# Patient Record
Sex: FEMALE | Race: WHITE | Employment: FULL TIME | URBAN - METROPOLITAN AREA
[De-identification: names, ages, dates, MRNs, and addresses within clinical notes are randomized per-mention and may not be internally consistent; named-entity substitution may affect disease eponyms.]

---

## 2017-01-27 DIAGNOSIS — K21.00 GASTROESOPHAGEAL REFLUX DISEASE WITH ESOPHAGITIS: ICD-10-CM

## 2017-01-27 RX ORDER — OMEPRAZOLE 40 MG/1
CAPSULE, DELAYED RELEASE ORAL
Qty: 30 CAPSULE | Refills: 5 | Status: SHIPPED | OUTPATIENT
Start: 2017-01-27 | End: 2020-03-09 | Stop reason: ALTCHOICE

## 2017-10-10 ENCOUNTER — TELEPHONE (OUTPATIENT)
Dept: INTERNAL MEDICINE CLINIC | Age: 52
End: 2017-10-10

## 2017-10-10 DIAGNOSIS — Z12.39 BREAST CANCER SCREENING: Primary | ICD-10-CM

## 2017-10-10 DIAGNOSIS — Z12.11 COLON CANCER SCREENING: ICD-10-CM

## 2017-10-10 NOTE — TELEPHONE ENCOUNTER
Patient received a letter that she needs to set up a mammogram and a colonoscopy. Please place order for mammogram for [facility] and refer her for her colonoscopy.

## 2017-11-13 ENCOUNTER — OFFICE VISIT (OUTPATIENT)
Dept: GASTROENTEROLOGY | Age: 52
End: 2017-11-13
Payer: COMMERCIAL

## 2017-11-13 VITALS
DIASTOLIC BLOOD PRESSURE: 84 MMHG | SYSTOLIC BLOOD PRESSURE: 167 MMHG | HEART RATE: 68 BPM | TEMPERATURE: 97.3 F | BODY MASS INDEX: 38.46 KG/M2 | RESPIRATION RATE: 14 BRPM | OXYGEN SATURATION: 99 % | HEIGHT: 62 IN | WEIGHT: 209 LBS

## 2017-11-13 DIAGNOSIS — R19.7 DIARRHEA, UNSPECIFIED TYPE: ICD-10-CM

## 2017-11-13 DIAGNOSIS — K21.9 GASTROESOPHAGEAL REFLUX DISEASE WITHOUT ESOPHAGITIS: Primary | ICD-10-CM

## 2017-11-13 PROCEDURE — 99244 OFF/OP CNSLTJ NEW/EST MOD 40: CPT | Performed by: INTERNAL MEDICINE

## 2017-11-13 RX ORDER — SUCRALFATE 1 G/1
1 TABLET ORAL 4 TIMES DAILY
Qty: 120 TABLET | Refills: 3 | Status: SHIPPED | OUTPATIENT
Start: 2017-11-13 | End: 2020-03-09

## 2017-11-13 RX ORDER — OMEPRAZOLE 20 MG/1
20 TABLET, DELAYED RELEASE ORAL DAILY
COMMUNITY
End: 2020-03-09 | Stop reason: ALTCHOICE

## 2017-11-13 RX ORDER — POLYETHYLENE GLYCOL 3350 17 G/17G
POWDER, FOR SOLUTION ORAL
Qty: 255 G | Refills: 0 | Status: SHIPPED | OUTPATIENT
Start: 2017-11-13 | End: 2017-12-13

## 2017-11-13 ASSESSMENT — ENCOUNTER SYMPTOMS
ABDOMINAL DISTENTION: 1
ABDOMINAL PAIN: 1
COUGH: 0
SHORTNESS OF BREATH: 0
DIARRHEA: 1
BLOOD IN STOOL: 0
WHEEZING: 0
ANAL BLEEDING: 0
NAUSEA: 0
VOMITING: 1
BACK PAIN: 1
CONSTIPATION: 0
RECTAL PAIN: 0
ALLERGIC/IMMUNOLOGIC NEGATIVE: 1
RESPIRATORY NEGATIVE: 1

## 2017-11-13 NOTE — PROGRESS NOTES
Hematological: Bruises/bleeds easily. Psychiatric/Behavioral: Positive for sleep disturbance. The patient is nervous/anxious. Objective:   Physical Exam   Constitutional: She is oriented to person, place, and time. She appears well-developed and well-nourished. No distress. HENT:   Head: Normocephalic. Mouth/Throat: No oropharyngeal exudate. Eyes: Pupils are equal, round, and reactive to light. No scleral icterus. Neck: Normal range of motion. Neck supple. No JVD present. No tracheal deviation present. No thyromegaly present. Cardiovascular: Normal rate, regular rhythm, normal heart sounds and intact distal pulses. No murmur heard. Pulmonary/Chest: Effort normal and breath sounds normal. No respiratory distress. She has no wheezes. Abdominal: Soft. Bowel sounds are normal. She exhibits no distension. There is no tenderness. There is no rebound and no guarding. No ascites   Musculoskeletal: Normal range of motion. She exhibits no edema. Neurological: She is alert and oriented to person, place, and time. She has normal reflexes. Skin: Skin is warm. No rash noted. She is not diaphoretic. No erythema. No pallor. She is not diaphoretic   Psychiatric: She has a normal mood and affect. Her behavior is normal. Judgment and thought content normal.   Nursing note and vitals reviewed. Assessment:      1. Gastroesophageal reflux disease without esophagitis  EGD   2.  Diarrhea, unspecified type  COLONOSCOPY W/ OR W/O BIOPSY           Plan:      Carafate tabsHopefully will be covered with her insurance,   continue Prilosec, and proceed with the EGD and colonoscopy

## 2017-11-16 ENCOUNTER — HOSPITAL ENCOUNTER (OUTPATIENT)
Age: 52
Setting detail: OUTPATIENT SURGERY
Discharge: HOME | End: 2017-11-16
Attending: INTERNAL MEDICINE | Admitting: INTERNAL MEDICINE
Payer: COMMERCIAL

## 2017-11-16 VITALS
SYSTOLIC BLOOD PRESSURE: 120 MMHG | HEART RATE: 70 BPM | WEIGHT: 202 LBS | BODY MASS INDEX: 37.17 KG/M2 | DIASTOLIC BLOOD PRESSURE: 72 MMHG | OXYGEN SATURATION: 97 % | HEIGHT: 62 IN | TEMPERATURE: 97.7 F | RESPIRATION RATE: 16 BRPM

## 2017-11-16 PROCEDURE — 7100000010 HC PHASE II RECOVERY - FIRST 15 MIN: Performed by: INTERNAL MEDICINE

## 2017-11-16 PROCEDURE — 3609010300 HC COLONOSCOPY W/BIOPSY SINGLE/MULTIPLE: Performed by: INTERNAL MEDICINE

## 2017-11-16 PROCEDURE — 2580000003 HC RX 258: Performed by: INTERNAL MEDICINE

## 2017-11-16 PROCEDURE — 43239 EGD BIOPSY SINGLE/MULTIPLE: CPT | Performed by: INTERNAL MEDICINE

## 2017-11-16 PROCEDURE — 88305 TISSUE EXAM BY PATHOLOGIST: CPT

## 2017-11-16 PROCEDURE — 6360000002 HC RX W HCPCS: Performed by: INTERNAL MEDICINE

## 2017-11-16 PROCEDURE — 3609012400 HC EGD TRANSORAL BIOPSY SINGLE/MULTIPLE: Performed by: INTERNAL MEDICINE

## 2017-11-16 PROCEDURE — 45380 COLONOSCOPY AND BIOPSY: CPT | Performed by: INTERNAL MEDICINE

## 2017-11-16 PROCEDURE — 7100000011 HC PHASE II RECOVERY - ADDTL 15 MIN: Performed by: INTERNAL MEDICINE

## 2017-11-16 PROCEDURE — 6370000000 HC RX 637 (ALT 250 FOR IP): Performed by: INTERNAL MEDICINE

## 2017-11-16 PROCEDURE — 99152 MOD SED SAME PHYS/QHP 5/>YRS: CPT | Performed by: INTERNAL MEDICINE

## 2017-11-16 PROCEDURE — 99153 MOD SED SAME PHYS/QHP EA: CPT | Performed by: INTERNAL MEDICINE

## 2017-11-16 RX ORDER — MIDAZOLAM HYDROCHLORIDE 1 MG/ML
INJECTION INTRAMUSCULAR; INTRAVENOUS PRN
Status: DISCONTINUED | OUTPATIENT
Start: 2017-11-16 | End: 2017-11-16 | Stop reason: HOSPADM

## 2017-11-16 RX ORDER — SODIUM CHLORIDE, SODIUM LACTATE, POTASSIUM CHLORIDE, CALCIUM CHLORIDE 600; 310; 30; 20 MG/100ML; MG/100ML; MG/100ML; MG/100ML
INJECTION, SOLUTION INTRAVENOUS CONTINUOUS
Status: DISCONTINUED | OUTPATIENT
Start: 2017-11-16 | End: 2017-11-16 | Stop reason: HOSPADM

## 2017-11-16 RX ORDER — MEPERIDINE HYDROCHLORIDE 50 MG/ML
INJECTION INTRAMUSCULAR; INTRAVENOUS; SUBCUTANEOUS PRN
Status: DISCONTINUED | OUTPATIENT
Start: 2017-11-16 | End: 2017-11-16 | Stop reason: HOSPADM

## 2017-11-16 RX ADMIN — SODIUM CHLORIDE, POTASSIUM CHLORIDE, SODIUM LACTATE AND CALCIUM CHLORIDE: 600; 310; 30; 20 INJECTION, SOLUTION INTRAVENOUS at 10:19

## 2017-11-16 ASSESSMENT — PAIN - FUNCTIONAL ASSESSMENT: PAIN_FUNCTIONAL_ASSESSMENT: 0-10

## 2017-11-16 ASSESSMENT — PAIN SCALES - GENERAL: PAINLEVEL_OUTOF10: 0

## 2017-11-16 NOTE — H&P
Neuropsychiatric:  No referable complaints. GENERAL PHYSICAL EXAM:     Vitals: BP (!) 139/91   Pulse 73   Temp 97.5 °F (36.4 °C) (Oral)   Resp 16   Ht 5' 2\" (1.575 m)   Wt 202 lb (91.6 kg)   SpO2 98%   BMI 36.95 kg/m²  Body mass index is 36.95 kg/m². GENERAL APPEARANCE:   Thien Sims is 47 y.o.,  female, moderately obese, nourished, conscious, alert. Does not appear to be distress or pain at this time. SKIN:  Warm, dry, no cyanosis or jaundice. HEAD:  Normocephalic, atraumatic, no swelling or tenderness. EYES:  Pupils equal, reactive to light. EARS:  No discharge, no marked hearing loss. NOSE:  No rhinorrhea, epistaxis or septal deformity. THROAT:  Not congested. No ulceration bleeding or discharge. NECK:  No stiffness, trachea central.  No palpable masses or L.N.                 CHEST:  Symmetrical and equal on expansion. HEART:  RRR S1 > S2. No audible murmurs or gallops. LUNGS:  Equal on expansion, normal breath sounds. No adventitious sounds. ABDOMEN:  Bowel sounds are absent. Obese. Soft on palpation. No localized tenderness. No guarding or rigidity. No palpable organomegaly. LYMPHATICS:  No palpable cervical lymphadenopathy. LOCOMOTOR, BACK AND SPINE:  No tenderness or deformities. EXTREMITIES:  Symmetrical, no pedal edema. Trents sign negative. No discoloration or ulcerations. NEUROLOGIC:  The patient is conscious, alert, oriented, No apparent focal sensory or motor deficits. PROVISIONAL DIAGNOSES / SURGERY:      Diarrhea. EGD, Colonoscopy.     Patient Active Problem List    Diagnosis Date Noted    Diarrhea 11/13/2017    Bulge of lumbar disc without myelopathy 10/16/2014    Drug-seeking behavior 10/16/2014    Opioid dependence ([facility] [address] 10/16/2014    Chronic back pain greater than 3 months duration 05/27/2014    Gastroesophageal reflux disease without esophagitis 05/27/2014    Suprapubic tenderness 05/27/2014    Urinary frequency 05/27/2014    FANTA (generalized anxiety disorder) 05/27/2014           Alisa Hooper PA-C on 11/16/2017 at 10:24 AM

## 2017-11-16 NOTE — OP NOTE
PROCEDURE NOTE    DATE OF PROCEDURE: 11/16/2017    SURGEON: Ciara Valentine MD  Facility : [address] [facility]  ASSISTANT: None  Anesthesia: Demerol 125 mg IV, Versed 5 mg IV  PREOPERATIVE DIAGNOSIS:   Diarrhea     POSTOPERATIVE DIAGNOSIS: as described below    OPERATION: Total colonoscopy     ANESTHESIA: Moderate Sedation    ESTIMATED BLOOD LOSS: less than 50     COMPLICATIONS: None. SPECIMENS:  Was Obtained:   Tiny cecal polyp , 5 mm , cold bx forceps     HISTORY: The patient is a 47y.o. year old female with history of above preop diagnosis. I recommended colonoscopy with possible biopsy or polypectomy and I explained the risk, benefits, expected outcome, and alternatives to the procedure. Risks included but are not limited to bleeding, infection, respiratory distress, hypotension, and perforation of the colon and possibility of missing a lesion. The patient understands and is in agreement. PROCEDURE: The patient was given IV conscious sedation. The patient's SPO2 remained above 90% throughout the procedure. The colonoscope was inserted per rectum and advanced under direct vision to the cecum without difficulty. The prep was good. Findings:  Terminal ileum: normal    Cecum/Ascending colon: abnormal: Tiny cecal polyp , 5 mm , cold bx forceps     Transverse colon: normal    Descending/Sigmoid colon: abnormal: diverticulosis     Rectum/Anus: examined in normal and retroflexed positions and was normal    Withdrawal Time was (minutes): 10    The colon was decompressed and the scope was removed. The patient tolerated the procedure well. Recommendations/Plan:   1. Lifestyle and dietary modifications as discussed  2. F/U Biopsies  3. F/U In OfficeYes  4. Discussed with the family  5.  Repeat colonoscopy [id number]    Electronically signed by Ciara Valentine MD  on 11/16/2017 at 11:39 AM

## 2017-11-17 LAB — SURGICAL PATHOLOGY REPORT: NORMAL

## 2017-12-05 DIAGNOSIS — R19.7 DIARRHEA, UNSPECIFIED TYPE: ICD-10-CM

## 2017-12-28 ENCOUNTER — TELEPHONE (OUTPATIENT)
Dept: INTERNAL MEDICINE CLINIC | Age: 52
End: 2017-12-28

## 2018-03-27 ENCOUNTER — OFFICE VISIT (OUTPATIENT)
Dept: FAMILY MEDICINE CLINIC | Age: 53
End: 2018-03-27

## 2018-03-27 VITALS
TEMPERATURE: 98.6 F | RESPIRATION RATE: 16 BRPM | HEIGHT: 62 IN | SYSTOLIC BLOOD PRESSURE: 144 MMHG | HEART RATE: 82 BPM | WEIGHT: 215 LBS | DIASTOLIC BLOOD PRESSURE: 88 MMHG | OXYGEN SATURATION: 98 % | BODY MASS INDEX: 39.56 KG/M2

## 2018-03-27 DIAGNOSIS — J20.9 ACUTE BRONCHITIS, UNSPECIFIED ORGANISM: ICD-10-CM

## 2018-03-27 DIAGNOSIS — S00.82XA: Primary | ICD-10-CM

## 2018-03-27 DIAGNOSIS — L08.9: Primary | ICD-10-CM

## 2018-03-27 DIAGNOSIS — J06.9 UPPER RESPIRATORY TRACT INFECTION, UNSPECIFIED TYPE: ICD-10-CM

## 2018-03-27 PROCEDURE — 99212 OFFICE O/P EST SF 10 MIN: CPT | Performed by: FAMILY MEDICINE

## 2018-03-27 RX ORDER — FLUCONAZOLE 150 MG/1
150 TABLET ORAL ONCE
Qty: 1 TABLET | Refills: 0 | Status: SHIPPED | OUTPATIENT
Start: 2018-03-27 | End: 2018-03-27

## 2018-03-27 RX ORDER — SULFAMETHOXAZOLE AND TRIMETHOPRIM 800; 160 MG/1; MG/1
1 TABLET ORAL 2 TIMES DAILY
Qty: 20 TABLET | Refills: 0 | Status: SHIPPED | OUTPATIENT
Start: 2018-03-27 | End: 2018-04-06

## 2018-03-27 RX ORDER — ACYCLOVIR 400 MG/1
400 TABLET ORAL 2 TIMES DAILY
Qty: 10 TABLET | Refills: 1 | Status: SHIPPED | OUTPATIENT
Start: 2018-03-27 | End: 2018-04-01

## 2018-03-27 RX ORDER — CEPHALEXIN 500 MG/1
500 CAPSULE ORAL 3 TIMES DAILY
Qty: 30 CAPSULE | Refills: 0 | Status: SHIPPED | OUTPATIENT
Start: 2018-03-27 | End: 2018-04-06

## 2018-03-27 ASSESSMENT — ENCOUNTER SYMPTOMS
SORE THROAT: 0
SHORTNESS OF BREATH: 0
COUGH: 1
RHINORRHEA: 1
SINUS PAIN: 0
TROUBLE SWALLOWING: 0
CHEST TIGHTNESS: 0
SINUS PRESSURE: 0
VOMITING: 0
EYE PAIN: 0
ABDOMINAL PAIN: 0
EYE REDNESS: 0
NAUSEA: 0
WHEEZING: 0
EYE DISCHARGE: 1
DIARRHEA: 1
BACK PAIN: 1
VOICE CHANGE: 1
EYE ITCHING: 0

## 2018-03-27 NOTE — PATIENT INSTRUCTIONS
later. If you notice any problems or new symptoms, get medical treatment right away. Follow-up care is a key part of your treatment and safety. Be sure to make and go to all appointments, and call your doctor if you are having problems. It's also a good idea to know your test results and keep a list of the medicines you take. How can you care for yourself at home? · To prevent dehydration, drink plenty of fluids, enough so that your urine is light yellow or clear like water. Choose water and other caffeine-free clear liquids until you feel better. If you have kidney, heart, or liver disease and have to limit fluids, talk with your doctor before you increase the amount of fluids you drink. · Take an over-the-counter pain medicine, such as acetaminophen (Tylenol), ibuprofen (Advil, Motrin), or naproxen (Aleve). Read and follow all instructions on the label. · Before you use cough and cold medicines, check the label. These medicines may not be safe for young children or for people with certain health problems. · Be careful when taking over-the-counter cold or flu medicines and Tylenol at the same time. Many of these medicines have acetaminophen, which is Tylenol. Read the labels to make sure that you are not taking more than the recommended dose. Too much acetaminophen (Tylenol) can be harmful. · Get plenty of rest.  · Do not smoke or allow others to smoke around you. If you need help quitting, talk to your doctor about stop-smoking programs and medicines. These can increase your chances of quitting for good. When should you call for help? Call 911 anytime you think you may need emergency care. For example, call if:  ? · You have severe trouble breathing. ?Call your doctor now or seek immediate medical care if:  ? · You seem to be getting much sicker. ? · You have new or worse trouble breathing. ? · You have a new or higher fever. ? · You have a new rash. ? Watch closely for changes in your health, yourself at home? · Take your antibiotics as directed. Do not stop taking them just because you feel better. You need to take the full course of antibiotics. · Prop up the infected area on pillows to reduce pain and swelling. Try to keep the area above the level of your heart as often as you can. · If your doctor told you how to care for your wound, follow your doctor's instructions. If you did not get instructions, follow this general advice:  ¨ Wash the wound with clean water 2 times a day. Don't use hydrogen peroxide or alcohol, which can slow healing. ¨ You may cover the wound with a thin layer of petroleum jelly, such as Vaseline, and a nonstick bandage. ¨ Apply more petroleum jelly and replace the bandage as needed. · Be safe with medicines. Take pain medicines exactly as directed. ¨ If the doctor gave you a prescription medicine for pain, take it as prescribed. ¨ If you are not taking a prescription pain medicine, ask your doctor if you can take an over-the-counter medicine. To prevent cellulitis in the future  · Try to prevent cuts, scrapes, or other injuries to your skin. Cellulitis most often occurs where there is a break in the skin. · If you get a scrape, cut, mild burn, or bite, wash the wound with clean water as soon as you can to help avoid infection. Don't use hydrogen peroxide or alcohol, which can slow healing. · If you have swelling in your legs (edema), support stockings and good skin care may help prevent leg sores and cellulitis. · Take care of your feet, especially if you have diabetes or other conditions that increase the risk of infection. Wear shoes and socks. Do not go barefoot. If you have athlete's foot or other skin problems on your feet, talk to your doctor about how to treat them. When should you call for help?   Call your doctor now or seek immediate medical care if:  ? · You have signs that your infection is getting worse, such as:  ¨ Increased pain, swelling, warmth, or redness. ¨ Red streaks leading from the area. ¨ Pus draining from the area. ¨ A fever. ? · You get a rash. ? Watch closely for changes in your health, and be sure to contact your doctor if:  ? · You are not getting better after 1 day (24 hours). ? · You do not get better as expected. Where can you learn more? Go to [email or web address].[email or web address]. org and sign in to your [employer] account. Enter A132 in the StarGreetz box to learn more about \"Cellulitis: Care Instructions. \"     If you do not have an account, please click on the \"Sign Up Now\" link. Current as of: October 13, 2016  Content Version: 11.5  © 0099-9123 [employer], [employer]orporated. Care instructions adapted under license by [facility] ([facility]). If you have questions about a medical condition or this instruction, always ask your healthcare professional. [address] any warranty or liability for your use of this information. Please go to the Emergency dept if you get worse.

## 2018-03-27 NOTE — PROGRESS NOTES
Breast Cancer Paternal Aunt     Cancer Maternal Cousin        Social History   Substance Use Topics    Smoking status: Former Smoker     Packs/day: 0.50     Years: 3.00     Types: Cigarettes     Quit date: 1/1/1988    Smokeless tobacco: Never Used    Alcohol use Yes      Comment: occasionally      Current Outpatient Prescriptions   Medication Sig Dispense Refill    mupirocin (BACTROBAN) 2 % ointment Apply topically 3 times daily. 1 Tube 0    cephALEXin (KEFLEX) 500 MG capsule Take 1 capsule by mouth 3 times daily for 10 days 30 capsule 0    sulfamethoxazole-trimethoprim (BACTRIM DS) 800-160 MG per tablet Take 1 tablet by mouth 2 times daily for 10 days 20 tablet 0    fluconazole (DIFLUCAN) 150 MG tablet Take 1 tablet by mouth once for 1 dose 1 tablet 0    acyclovir (ZOVIRAX) 400 MG tablet Take 1 tablet by mouth 2 times daily for 5 days 10 tablet 1    tiZANidine (ZANAFLEX) 2 MG tablet Take 2 mg by mouth 2 times daily.  HYDROcodone-acetaminophen (NORCO) 5-325 MG per tablet Take 1 tablet by mouth every 6 hours as needed for Pain.  omeprazole (PRILOSEC OTC) 20 MG tablet Take 20 mg by mouth daily      sucralfate (CARAFATE) 1 GM tablet Take 1 tablet by mouth 4 times daily 120 tablet 3    omeprazole (PRILOSEC) 40 MG delayed release capsule TAKE 1 CAPSULE BY MOUTH ONCE A DAY 30 capsule 5    vitamin D (ERGOCALCIFEROL) 79045 UNITS CAPS capsule Take 1 capsule by mouth once a week For 12 weeks 12 capsule 0    metoclopramide (REGLAN) 10 MG tablet Take 1 tablet by mouth 3 times daily (with meals) 120 tablet 3     No current facility-administered medications for this visit.       No Known Allergies    Health Maintenance   Topic Date Due    HIV screen  08/21/1981    DTaP/Tdap/Td vaccine (1 - Tdap) 08/21/1985    Shingles Vaccine (1 of 2 - 2 Dose Series) 08/21/2016    Cervical cancer screen  01/20/2017    Breast cancer screen  02/14/2017    Flu vaccine (1) 09/01/2017    Lipid screen  03/23/2021    Colon

## 2018-08-15 ENCOUNTER — OFFICE VISIT (OUTPATIENT)
Dept: FAMILY MEDICINE CLINIC | Age: 53
End: 2018-08-15

## 2018-08-15 VITALS
OXYGEN SATURATION: 98 % | HEART RATE: 80 BPM | WEIGHT: 216 LBS | DIASTOLIC BLOOD PRESSURE: 83 MMHG | BODY MASS INDEX: 42.41 KG/M2 | TEMPERATURE: 98 F | SYSTOLIC BLOOD PRESSURE: 136 MMHG | RESPIRATION RATE: 16 BRPM | HEIGHT: 60 IN

## 2018-08-15 DIAGNOSIS — J06.0 SORE THROAT AND LARYNGITIS: Primary | ICD-10-CM

## 2018-08-15 LAB — S PYO AG THROAT QL: NORMAL

## 2018-08-15 PROCEDURE — 99212 OFFICE O/P EST SF 10 MIN: CPT | Performed by: FAMILY MEDICINE

## 2018-08-15 PROCEDURE — 87880 STREP A ASSAY W/OPTIC: CPT | Performed by: FAMILY MEDICINE

## 2018-08-15 RX ORDER — FLUTICASONE PROPIONATE 50 MCG
1 SPRAY, SUSPENSION (ML) NASAL 2 TIMES DAILY
Qty: 1 BOTTLE | Refills: 2 | Status: SHIPPED | OUTPATIENT
Start: 2018-08-15 | End: 2020-03-09 | Stop reason: ALTCHOICE

## 2018-08-15 RX ORDER — AMOXICILLIN 875 MG/1
875 TABLET, COATED ORAL 2 TIMES DAILY
Qty: 20 TABLET | Refills: 0 | Status: SHIPPED | OUTPATIENT
Start: 2018-08-15 | End: 2018-08-25

## 2018-08-15 RX ORDER — GUAIFENESIN 600 MG/1
600 TABLET, EXTENDED RELEASE ORAL 2 TIMES DAILY
Qty: 20 TABLET | Refills: 0 | Status: SHIPPED | OUTPATIENT
Start: 2018-08-15 | End: 2018-08-25

## 2018-08-15 ASSESSMENT — PATIENT HEALTH QUESTIONNAIRE - PHQ9
SUM OF ALL RESPONSES TO PHQ9 QUESTIONS 1 & 2: 0
SUM OF ALL RESPONSES TO PHQ QUESTIONS 1-9: 0
SUM OF ALL RESPONSES TO PHQ QUESTIONS 1-9: 0
2. FEELING DOWN, DEPRESSED OR HOPELESS: 0
1. LITTLE INTEREST OR PLEASURE IN DOING THINGS: 0

## 2018-08-15 ASSESSMENT — ENCOUNTER SYMPTOMS
RESPIRATORY NEGATIVE: 1
SWOLLEN GLANDS: 1
ALLERGIC/IMMUNOLOGIC NEGATIVE: 1
SINUS PAIN: 1
SINUS PRESSURE: 1
GASTROINTESTINAL NEGATIVE: 1
EYES NEGATIVE: 1

## 2018-08-15 NOTE — PROGRESS NOTES
[address] [facility]   [address] [facility]  [address] [facility] [address]  Dept: [phone]  Dept Fax: [phone]    Dolores Casarez is a 47 y.o. female who presents today for her medical conditions/complaints as noted below. Dolores Casarez is c/o of   Chief Complaint   Patient presents with    Pharyngitis    Otalgia         HPI:     This patient is a 64-year-old white female presents today with a sore throat. She has a past medical history significant for chronic pain and gastroesophageal reflux disease. Patient states her throat has been sore for the past couple days and there has been ill contacts. There is postnasal drip and drainage along with cough. We will evaluate and treat. Pharyngitis   This is a new problem. The current episode started in the past 7 days. Associated symptoms include congestion and swollen glands. Pertinent negatives include no fatigue. The symptoms are aggravated by drinking and eating.        Hemoglobin A1C (%)   Date Value   03/23/2016 5.5   05/03/2013 5.4             ( goal A1C is < 7)   No results found for: LABMICR  LDL Cholesterol (mg/dL)   Date Value   03/23/2016 122   04/12/2014 134 (H)   05/03/2013 119 (H)       (goal LDL is <100)   AST (U/L)   Date Value   03/23/2016 11     ALT (U/L)   Date Value   03/23/2016 14     BUN (mg/dL)   Date Value   03/23/2016 12     BP Readings from Last 3 Encounters:   08/15/18 136/83   03/27/18 (!) 144/88   11/16/17 120/72          (goal 120/80)    Past Medical History:   Diagnosis Date    Chronic back pain     Diarrhea     Distal radius fracture 1/28/13    GERD (gastroesophageal reflux disease)     History of depression       Past Surgical History:   Procedure Laterality Date    BLADDER SUSPENSION      FRACTURE SURGERY  2/7/2013    right distial radius orif    HYSTERECTOMY      has ovaries    KNEE ARTHROSCOPY      right    HI COLONOSCOPY W/BIOPSY SINGLE/MULTIPLE N/A 11/16/2017 COLONOSCOPY WITH BIOPSY OF POLYP performed by Jose Medley MD at [address] [place] [address] EGD TRANSORAL BIOPSY SINGLE/MULTIPLE N/A 11/16/2017    EGD BIOPSY performed by Jose Medley MD at [place] [address]         Family History   Problem Relation Age of Onset    High Blood Pressure Mother     Depression Mother     Heart Surgery Father     Other Brother         scoliosis    Colon Cancer Maternal Grandfather     COPD Maternal Grandfather     Cancer Maternal Grandfather         lung    Breast Cancer Paternal Aunt     Cancer Maternal Cousin        Social History   Substance Use Topics    Smoking status: Former Smoker     Packs/day: 0.50     Years: 3.00     Types: Cigarettes     Quit date: 1/1/1988    Smokeless tobacco: Never Used    Alcohol use Yes      Comment: occasionally      Current Outpatient Prescriptions   Medication Sig Dispense Refill    amoxicillin (AMOXIL) 875 MG tablet Take 1 tablet by mouth 2 times daily for 10 days 20 tablet 0    fluticasone (FLONASE) 50 MCG/ACT nasal spray 1 spray by Nasal route 2 times daily for 14 days 1 Bottle 2    guaiFENesin (MUCINEX) 600 MG extended release tablet Take 1 tablet by mouth 2 times daily for 10 days 20 tablet 0    mupirocin (BACTROBAN) 2 % ointment Apply topically 3 times daily. 1 Tube 0    omeprazole (PRILOSEC OTC) 20 MG tablet Take 20 mg by mouth daily      sucralfate (CARAFATE) 1 GM tablet Take 1 tablet by mouth 4 times daily 120 tablet 3    omeprazole (PRILOSEC) 40 MG delayed release capsule TAKE 1 CAPSULE BY MOUTH ONCE A DAY 30 capsule 5    vitamin D (ERGOCALCIFEROL) 75293 UNITS CAPS capsule Take 1 capsule by mouth once a week For 12 weeks 12 capsule 0    metoclopramide (REGLAN) 10 MG tablet Take 1 tablet by mouth 3 times daily (with meals) 120 tablet 3    tiZANidine (ZANAFLEX) 2 MG tablet Take 2 mg by mouth 2 times daily.  HYDROcodone-acetaminophen (NORCO) 5-325 MG per tablet Take 1 tablet by mouth every 6 hours as needed for Pain.

## 2020-02-20 ENCOUNTER — OFFICE VISIT (OUTPATIENT)
Dept: FAMILY MEDICINE CLINIC | Age: 55
End: 2020-02-20
Payer: COMMERCIAL

## 2020-02-20 VITALS
BODY MASS INDEX: 37.74 KG/M2 | TEMPERATURE: 99.3 F | OXYGEN SATURATION: 98 % | DIASTOLIC BLOOD PRESSURE: 85 MMHG | SYSTOLIC BLOOD PRESSURE: 130 MMHG | HEART RATE: 73 BPM | HEIGHT: 63 IN | WEIGHT: 213 LBS

## 2020-02-20 PROCEDURE — 99212 OFFICE O/P EST SF 10 MIN: CPT | Performed by: FAMILY MEDICINE

## 2020-02-20 RX ORDER — FLUCONAZOLE 150 MG/1
150 TABLET ORAL ONCE
Qty: 1 TABLET | Refills: 0 | Status: SHIPPED | OUTPATIENT
Start: 2020-02-20 | End: 2020-02-20

## 2020-02-20 RX ORDER — NAPROXEN 500 MG/1
500 TABLET ORAL 2 TIMES DAILY WITH MEALS
COMMUNITY
End: 2020-07-16 | Stop reason: ALTCHOICE

## 2020-02-20 RX ORDER — AMOXICILLIN 500 MG/1
500 TABLET, FILM COATED ORAL 3 TIMES DAILY
Qty: 30 TABLET | Refills: 0 | Status: SHIPPED | OUTPATIENT
Start: 2020-02-20 | End: 2020-03-01

## 2020-02-20 ASSESSMENT — ENCOUNTER SYMPTOMS
DIARRHEA: 0
EYE REDNESS: 0
RHINORRHEA: 0
SINUS PRESSURE: 1
SINUS PAIN: 1
EYE PAIN: 0
SINUS COMPLAINT: 1
WHEEZING: 0
VOMITING: 0
NAUSEA: 0
HOARSE VOICE: 1
CHEST TIGHTNESS: 0
BACK PAIN: 1
EYE ITCHING: 0
COUGH: 1
ABDOMINAL PAIN: 0
SHORTNESS OF BREATH: 0
SORE THROAT: 1
SWOLLEN GLANDS: 0

## 2020-02-20 ASSESSMENT — PATIENT HEALTH QUESTIONNAIRE - PHQ9: DEPRESSION UNABLE TO ASSESS: PT REFUSES

## 2020-02-20 NOTE — PROGRESS NOTES
[address] [facility]   [facility] Sal[facility]  Dept: [phone]  Dept Fax: [phone]    Roger Harrison is a 49 y.o. female who presents today for her medicalconditions/complaints as noted below. 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 Sinus Problem   This is a new problem. The current episode started in the past 7 days (2 days). The problem has been gradually worsening since onset. There has been no fever. Her pain is at a severity of 6/10. The pain is moderate. Associated symptoms include chills, congestion, coughing, ear pain, headaches, a hoarse voice, sinus pressure, sneezing and a sore throat. Pertinent negatives include no diaphoresis, neck pain, shortness of breath or swollen glands. (Occasional cough, mild sore throat.) Past treatments include nothing.        Past Medical History:   Diagnosis Date    Chronic back pain     Diarrhea     Distal radius fracture 1/28/13    GERD (gastroesophageal reflux disease)     History of depression       Past Surgical History:   Procedure Laterality Date    BLADDER SUSPENSION      FRACTURE SURGERY  2/7/2013    right distial radius orif    HYSTERECTOMY      has ovaries    KNEE ARTHROSCOPY      right    [place] COLONOSCOPY W/BIOPSY SINGLE/MULTIPLE N/A 11/16/2017    COLONOSCOPY WITH BIOPSY OF POLYP performed by Robert Carias MD at [address] [place] [address] EGD TRANSORAL BIOPSY SINGLE/MULTIPLE N/A 11/16/2017    EGD BIOPSY performed by Robert Carias MD at [facility]         Family History   Problem Relation Age of Onset    High Blood Pressure Mother     Depression Mother     Heart Surgery Father     Other Brother         scoliosis    Colon Cancer Maternal Grandfather     COPD Maternal Grandfather     Cancer Maternal Grandfather         lung    Breast Cancer Paternal Aunt     Cancer Maternal Cousin        Social History     Tobacco Use    Smoking status: Former Smoker     Packs/day: 0.50     Years: 3.00     Pack years: 1.50     Types: Cigarettes     Last attempt to quit: 1988     Years since quittin.1    Smokeless tobacco: Never Used   Substance Use Topics    Alcohol use: Yes     Comment: occasionally      Current Outpatient Medications   Medication Sig Dispense Refill    naproxen (NAPROSYN) 500 MG tablet Take 500 mg by mouth 2 times daily (with meals)      fluconazole (DIFLUCAN) 150 MG tablet Take 1 tablet by mouth once for 1 dose 1 tablet 0    Amoxicillin 500 MG TABS Take 500 mg by mouth 3 times daily for 10 days 30 tablet 0    tiZANidine (ZANAFLEX) 2 MG tablet Take 2 mg by mouth 2 times daily.  HYDROcodone-acetaminophen (NORCO) 5-325 MG per tablet Take 1 tablet by mouth every 6 hours as needed for Pain.  fluticasone (FLONASE) 50 MCG/ACT nasal spray 1 spray by Nasal route 2 times daily for 14 days 1 Bottle 2    mupirocin (BACTROBAN) 2 % ointment Apply topically 3 times daily. 1 Tube 0    omeprazole (PRILOSEC OTC) 20 MG tablet Take 20 mg by mouth daily      sucralfate (CARAFATE) 1 GM tablet Take 1 tablet by mouth 4 times daily (Patient not taking: Reported on 2020) 120 tablet 3    omeprazole (PRILOSEC) 40 MG delayed release capsule TAKE 1 CAPSULE BY MOUTH ONCE A DAY (Patient not taking: Reported on 2020) 30 capsule 5    vitamin D (ERGOCALCIFEROL) 02526 UNITS CAPS capsule Take 1 capsule by mouth once a week For 12 weeks (Patient not taking: Reported on 2020) 12 capsule 0    metoclopramide (REGLAN) 10 MG tablet Take 1 tablet by mouth 3 times daily (with meals) 120 tablet 3     No current facility-administered medications for this visit.       No Known Allergies       Health Maintenance   Topic Date Due    DTaP/Tdap/Td vaccine (1 - Tdap) 1977    HIV screen  1981    Shingles Vaccine (1 of 2) 2016    Cervical cancer screen 01/20/2017    Breast cancer screen  02/14/2017    Diabetes screen  03/23/2019    Flu vaccine (1) 09/01/2019    Lipid screen  03/23/2021    Colon cancer screen colonoscopy  11/16/2022    Hepatitis A vaccine  Aged Out    Hepatitis B vaccine  Aged Out    Hib vaccine  Aged Out    Meningococcal (ACWY) vaccine  Aged Out    Pneumococcal 0-64 years Vaccine  Aged Out       Subjective:      Review of Systems   Constitutional: Positive for chills. Negative for appetite change, diaphoresis and fever. HENT: Positive for congestion, ear pain, hoarse voice, sinus pressure, sinus pain, sneezing and sore throat. Negative for postnasal drip and rhinorrhea. Eyes: Negative for pain, redness, itching and visual disturbance. Eyes water. Respiratory: Positive for cough. Negative for chest tightness, shortness of breath and wheezing. Cardiovascular: Negative for chest pain. Gastrointestinal: Negative for abdominal pain, diarrhea, nausea and vomiting. Genitourinary: Positive for frequency. Negative for dysuria and hematuria. Nocturnal frequency refuses tests. Musculoskeletal: Positive for back pain and myalgias. Negative for neck pain. Chronic back pain. Skin: Negative for rash. Neurological: Positive for headaches. Negative for dizziness and light-headedness. Objective:      Physical Exam  Vitals signs reviewed. Constitutional:       General: She is not in acute distress. Appearance: She is well-developed. She is not diaphoretic. HENT:      Head: Normocephalic. Right Ear: External ear normal. A middle ear effusion is present. Tympanic membrane is erythematous. Left Ear: External ear normal. Tenderness present. No drainage. A middle ear effusion is present. Tympanic membrane is erythematous. Nose: Mucosal edema, congestion and rhinorrhea present. Right Sinus: Maxillary sinus tenderness and frontal sinus tenderness present.       Left Sinus: Maxillary sinus tenderness and frontal sinus tenderness present. Mouth/Throat:      Pharynx: No oropharyngeal exudate. Eyes:      General:         Right eye: No discharge. Left eye: No discharge. Conjunctiva/sclera: Conjunctivae normal.      Pupils: Pupils are equal, round, and reactive to light. Neck:      Musculoskeletal: Normal range of motion and neck supple. Cardiovascular:      Rate and Rhythm: Normal rate and regular rhythm. Heart sounds: Normal heart sounds. No murmur. Pulmonary:      Effort: Pulmonary effort is normal. No respiratory distress. Breath sounds: Normal breath sounds. No wheezing or rales. Abdominal:      General: Bowel sounds are normal. There is no distension. Palpations: Abdomen is soft. There is no mass. Tenderness: There is no abdominal tenderness. Musculoskeletal: Normal range of motion. General: No tenderness. Lymphadenopathy:      Cervical: Cervical adenopathy present. Skin:     General: Skin is warm. Findings: No rash. Neurological:      Mental Status: She is alert and oriented to person, place, and time. Cranial Nerves: No cranial nerve deficit. Coordination: Coordination normal.   Psychiatric:         Behavior: Behavior normal.         Thought Content: Thought content normal.         Judgment: Judgment normal.       /85 (Site: Left Upper Arm, Position: Sitting, Cuff Size: Large Adult)   Pulse 73   Temp 99.3 °F (37.4 °C)   Ht 5' 3\" (1.6 m)   Wt 213 lb (96.6 kg)   SpO2 98%   BMI 37.73 kg/m²     Assessment:       Diagnosis Orders   1. Acute sinusitis, recurrence not specified, unspecified location  fluconazole (DIFLUCAN) 150 MG tablet    Amoxicillin 500 MG TABS   2. Acute otitis media, unspecified otitis media type  fluconazole (DIFLUCAN) 150 MG tablet    Amoxicillin 500 MG TABS   3.  Acute otitis externa of left ear, unspecified type  fluconazole (DIFLUCAN) 150 MG tablet    Amoxicillin 500 MG TABS       Plan: Refuses sretp/flu screens. No orders of the defined types were placed in this encounter. Orders Placed This Encounter   Medications    fluconazole (DIFLUCAN) 150 MG tablet     Sig: Take 1 tablet by mouth once for 1 dose     Dispense:  1 tablet     Refill:  0    Amoxicillin 500 MG TABS     Sig: Take 500 mg by mouth 3 times daily for 10 days     Dispense:  30 tablet     Refill:  0      Patient given educational materials - see patient instructions. Discusseduse, benefit, and side effects of prescribed medications. All patient questionsanswered. Pt voiced understanding. Reviewed health maintenance. Instructed tocontinue current medications, diet and exercise. Patient agreed with treatmentplan. Follow up as directed.      Electronically signed by Keo Kemp MD on 2/20/2020 at 8:56 AM

## 2020-02-20 NOTE — PATIENT INSTRUCTIONS
Patient Education        Swimmer's Ear: Care Instructions  Your Care Instructions    Swimmer's ear (otitis externa) is inflammation or infection of the ear canal. This is the passage that leads from the outer ear to the eardrum. Any water, sand, or other debris that gets into the ear canal and stays there can cause swimmer's ear. Putting cotton swabs or other items in the ear to clean it can also cause this problem. Swimmer's ear can be very painful. But you can treat the pain and infection with medicines. You should feel better in a few days. Follow-up care is a key part of your treatment and safety. Be sure to make and go to all appointments, and call your doctor if you are having problems. It's also a good idea to know your test results and keep a list of the medicines you take. How can you care for yourself at home? Cleaning and care  · Use antibiotic drops as your doctor directs. · Do not insert ear drops (other than the antibiotic ear drops) or anything else into the ear unless your doctor has told you to. · Avoid getting water in the ear until the problem clears up. Use cotton lightly coated with petroleum jelly as an earplug. Do not use plastic earplugs. · Use a hair dryer set on low to carefully dry the ear after you shower. · To ease ear pain, hold a warm washcloth against your ear. · Take pain medicines exactly as directed. ? If the doctor gave you a prescription medicine for pain, take it as prescribed. ? If you are not taking a prescription pain medicine, ask your doctor if you can take an over-the-counter medicine. Inserting ear drops  · Warm the drops to body temperature by rolling the container in your hands. Or you can place it in a cup of warm water for a few minutes. · Lie down, with your ear facing up. · Place drops inside the ear. Follow your doctor's instructions (or the directions on the label) for how many drops to use.  Gently wiggle the outer ear or pull the ear up and back to help the drops get into the ear. · It's important to keep the liquid in the ear canal for 3 to 5 minutes. When should you call for help? Call your doctor now or seek immediate medical care if:    · You have a new or higher fever.     · You have new or worse pain, swelling, warmth, or redness around or behind your ear.     · You have new or increasing pus or blood draining from your ear.    Watch closely for changes in your health, and be sure to contact your doctor if:    · You are not getting better after 2 days (48 hours). Where can you learn more? Go to https://chpepiceweb.Fliplingo. org and sign in to your [employer] account. Enter C706 in the [employer] box to learn more about \"Swimmer's Ear: Care Instructions. \"     If you do not have an account, please click on the \"Sign Up Now\" link. Current as of: July 28, 2019  Content Version: 12.3  © 2891-5677 [employer]. Care instructions adapted under license by [facility] ([facility]). If you have questions about a medical condition or this instruction, always ask your healthcare professional. [place] [address] any warranty or liability for your use of this information. Patient Education        Ear Infection (Otitis Media): Care Instructions  Your Care Instructions    An ear infection may start with a cold and affect the middle ear (otitis media). It can hurt a lot. Most ear infections clear up on their own in a couple of days. Most often you will not need antibiotics. This is because many ear infections are caused by a virus. Antibiotics don't work against a virus. Regular doses of pain medicines are the best way to reduce your fever and help you feel better. Follow-up care is a key part of your treatment and safety. Be sure to make and go to all appointments, and call your doctor if you are having problems. It's also a good idea to know your test results and keep a list of the medicines you take.   How can you care for yourself at home? · Take pain medicines exactly as directed. ? If the doctor gave you a prescription medicine for pain, take it as prescribed. ? If you are not taking a prescription pain medicine, take an over-the-counter medicine, such as acetaminophen (Tylenol), ibuprofen (Advil, Motrin), or naproxen (Aleve). Read and follow all instructions on the label. ? Do not take two or more pain medicines at the same time unless the doctor told you to. Many pain medicines have acetaminophen, which is Tylenol. Too much acetaminophen (Tylenol) can be harmful. · Plan to take a full dose of pain reliever before bedtime. Getting enough sleep will help you get better. · Try a warm, moist washcloth on the ear. It may help relieve pain. · If your doctor prescribed antibiotics, take them as directed. Do not stop taking them just because you feel better. You need to take the full course of antibiotics. When should you call for help? Call your doctor now or seek immediate medical care if:    · You have new or increasing ear pain.     · You have new or increasing pus or blood draining from your ear.     · You have a fever with a stiff neck or a severe headache.    Watch closely for changes in your health, and be sure to contact your doctor if:    · You have new or worse symptoms.     · You are not getting better after taking an antibiotic for 2 days. Where can you learn more? Go to https://Hitsbookpemarueweb.Dynamo Micropower. org and sign in to your [employer] account. Enter T446 in the [facility] box to learn more about \"Ear Infection (Otitis Media): Care Instructions. \"     If you do not have an account, please click on the \"Sign Up Now\" link. Current as of: July 28, 2019  Content Version: 12.3  © 3914-3871 [employer]. Care instructions adapted under license by [address]  If you have questions about a medical condition or this instruction, always ask your healthcare professional. [employer] disclaims any warranty or liability for your use of this information. Patient Education        Sinusitis: Care Instructions  Your Care Instructions    Sinusitis is an infection of the lining of the sinus cavities in your head. Sinusitis often follows a cold. It causes pain and pressure in your head and face. In most cases, sinusitis gets better on its own in 1 to 2 weeks. But some mild symptoms may last for several weeks. Sometimes antibiotics are needed. Follow-up care is a key part of your treatment and safety. Be sure to make and go to all appointments, and call your doctor if you are having problems. It's also a good idea to know your test results and keep a list of the medicines you take. How can you care for yourself at home? · Take an over-the-counter pain medicine, such as acetaminophen (Tylenol), ibuprofen (Advil, Motrin), or naproxen (Aleve). Read and follow all instructions on the label. · If the doctor prescribed antibiotics, take them as directed. Do not stop taking them just because you feel better. You need to take the full course of antibiotics. · Be careful when taking over-the-counter cold or flu medicines and Tylenol at the same time. Many of these medicines have acetaminophen, which is Tylenol. Read the labels to make sure that you are not taking more than the recommended dose. Too much acetaminophen (Tylenol) can be harmful. · Breathe warm, moist air from a steamy shower, a hot bath, or a sink filled with hot water. Avoid cold, dry air. Using a humidifier in your home may help. Follow the directions for cleaning the machine. · Use saline (saltwater) nasal washes to help keep your nasal passages open and wash out mucus and bacteria. You can buy saline nose drops at a grocery store or drugstore. Or you can make your own at home by adding 1 teaspoon of salt and 1 teaspoon of baking soda to 2 cups of distilled water.  If you make your own, fill a bulb syringe with the medical condition or this instruction, always ask your healthcare professional. [place] [address] any warranty or liability for your use of this information.

## 2020-03-09 ENCOUNTER — OFFICE VISIT (OUTPATIENT)
Dept: FAMILY MEDICINE CLINIC | Age: 55
End: 2020-03-09
Payer: COMMERCIAL

## 2020-03-09 ENCOUNTER — HOSPITAL ENCOUNTER (OUTPATIENT)
Age: 55
Setting detail: SPECIMEN
Discharge: HOME | End: 2020-03-09
Payer: COMMERCIAL

## 2020-03-09 VITALS
WEIGHT: 213 LBS | BODY MASS INDEX: 37.74 KG/M2 | DIASTOLIC BLOOD PRESSURE: 80 MMHG | OXYGEN SATURATION: 96 % | HEART RATE: 94 BPM | SYSTOLIC BLOOD PRESSURE: 130 MMHG | HEIGHT: 63 IN

## 2020-03-09 PROBLEM — N39.41 URGE INCONTINENCE: Status: ACTIVE | Noted: 2020-03-09

## 2020-03-09 PROBLEM — R19.7 DIARRHEA: Status: RESOLVED | Noted: 2017-11-13 | Resolved: 2020-03-09

## 2020-03-09 PROBLEM — E55.9 VITAMIN D DEFICIENCY: Status: ACTIVE | Noted: 2020-03-09

## 2020-03-09 LAB
BILIRUBIN, POC: NEGATIVE
BLOOD URINE, POC: NEGATIVE
CLARITY, POC: NORMAL
COLOR, POC: YELLOW
GLUCOSE URINE, POC: NEGATIVE
KETONES, POC: NEGATIVE
LEUKOCYTE EST, POC: NEGATIVE
NITRITE, POC: NEGATIVE
PH, POC: 5.5
PROTEIN, POC: NEGATIVE
SPECIFIC GRAVITY, POC: 1.03
UROBILINOGEN, POC: 0.2

## 2020-03-09 PROCEDURE — 99203 OFFICE O/P NEW LOW 30 MIN: CPT | Performed by: FAMILY MEDICINE

## 2020-03-09 PROCEDURE — 81002 URINALYSIS NONAUTO W/O SCOPE: CPT | Performed by: FAMILY MEDICINE

## 2020-03-09 PROCEDURE — 3017F COLORECTAL CA SCREEN DOC REV: CPT | Performed by: FAMILY MEDICINE

## 2020-03-09 PROCEDURE — 82306 VITAMIN D 25 HYDROXY: CPT

## 2020-03-09 PROCEDURE — G8484 FLU IMMUNIZE NO ADMIN: HCPCS | Performed by: FAMILY MEDICINE

## 2020-03-09 PROCEDURE — G8427 DOCREV CUR MEDS BY ELIG CLIN: HCPCS | Performed by: FAMILY MEDICINE

## 2020-03-09 PROCEDURE — 1036F TOBACCO NON-USER: CPT | Performed by: FAMILY MEDICINE

## 2020-03-09 PROCEDURE — G8417 CALC BMI ABV UP PARAM F/U: HCPCS | Performed by: FAMILY MEDICINE

## 2020-03-09 PROCEDURE — 36415 COLL VENOUS BLD VENIPUNCTURE: CPT

## 2020-03-09 RX ORDER — OXYBUTYNIN CHLORIDE 5 MG/1
5 TABLET, EXTENDED RELEASE ORAL DAILY
Qty: 30 TABLET | Refills: 1 | Status: SHIPPED | OUTPATIENT
Start: 2020-03-09 | End: 2020-05-21 | Stop reason: SDUPTHER

## 2020-03-09 ASSESSMENT — ENCOUNTER SYMPTOMS
BACK PAIN: 1
SHORTNESS OF BREATH: 0
ABDOMINAL PAIN: 0
NAUSEA: 0
SORE THROAT: 0

## 2020-03-09 ASSESSMENT — PATIENT HEALTH QUESTIONNAIRE - PHQ9
SUM OF ALL RESPONSES TO PHQ QUESTIONS 1-9: 0
SUM OF ALL RESPONSES TO PHQ9 QUESTIONS 1 & 2: 0
2. FEELING DOWN, DEPRESSED OR HOPELESS: 0
SUM OF ALL RESPONSES TO PHQ QUESTIONS 1-9: 0
1. LITTLE INTEREST OR PLEASURE IN DOING THINGS: 0

## 2020-03-09 NOTE — PROGRESS NOTES
Subjective:      Patient ID: Hossein Rivers is a 49 y.o. female. Visit Information    Have you changed or started any medications since your last visit including any over-the-counter medicines, vitamins, or herbal medicines? no   Are you having any side effects from any of your medications? -  no  Have you stopped taking any of your medications? Is so, why? -  no    Have you seen any other physician or provider since your last visit? No  Have you had any other diagnostic tests since your last visit? No  Have you been seen in the emergency room and/or had an admission to a hospital since we last saw you? No  Have you had your routine dental cleaning in the past 6 months? no    Have you activated your Rounds account? If not, what are your barriers? No     Patient Care Team:  Yaneth Celis MD as PCP - General (Family Medicine)    Medical History Review  Past Medical, Family, and Social History reviewed and does contribute to the patient presenting condition    Health Maintenance   Topic Date Due    HIV screen  08/21/1981    DTaP/Tdap/Td vaccine (1 - Tdap) 08/21/1985    Shingles Vaccine (1 of 2) 08/21/2016    Cervical cancer screen  01/20/2017    Breast cancer screen  02/14/2017    Diabetes screen  03/23/2019    Flu vaccine (1) 06/30/2020 (Originally 9/1/2019)    Lipid screen  03/23/2021    Colon cancer screen colonoscopy  11/16/2022    Hepatitis A vaccine  Aged Out    Hepatitis B vaccine  Aged Out    Hib vaccine  Aged Out    Meningococcal (ACWY) vaccine  Aged Out    Pneumococcal 0-64 years Vaccine  Aged Out     HPI  18-year-old female is seen first time by me in the office the patient has got increased frequency of urination and she states  she cannot hold it denies of dribbling and she has been getting up in the night  5 times.   She has got chronic back pain and goes to the pain clinic and is on medication from them, she also had vitamin D deficiency is not on vitamin D will check her vitamin D level also due for her mammogram  Review of Systems   Constitutional: Negative for appetite change. HENT: Negative for ear pain, sneezing and sore throat. Eyes: Negative for visual disturbance. Respiratory: Negative for shortness of breath. Cardiovascular: Negative for chest pain. Gastrointestinal: Negative for abdominal pain and nausea. Genitourinary: Positive for frequency. Negative for pelvic pain and vaginal discharge. Musculoskeletal: Positive for back pain. Negative for arthralgias. Neurological: Negative for dizziness and headaches. Objective:   Physical Exam  Vitals signs and nursing note reviewed. Constitutional:       Appearance: Normal appearance. She is well-developed. Comments: /80   Pulse 94   Ht 5' 3\" (1.6 m)   Wt 213 lb (96.6 kg)   SpO2 96%   BMI 37.73 kg/m²    HENT:      Head: Normocephalic. Right Ear: Tympanic membrane normal.      Left Ear: Tympanic membrane normal.      Nose: Nose normal.      Mouth/Throat:      Mouth: Mucous membranes are moist.   Eyes:      Conjunctiva/sclera: Conjunctivae normal.   Neck:      Thyroid: No thyromegaly. Cardiovascular:      Rate and Rhythm: Normal rate and regular rhythm. Pulmonary:      Breath sounds: Normal breath sounds. No rales. Abdominal:      General: Bowel sounds are normal.      Palpations: Abdomen is soft. Tenderness: There is no abdominal tenderness. Musculoskeletal:         General: Tenderness present. Comments: Tenderness in the lumbar region present   Skin:     Findings: No rash. Neurological:      Mental Status: She is alert and oriented to person, place, and time. Urine dipstick was negative    Assessment:        Diagnosis Orders   1. Urge incontinence   start Ditropan   2. Frequency of urination  POCT Urinalysis no Micro   3.  Screening mammogram, encounter for  JUSTIN DIGITAL SCREEN W CAD BILATERAL   4. Vitamin D deficiency  Vitamin D 25 Hydroxy             Plan:         Orders Placed This Encounter   Procedures    JUSTIN DIGITAL SCREEN W CAD BILATERAL     Standing Status:   Future     Standing Expiration Date:   5/9/2021    Vitamin D 25 Hydroxy     Standing Status:   Future     Standing Expiration Date:   3/9/2021    POCT Urinalysis no Micro     Orders Placed This Encounter   Medications    oxybutynin (DITROPAN-XL) 5 MG extended release tablet     Sig: Take 1 tablet by mouth daily     Dispense:  30 tablet     Refill:  1     Return in about 2 weeks (around 3/23/2020) for  urge incontinence.     Continue current medications reviewed from the chart            Yaneth Celis MD

## 2020-03-10 LAB — VITAMIN D 25-HYDROXY: 13.4 NG/ML (ref 30–100)

## 2020-03-10 RX ORDER — ERGOCALCIFEROL 1.25 MG/1
50000 CAPSULE ORAL WEEKLY
Qty: 16 CAPSULE | Refills: 0 | Status: SHIPPED | OUTPATIENT
Start: 2020-03-10 | End: 2020-07-16 | Stop reason: ALTCHOICE

## 2020-03-20 ENCOUNTER — TELEPHONE (OUTPATIENT)
Dept: FAMILY MEDICINE CLINIC | Age: 55
End: 2020-03-20

## 2020-05-21 RX ORDER — OXYBUTYNIN CHLORIDE 5 MG/1
5 TABLET, EXTENDED RELEASE ORAL DAILY
Qty: 30 TABLET | Refills: 2 | Status: SHIPPED | OUTPATIENT
Start: 2020-05-21 | End: 2020-07-16 | Stop reason: ALTCHOICE

## 2020-07-16 ENCOUNTER — TELEMEDICINE (OUTPATIENT)
Dept: FAMILY MEDICINE CLINIC | Age: 55
End: 2020-07-16
Payer: COMMERCIAL

## 2020-07-16 PROCEDURE — G8427 DOCREV CUR MEDS BY ELIG CLIN: HCPCS | Performed by: FAMILY MEDICINE

## 2020-07-16 PROCEDURE — 99214 OFFICE O/P EST MOD 30 MIN: CPT | Performed by: FAMILY MEDICINE

## 2020-07-16 PROCEDURE — 3017F COLORECTAL CA SCREEN DOC REV: CPT | Performed by: FAMILY MEDICINE

## 2020-07-16 RX ORDER — PHENTERMINE HYDROCHLORIDE 37.5 MG/1
37.5 TABLET ORAL
Qty: 30 TABLET | Refills: 0 | Status: SHIPPED | OUTPATIENT
Start: 2020-07-16 | End: 2020-08-19 | Stop reason: SDUPTHER

## 2020-07-16 RX ORDER — OXYBUTYNIN CHLORIDE 5 MG/1
5 TABLET, EXTENDED RELEASE ORAL DAILY
Qty: 30 TABLET | Refills: 3 | Status: SHIPPED | OUTPATIENT
Start: 2020-07-16 | End: 2020-09-16 | Stop reason: SDUPTHER

## 2020-07-16 RX ORDER — TRAZODONE HYDROCHLORIDE 50 MG/1
50 TABLET ORAL NIGHTLY PRN
Qty: 90 TABLET | Refills: 1 | Status: SHIPPED | OUTPATIENT
Start: 2020-07-16 | End: 2021-02-16 | Stop reason: SDUPTHER

## 2020-07-16 ASSESSMENT — ENCOUNTER SYMPTOMS
DIARRHEA: 0
VOMITING: 0
ABDOMINAL DISTENTION: 0
CONSTIPATION: 0
NAUSEA: 0
SHORTNESS OF BREATH: 0
ABDOMINAL PAIN: 0
WHEEZING: 0
CHEST TIGHTNESS: 0
COUGH: 0

## 2020-07-16 ASSESSMENT — ANXIETY QUESTIONNAIRES
2. NOT BEING ABLE TO STOP OR CONTROL WORRYING: 0-NOT AT ALL
GAD7 TOTAL SCORE: 8
3. WORRYING TOO MUCH ABOUT DIFFERENT THINGS: 2-OVER HALF THE DAYS
1. FEELING NERVOUS, ANXIOUS, OR ON EDGE: 2-OVER HALF THE DAYS
4. TROUBLE RELAXING: 0-NOT AT ALL
6. BECOMING EASILY ANNOYED OR IRRITABLE: 2-OVER HALF THE DAYS
5. BEING SO RESTLESS THAT IT IS HARD TO SIT STILL: 2-OVER HALF THE DAYS
7. FEELING AFRAID AS IF SOMETHING AWFUL MIGHT HAPPEN: 0-NOT AT ALL

## 2020-07-16 ASSESSMENT — PATIENT HEALTH QUESTIONNAIRE - PHQ9
1. LITTLE INTEREST OR PLEASURE IN DOING THINGS: 1
SUM OF ALL RESPONSES TO PHQ QUESTIONS 1-9: 2
SUM OF ALL RESPONSES TO PHQ QUESTIONS 1-9: 2
SUM OF ALL RESPONSES TO PHQ9 QUESTIONS 1 & 2: 2
2. FEELING DOWN, DEPRESSED OR HOPELESS: 1

## 2020-07-16 NOTE — PROGRESS NOTES
2020    TELEHEALTH EVALUATION -- Audio/Visual (During ZHVIF-08 public health emergency)    HPI:    Saranya Tidwell (:  1965) has requested an audio/video evaluation for the following concern(s):Weight Management (discuss weight management) and Anxiety      Fatigue: Patient complains of fatigue. Symptoms began several years ago. [place] symptom the patient feels fatigue began with: Patient reports chronic insomnia for many years, chronic back pain but she stopped going to pain management, increased thirst and urination,    Not sleeping well  Going to the bathroom at nighttime twice/night  Works 3rd shift  Drinking a lot of water,but even when she stopped drinking at 6 pm, still wakes up at 4 am and wakes up twice a night. She also feels hungry all the time. Patient describes the following psychologic symptoms: Anxiety. Patient denies significant change in weight and symptoms of arthritis. Symptoms have progressed to a point and plateaued. Severity has been moderate. Previous visits for this problem: none. [place] complains of unintentional weight gain and she is still gaining weight  Wants to lose weight. Saranya Tidwell has made a substantial good jackson effort to lose weight in a regimen for weight reduction based on caloric restriction, nutritional changes, and exercise  Saranya Tidwell reports he did stop drinking pop  Walking a lot , riding the bike, walking around, and working in childcare, has an active job, but unable to lose any weight,  Contraindications to controlled substance anorexiant: NONE     (Saranya Tidwell reports no : use of illegal drug substances  Saranya Tidwell reports alcohol use of NONE    OARRS done today and okay  Plan: diet, exercise and start Adipex      Patient informed that when prescribed a controlled substance for weight loss, the provider is required by law to see the patient for an appointment every thirty days.  This is neccessary to record the weight and blood pressure and to assess patient's efforts to lose weight, and to ensure there are no contraindications or adverse effects. blood pressure is Normal per prior measurement. BP Readings from Last 3 Encounters:   03/09/20 130/80   02/20/20 130/85   08/15/18 136/83        Pulse is Normal.  Pulse Readings from Last 3 Encounters:   03/09/20 94   02/20/20 73   08/15/18 80     Patient reports her weight now is 219 lbs, there is unintentional weight gain of 6 pounds in 4 months with lifestyle changes      Wt Readings from Last 3 Encounters:   03/09/20 213 lb (96.6 kg)   02/20/20 213 lb (96.6 kg)   08/15/18 216 lb (98 kg)     BMI 38.8 KG/M2 per BMI calculator  Severe obesity per BMI    She is requesting to start Adipex as some colleagues at work have been able to lose weight with it. Patient says she did come off of the opiates and she would like to try it . Patient reports besides insomnia she also has mild depression and anxiety    Denies suicidal ideation, plan or intent. She is not taking any medications and she does not want any, but would like something to help with her insomnia. PHQ-2 Over the past 2 weeks, how often have you been bothered by any of the following problems? Little interest or pleasure in doing things: Several days  Feeling down, depressed, or hopeless: Several days  PHQ-2 Score: 2  PHQ-9 Over the past 2 weeks, how often have you been bothered by any of the following problems? PHQ-9 Total Score: 2        PHQ Scores 7/16/2020 3/9/2020 8/15/2018   PHQ2 Score 2 0 0   PHQ9 Score 2 0 0       Overactive bladder. She was on oxybutynin 5 mg before did not feel it is helping much. She has mostly nocturia  And frequency of urination,, Which is worse at nighttime but not during the daytime    Jenna Yusuf has Vitamin D deficiency. Jenna Yusuf  is not taking Vitamin D supplementation   she feels tired.     Lab Results   Component Value Date    VITD25 13.4 (L) 03/09/2020         Hyperlipidemia:  Patient is not following a low fat, low cholesterol diet. She is  exercising regularly. OTC Supplements: None    LDL is high, high triglycerides  Lab Results   Component Value Date    LDLCHOLESTEROL 122 03/23/2016     Lab Results   Component Value Date    TRIG 171 (H) 03/23/2016    TRIG 82 04/12/2014    TRIG 124 05/03/2013       Patient has a prior diagnosis of overactive bladder and she was on oxybutynin before which did help a little she has frequency and urgency of urination. She would like to restart oxybutynin. She denies any flank pain or gross hematuria. Shannan Bobo is due for HIV screening due to CDC recommendation to be screened. Shannan Bobo denies high risk behavior. Review of Systems   Constitutional: Positive for fatigue and unexpected weight change. Negative for activity change, appetite change, chills, diaphoresis and fever. Respiratory: Negative for cough, chest tightness, shortness of breath and wheezing. Cardiovascular: Negative for chest pain, palpitations and leg swelling. Gastrointestinal: Negative for abdominal distention, abdominal pain, constipation, diarrhea, nausea and vomiting. Endocrine: Positive for polydipsia and polyphagia. Negative for cold intolerance, heat intolerance and polyuria. Genitourinary: Positive for frequency and urgency. Negative for flank pain and hematuria. Psychiatric/Behavioral: Positive for dysphoric mood and sleep disturbance. Negative for self-injury and suicidal ideas. The patient is nervous/anxious. Not taking any medications currently      Prior to Visit Medications    Medication Sig Taking?  Authorizing Provider   oxybutynin (DITROPAN-XL) 5 MG extended release tablet Take 1 tablet by mouth daily  Patient not taking: Reported on 7/16/2020  Alexander Jesus MD   vitamin D (ERGOCALCIFEROL) 1.25 MG (79916 UT) CAPS capsule Take 1 capsule by mouth once a week  Patient not taking: Reported on 7/16/2020  Vik Vaca MD   naproxen (NAPROSYN) 500 MG tablet Take 500 mg by mouth 2 times daily (with meals)  Historical Provider, MD   mupirocin (BACTROBAN) 2 % ointment Apply topically 3 times daily. Patient not taking: Reported on 3/9/2020  Sharon Reynoso MD   tiZANidine (ZANAFLEX) 2 MG tablet Take 2 mg by mouth 2 times daily. Historical Provider, MD   HYDROcodone-acetaminophen (NORCO) 5-325 MG per tablet Take 1 tablet by mouth every 6 hours as needed for Pain. Historical Provider, MD       Social History     Tobacco Use    Smoking status: Former Smoker     Packs/day: 0.50     Years: 3.00     Pack years: 1.50     Types: Cigarettes     Last attempt to quit: 1988     Years since quittin.5    Smokeless tobacco: Never Used   Substance Use Topics    Alcohol use: Yes     Comment: occasionally    Drug use: No          PHYSICAL EXAMINATION:    Vital Signs: (As obtained by patient/caregiver or practitioner observation)  -vital signs stable and within normal limits except severe obesity per BMI, BMI 38.8 kg/M2  Patient-Reported Vitals 2020   Patient-Reported Weight 219   Patient-Reported Height 5'3 in   Patient-Reported Temperature 97.7 F           Constitutional: [x] Appears well-developed and well-nourished [x] No apparent distress      [x] Abnormal-obese      Mental status  [x] Alert and awake  [x] Oriented to person/place/time [x]Able to follow commands      Eyes:  EOM    [x]  Normal  [] Abnormal-  Sclera  [x]  Normal  [] Abnormal -         Discharge [x]  None visible  [] Abnormal -    HENT:   [x] Normocephalic, atraumatic.   [] Abnormal   [x] Mouth/Throat: Mucous membranes are moist.     External Ears [x] Normal  [] Abnormal-     Neck: [x] No visualized mass     Pulmonary/Chest: [x] Respiratory effort normal.  [x] No visualized signs of difficulty breathing or respiratory distress        [] Abnormal        Musculoskeletal:   [x] Normal gait with no signs of ataxia         [x] Normal range of motion of neck        [] Abnormal-       Neurological: [x] No Facial Asymmetry (Cranial nerve 7 motor function) (limited exam to video visit)          [x] No gaze palsy        [] Abnormal-            Skin:        [x] No significant exanthematous lesions or discoloration noted on facial skin         [] Abnormal-            Psychiatric:      [x] No Hallucinations       []Mood is normal      [x]Behavior is normal      [x]Judgment is normal      [x]Thought content is normal       [x] Abnormal-anxious    Other pertinent observable physical exam findings-none    Due to this being a TeleHealth encounter, evaluation of the following organ systems is limited: Vitals/Constitutional/EENT/Resp/CV/GI//MS/Neuro/Skin/Heme-Lymph-Imm. ASSESSMENT/PLAN:    1. Chronic fatigue  Failing to change as expected. Will do basic labs to rule out certain common medical conditions: hematologic, renal, hepatic, electrolyte imbalances, thyroid disorders. Will improve her sleep  - CBC; Future  - Comprehensive Metabolic Panel; Future  - TSH without Reflex; Future    2. Severe obesity (BMI 35.0-39. 9) with comorbidity ([address]  Worsening  We will do blood work  - Comprehensive Metabolic Panel; Future  - Lipid Panel; Future  - TSH without Reflex; Future  - start phentermine (ADIPEX-P) 37.5 MG tablet; Take 1 tablet by mouth every morning (before breakfast) for 30 days. Dispense: 30 tablet; Refill: 0  - Hemoglobin A1C; Future    Patient was asked about her current diet and exercise habits, and personalized advice was provided regarding recommended lifestyle changes. Patient's comorbid health conditions associated with elevated BMI were discussed, including dyslipidemia, GERD, mood disorder and stress urinary incontinence, as well as the likely benefits of weight loss.   Based upon patient's motivation to change her behavior, the following plan was agreed upon to work toward a weight loss goal of 1-2 pounds/week: low carbohydrate diet, wear a pedometer and get at least 10,000 steps per day, exercise for at least 30 minutes 4-5 days per week and medication prescribed: Adipex. Educational materials for  weight loss were provided. Patient will follow-up in 1 month(s) with PCP. Provider spent  10 minutes counseling patient. 3. Insomnia due to medical condition  Failing to change as expected. -start traZODone (DESYREL) 50 MG tablet; Take 1 tablet by mouth nightly as needed for Sleep  Dispense: 90 tablet; Refill: 1    4. Mild episode of recurrent major depressive disorder ([facility] [address]  worsening  Start Trazodone for insomnia  Attempt to lose weight       5. Vitamin D deficiency  Likely worsening  We will recheck level  - Vitamin D 25 Hydroxy; Future    6. Hyperlipidemia with target LDL less than 100  Likely worsening  Recheck levels, lipid profile    7. OAB (overactive bladder)  worsening    - Urinalysis Reflex to Culture; Future-to rule out UTI  -restart oxybutynin (DITROPAN XL) 5 MG extended release tablet; Take 1 tablet by mouth daily  Dispense: 30 tablet; Refill: 3    8. Encounter for screening for HIV    - HIV Screen; Future    9. Polydipsia  Will need to rule out diabetes  - Hemoglobin A1C; Future      Petra received counseling on the following healthy behaviors: nutrition, exercise, medication adherence and weight loss  Reviewed prior labs and health maintenance. Continue current medications, diet and exercise. Discussed use, benefit, and side effects of prescribed medications. Barriers to medication compliance addressed. Patient given educational materials - see patient instructions. All patient questions answered. Patient voiced understanding.      Return in about 4 weeks (around 8/13/2020) for ADIPEX#, **Do EXERCISE FLOWSHEET in [facility].  2 more for Adipex      Future Appointments   Date Time Provider Salvador Cheng   8/19/2020  1:45 PM Jacinda Jo MD fp [facility]   9/16/2020  4:15 PM [facility]        Total time spent during this visit 25 minutes including

## 2020-07-16 NOTE — PATIENT INSTRUCTIONS
Patient Education        Learning About Low-Carbohydrate Diets  What is a low-carbohydrate diet? A low-carbohydrate (or \"low-carb\") diet limits foods and drinks that have carbohydrates. This includes grains, fruits, milk and yogurt, and starchy vegetables like potatoes, beans, and corn. It also avoids foods and drinks that have added sugar. Instead, low-carb diets include foods that are high in protein and fat. Why might you follow a low-carb diet? Low-carb diets may be used for a variety of reasons, such as for weight loss. People who have diabetes may use a low-carb diet to help manage their blood sugar levels. What should you do before you start the diet? Talk to your doctor before you try any diet. This is even more important if you have health problems like kidney disease, heart disease, or diabetes. Your doctor may suggest that you meet with a registered dietitian. A dietitian can help you make an eating plan that works for you. What foods do you eat on a low-carb diet? On a low-carb diet, you choose foods that are high in protein and fat. Examples of these are:  · Meat, poultry, and fish. · Eggs. · Nuts, such as walnuts, pecans, almonds, and peanuts. · Peanut butter and other nut butters. · Tofu. · Avocado. · Verito Clarke. · Non-starchy vegetables like broccoli, cauliflower, green beans, mushrooms, peppers, lettuce, and spinach. · Unsweetened non-dairy milks like almond milk and coconut milk. · Cheese, cottage cheese, and cream cheese. Current as of: August 22, 2019               Content Version: 12.5  © 9074-9798 [employer]. Care instructions adapted under license by [facility] ([facility]). If you have questions about a medical condition or this instruction, always ask your healthcare professional. [address] any warranty or liability for your use of this information.

## 2020-07-19 PROBLEM — N32.81 OAB (OVERACTIVE BLADDER): Status: ACTIVE | Noted: 2020-07-19

## 2020-07-19 PROBLEM — G47.01 INSOMNIA DUE TO MEDICAL CONDITION: Status: ACTIVE | Noted: 2020-07-19

## 2020-07-19 PROBLEM — R53.82 CHRONIC FATIGUE: Status: ACTIVE | Noted: 2020-07-19

## 2020-07-19 PROBLEM — R63.1 POLYDIPSIA: Status: ACTIVE | Noted: 2020-07-19

## 2020-07-19 PROBLEM — F33.0 MILD EPISODE OF RECURRENT MAJOR DEPRESSIVE DISORDER (HCC): Status: ACTIVE | Noted: 2020-07-19

## 2020-07-19 PROBLEM — E78.5 HYPERLIPIDEMIA WITH TARGET LDL LESS THAN 100: Status: ACTIVE | Noted: 2020-07-19

## 2020-07-19 PROBLEM — E66.01 SEVERE OBESITY (BMI 35.0-39.9) WITH COMORBIDITY (HCC): Status: ACTIVE | Noted: 2020-07-19

## 2020-07-24 ENCOUNTER — HOSPITAL ENCOUNTER (OUTPATIENT)
Age: 55
Setting detail: SPECIMEN
Discharge: HOME | End: 2020-07-24
Payer: COMMERCIAL

## 2020-07-24 LAB
-: ABNORMAL
ALBUMIN SERPL-MCNC: 4.5 G/DL (ref 3.5–5.2)
ALBUMIN/GLOBULIN RATIO: ABNORMAL (ref 1–2.5)
ALP BLD-CCNC: 83 U/L (ref 35–104)
ALT SERPL-CCNC: 13 U/L (ref 5–33)
AMORPHOUS: ABNORMAL
ANION GAP SERPL CALCULATED.3IONS-SCNC: 9 MMOL/L (ref 9–17)
AST SERPL-CCNC: 12 U/L
BACTERIA: ABNORMAL
BILIRUB SERPL-MCNC: 0.42 MG/DL (ref 0.3–1.2)
BILIRUBIN URINE: NEGATIVE
BUN BLDV-MCNC: 12 MG/DL (ref 6–20)
BUN/CREAT BLD: ABNORMAL (ref 9–20)
CALCIUM SERPL-MCNC: 9.5 MG/DL (ref 8.6–10.4)
CASTS UA: ABNORMAL /LPF
CHLORIDE BLD-SCNC: 106 MMOL/L (ref 98–107)
CHOLESTEROL/HDL RATIO: 4.2
CHOLESTEROL: 204 MG/DL
CO2: 26 MMOL/L (ref 20–31)
COLOR: YELLOW
COMMENT UA: ABNORMAL
CREAT SERPL-MCNC: 0.74 MG/DL (ref 0.5–0.9)
CRYSTALS, UA: ABNORMAL /HPF
EPITHELIAL CELLS UA: ABNORMAL /HPF
ESTIMATED AVERAGE GLUCOSE: 114 MG/DL
GFR AFRICAN AMERICAN: >60 ML/MIN
GFR NON-AFRICAN AMERICAN: >60 ML/MIN
GFR SERPL CREATININE-BSD FRML MDRD: ABNORMAL ML/MIN/{1.73_M2}
GFR SERPL CREATININE-BSD FRML MDRD: ABNORMAL ML/MIN/{1.73_M2}
GLUCOSE BLD-MCNC: 101 MG/DL (ref 70–99)
GLUCOSE URINE: NEGATIVE
HBA1C MFR BLD: 5.6 % (ref 4–6)
HCT VFR BLD CALC: 42.7 % (ref 36–46)
HDLC SERPL-MCNC: 49 MG/DL
HEMOGLOBIN: 14 G/DL (ref 12–16)
HIV AG/AB: NONREACTIVE
KETONES, URINE: NEGATIVE
LDL CHOLESTEROL: 138 MG/DL (ref 0–130)
LEUKOCYTE ESTERASE, URINE: NEGATIVE
MCH RBC QN AUTO: 28.7 PG (ref 26–34)
MCHC RBC AUTO-ENTMCNC: 32.7 G/DL (ref 31–37)
MCV RBC AUTO: 87.7 FL (ref 80–100)
MUCUS: ABNORMAL
NITRITE, URINE: NEGATIVE
NRBC AUTOMATED: NORMAL PER 100 WBC
OTHER OBSERVATIONS UA: ABNORMAL
PDW BLD-RTO: 14 % (ref 11.5–14.9)
PH UA: 6.5 (ref 5–8)
PLATELET # BLD: 200 K/UL (ref 150–450)
PMV BLD AUTO: 10.5 FL (ref 6–12)
POTASSIUM SERPL-SCNC: 4.3 MMOL/L (ref 3.7–5.3)
PROTEIN UA: NEGATIVE
RBC # BLD: 4.87 M/UL (ref 4–5.2)
RBC UA: ABNORMAL /HPF
RENAL EPITHELIAL, UA: ABNORMAL /HPF
SODIUM BLD-SCNC: 141 MMOL/L (ref 135–144)
SPECIFIC GRAVITY UA: 1.01 (ref 1–1.03)
TOTAL PROTEIN: 7.1 G/DL (ref 6.4–8.3)
TRICHOMONAS: ABNORMAL
TRIGL SERPL-MCNC: 84 MG/DL
TSH SERPL DL<=0.05 MIU/L-ACNC: 18.65 MIU/L (ref 0.3–5)
TURBIDITY: ABNORMAL
URINE HGB: NEGATIVE
UROBILINOGEN, URINE: NORMAL
VITAMIN D 25-HYDROXY: 18.6 NG/ML (ref 30–100)
VLDLC SERPL CALC-MCNC: ABNORMAL MG/DL (ref 1–30)
WBC # BLD: 4.2 K/UL (ref 3.5–11)
WBC UA: ABNORMAL /HPF
YEAST: ABNORMAL

## 2020-07-24 PROCEDURE — 85027 COMPLETE CBC AUTOMATED: CPT

## 2020-07-24 PROCEDURE — 87389 HIV-1 AG W/HIV-1&-2 AB AG IA: CPT

## 2020-07-24 PROCEDURE — 83036 HEMOGLOBIN GLYCOSYLATED A1C: CPT

## 2020-07-24 PROCEDURE — 80053 COMPREHEN METABOLIC PANEL: CPT

## 2020-07-24 PROCEDURE — 84443 ASSAY THYROID STIM HORMONE: CPT

## 2020-07-24 PROCEDURE — 80061 LIPID PANEL: CPT

## 2020-07-24 PROCEDURE — 82306 VITAMIN D 25 HYDROXY: CPT

## 2020-07-24 PROCEDURE — 81001 URINALYSIS AUTO W/SCOPE: CPT

## 2020-07-24 PROCEDURE — 36415 COLL VENOUS BLD VENIPUNCTURE: CPT

## 2020-07-27 PROBLEM — E03.9 ACQUIRED HYPOTHYROIDISM: Status: ACTIVE | Noted: 2020-07-27

## 2020-07-27 RX ORDER — ATORVASTATIN CALCIUM 10 MG/1
10 TABLET, FILM COATED ORAL EVERY EVENING
Qty: 90 TABLET | Refills: 1 | Status: SHIPPED | OUTPATIENT
Start: 2020-07-27 | End: 2021-01-15

## 2020-07-27 RX ORDER — LEVOTHYROXINE SODIUM 0.03 MG/1
25 TABLET ORAL
Qty: 30 TABLET | Refills: 0 | Status: SHIPPED | OUTPATIENT
Start: 2020-07-27 | End: 2020-08-19 | Stop reason: SDUPTHER

## 2020-07-27 RX ORDER — ERGOCALCIFEROL 1.25 MG/1
50000 CAPSULE ORAL WEEKLY
Qty: 12 CAPSULE | Refills: 0 | Status: SHIPPED
Start: 2020-07-27 | End: 2021-01-19 | Stop reason: DRUGHIGH

## 2020-07-27 NOTE — RESULT ENCOUNTER NOTE
.  Prescription for Lipitor sent to the pharmacy  Future Appointments  8/19/2020  1:45 PM    Albertina Weber MD     fp [facility]          [facility]  9/16/2020  4:15 PM    Albertina Weber MD     fp [facility]

## 2020-08-19 ENCOUNTER — TELEMEDICINE (OUTPATIENT)
Dept: FAMILY MEDICINE CLINIC | Age: 55
End: 2020-08-19
Payer: COMMERCIAL

## 2020-08-19 PROBLEM — Z90.710 H/O: HYSTERECTOMY: Status: ACTIVE | Noted: 2020-08-19

## 2020-08-19 PROBLEM — N39.41 URGE INCONTINENCE: Status: RESOLVED | Noted: 2020-03-09 | Resolved: 2020-08-19

## 2020-08-19 PROCEDURE — 3017F COLORECTAL CA SCREEN DOC REV: CPT | Performed by: FAMILY MEDICINE

## 2020-08-19 PROCEDURE — 99214 OFFICE O/P EST MOD 30 MIN: CPT | Performed by: FAMILY MEDICINE

## 2020-08-19 PROCEDURE — G8427 DOCREV CUR MEDS BY ELIG CLIN: HCPCS | Performed by: FAMILY MEDICINE

## 2020-08-19 RX ORDER — LEVOTHYROXINE SODIUM 0.03 MG/1
25 TABLET ORAL
Qty: 90 TABLET | Refills: 3 | Status: SHIPPED | OUTPATIENT
Start: 2020-08-19 | End: 2021-02-21 | Stop reason: SDUPTHER

## 2020-08-19 RX ORDER — PHENTERMINE HYDROCHLORIDE 37.5 MG/1
37.5 TABLET ORAL
Qty: 30 TABLET | Refills: 0 | Status: SHIPPED | OUTPATIENT
Start: 2020-08-19 | End: 2020-09-16 | Stop reason: SDUPTHER

## 2020-08-19 ASSESSMENT — ENCOUNTER SYMPTOMS
ABDOMINAL PAIN: 0
TROUBLE SWALLOWING: 0
SHORTNESS OF BREATH: 0

## 2020-08-19 NOTE — PROGRESS NOTES
2020    TELEHEALTH EVALUATION -- Audio/Visual (During HDJCN-54 public health emergency)    HPI:    Emerita Aguilar (:  1965) has requested an audio/video evaluation for the following concern(s):Weight Management (adipex ) and Knee Pain (right)        Wants to lose weight. Emerita Aguilar was started on Adipex. Patient is here for refill of Adipex #2    In addition to the medication, patient also using diet and exercise as follows:  -diet: Low-carb diet low-fat diet  -exercise: Has been more active until she felt 3 days ago    Medication side effects: None. Patient denies anorexia, nausea, vomiting, abdominal pain, involuntary weight loss, palpitations, insomnia, irritability, anxiety, headache and tremor. Patient informed that when prescribed a controlled substance for weight loss, the provider is required by law to see the patient for an appointment every thirty days. This is neccessary to record the weight and blood pressure and to assess patient's efforts to lose weight, and to ensure there are no contraindications or adverse effects. blood pressure is Normal.  BP Readings from Last 3 Encounters:   20 130/80   20 130/85   08/15/18 136/83        Pulse is Normal.     Pulse Readings from Last 3 Encounters:   20 94   20 73   08/15/18 80       Weight has been 203 lbs. Patient intentionally lost 10 in 1 month  Wt Readings from Last 3 Encounters:   20 213 lb (96.6 kg)   20 213 lb (96.6 kg)   08/15/18 216 lb (98 kg)                   Fell down 3 steps on Monday morning, on 2020, onto the right knee  Patient says \"I cannot walk for too long\". She reports she has some swelling over the right knee, but it is going down  Patient says the knee pops a lot, but does not give out. Has history of meniscus injury and she says she knows she does not have meniscus rupture at this time .   Told she will need knee replacement in the future due to arthritis worsening  Intensity of pain is 8/10  Has been taking ibuprofen ice, brace and have been helping. Patient reports she does not have insurance at this time and she does not want to see an orthopedics at this time. She does not want x-ray either. Hypothyroidism: Recent symptoms: fatigue has been improving. She denies cold intolerance, heat intolerance, hair loss, dry skin, constipation, diarrhea, edema, anxiety, tremor, palpitations and dysphagia. Patient is  taking her medication consistently on an empty stomach. Patient was started on Synthroid a month ago, tolerated well she is due for recheck  TSH is Elevated. No results found for: [facility]  Lab Results   Component Value Date    TSH 18.65 (H) 07/24/2020    TSH 3.83 03/23/2016    TSH 5.39 12/21/2013       Hyperlipidemia:  No new myalgias or GI upset on atorvastatin (Lipitor) which was started recently on 7/27/2020. Medication compliance: compliant all of the time. Patient is  following a low fat, low cholesterol diet. LDL is high  Lab Results   Component Value Date    LDLCHOLESTEROL 138 (H) 07/24/2020     Lab Results   Component Value Date    TRIG 84 07/24/2020    TRIG 171 (H) 03/23/2016    TRIG 82 04/12/2014         Review of Systems   Constitutional: Negative for activity change, appetite change, chills, diaphoresis, fatigue, fever and unexpected weight change. HENT: Negative for trouble swallowing. Respiratory: Negative for shortness of breath. Cardiovascular: Negative for chest pain, palpitations and leg swelling. Gastrointestinal: Negative for abdominal pain. Endocrine: Negative for cold intolerance, heat intolerance, polydipsia, polyphagia and polyuria. Musculoskeletal: Positive for arthralgias (RIGHT KNEE), gait problem and joint swelling (RIGHT KNEE). Neurological: Negative for tremors and headaches. Psychiatric/Behavioral: Negative for sleep disturbance. The patient is not nervous/anxious.           Prior to Visit Medications    Medication Sig Taking? Authorizing Provider   vitamin D (ERGOCALCIFEROL) 1.25 MG (79500 UT) CAPS capsule Take 1 capsule by mouth once a week Yes Jacinda Jo MD   levothyroxine (SYNTHROID) 25 MCG tablet Take 1 tablet by mouth every morning (before breakfast) Call for refill Yes Jacinda Jo MD   atorvastatin (LIPITOR) 10 MG tablet Take 1 tablet by mouth every evening Yes Jacinda Jo MD   oxybutynin (DITROPAN XL) 5 MG extended release tablet Take 1 tablet by mouth daily Yes Jacinda oJ MD   traZODone (DESYREL) 50 MG tablet Take 1 tablet by mouth nightly as needed for Sleep Yes Jacinda Jo MD       Social History     Tobacco Use    Smoking status: Former Smoker     Packs/day: 0.50     Years: 3.00     Pack years: 1.50     Types: Cigarettes     Last attempt to quit: 1988     Years since quittin.6    Smokeless tobacco: Never Used   Substance Use Topics    Alcohol use: Yes     Comment: occasionally    Drug use: No          PHYSICAL EXAMINATION:    Vital Signs: (As obtained by patient/caregiver or practitioner observation)  -vital signs stable and within normal limits except severe obesity per BMI, last BMI was 37.73 kg/M2  Patient-Reported Vitals 2020   Patient-Reported Weight 203 lb   Patient-Reported Height 5'2   Patient-Reported Temperature 97.7           Intensity of pain is 8 out of 10       Constitutional: [x] Appears well-developed and well-nourished [x] No apparent distress      [x] Abnormal-obese      Mental status  [x] Alert and awake  [x] Oriented to person/place/time [x]Able to follow commands      Eyes:  EOM    [x]  Normal  [] Abnormal-  Sclera  [x]  Normal  [] Abnormal -         Discharge [x]  None visible  [] Abnormal -    HENT:   [x] Normocephalic, atraumatic.   [] Abnormal   [x] Mouth/Throat: Mucous membranes are moist.     External Ears [x] Normal  [] Abnormal-     Neck: [x] No visualized mass     Pulmonary/Chest: [x] Respiratory CHOL 206 (H) 04/12/2014     Lab Results   Component Value Date    TRIG 84 07/24/2020    TRIG 171 (H) 03/23/2016    TRIG 82 04/12/2014     Lab Results   Component Value Date    HDL 49 07/24/2020    HDL 45 03/23/2016    HDL 56 04/12/2014     Lab Results   Component Value Date    LDLCHOLESTEROL 138 (H) 07/24/2020    LDLCHOLESTEROL 122 03/23/2016    LDLCHOLESTEROL 134 (H) 04/12/2014       Lab Results   Component Value Date    CHOLHDLRATIO 4.2 07/24/2020    CHOLHDLRATIO 4.5 03/23/2016    CHOLHDLRATIO 3.7 04/12/2014       Lab Results   Component Value Date    LABA1C 5.6 07/24/2020    LABA1C 5.5 03/23/2016    LABA1C 5.4 05/03/2013         Lab Results   Component Value Date    VITD25 18.6 (L) 07/24/2020       ASSESSMENT/PLAN:    1. Severe obesity (BMI 35.0-39. 9) with comorbidity (HCC)  Improved  - phentermine (ADIPEX-P) 37.5 MG tablet; Take 1 tablet by mouth every morning (before breakfast) for 30 days. Dispense: 30 tablet; Refill: 0  Patient was asked about her current diet and exercise habits, and personalized advice was provided regarding recommended lifestyle changes. Patient's comorbid health conditions associated with elevated BMI were discussed, including degenerative joint disease, dyslipidemia, GERD, hyperglycemia and mood disorder, as well as the likely benefits of weight loss. Based upon patient's motivation to change her behavior, the following plan was agreed upon to work toward a weight loss goal of 1-2 pounds per week: low carbohydrate diet, increase physical activity as follows: As tolerated in the view of recent injury of the right knee and medication prescribed: Adipex and also Synthroid. Educational materials for  weight loss were provided. Patient will follow-up in 1 month(s) with PCP. Provider spent  10 minutes counseling patient.       2. Acute pain of right knee  Improving  Continue ice, advised knee brace from over-the-counter, advised her knee care gel, Voltaren gel, lidocaine cream or from over-the-counter  Patient was advised if the pain does not improve she needs to see orthopedics, to rule out any meniscal or ligament injury    3. Acquired hypothyroidism  Likely improving  Continue current treatment and recheck TSH  - levothyroxine (SYNTHROID) 25 MCG tablet; Take 1 tablet by mouth every morning (before breakfast) Call for refill  Dispense: 90 tablet; Refill: 3      4. Hyperlipidemia with target LDL less than 100  Likely improving slowly  Continue Lipitor which was recently started, tolerated well  We will recheck lipid profile in about 6 months from prior  Continue to attempt to lose weight    Controlled Substance Monitoring:    Acute and Chronic Pain Monitoring:   RX Monitoring 8/19/2020   Periodic Controlled Substance Monitoring Possible medication side effects, risk of tolerance/dependence & alternative treatments discussed. ;No signs of potential drug abuse or diversion identified. ;Assessed functional status. [facility] received counseling on the following healthy behaviors: nutrition, exercise, medication adherence and weight loss. Reviewed prior labs and health maintenance. Continue current medications, diet and exercise. Discussed use, benefit, and side effects of prescribed medications. Barriers to medication compliance addressed. Patient given educational materials - see patient instructions. All patient questions answered. Patient voiced understanding. Return for KEEP APPT. Data Unavailable      Future Appointments   Date Time Provider Salvador Skylar   9/16/2020  4:15 PM Albertina Weber MD [facility]        Total time spent during this visit 25 minutes including face-to-face, counseling, charting review, and non-face-to-face time. iTka Guzman is a 49 y.o. female being evaluated by a Virtual Visit (video visit) encounter to address concerns as mentioned above.     Due to this being a TeleHealth encounter (During [facility]- public health emergency), evaluation of the following organ systems was limited: Vitals/Constitutional/EENT/Resp/CV/GI//MS/Neuro/Skin/Heme-Lymph-Imm. Pursuant to the emergency declaration under the [address] [place] [address], [address] [place] [address] and the [employer] and [employer] Act, this Virtual Visit was conducted with patient's (and/or legal guardian's) consent, to reduce the patient's risk of exposure to COVID-19 and provide necessary medical care. The patient (and/or legal guardian) has also been advised to contact this office for worsening conditions or problems, and seek emergency medical treatment and/or call 911 if deemed necessary. Services were provided through a video synchronous discussion virtually to substitute for in-person clinic visit. Patient was located at his home, provider was located in the office, at the primary practice location. Patient identification was verified at the start of the visit: Yes    Total time spent for this encounter: 25 minutes    --Elizabeth Duff MD on 8/19/2020 at 7:01 PM    An electronic signature was used to authenticate this note.

## 2020-09-16 ENCOUNTER — TELEMEDICINE (OUTPATIENT)
Dept: FAMILY MEDICINE CLINIC | Age: 55
End: 2020-09-16

## 2020-09-16 PROBLEM — R63.1 POLYDIPSIA: Status: RESOLVED | Noted: 2020-07-19 | Resolved: 2020-09-16

## 2020-09-16 PROCEDURE — 99213 OFFICE O/P EST LOW 20 MIN: CPT | Performed by: FAMILY MEDICINE

## 2020-09-16 RX ORDER — PHENTERMINE HYDROCHLORIDE 37.5 MG/1
37.5 TABLET ORAL
Qty: 30 TABLET | Refills: 0 | Status: SHIPPED | OUTPATIENT
Start: 2020-09-16 | End: 2021-04-21 | Stop reason: SDUPTHER

## 2020-09-16 RX ORDER — OXYBUTYNIN CHLORIDE 10 MG/1
10 TABLET, EXTENDED RELEASE ORAL DAILY
Qty: 90 TABLET | Refills: 3 | Status: SHIPPED | OUTPATIENT
Start: 2020-09-16 | End: 2021-11-18 | Stop reason: SDUPTHER

## 2020-09-16 ASSESSMENT — PATIENT HEALTH QUESTIONNAIRE - PHQ9
SUM OF ALL RESPONSES TO PHQ QUESTIONS 1-9: 0
SUM OF ALL RESPONSES TO PHQ QUESTIONS 1-9: 0
SUM OF ALL RESPONSES TO PHQ9 QUESTIONS 1 & 2: 0
1. LITTLE INTEREST OR PLEASURE IN DOING THINGS: 0
2. FEELING DOWN, DEPRESSED OR HOPELESS: 0

## 2020-09-16 ASSESSMENT — ENCOUNTER SYMPTOMS
DIARRHEA: 0
CONSTIPATION: 0
COUGH: 0
NAUSEA: 0
CHEST TIGHTNESS: 0
VOMITING: 0
ABDOMINAL DISTENTION: 0
SHORTNESS OF BREATH: 0
WHEEZING: 0
ABDOMINAL PAIN: 0

## 2020-09-16 NOTE — PROGRESS NOTES
2020 2020 3/9/2020 8/15/2018   PHQ2 Score 0 2 0 0   PHQ9 Score 0 2 0 0         Review of Systems   Constitutional: Negative for activity change, appetite change, chills, diaphoresis, fatigue, fever and unexpected weight change. Respiratory: Negative for cough, chest tightness, shortness of breath and wheezing. Cardiovascular: Negative for chest pain, palpitations and leg swelling. Gastrointestinal: Negative for abdominal distention, abdominal pain, constipation, diarrhea, nausea and vomiting. Endocrine: Negative for cold intolerance, heat intolerance, polydipsia, polyphagia and polyuria. Genitourinary: Positive for frequency and urgency. Negative for dysuria and flank pain. Psychiatric/Behavioral: Positive for sleep disturbance. Prior to Visit Medications    Medication Sig Taking? Authorizing Provider   phentermine (ADIPEX-P) 37.5 MG tablet Take 1 tablet by mouth every morning (before breakfast) for 30 days.  Yes Ever MD Saqib   levothyroxine (SYNTHROID) 25 MCG tablet Take 1 tablet by mouth every morning (before breakfast) Call for refill Yes Ever MD Saqib   vitamin D (ERGOCALCIFEROL) 1.25 MG (71838 UT) CAPS capsule Take 1 capsule by mouth once a week Yes Ever MD Saqib   atorvastatin (LIPITOR) 10 MG tablet Take 1 tablet by mouth every evening Yes Ever MD Saqib   oxybutynin (DITROPAN XL) 5 MG extended release tablet Take 1 tablet by mouth daily Yes Ever MD Saqib   traZODone (DESYREL) 50 MG tablet Take 1 tablet by mouth nightly as needed for Sleep Yes Ever MD Saqib       Social History     Tobacco Use    Smoking status: Former Smoker     Packs/day: 0.50     Years: 3.00     Pack years: 1.50     Types: Cigarettes     Last attempt to quit: 1988     Years since quittin.7    Smokeless tobacco: Never Used   Substance Use Topics    Alcohol use: Yes     Comment: occasionally    Drug use: No          PHYSICAL EXAMINATION:    Vital Signs: (As obtained by patient/caregiver or practitioner observation)  -vital signs stable and within normal limits except severe obesity per BMI, last BMI 37.73 kg/M2  Patient-Reported Vitals 9/16/2020   Patient-Reported Weight 198   Patient-Reported Height 5'2   Patient-Reported Temperature 97.5           Intensity of pain is 0/10       Constitutional: [x] Appears well-developed and well-nourished [x] No apparent distress      [x] Abnormal-obese    Mental status  [x] Alert and awake  [x] Oriented to person/place/time [x]Able to follow commands      Eyes:  EOM    [x]  Normal  [] Abnormal-  Sclera  [x]  Normal  [] Abnormal -         Discharge [x]  None visible  [] Abnormal -    HENT:   [x] Normocephalic, atraumatic. [] Abnormal   [x] Mouth/Throat: Mucous membranes are moist.     External Ears [x] Normal  [] Abnormal-     Neck: [x] No visualized mass     Pulmonary/Chest: [x] Respiratory effort normal.  [x] No visualized signs of difficulty breathing or respiratory distress        [] Abnormal        Musculoskeletal:   [x] Normal gait with no signs of ataxia         [x] Normal range of motion of neck        [] Abnormal-       Neurological:        [x] No Facial Asymmetry (Cranial nerve 7 motor function) (limited exam to video visit)          [x] No gaze palsy        [] Abnormal-            Skin:        [x] No significant exanthematous lesions or discoloration noted on facial skin         [] Abnormal-            Psychiatric:      [x] No Hallucinations       [x]Mood is normal      [x]Behavior is normal      [x]Judgment is normal      [x]Thought content is normal       [] Abnormal-     Other pertinent observable physical exam findings- none      Due to this being a TeleHealth encounter, evaluation of the following organ systems is limited: Vitals/Constitutional/EENT/Resp/CV/GI//MS/Neuro/Skin/Heme-Lymph-Imm. Most recent labs reviewed with the patient and all questions fully answered.    hyperlipidemia  Mild Hyperglycemia, A1c within normal limits   TSH increased , was stared on Synthroid        Otherwise labs within normal limits        Lab Results   Component Value Date    WBC 4.2 07/24/2020    HGB 14.0 07/24/2020    HCT 42.7 07/24/2020    MCV 87.7 07/24/2020     07/24/2020       Lab Results   Component Value Date     07/24/2020    K 4.3 07/24/2020     07/24/2020    CO2 26 07/24/2020    BUN 12 07/24/2020    CREATININE 0.74 07/24/2020    GLUCOSE 101 07/24/2020    CALCIUM 9.5 07/24/2020          Lab Results   Component Value Date    LABA1C 5.6 07/24/2020    LABA1C 5.5 03/23/2016    LABA1C 5.4 05/03/2013       Lab Results   Component Value Date    ALT 13 07/24/2020    AST 12 07/24/2020    ALKPHOS 83 07/24/2020    BILITOT 0.42 07/24/2020       Lab Results   Component Value Date    TSH 18.65 (H) 07/24/2020       Lab Results   Component Value Date    CHOL 204 (H) 07/24/2020    CHOL 201 (H) 03/23/2016    CHOL 206 (H) 04/12/2014     Lab Results   Component Value Date    TRIG 84 07/24/2020    TRIG 171 (H) 03/23/2016    TRIG 82 04/12/2014     Lab Results   Component Value Date    HDL 49 07/24/2020    HDL 45 03/23/2016    HDL 56 04/12/2014     Lab Results   Component Value Date    LDLCHOLESTEROL 138 (H) 07/24/2020    LDLCHOLESTEROL 122 03/23/2016    LDLCHOLESTEROL 134 (H) 04/12/2014       Lab Results   Component Value Date    CHOLHDLRATIO 4.2 07/24/2020    CHOLHDLRATIO 4.5 03/23/2016    CHOLHDLRATIO 3.7 04/12/2014       ASSESSMENT/PLAN:    1. Severe obesity (BMI 35.0-39. 9) with comorbidity (HCC)  Improving  - phentermine (ADIPEX-P) 37.5 MG tablet; Take 1 tablet by mouth every morning (before breakfast) for 30 days. Dispense: 30 tablet; Refill: 0  Patient was asked about her current diet and exercise habits, and personalized advice was provided regarding recommended lifestyle changes.   Patient's comorbid health conditions associated with elevated BMI were discussed, including degenerative joint disease, dyslipidemia, hyperglycemia and mood disorder, as well as the likely benefits of weight loss. Based upon patient's motivation to change her behavior, the following plan was agreed upon to work toward a weight loss goal of  1-2 pounds/months: low carbohydrate diet, wear a pedometer and get at least 10,000 steps per day and medication prescribed: Adipex. Educational materials for  weight loss were provided. Patient will follow-up in 4 month(s) with PCP. Provider spent  10 minutes counseling patient. 2. OAB (overactive bladder)  Failing to change as expected. No recent UTI  -Dose increased 9/16/2020  oxybutynin (DITROPAN-XL) 10 MG extended release tablet; Take 1 tablet by mouth daily  Dispense: 90 tablet; Refill: 3    Controlled Substance Monitoring:    Acute and Chronic Pain Monitoring:   RX Monitoring 9/16/2020   Periodic Controlled Substance Monitoring Possible medication side effects, risk of tolerance/dependence & alternative treatments discussed. ;No signs of potential drug abuse or diversion identified. ;Assessed functional status. Shannan Bobo received counseling on the following healthy behaviors: nutrition, exercise, medication adherence and weight loss     Reviewed prior labs and health maintenance. Continue current medications, diet and exercise. Discussed use, benefit, and side effects of prescribed medications. Barriers to medication compliance addressed. Patient given educational materials - see patient instructions. All patient questions answered. Patient voiced understanding. Return in about 4 months (around 1/16/2021) for Face-2F-30mins PHYSICAL, VISION screen, PHQ9. .  Data Unavailable      Future Appointments   Date Time Provider Salvador Cheng   1/19/2021  3:30 PM Alexander Jesus MD [facility]        Total time spent during this visit 15 minutes including face-to-face, counseling, charting review, and non-face-to-face time.       Hiral Hsieh is a 48 y.o. female being evaluated by a Virtual Visit (video visit) encounter to address concerns as mentioned above. Due to this being a TeleHealth encounter ([facility]), evaluation of the following organ systems was limited: Vitals/Constitutional/EENT/Resp/CV/GI//MS/Neuro/Skin/Heme-Lymph-Imm. Pursuant to the emergency declaration under the [address] [place] [address] and the [employer] and [employer] Act, this Virtual Visit was conducted with patient's (and/or legal guardian's) consent, to reduce the patient's risk of exposure to COVID-19 and provide necessary medical care. The patient (and/or legal guardian) has also been advised to contact this office for worsening conditions or problems, and seek emergency medical treatment and/or call 911 if deemed necessary. Services were provided through a video synchronous discussion virtually to substitute for in-person clinic visit. Patient was located at his home, provider was located in the office, at the primary practice location. Patient identification was verified at the start of the visit: Yes    Total time spent for this encounter: 15 minutes    --Stefani Gonsalves MD on 9/16/2020 at 4:55 PM    An electronic signature was used to authenticate this note.

## 2020-09-16 NOTE — PATIENT INSTRUCTIONS
Patient Education        Learning About Low-Carbohydrate Diets  What is a low-carbohydrate diet? A low-carbohydrate (or \"low-carb\") diet limits foods and drinks that have carbohydrates. This includes grains, fruits, milk and yogurt, and starchy vegetables like potatoes, beans, and corn. It also avoids foods and drinks that have added sugar. Instead, low-carb diets include foods that are high in protein and fat. Why might you follow a low-carb diet? Low-carb diets may be used for a variety of reasons, such as for weight loss. People who have diabetes may use a low-carb diet to help manage their blood sugar levels. What should you do before you start the diet? Talk to your doctor before you try any diet. This is even more important if you have health problems like kidney disease, heart disease, or diabetes. Your doctor may suggest that you meet with a registered dietitian. A dietitian can help you make an eating plan that works for you. What foods do you eat on a low-carb diet? On a low-carb diet, you choose foods that are high in protein and fat. Examples of these are:  · Meat, poultry, and fish. · Eggs. · Nuts, such as walnuts, pecans, almonds, and peanuts. · Peanut butter and other nut butters. · Tofu. · Avocado. · Denny Ruffin. · Non-starchy vegetables like broccoli, cauliflower, green beans, mushrooms, peppers, lettuce, and spinach. · Unsweetened non-dairy milks like almond milk and coconut milk. · Cheese, cottage cheese, and cream cheese. Current as of: August 22, 2019               Content Version: 12.5  © 0786-6275 [employer]. Care instructions adapted under license by [facility] [place] ([facility]). If you have questions about a medical condition or this instruction, always ask your healthcare professional. [address] any warranty or liability for your use of this information.
none

## 2021-01-15 DIAGNOSIS — E78.5 HYPERLIPIDEMIA WITH TARGET LDL LESS THAN 100: ICD-10-CM

## 2021-01-15 RX ORDER — ATORVASTATIN CALCIUM 10 MG/1
TABLET, FILM COATED ORAL
Qty: 90 TABLET | Refills: 0 | Status: SHIPPED | OUTPATIENT
Start: 2021-01-15 | End: 2021-04-26 | Stop reason: SDUPTHER

## 2021-01-15 NOTE — TELEPHONE ENCOUNTER
Please Approve or Refuse.   Send to Pharmacy per Pt's Request:      Next Visit Date:  1/19/2021   Last Visit Date: 9/16/2020    Hemoglobin A1C (%)   Date Value   07/24/2020 5.6   03/23/2016 5.5   05/03/2013 5.4             ( goal A1C is < 7)   BP Readings from Last 3 Encounters:   03/09/20 130/80   02/20/20 130/85   08/15/18 136/83          (goal 120/80)  BUN   Date Value Ref Range Status   07/24/2020 12 6 - 20 mg/dL Final     CREATININE   Date Value Ref Range Status   07/24/2020 0.74 0.50 - 0.90 mg/dL Final     Potassium   Date Value Ref Range Status   07/24/2020 4.3 3.7 - 5.3 mmol/L Final

## 2021-01-19 ENCOUNTER — OFFICE VISIT (OUTPATIENT)
Dept: FAMILY MEDICINE CLINIC | Age: 56
End: 2021-01-19
Payer: COMMERCIAL

## 2021-01-19 VITALS
OXYGEN SATURATION: 98 % | DIASTOLIC BLOOD PRESSURE: 86 MMHG | BODY MASS INDEX: 35.08 KG/M2 | HEIGHT: 63 IN | SYSTOLIC BLOOD PRESSURE: 138 MMHG | WEIGHT: 198 LBS | TEMPERATURE: 97.2 F | HEART RATE: 66 BPM

## 2021-01-19 DIAGNOSIS — E55.9 VITAMIN D DEFICIENCY: ICD-10-CM

## 2021-01-19 DIAGNOSIS — Z00.00 ANNUAL PHYSICAL EXAM: Primary | ICD-10-CM

## 2021-01-19 DIAGNOSIS — E78.5 HYPERLIPIDEMIA WITH TARGET LDL LESS THAN 100: ICD-10-CM

## 2021-01-19 DIAGNOSIS — Z12.31 ENCOUNTER FOR SCREENING MAMMOGRAM FOR BREAST CANCER: ICD-10-CM

## 2021-01-19 DIAGNOSIS — Z11.59 ENCOUNTER FOR SCREENING FOR OTHER VIRAL DISEASES: ICD-10-CM

## 2021-01-19 DIAGNOSIS — G89.29 CHRONIC RIGHT-SIDED LOW BACK PAIN WITHOUT SCIATICA: ICD-10-CM

## 2021-01-19 DIAGNOSIS — M54.50 CHRONIC RIGHT-SIDED LOW BACK PAIN WITHOUT SCIATICA: ICD-10-CM

## 2021-01-19 DIAGNOSIS — Z23 ENCOUNTER FOR IMMUNIZATION: ICD-10-CM

## 2021-01-19 DIAGNOSIS — F41.9 ANXIETY: ICD-10-CM

## 2021-01-19 DIAGNOSIS — E03.9 ACQUIRED HYPOTHYROIDISM: ICD-10-CM

## 2021-01-19 DIAGNOSIS — H61.23 BILATERAL IMPACTED CERUMEN: ICD-10-CM

## 2021-01-19 PROCEDURE — 90715 TDAP VACCINE 7 YRS/> IM: CPT | Performed by: FAMILY MEDICINE

## 2021-01-19 PROCEDURE — 99396 PREV VISIT EST AGE 40-64: CPT | Performed by: FAMILY MEDICINE

## 2021-01-19 PROCEDURE — 90471 IMMUNIZATION ADMIN: CPT | Performed by: FAMILY MEDICINE

## 2021-01-19 RX ORDER — CHOLECALCIFEROL (VITAMIN D3) 50 MCG
2000 TABLET ORAL DAILY
Qty: 30 TABLET | Refills: 3
Start: 2021-01-19 | End: 2021-12-23

## 2021-01-19 RX ORDER — BUSPIRONE HYDROCHLORIDE 10 MG/1
10 TABLET ORAL 3 TIMES DAILY PRN
Qty: 90 TABLET | Refills: 0 | Status: SHIPPED | OUTPATIENT
Start: 2021-01-19 | End: 2021-02-18

## 2021-01-19 RX ORDER — HYDROCODONE BITARTRATE AND ACETAMINOPHEN 5; 325 MG/1; MG/1
1 TABLET ORAL EVERY 6 HOURS PRN
COMMUNITY
End: 2021-05-14

## 2021-01-19 RX ORDER — TIZANIDINE 4 MG/1
4 TABLET ORAL EVERY 8 HOURS PRN
Qty: 90 TABLET | Refills: 0 | Status: SHIPPED | OUTPATIENT
Start: 2021-01-19 | End: 2021-02-17

## 2021-01-19 ASSESSMENT — PATIENT HEALTH QUESTIONNAIRE - PHQ9
2. FEELING DOWN, DEPRESSED OR HOPELESS: 0
SUM OF ALL RESPONSES TO PHQ QUESTIONS 1-9: 0
SUM OF ALL RESPONSES TO PHQ QUESTIONS 1-9: 0
1. LITTLE INTEREST OR PLEASURE IN DOING THINGS: 0
SUM OF ALL RESPONSES TO PHQ QUESTIONS 1-9: 0

## 2021-01-19 ASSESSMENT — VISUAL ACUITY
OD_CC: 20/25
OS_CC: 20/20

## 2021-01-19 ASSESSMENT — ENCOUNTER SYMPTOMS
VOMITING: 0
WHEEZING: 0
NAUSEA: 0
BLOOD IN STOOL: 0
ABDOMINAL PAIN: 0
TROUBLE SWALLOWING: 0
SHORTNESS OF BREATH: 0
CHEST TIGHTNESS: 0
BACK PAIN: 1
CONSTIPATION: 0
DIARRHEA: 0
COUGH: 0
ABDOMINAL DISTENTION: 0

## 2021-01-19 NOTE — PATIENT INSTRUCTIONS
Patient Education        Learning About Low-Carbohydrate Diets  What is a low-carbohydrate diet? A low-carbohydrate (or \"low-carb\") diet limits foods and drinks that have carbohydrates. This includes grains, fruits, milk and yogurt, and starchy vegetables like potatoes, beans, and corn. It also avoids foods and drinks that have added sugar. Instead, low-carb diets include foods that are high in protein and fat. Why might you follow a low-carb diet? Low-carb diets may be used for a variety of reasons, such as for weight loss. People who have diabetes may use a low-carb diet to help manage their blood sugar levels. What should you do before you start the diet? Talk to your doctor before you try any diet. This is even more important if you have health problems like kidney disease, heart disease, or diabetes. Your doctor may suggest that you meet with a registered dietitian. A dietitian can help you make an eating plan that works for you. What foods do you eat on a low-carb diet? On a low-carb diet, you choose foods that are high in protein and fat. Examples of these are:  · Meat, poultry, and fish. · Eggs. · Nuts, such as walnuts, pecans, almonds, and peanuts. · Peanut butter and other nut butters. · Tofu. · Avocado. · Mila Drones. · Non-starchy vegetables like broccoli, cauliflower, green beans, mushrooms, peppers, lettuce, and spinach. · Unsweetened non-dairy milks like almond milk and coconut milk. · Cheese, cottage cheese, and cream cheese. Current as of: August 22, 2019               Content Version: 12.6  © 1026-5630 [employer]. Care instructions adapted under license by [facility] ([facility]). If you have questions about a medical condition or this instruction, always ask your healthcare professional. Nestor Fontanez any warranty or liability for your use of this information.          Patient Education        Well Visit, Women 48 to 72: Care Instructions  Your Care Instructions     Physical exams can help you stay healthy. Your doctor has checked your overall health and may have suggested ways to take good care of yourself. He or she also may have recommended tests. At home, you can help prevent illness with healthy eating, regular exercise, and other steps. Follow-up care is a key part of your treatment and safety. Be sure to make and go to all appointments, and call your doctor if you are having problems. It's also a good idea to know your test results and keep a list of the medicines you take. How can you care for yourself at home? · Reach and stay at a healthy weight. This will lower your risk for many problems, such as obesity, diabetes, heart disease, and high blood pressure. · Get at least 30 minutes of exercise on most days of the week. Walking is a good choice. You also may want to do other activities, such as running, swimming, cycling, or playing tennis or team sports. · Do not smoke. Smoking can make health problems worse. If you need help quitting, talk to your doctor about stop-smoking programs and medicines. These can increase your chances of quitting for good. · Protect your skin from too much sun. When you're outdoors from 10 a.m. to 4 p.m., stay in the shade or cover up with clothing and a hat with a wide brim. Wear sunglasses that block UV rays. Even when it's cloudy, put broad-spectrum sunscreen (SPF 30 or higher) on any exposed skin. · See a dentist one or two times a year for checkups and to have your teeth cleaned. · Wear a seat belt in the car. Follow your doctor's advice about when to have certain tests. These tests can spot problems early. · Cholesterol. Your doctor will tell you how often to have this done based on your age, family history, or other things that can increase your risk for heart attack and stroke. · Blood pressure. Have your blood pressure checked during a routine doctor visit.  Your doctor will tell you how often to check your blood pressure based on your age, your blood pressure results, and other factors. · Mammogram. Ask your doctor how often you should have a mammogram, which is an X-ray of your breasts. A mammogram can spot breast cancer before it can be felt and when it is easiest to treat. · Pap test and pelvic exam. Ask your doctor how often you should have a Pap test. You may not need to have a Pap test as often as you used to. · Vision. Have your eyes checked every year or two or as often as your doctor suggests. Some experts recommend that you have yearly exams for glaucoma and other age-related eye problems starting at age 48. · Hearing. Tell your doctor if you notice any change in your hearing. You can have tests to find out how well you hear. · Diabetes. Ask your doctor whether you should have tests for diabetes. · Colorectal cancer. Your risk for colorectal cancer gets higher as you get older. Some experts say that adults should start regular screening at age 48 and stop at age 76. Others say to start before age 48 or continue after age 76. Talk with your doctor about your risk and when to start and stop screening. · Thyroid disease. Talk to your doctor about whether to have your thyroid checked as part of a regular physical exam. Women have an increased chance of a thyroid problem. · Osteoporosis. You should begin tests for bone density at age 72. If you are younger than 72, ask your doctor whether you have factors that may increase your risk for this disease. You may want to have this test before age 72. · Heart attack and stroke risk. At least every 4 to 6 years, you should have your risk for heart attack and stroke assessed. Your doctor uses factors such as your age, blood pressure, cholesterol, and whether you smoke or have diabetes to show what your risk for a heart attack or stroke is over the next 10 years. When should you call for help?   Watch closely for changes in your health, and be sure to contact your doctor if you have any problems or symptoms that concern you. Where can you learn more? Go to [email or web address] and sign in to your [employer] account. Enter A515 in the [facility] box to learn more about \"Well Visit, Women 50 to 72: Care Instructions. \"     If you do not have an account, please click on the \"Sign Up Now\" link. Current as of: May 27, 2020               Content Version: 12.6  © 2006-2020 [employer]. Care instructions adapted under license by [facility] ([facility]). If you have questions about a medical condition or this instruction, always ask your healthcare professional. [address] any warranty or liability for your use of this information.

## 2021-01-19 NOTE — PROGRESS NOTES
Visit Information    Have you changed or started any medications since your last visit including any over-the-counter medicines, vitamins, or herbal medicines? no   Are you having any side effects from any of your medications? -  no  Have you stopped taking any of your medications? Is so, why? -  no    Have you seen any other physician or provider since your last visit? No  Have you had any other diagnostic tests since your last visit? No  Have you been seen in the emergency room and/or had an admission to a hospital since we last saw you? NO  Have you had your routine dental cleaning in the past 6 months? no    Have you activated your [employer] account? If not, what are your barriers?  Yes     Patient Care Team:  Rosa Bolaños MD as PCP - General (Family Medicine)  Rosa Bolaños MD as PCP - [facility]    Medical History Review  Past Medical, Family, and Social History reviewed and does contribute to the patient presenting condition    Health Maintenance   Topic Date Due    Hepatitis C screen  1965    DTaP/Tdap/Td vaccine (1 - Tdap) 08/21/1985    Shingles Vaccine (1 of 2) 08/21/2016    Breast cancer screen  02/14/2017    Flu vaccine (1) 09/01/2020    Lipid screen  07/24/2021    TSH testing  07/24/2021    Colon cancer screen colonoscopy  11/16/2022    Diabetes screen  07/24/2023    HIV screen  Completed    Hepatitis A vaccine  Aged Out    Hepatitis B vaccine  Aged Out    Hib vaccine  Aged Out    Meningococcal (ACWY) vaccine  Aged Out    Pneumococcal 0-64 years Vaccine  Aged Out

## 2021-01-19 NOTE — PROGRESS NOTES
2021      Raimundo Arenas (:  1965) is a 48 y.o. female, here for a preventive medicine evaluation, Annual Exam, Anxiety, and Back Pain   . ASSESSMENT/PLAN:    1. Annual physical exam    Low carb, low fat diet, increase fruits and vegetables, and exercise 4-5 times a week 30-40 minutes a day, or walk 1-2 hours per day, or wear a pedometer and get at least 10,000 steps per day. Dental exam 2-3 times /year advised. Immunizations reviewed. Health Maintenance reviewed   Smoking cessation counseling given not to ever restart smoking  Low-salt diet advised    Annual eye exam advised. 2. Anxiety  worsening  Situational  -Start    busPIRone (BUSPAR) 10 MG tablet; Take 1 tablet by mouth 3 times daily as needed (anxiety), Disp-90 tablet, R-0Normal  3. Chronic right-sided low back pain without sciatica  Failing to change as expected. Recently has steroid shot given by pain management and Norco  -start     tiZANidine (ZANAFLEX) 4 MG tablet; Take 1 tablet by mouth every 8 hours as needed (back pain) Causes sedation, do not drive while taking this medication, Disp-90 tablet, R-0Normal  -    Start  Glucosamine-Chondroitin-MSM (TRIPLE FLEX) 750-400-375 MG TABS; Take 2 tablets by mouth daily With food, Disp-180 tablet, R-3Normal  Heating pad, stretching advised  4. Vitamin D deficiency  Taking vitamin D 2000 units from over-the-counter, continue the same  5. Encounter for screening mammogram for breast cancer  -     JUSTIN DIGITAL SCREEN W OR WO CAD BILATERAL; Future  6. Encounter for screening for other viral diseases  -     Hepatitis C Antibody; Future  7. Encounter for immunization  -     Tdap (age 6y and older) IM ([facility])  8. Acquired hypothyroidism  Stable  Continue Synthroid 25 MCG daily  Advised to take Synthroid in the morning, on empty stomach, without any other meds and with a full glass of water.    -     TSH without Reflex; Future  -     T4, Free; Future  9.  Hyperlipidemia with target LDL less than 100  Likely improving  Continue Lipitor 10 mg daily  -     Lipid Panel; Future  -     AST; Future  -     ALT; Future  10. Bilateral impacted cerumen  -     carbamide peroxide (DEBROX) 6.5 % otic solution; Place 5 drops into the right ear 2 times daily for 5 days One ear at a time, then flush it, Disp-1 Bottle, R-0Normal        Petra received counseling on the following healthy behaviors: nutrition, exercise, medication adherence and weight loss    Reviewed prior labs and health maintenance  Discussed use, benefit, and side effects of prescribed medications. Barriers to medication compliance addressed. Patient given educational materials - see patient instructions  All patient questions answered. Patient voiced understanding. The patient's past medical, surgical, social, and family history as well as her  current medications and allergies were reviewed as documented in today's encounter. Medications, labs, diagnostic studies, consultations and follow-up as documented in thisencounter. 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 Put her on the list for COVID 19. 2 more appointments adipex 1 mo apart        Patient Active Problem List   Diagnosis    Chronic back pain greater than 3 months duration    Gastroesophageal reflux disease without esophagitis    Bulge of lumbar disc without myelopathy    Vitamin D deficiency    Severe obesity (BMI 35.0-39. 9) with comorbidity ([address]    Hyperlipidemia with target LDL less than 100    Chronic fatigue    OAB (overactive bladder)    Insomnia due to medical condition    Mild episode of recurrent major depressive disorder ([address]    Acquired hypothyroidism    H/O: hysterectomy    Anxiety    Chronic right-sided low back pain without sciatica    Bilateral impacted cerumen       Prior to Visit Medications    Medication Sig Taking?  Authorizing Provider   ZINC PO Take by mouth Yes Historical Provider, MD   HYDROcodone-acetaminophen (NORCO) 5-325 MG per tablet Take 1 tablet by mouth every 6 hours as needed for Pain. Yes Historical Provider, MD   atorvastatin (LIPITOR) 10 MG tablet TAKE ONE TABLET BY MOUTH EVERY EVENING Yes Bernie Fritz MD   oxybutynin (DITROPAN-XL) 10 MG extended release tablet Take 1 tablet by mouth daily Yes Bernie Fritz MD   levothyroxine (SYNTHROID) 25 MCG tablet Take 1 tablet by mouth every morning (before breakfast) Call for refill Yes Bernie Fritz MD   vitamin D (ERGOCALCIFEROL) 1.25 MG (47161 UT) CAPS capsule Take 1 capsule by mouth once a week Yes Bernie Fritz MD   traZODone (DESYREL) 50 MG tablet Take 1 tablet by mouth nightly as needed for Sleep Yes Bernie Fritz MD        No Known Allergies    Past Medical History:   Diagnosis Date    Acquired hypothyroidism 7/27/2020    Chronic back pain     Diarrhea     Distal radius fracture 1/28/13    Drug-seeking behavior 10/16/2014    FANTA (generalized anxiety disorder)     GERD (gastroesophageal reflux disease)     H/O: hysterectomy 8/19/2020    History of depression     Hyperlipidemia with target LDL less than 100 7/19/2020    Mild episode of recurrent major depressive disorder ([facility] 7/19/2020    Opioid dependence ([facility] 10/16/2014    Severe obesity (BMI 35.0-39. 9) with comorbidity ([facility] 7/19/2020    Urge incontinence 3/9/2020       Past Surgical History:   Procedure Laterality Date    BLADDER SUSPENSION      FRACTURE SURGERY  2/7/2013    right distial radius orif    HYSTERECTOMY      has ovaries    KNEE ARTHROSCOPY      right    NE COLONOSCOPY W/BIOPSY SINGLE/MULTIPLE N/A 11/16/2017    COLONOSCOPY WITH BIOPSY OF POLYP performed by Lucio Ratliff MD at [address] EGD TRANSORAL BIOPSY SINGLE/MULTIPLE N/A 11/16/2017    EGD BIOPSY performed by Lucio Ratliff MD at [facility]         .   Social History     Tobacco Use    Smoking status: Former Smoker     Packs/day: 0.50 Years: 3.00     Pack years: 1.50     Types: Cigarettes     Quit date: 1988     Years since quittin.0    Smokeless tobacco: Never Used   Substance Use Topics    Alcohol use: Yes     Comment: occasionally    Drug use: No       Family History   Problem Relation Age of Onset    High Blood Pressure Mother     Depression Mother     Heart Surgery Father     Other Brother         scoliosis    Colon Cancer Maternal Grandfather     COPD Maternal Grandfather     Lung Cancer Maternal Grandfather         lung    Breast Cancer Paternal Aunt     Cancer Maternal Cousin         brain       ADVANCE DIRECTIVE: N, <no information>    CHONG / Brenden Sandoval is here for Annual Exam, Anxiety, and Back Pain     Patient reports anxiety due to her mom living with her and her   Patient says she has lost her father 2019, and now her mom is getting depressed, does not want to do anything  Her sister will take her mom to [place]. She would like something as needed for anxiety    Patient also reports flareup of her low back pain, worse on the right side, cannot sit for too long, she just received a steroid injection per pain management and Norco.   Has been using heating pad. The pain feels like muscle spasms, intensity of pain 7 out of 10. Urinary symptoms: no    Sexually active: Yes     last pap: was normal  Had hysterectomy , had heavy periods and endometriosis      Last mammogram:N/A  The patient has paternal aunt family history of breast cancer    Wears seatbelts: yes     Regular exercise: yes  Ever been transfused or tattooed?: yes  The patient reports that domestic violence in her life is absent. Last eye exam: up to date: No    Abnormal visual screening. I advised Erik Lund to make appointment with ophthalmologist. The patient verbalizes understanding and agrees with the plan.            Hearing Screening    125Hz 250Hz 500Hz 1000Hz 2000Hz 3000Hz 4000Hz 6000Hz 8000Hz   Right ear: Left ear:               Visual Acuity Screening    Right eye Left eye Both eyes   Without correction:      With correction: 20/25 20/20 20/20       Hearing:normal :Yes    Last dental exam and preventative dental cleaning: up to date : NO  I advised Lorena Montgomery to make appointment with dentist. The patient verbalizes understanding and agrees with the plan. Nutritional assessment: Body mass index is 35.07 kg/m². Elevated, obesity per BMI    Weight is decreasing intentionally lost 15 pounds in 10 months and she would like to lose more weight to help with her back pain. She was previously on Adipex and she would like to restart that  Wt Readings from Last 3 Encounters:   01/19/21 198 lb (89.8 kg)   03/09/20 213 lb (96.6 kg)   02/20/20 213 lb (96.6 kg)       Healthful diet and Physical activity counseling to prevent CVD- low carb, low fat diet, increase fruits and vegetables, and exercise 4-5 times a day 30-40minutes a day discussed    Nicotine dependence. no  Smoker, counseling given to quit smoking. Counseling given: Yes      Alcohol use: yes - occasionally     Immunization History   Administered Date(s) Administered    Tdap (Boostrix, Adacel) 01/19/2021       Tdap one time, then Td every 10 years-up to date:  NO, she is agreeable to get it done today, the last one was about 14 years ago and she works in     Influenza annually-up to date:  No    PPSV 21 in all adults 19-63 yo with chronic conditions,smokers, alcoholism,  nursing home residents; then PCV 13 at 74 yo-up to date:  NOT indicated    Menopause: Yes   No LMP recorded. Patient has had a hysterectomy.      Colon cancer screening recommended at 48 yo-up to date,11/16/2017, the next one is due in 5 years from prior due to polyp which was benign  The patient has  family history of colon cancer      Blood pressure is borderline high, she says she is very anxious today  Advised low-salt diet    BP Readings from Last 3 Encounters:   01/19/21 138/86   03/09/20 130/80   02/20/20 130/85       Pulse is normal.  Pulse Readings from Last 3 Encounters:   01/19/21 66   03/09/20 94   02/20/20 73       A1c is normal  Lab Results   Component Value Date    LABA1C 5.6 07/24/2020    LABA1C 5.5 03/23/2016    LABA1C 5.4 05/03/2013     Hyperlipidemia   On Lipitor, tolerates it well, denies side effects      Lab Results   Component Value Date    CHOL 204 (H) 07/24/2020    CHOL 201 (H) 03/23/2016    CHOL 206 (H) 04/12/2014     Lab Results   Component Value Date    TRIG 84 07/24/2020    TRIG 171 (H) 03/23/2016    TRIG 82 04/12/2014     Lab Results   Component Value Date    HDL 49 07/24/2020    HDL 45 03/23/2016    HDL 56 04/12/2014     Lab Results   Component Value Date    LDLCHOLESTEROL 138 (H) 07/24/2020    LDLCHOLESTEROL 122 03/23/2016    LDLCHOLESTEROL 134 (H) 04/12/2014     Lab Results   Component Value Date    CHOLHDLRATIO 4.2 07/24/2020    CHOLHDLRATIO 4.5 03/23/2016    CHOLHDLRATIO 3.7 04/12/2014       Cardiovascular risk is: Low  The 10-year ASCVD risk score (Asya Apodaca, et al., 2013) is: 2.4%    Values used to calculate the score:      Age: 48 years      Sex: Female      Is Non- : No      Diabetic: No      Tobacco smoker: No      Systolic Blood Pressure: 764 mmHg      Is BP treated: No      HDL Cholesterol: 49 mg/dL      Total Cholesterol: 204 mg/dL    Hepatitis C screening-No results found for: HAV, HEPAIGM, HEPBIGM, HEPBCAB, HBEAG, HEPCAB        Hypothyroidism: Recent symptoms: fatigue and anxiety. She denies fatigue, weight gain, cold intolerance, heat intolerance, hair loss, dry skin, constipation, diarrhea, edema, tremor, palpitations and dysphagia. Patient is  taking her medication consistently on an empty stomach. TSH is Elevated. No results found for: [facility]  Lab Results   Component Value Date    TSH 18.65 (H) 07/24/2020    TSH 3.83 03/23/2016    TSH 5.39 12/21/2013     Janeth Oakes has Vitamin D deficiency.   Janeth Oakes  is  taking Vitamin D supplementation   she feels tired. Lab Results   Component Value Date    VITD25 18.6 (L) 07/24/2020          [x]Negative depression screening. PHQ Scores 1/19/2021 9/16/2020 7/16/2020 3/9/2020 8/15/2018   PHQ2 Score 0 0 2 0 0   PHQ9 Score 0 0 2 0 0       Health Maintenance   Topic Date Due    Hepatitis C screen  1965    Shingles Vaccine (1 of 2) 08/21/2016    Breast cancer screen  02/14/2017    Flu vaccine (1) 06/30/2021 (Originally 9/1/2020)    Lipid screen  07/24/2021    TSH testing  07/24/2021    Colon cancer screen colonoscopy  11/16/2022    Diabetes screen  07/24/2023    DTaP/Tdap/Td vaccine (2 - Td) 01/19/2031    HIV screen  Completed    Hepatitis A vaccine  Aged Out    Hepatitis B vaccine  Aged Out    Hib vaccine  Aged Out    Meningococcal (ACWY) vaccine  Aged Out    Pneumococcal 0-64 years Vaccine  Aged Out       -rest of complaints with corresponding details per ROS    Review of Systems   Constitutional: Positive for fatigue. Negative for activity change, appetite change, chills, diaphoresis, fever and unexpected weight change. HENT: Negative for congestion, dental problem, hearing loss and trouble swallowing. Eyes: Positive for visual disturbance. Respiratory: Negative for cough, chest tightness, shortness of breath and wheezing. Cardiovascular: Negative for chest pain, palpitations and leg swelling. Gastrointestinal: Negative for abdominal distention, abdominal pain, blood in stool, constipation, diarrhea, nausea and vomiting. Endocrine: Negative for cold intolerance, heat intolerance, polydipsia, polyphagia and polyuria. Genitourinary: Negative for difficulty urinating, dysuria, frequency, urgency and vaginal pain. Musculoskeletal: Positive for back pain. Negative for arthralgias and myalgias. Skin: Negative for rash. Allergic/Immunologic: Negative for environmental allergies and immunocompromised state.    Neurological: Negative for dizziness, weakness and numbness. Hematological: Does not bruise/bleed easily. Psychiatric/Behavioral: Negative for dysphoric mood and sleep disturbance. The patient is nervous/anxious.          -vital signs stable and within normal limits except obesity per BMI and borderline high blood pressure  /86   Pulse 66   Temp 97.2 °F (36.2 °C)   Ht 5' 3\" (1.6 m)   Wt 198 lb (89.8 kg)   SpO2 98%   BMI 35.07 kg/m²   Vitals:    01/19/21 1531   BP: 138/86   Pulse: 66   Temp: 97.2 °F (36.2 °C)   SpO2: 98%   Weight: 198 lb (89.8 kg)   Height: 5' 3\" (1.6 m)     Estimated body mass index is 35.07 kg/m² as calculated from the following:    Height as of this encounter: 5' 3\" (1.6 m). Weight as of this encounter: 198 lb (89.8 kg). Physical Exam  Vitals signs and nursing note reviewed. Constitutional:       General: She is not in acute distress. Appearance: Normal appearance. She is well-developed. She is obese. She is not diaphoretic. HENT:      Head: Normocephalic and atraumatic. Right Ear: Hearing, tympanic membrane, ear canal and external ear normal. There is impacted cerumen. Left Ear: Hearing, tympanic membrane, ear canal and external ear normal. There is impacted cerumen. Nose: No mucosal edema. Mouth/Throat:      Comments: I did not examine the mouth due to coronavirus pandemic and wearing masks. Eyes:      General: Lids are normal. No scleral icterus. Right eye: No discharge. Left eye: No discharge. Extraocular Movements: Extraocular movements intact. Conjunctiva/sclera: Conjunctivae normal.   Neck:      Musculoskeletal: Normal range of motion and neck supple. Thyroid: No thyromegaly. Cardiovascular:      Rate and Rhythm: Normal rate and regular rhythm. Heart sounds: Normal heart sounds. No murmur. Pulmonary:      Effort: Pulmonary effort is normal. No respiratory distress. Breath sounds: Normal breath sounds. No wheezing or rales.    Chest: Chest wall: No tenderness. Abdominal:      General: Bowel sounds are normal. There is no distension. Palpations: Abdomen is soft. There is no hepatomegaly or splenomegaly. Tenderness: There is no abdominal tenderness. Comments: Obese abdomen   Musculoskeletal:      Lumbar back: She exhibits decreased range of motion, tenderness, bony tenderness, pain and spasm. Right lower leg: No edema. Left lower leg: No edema. Comments: antalgic gait and changing position   Skin:     General: Skin is warm and dry. Capillary Refill: Capillary refill takes less than 2 seconds. Findings: No rash. Neurological:      Mental Status: She is alert and oriented to person, place, and time. Cranial Nerves: No cranial nerve deficit. Motor: No abnormal muscle tone. Gait: Gait normal.      Deep Tendon Reflexes: Reflexes normal.      Reflex Scores:       Patellar reflexes are 2+ on the right side and 2+ on the left side. Psychiatric:         Mood and Affect: Mood is anxious. Behavior: Behavior normal.         Thought Content: Thought content normal.         Judgment: Judgment normal.         No flowsheet data found.         Orders Placed This Encounter   Medications    busPIRone (BUSPAR) 10 MG tablet     Sig: Take 1 tablet by mouth 3 times daily as needed (anxiety)     Dispense:  90 tablet     Refill:  0    tiZANidine (ZANAFLEX) 4 MG tablet     Sig: Take 1 tablet by mouth every 8 hours as needed (back pain) Causes sedation, do not drive while taking this medication     Dispense:  90 tablet     Refill:  0    Glucosamine-Chondroitin-MSM (TRIPLE FLEX) 750-400-375 MG TABS     Sig: Take 2 tablets by mouth daily With food     Dispense:  180 tablet     Refill:  3    carbamide peroxide (DEBROX) 6.5 % otic solution     Sig: Place 5 drops into the right ear 2 times daily for 5 days One ear at a time, then flush it     Dispense:  1 Bottle     Refill:  0    vitamin D (CHOLECALCIFEROL) 50 MCG (2000 UT) TABS tablet     Sig: Take 1 tablet by mouth daily     Dispense:  30 tablet     Refill:  3         Medications Discontinued During This Encounter   Medication Reason    vitamin D (ERGOCALCIFEROL) 1.25 MG (37037 UT) CAPS capsule DOSE ADJUSTMENT         Orders Placed This Encounter   Procedures    JUSTIN DIGITAL SCREEN W OR WO CAD BILATERAL     Standing Status:   Future     Standing Expiration Date:   12/19/2021     Order Specific Question:   Reason for exam:     Answer:   screening mammogram    Tdap (age 6y and older) IM ([facility])    Hepatitis C Antibody     Standing Status:   Future     Standing Expiration Date:   12/19/2021    TSH without Reflex     Standing Status:   Future     Standing Expiration Date:   12/19/2021    T4, Free     Standing Status:   Future     Standing Expiration Date:   12/19/2021    Lipid Panel     Standing Status:   Future     Standing Expiration Date:   12/19/2021     Order Specific Question:   Is Patient Fasting?/# of Hours     Answer:   8-10 Hours, water ok to drink    AST     Standing Status:   Future     Standing Expiration Date:   12/19/2021    ALT     Standing Status:   Future     Standing Expiration Date:   12/19/2021         Future Appointments   Date Time Provider Salvador Cheng   4/21/2021  2:30 PM Nathan Littlejohn MD [address]   5/14/2021  2:30 PM [facility]   6/11/2021  3:00 PM Nathan Littlejohn MD [address]       This note was completed by using the assistance of a speech-recognition program. However, inadvertent computerized transcription errors may be present. Although every effort was made to ensure accuracy, no guarantees can be provided that every mistake has been identified and corrected by editing. An electronic signature was used to authenticate this note.     Electronically signed by Nathan Littlejohn MD on 1/19/2021 at 6:42 PM

## 2021-02-16 DIAGNOSIS — G47.01 INSOMNIA DUE TO MEDICAL CONDITION: ICD-10-CM

## 2021-02-17 DIAGNOSIS — M54.50 CHRONIC RIGHT-SIDED LOW BACK PAIN WITHOUT SCIATICA: ICD-10-CM

## 2021-02-17 DIAGNOSIS — G89.29 CHRONIC RIGHT-SIDED LOW BACK PAIN WITHOUT SCIATICA: ICD-10-CM

## 2021-02-17 RX ORDER — TRAZODONE HYDROCHLORIDE 50 MG/1
50 TABLET ORAL NIGHTLY PRN
Qty: 90 TABLET | Refills: 1 | Status: SHIPPED | OUTPATIENT
Start: 2021-02-17 | End: 2021-11-22

## 2021-02-17 RX ORDER — TIZANIDINE 4 MG/1
TABLET ORAL
Qty: 90 TABLET | Refills: 0 | Status: SHIPPED | OUTPATIENT
Start: 2021-02-17 | End: 2021-03-18

## 2021-02-17 NOTE — TELEPHONE ENCOUNTER
Please Approve or Refuse.   Send to Pharmacy per Pt's Request:      Next Visit Date:  4/21/2021   Last Visit Date: 1/19/2021    Hemoglobin A1C (%)   Date Value   07/24/2020 5.6   03/23/2016 5.5   05/03/2013 5.4             ( goal A1C is < 7)   BP Readings from Last 3 Encounters:   01/19/21 138/86   03/09/20 130/80   02/20/20 130/85          (goal 120/80)  BUN   Date Value Ref Range Status   07/24/2020 12 6 - 20 mg/dL Final     CREATININE   Date Value Ref Range Status   07/24/2020 0.74 0.50 - 0.90 mg/dL Final     Potassium   Date Value Ref Range Status   07/24/2020 4.3 3.7 - 5.3 mmol/L Final

## 2021-02-17 NOTE — TELEPHONE ENCOUNTER
Please Approve or Refuse.   Send to Pharmacy per Pt's Request:      Next Visit Date:  2/17/2021   Last Visit Date: 1/19/2021    Hemoglobin A1C (%)   Date Value   07/24/2020 5.6   03/23/2016 5.5   05/03/2013 5.4             ( goal A1C is < 7)   BP Readings from Last 3 Encounters:   01/19/21 138/86   03/09/20 130/80   02/20/20 130/85          (goal 120/80)  BUN   Date Value Ref Range Status   07/24/2020 12 6 - 20 mg/dL Final     CREATININE   Date Value Ref Range Status   07/24/2020 0.74 0.50 - 0.90 mg/dL Final     Potassium   Date Value Ref Range Status   07/24/2020 4.3 3.7 - 5.3 mmol/L Final

## 2021-02-19 ENCOUNTER — HOSPITAL ENCOUNTER (OUTPATIENT)
Age: 56
Setting detail: SPECIMEN
Discharge: HOME | End: 2021-02-19
Payer: COMMERCIAL

## 2021-02-19 DIAGNOSIS — E03.9 ACQUIRED HYPOTHYROIDISM: ICD-10-CM

## 2021-02-19 DIAGNOSIS — Z11.59 ENCOUNTER FOR SCREENING FOR OTHER VIRAL DISEASES: ICD-10-CM

## 2021-02-19 DIAGNOSIS — E78.5 HYPERLIPIDEMIA WITH TARGET LDL LESS THAN 100: ICD-10-CM

## 2021-02-19 LAB
ALT SERPL-CCNC: 20 U/L (ref 5–33)
AST SERPL-CCNC: 14 U/L
CHOLESTEROL/HDL RATIO: 2.3
CHOLESTEROL: 126 MG/DL
HDLC SERPL-MCNC: 54 MG/DL
HEPATITIS C ANTIBODY: NONREACTIVE
LDL CHOLESTEROL: 59 MG/DL (ref 0–130)
THYROXINE, FREE: 0.86 NG/DL (ref 0.93–1.7)
TRIGL SERPL-MCNC: 64 MG/DL
TSH SERPL DL<=0.05 MIU/L-ACNC: 14.11 MIU/L (ref 0.3–5)
VLDLC SERPL CALC-MCNC: NORMAL MG/DL (ref 1–30)

## 2021-02-19 PROCEDURE — 86803 HEPATITIS C AB TEST: CPT

## 2021-02-19 PROCEDURE — 80061 LIPID PANEL: CPT

## 2021-02-19 PROCEDURE — 84460 ALANINE AMINO (ALT) (SGPT): CPT

## 2021-02-19 PROCEDURE — 84450 TRANSFERASE (AST) (SGOT): CPT

## 2021-02-19 PROCEDURE — 84439 ASSAY OF FREE THYROXINE: CPT

## 2021-02-19 PROCEDURE — 84443 ASSAY THYROID STIM HORMONE: CPT

## 2021-02-19 PROCEDURE — 36415 COLL VENOUS BLD VENIPUNCTURE: CPT

## 2021-02-21 DIAGNOSIS — E03.9 ACQUIRED HYPOTHYROIDISM: ICD-10-CM

## 2021-02-21 RX ORDER — LEVOTHYROXINE SODIUM 0.05 MG/1
50 TABLET ORAL
Qty: 90 TABLET | Refills: 3 | Status: SHIPPED | OUTPATIENT
Start: 2021-02-21 | End: 2022-06-16 | Stop reason: SDUPTHER

## 2021-02-22 NOTE — RESULT ENCOUNTER NOTE
Please notify patient. Thyroid function is still abnormal but improving. Increase Synthroid from 25 MCG to 50 MCG daily, new prescription sent to the pharmacy  Advised to take Synthroid in the morning, on empty stomach, without any other meds and with a full glass of water.       Greatly improved lipids  Otherwise labs within normal limits    Future Appointments  4/21/2021  2:30 PM    Harjit Barnd MD     [facility]  5/14/2021  2:30 PM    Harjit Brand MD     [facility]  6/11/2021  3:00 PM    Harjit Brand MD     [facility]

## 2021-03-03 ENCOUNTER — TELEPHONE (OUTPATIENT)
Dept: FAMILY MEDICINE CLINIC | Age: 56
End: 2021-03-03

## 2021-03-03 DIAGNOSIS — Z20.822 SUSPECTED COVID-19 VIRUS INFECTION: ICD-10-CM

## 2021-03-03 DIAGNOSIS — B34.9 ACUTE VIRAL SYNDROME: Primary | ICD-10-CM

## 2021-03-03 RX ORDER — ALBUTEROL SULFATE 90 UG/1
2 AEROSOL, METERED RESPIRATORY (INHALATION) EVERY 6 HOURS PRN
Qty: 1 INHALER | Refills: 1 | Status: SHIPPED | OUTPATIENT
Start: 2021-03-03 | End: 2022-03-16 | Stop reason: ALTCHOICE

## 2021-03-03 RX ORDER — BUTALBITAL, ACETAMINOPHEN AND CAFFEINE 50; 325; 40 MG/1; MG/1; MG/1
1 TABLET ORAL EVERY 6 HOURS PRN
Qty: 30 TABLET | Refills: 0 | Status: SHIPPED | OUTPATIENT
Start: 2021-03-03 | End: 2021-05-14

## 2021-03-03 RX ORDER — DEXAMETHASONE 1 MG
1 TABLET ORAL EVERY 6 HOURS
Qty: 40 TABLET | Refills: 0 | Status: SHIPPED | OUTPATIENT
Start: 2021-03-03 | End: 2021-03-13

## 2021-03-03 RX ORDER — BUTALBITAL, ACETAMINOPHEN AND CAFFEINE 50; 325; 40 MG/1; MG/1; MG/1
1 TABLET ORAL EVERY 6 HOURS PRN
Qty: 30 TABLET | Refills: 0 | Status: SHIPPED | OUTPATIENT
Start: 2021-03-03 | End: 2021-03-03 | Stop reason: SDUPTHER

## 2021-03-03 NOTE — TELEPHONE ENCOUNTER
Patient took Covid test 03/02/2021 [facility]) waiting for results, looking to see if Dr. Jarod Croft would prescribe something for sore throat, chest montserrat. and headache.

## 2021-03-03 NOTE — TELEPHONE ENCOUNTER
Please let the patient know to  prescription from the pharmacy. If she needs letter for work, she will need an appointment    If worsening symptoms in few days or not getting better as expected needs to let us know and make appointment. Orders Placed This Encounter   Medications    dexamethasone (DECADRON) 1 MG tablet     Sig: Take 1 tablet by mouth every 6 hours for 10 days With low carb low salt diet     Dispense:  40 tablet     Refill:  0    albuterol sulfate HFA (PROVENTIL HFA) 108 (90 Base) MCG/ACT inhaler     Sig: Inhale 2 puffs into the lungs every 6 hours as needed for Wheezing or Shortness of Breath     Dispense:  1 Inhaler     Refill:  1    butalbital-acetaminophen-caffeine (FIORICET, ESGIC) -40 MG per tablet     Sig: Take 1 tablet by mouth every 6 hours as needed for Headaches or Migraine MAX 3 DAYS/WEEK     Dispense:  30 tablet     Refill:  0         [facility] [address] [place], [place] [id number] [place] [id number]  Phone: [phone] Fax: [phone]      No orders of the defined types were placed in this encounter. Future Appointments   Date Time Provider Salvador Cheng   4/21/2021  2:30 PM [facility]   5/14/2021  2:30 PM [facility]   6/11/2021  3:00 PM [facility]       Thank you!

## 2021-03-04 ENCOUNTER — TELEPHONE (OUTPATIENT)
Dept: FAMILY MEDICINE CLINIC | Age: 56
End: 2021-03-04

## 2021-03-04 ENCOUNTER — TELEMEDICINE (OUTPATIENT)
Dept: FAMILY MEDICINE CLINIC | Age: 56
End: 2021-03-04
Payer: COMMERCIAL

## 2021-03-04 DIAGNOSIS — J01.90 ACUTE BACTERIAL SINUSITIS: Primary | ICD-10-CM

## 2021-03-04 DIAGNOSIS — J20.9 ACUTE BRONCHITIS DUE TO INFECTION: ICD-10-CM

## 2021-03-04 DIAGNOSIS — B96.89 ACUTE BACTERIAL SINUSITIS: Primary | ICD-10-CM

## 2021-03-04 PROCEDURE — 99213 OFFICE O/P EST LOW 20 MIN: CPT | Performed by: FAMILY MEDICINE

## 2021-03-04 RX ORDER — BENZONATATE 100 MG/1
100 CAPSULE ORAL 3 TIMES DAILY PRN
Qty: 21 CAPSULE | Refills: 0 | Status: SHIPPED | OUTPATIENT
Start: 2021-03-04 | End: 2021-03-11

## 2021-03-04 RX ORDER — AZITHROMYCIN 250 MG/1
TABLET, FILM COATED ORAL
Qty: 6 TABLET | Refills: 0 | Status: SHIPPED | OUTPATIENT
Start: 2021-03-04 | End: 2021-03-09

## 2021-03-04 ASSESSMENT — ENCOUNTER SYMPTOMS
RHINORRHEA: 1
SINUS PRESSURE: 1
SHORTNESS OF BREATH: 0
TROUBLE SWALLOWING: 1
SINUS PAIN: 1
SORE THROAT: 1
CHEST TIGHTNESS: 1
COUGH: 1

## 2021-03-04 NOTE — PROGRESS NOTES
3/4/2021    TELEHEALTH EVALUATION -- Audio/Visual (During LYQSB-92 public health emergency)      Enoch Kuo (:  1965) is a 48 y.o. female,Established patient, here for evaluation of the following chief complaint(s): Congestion, Pharyngitis, and Cough        ASSESSMENT/PLAN:    1. Acute bacterial sinusitis  Worsening  -     azithromycin (ZITHROMAX) 250 MG tablet; 500 mg orally on day one followed by 250 mg daily on days two through five, Disp-6 tablet, R-0Normal  -     benzonatate (TESSALON PERLES) 100 MG capsule; Take 1 capsule by mouth 3 times daily as needed for Cough, Disp-21 capsule, R-0Normal  Increase fluids, rest, heat over the sinuses, decongestant of her choice from over-the-counter, [facility]  2. Acute bronchitis due to infection  Worsening  -     azithromycin (ZITHROMAX) 250 MG tablet; 500 mg orally on day one followed by 250 mg daily on days two through five, Disp-6 tablet, R-0Normal  -     benzonatate (TESSALON PERLES) 100 MG capsule; Take 1 capsule by mouth 3 times daily as needed for Cough, Disp-21 capsule, R-0Normal  Letter for work given and will fax it  \"It is my medical opinion that Myles Berrios should remain out of work until 3/9/2021  DUE TO SINUS INFECTION AND BRONCHITIS. COVID 19 NEGATIVE. \"      Isha All received counseling on the following healthy behaviors: nutrition, exercise and medication adherence  Reviewed prior labs and health maintenance  Discussed use, benefit, and side effects of prescribed medications. Barriers to medication compliance addressed. Patient given educational materials - see patient instructions  All patient questions answered. Patient voiced understanding. The patient's past medical,surgical, social, and family history as well as her current medications and allergies were reviewed as documented in today's encounter. Medications, labs, diagnostic studies, consultations and follow-up as documented in this encounter. Return for KEEP APPT.     To fax letter Fax: [phone]   Attention: Marcie Chow    Future Appointments   Date Time Provider Salvadro Cheng   2021  2:30 PM Silvia Saavedra MD [facility]   2021  2:30 PM Silvia Saavedra MD [facility]   2021  3:00 PM Silvia Saavedra MD [facility]         SUBJECTIVE/OBJECTIVE:  Allen Mann (:  1965) has requested an audio/video evaluation for the following concern(s):Congestion, Pharyngitis, and Cough    Patient reports onset of respiratory symptoms since Monday night, 3/1/21, suddenly, progressively getting worse. Patient complains of chills, night sweats, decreased appetite, sore throat, losing her voice, has no energy, cough, chest tightness. Patient says her chest feels a bit better with inhaler which was prescribed yesterday  Patient reports headache, quality is pressure all over, and it hurts under the eyes and feels like sinus pressure. Denies white spots in the throat. She reports postnasal drip, congestion, runny nose, sinus pain and sinus pressure, and in the morning throwing up yellow mucus. COVID 19 test negative on 3/2/21, done at [facility], she will send us the report    Works at  and she needs letter for work. Denies fever  Lost appetite but she says she did not lose smell and taste  Drinking a lot of fluids. Has been resting. She did contact our office yesterday and she was started on decadron and Albuterol which have been helping with the cough. She denies chest pain, leg swelling, abdominal pain, diarrhea. She does have some muscle aches but not much. Review of Systems   Constitutional: Positive for activity change, appetite change, chills, diaphoresis and fatigue. Negative for fever. HENT: Positive for congestion, postnasal drip, rhinorrhea, sinus pressure, sinus pain, sore throat and trouble swallowing. Negative for ear discharge and ear pain. Respiratory: Positive for cough and chest tightness.  Negative for shortness of breath. Gastrointestinal: Positive for nausea and vomiting. Negative for abdominal pain, constipation and diarrhea. Endocrine: Positive for cold intolerance. Musculoskeletal: Positive for myalgias. Neurological: Positive for headaches. Psychiatric/Behavioral: Positive for sleep disturbance (sleeping a lot). Prior to Visit Medications    Medication Sig Taking? Authorizing Provider   dexamethasone (DECADRON) 1 MG tablet Take 1 tablet by mouth every 6 hours for 10 days With low carb low salt diet Yes Isaac Brown MD   albuterol sulfate HFA (PROVENTIL HFA) 108 (90 Base) MCG/ACT inhaler Inhale 2 puffs into the lungs every 6 hours as needed for Wheezing or Shortness of Breath Yes Isaac Brown MD   butalbital-acetaminophen-caffeine (FIORICET, ESGIC) -40 MG per tablet Take 1 tablet by mouth every 6 hours as needed for Headaches or Migraine MAX 3 DAYS/WEEK Yes Isaac Brown MD   levothyroxine (SYNTHROID) 50 MCG tablet Take 1 tablet by mouth every morning (before breakfast) Dose increased 2/21/2021 Yes Isaac Brown MD   traZODone (DESYREL) 50 MG tablet Take 1 tablet by mouth nightly as needed for Sleep Yes Isaac Brown MD   tiZANidine (ZANAFLEX) 4 MG tablet TAKE 1 TABLET BY MOUTH EVERY 8 HOURS AS NEEDED BACK PAIN DO NOT DRIVE WHILE TAKING THIS MEDICATION Yes Isaac Brown MD   ZINC PO Take by mouth Yes Historical Provider, MD   HYDROcodone-acetaminophen (NORCO) 5-325 MG per tablet Take 1 tablet by mouth every 6 hours as needed for Pain.  Yes Historical Provider, MD   Glucosamine-Chondroitin-MSM (TRIPLE FLEX) [phone] MG TABS Take 2 tablets by mouth daily With food Yes Isaac Brown MD   vitamin D (CHOLECALCIFEROL) 50 MCG (2000 UT) TABS tablet Take 1 tablet by mouth daily Yes Isaac Brown MD   atorvastatin (LIPITOR) 10 MG tablet TAKE ONE TABLET BY MOUTH EVERY EVENING Yes Isaac Brown MD   oxybutynin (DITROPAN-XL) 10 MG extended release tablet Take 1 tablet by mouth daily Yes Ann-Marie Mendoza MD       Social History     Tobacco Use    Smoking status: Former Smoker     Packs/day: 0.50     Years: 3.00     Pack years: 1.50     Types: Cigarettes     Quit date: 1988     Years since quittin.1    Smokeless tobacco: Never Used   Substance Use Topics    Alcohol use: Yes     Comment: occasionally    Drug use: No          PHYSICAL EXAMINATION:    Vital Signs: (As obtained by patient/caregiver or practitioner observation)  -vital signs stable and within normal limits except obesity per BMI last BMI 35.07 kg/M2  Patient-Reported Vitals 3/4/2021   Patient-Reported Weight 198 lbs   Patient-Reported Height 5 ft 2 in   Patient-Reported Temperature 98.2 F            Constitutional: [x] Appears well-developed and well-nourished [x] No apparent distress      [x] Abnormal-tired, sounds congested, ill looking, sniffing her nose constantly during the encounter      Mental status  [x] Alert and awake  [x] Oriented to person/place/time [x]Able to follow commands      Eyes:  EOM    [x]  Normal  [] Abnormal-  Sclera  [x]  Normal  [] Abnormal -         Discharge [x]  None visible  [] Abnormal -    HENT:   [x] Normocephalic, atraumatic.   [x] Abnormal  patient reports pain under the eyes with direct pressure  [x] Mouth/Throat: Mucous membranes are moist.     External Ears [x] Normal  [] Abnormal-     Neck: [x] No visualized mass     Pulmonary/Chest: [x] Respiratory effort normal.  [x] No visualized signs of difficulty breathing or respiratory distress        [x] Abnormal -noted cough when taking a deep breath       Musculoskeletal:   [x] Normal gait with no signs of ataxia         [x] Normal range of motion of neck        [] Abnormal-       Neurological:        [x] No Facial Asymmetry (Cranial nerve 7 motor function) (limited exam to video visit)          [x] No gaze palsy        [] Abnormal-            Skin:        [x] No significant exanthematous lesions or discoloration noted on facial skin         [] Abnormal-            Psychiatric:      [x] No Hallucinations       [x]Mood is normal      [x]Behavior is normal      [x]Judgment is normal      [x]Thought content is normal       [] Abnormal-     Other pertinent observable physical exam findings-patient's voice sounds slightly hoarse    Due to this being a TeleHealth encounter, evaluation of the following organ systems is limited: Vitals/Constitutional/EENT/Resp/CV/GI//MS/Neuro/Skin/Heme-Lymph-Imm. Orders Placed This Encounter   Medications    azithromycin (ZITHROMAX) 250 MG tablet     Si mg orally on day one followed by 250 mg daily on days two through five     Dispense:  6 tablet     Refill:  0    benzonatate (TESSALON PERLES) 100 MG capsule     Sig: Take 1 capsule by mouth 3 times daily as needed for Cough     Dispense:  21 capsule     Refill:  0       No orders of the defined types were placed in this encounter. There are no discontinued medications. Bryan Avendaño, was evaluated through a synchronous (real-time) audio-video encounter. The patient (or guardian if applicable) is aware that this is a billable service. Verbal consent to proceed has been obtained within the past 12 months. The visit was conducted pursuant to the emergency declaration under the [address] [place] [address], [address] [place] [address] authority and the [employer] Act. Patient identification was verified    Patient was alone   The patient was located in a state where the provider was credentialed to provide care. On this date 3/4/2021 I have spent 29 minutes reviewing previous notes, test results and face to face with the patient discussing the diagnosis and importance of compliance with the treatment plan as well as documenting on the day of the visit.     --Ann-Marie Mendoza MD on 3/5/2021 at 7:52 AM    An electronic signature was used to authenticate this note.

## 2021-03-04 NOTE — TELEPHONE ENCOUNTER
We do not give letters without being seen, please schedule the patient for appointment if any of the providers having openings, if no appointment openings, please place her at the end of my schedule for today, V[facility]UAL

## 2021-03-04 NOTE — TELEPHONE ENCOUNTER
Patient states you called in medication and she now has no voice. 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     Fax: [phone]   Attention: Rose Valle

## 2021-03-05 ASSESSMENT — ENCOUNTER SYMPTOMS
CONSTIPATION: 0
NAUSEA: 1
ABDOMINAL PAIN: 0
VOMITING: 1
DIARRHEA: 0

## 2021-03-18 DIAGNOSIS — G89.29 CHRONIC RIGHT-SIDED LOW BACK PAIN WITHOUT SCIATICA: ICD-10-CM

## 2021-03-18 DIAGNOSIS — M54.50 CHRONIC RIGHT-SIDED LOW BACK PAIN WITHOUT SCIATICA: ICD-10-CM

## 2021-03-18 RX ORDER — TIZANIDINE 4 MG/1
TABLET ORAL
Qty: 90 TABLET | Refills: 0 | Status: SHIPPED | OUTPATIENT
Start: 2021-03-18 | End: 2021-09-14 | Stop reason: ALTCHOICE

## 2021-04-21 ENCOUNTER — OFFICE VISIT (OUTPATIENT)
Dept: FAMILY MEDICINE CLINIC | Age: 56
End: 2021-04-21
Payer: COMMERCIAL

## 2021-04-21 VITALS
OXYGEN SATURATION: 99 % | HEART RATE: 67 BPM | TEMPERATURE: 97.2 F | DIASTOLIC BLOOD PRESSURE: 86 MMHG | HEIGHT: 63 IN | SYSTOLIC BLOOD PRESSURE: 124 MMHG | BODY MASS INDEX: 35.72 KG/M2 | WEIGHT: 201.6 LBS

## 2021-04-21 DIAGNOSIS — M17.0 BILATERAL PRIMARY OSTEOARTHRITIS OF KNEE: ICD-10-CM

## 2021-04-21 DIAGNOSIS — Z12.31 ENCOUNTER FOR SCREENING MAMMOGRAM FOR BREAST CANCER: ICD-10-CM

## 2021-04-21 DIAGNOSIS — D22.9 CHANGE IN SKIN MOLE: ICD-10-CM

## 2021-04-21 DIAGNOSIS — G89.29 CHRONIC PAIN OF BOTH KNEES: Primary | ICD-10-CM

## 2021-04-21 DIAGNOSIS — M25.561 CHRONIC PAIN OF BOTH KNEES: Primary | ICD-10-CM

## 2021-04-21 DIAGNOSIS — F33.41 RECURRENT MAJOR DEPRESSIVE DISORDER, IN PARTIAL REMISSION (HCC): ICD-10-CM

## 2021-04-21 DIAGNOSIS — E55.9 VITAMIN D DEFICIENCY: ICD-10-CM

## 2021-04-21 DIAGNOSIS — E66.01 SEVERE OBESITY (BMI 35.0-39.9) WITH COMORBIDITY (HCC): ICD-10-CM

## 2021-04-21 DIAGNOSIS — M25.562 CHRONIC PAIN OF BOTH KNEES: Primary | ICD-10-CM

## 2021-04-21 DIAGNOSIS — E03.9 ACQUIRED HYPOTHYROIDISM: ICD-10-CM

## 2021-04-21 PROCEDURE — 99214 OFFICE O/P EST MOD 30 MIN: CPT | Performed by: FAMILY MEDICINE

## 2021-04-21 RX ORDER — PHENTERMINE HYDROCHLORIDE 37.5 MG/1
37.5 TABLET ORAL
Qty: 30 TABLET | Refills: 0 | Status: SHIPPED | OUTPATIENT
Start: 2021-04-21 | End: 2021-05-14 | Stop reason: SDUPTHER

## 2021-04-21 ASSESSMENT — PATIENT HEALTH QUESTIONNAIRE - PHQ9
SUM OF ALL RESPONSES TO PHQ QUESTIONS 1-9: 0
SUM OF ALL RESPONSES TO PHQ9 QUESTIONS 1 & 2: 0
1. LITTLE INTEREST OR PLEASURE IN DOING THINGS: 0
SUM OF ALL RESPONSES TO PHQ QUESTIONS 1-9: 0

## 2021-04-21 ASSESSMENT — ENCOUNTER SYMPTOMS
NAUSEA: 0
COUGH: 0
CHEST TIGHTNESS: 0
ABDOMINAL DISTENTION: 0
TROUBLE SWALLOWING: 0
VOMITING: 0
DIARRHEA: 0
SHORTNESS OF BREATH: 0
CONSTIPATION: 0
WHEEZING: 0
ABDOMINAL PAIN: 0

## 2021-04-21 NOTE — PROGRESS NOTES
Visit Information    Have you changed or started any medications since your last visit including any over-the-counter medicines, vitamins, or herbal medicines? no   Have you stopped taking any of your medications? Is so, why? -  no  Are you having any side effects from any of your medications? - no    Have you seen any other physician or provider since your last visit?  no   Have you had any other diagnostic tests since your last visit?  no   Have you been seen in the emergency room and/or had an admission in a hospital since we last saw you?  no   Have you had your routine dental cleaning in the past 6 months?  no     Do you have an active MyChart account? If no, what is the barrier?   Yes    Patient Care Team:  Nesha Rodriguez MD as PCP - General (Family Medicine)  Nesha Rodriguez MD as PCP - [facility]  Rosalba Crews RN as Registered Nurse    Medical History Review  Past Medical, Family, and Social History reviewed and does contribute to the patient presenting condition    Health Maintenance   Topic Date Due    Shingles Vaccine (1 of 2) Never done    Breast cancer screen  02/14/2017    Flu vaccine (Season Ended) 09/01/2021    Lipid screen  02/19/2022    TSH testing  02/19/2022    Colon cancer screen colonoscopy  11/16/2022    Diabetes screen  07/24/2023    DTaP/Tdap/Td vaccine (2 - Td) 01/19/2031    COVID-19 Vaccine  Completed    Hepatitis C screen  Completed    HIV screen  Completed    Hepatitis A vaccine  Aged Out    Hepatitis B vaccine  Aged Out    Hib vaccine  Aged Out    Meningococcal (ACWY) vaccine  Aged Out    Pneumococcal 0-64 years Vaccine  Aged Out

## 2021-04-21 NOTE — PATIENT INSTRUCTIONS
Patient Education        Learning About Low-Carbohydrate Diets  What is a low-carbohydrate diet? A low-carbohydrate (or \"low-carb\") diet limits foods and drinks that have carbohydrates. This includes grains, fruits, milk and yogurt, and starchy vegetables like potatoes, beans, and corn. It also avoids foods and drinks that have added sugar. Instead, low-carb diets include foods that are high in protein and fat. Why might you follow a low-carb diet? Low-carb diets may be used for a variety of reasons, such as for weight loss. People who have diabetes may use a low-carb diet to help manage their blood sugar levels. What should you do before you start the diet? Talk to your doctor before you try any diet. This is even more important if you have health problems like kidney disease, heart disease, or diabetes. Your doctor may suggest that you meet with a registered dietitian. A dietitian can help you make an eating plan that works for you. What foods do you eat on a low-carb diet? On a low-carb diet, you choose foods that are high in protein and fat. Examples of these are:  · Meat, poultry, and fish. · Eggs. · Nuts, such as walnuts, pecans, almonds, and peanuts. · Peanut butter and other nut butters. · Tofu. · Avocado. · [place]. · Non-starchy vegetables like broccoli, cauliflower, green beans, mushrooms, peppers, lettuce, and spinach. · Unsweetened non-dairy milks like almond milk and coconut milk. · Cheese, cottage cheese, and cream cheese. Current as of: December 17, 2020               Content Version: 12.8  © 2006-2021 [employer]. Care instructions adapted under license by [facility] [place] ([facility]). If you have questions about a medical condition or this instruction, always ask your healthcare professional. [address]  any warranty or liability for your use of this information.          Patient Education        Knee: Exercises  Introduction  Here are some examples of do not have to lift your leg more than 12 inches to get a hamstring workout. Shallow standing knee bends   Do this exercise only if you have very little pain; if you have no clicking, locking, or giving way if you have an injured knee; and if it does not hurt while you are doing 8 to 12 repetitions. 1. Stand with your hands lightly resting on a counter or chair in front of you. Put your feet shoulder-width apart. 2. Slowly bend your knees so that you squat down like you are going to sit in a chair. Make sure your knees do not go in front of your toes. 3. Lower yourself about 6 inches. Your heels should remain on the floor at all times. 4. Rise slowly to a standing position. Heel raises   1. Stand with your feet a few inches apart, with your hands lightly resting on a counter or chair in front of you. 2. Slowly raise your heels off the floor while keeping your knees straight. 3. Hold for about 6 seconds, then slowly lower your heels to the floor. 4. Do 8 to 12 repetitions several times during the day. Follow-up care is a key part of your treatment and safety. Be sure to make and go to all appointments, and call your doctor if you are having problems. It's also a good idea to know your test results and keep a list of the medicines you take. Where can you learn more? Go to https://[employer]. org and sign in to your [employer] account. Enter K472 in the [facility] box to learn more about \"Knee: Exercises. \"     If you do not have an account, please click on the \"Sign Up Now\" link. Current as of: November 16, 2020               Content Version: 12.8  © 3002-2016 [employer], [employer]. Care instructions adapted under license by [facility] ([facility]). If you have questions about a medical condition or this instruction, always ask your healthcare professional. [address] any warranty or liability for your use of this information.

## 2021-04-21 NOTE — PROGRESS NOTES
Denver Rojas (:  1965) is a 48 y.o. female,Established patient, here for evaluation of the following chief complaint(s): Weight Loss (ADIPEX # 1  ( 2ND ROUND)), Other (UNABLE TO GIVE URINE SAMPLE), Knee Pain, and Mole      ASSESSMENT/PLAN:    1. Chronic pain of both knees  Worsening  Continue to work on weight loss, will do x-rays, NSAIDs, referred to orthopedics, advised knee braces from over-the-counter  -     XR KNEE LEFT (3 VIEWS); Future  -     XR KNEE RIGHT (3 VIEWS); Future  -     Reji Rowe MD, Orthopedic Surgery, [place]  -     diclofenac sodium (VOLTAREN) 1 % GEL; Apply 2 g topically 2 times daily As needed for joint pains, Topical, 2 TIMES DAILY Starting Wed 2021, Disp-100 g, R-3, NO PRINT  2. Bilateral primary osteoarthritis of knee  Likely worsening due to wear and tear nature of the disease. We will do x-rays, referred to orthopedics, start NSAIDs topical and cut down on ibuprofen p.o., knee brace from over-the-counter  Triple flex from over-the-counter  -     diclofenac sodium (VOLTAREN) 1 % GEL; Apply 2 g topically 2 times daily As needed for joint pains, Topical, 2 TIMES DAILY Starting 2021, Disp-100 g, R-3, NO PRINT  3. Severe obesity (BMI 35.0-39. 9) with comorbidity (HCC)  Worsening  -     phentermine (ADIPEX-P) 37.5 MG tablet; Take 1 tablet by mouth every morning (before breakfast) for 30 days. , Disp-30 tablet, R-0Normal  Patient was asked about her current diet and exercise habits, and personalized advice was provided regarding recommended lifestyle changes. Patient's comorbid health conditions associated with elevated BMI were discussed, including degenerative joint disease, dyslipidemia, GERD, mood disorder, skin rashes and stress urinary incontinence, as well as the likely benefits of weight loss.   Based upon patient's motivation to change her behavior, the following plan was agreed upon to work toward a weight loss goal of 1-2 pounds per week: low carbohydrate diet, exercise for at least 30 minutes 4-5 days per week and medication prescribed: Adipex. Educational materials for  weight loss were provided. Patient will follow-up in 1 month(s) with PCP. Provider spent  10 minutes counseling patient. 4. Change in skin mole  Worsening, possible hemangioma or atypical mole, might need excision biopsy  -     AFL - Laura Acosta MD Dermatology, [place]    5. Vitamin D deficiency  Unsure if improving or not. Will recheck level  Continue supplementation.    -     Vitamin D 25 Hydroxy; Future  6. Acquired hypothyroidism stable  Likely improved  Recheck TSH, last TSH still high  Continue Synthroid 50 MCG daily  -     TSH without Reflex; Future  -     T4, Free; Future  Advised to take Synthroid in the morning, on empty stomach, without any other meds and with a full glass of water. 7. Encounter for screening mammogram for breast cancer  -     [facility] Digital Screen Bilateral [[id number]]; Future    8. Recurrent major depressive disorder, in partial remission ([facility] [address]  Stable  Will continue to monitor. Trazodone at nighttime for insomnia    Controlled Substance Monitoring:    Acute and Chronic Pain Monitoring:   RX Monitoring 4/21/2021   Periodic Controlled Substance Monitoring Possible medication side effects, risk of tolerance/dependence & alternative treatments discussed. ;No signs of potential drug abuse or diversion identified. ;Assessed functional status. Marleny Ba received counseling on the following healthy behaviors: nutrition, exercise, medication adherence and weight loss  Reviewed prior labs and health maintenance  Discussed use, benefit, and side effects of prescribed medications. Barriers to medication compliance addressed. Patient given educational materials - see patient instructions  All patient questions answered. Patient voiced understanding.   The patient's past medical,surgical, social, and family history as well as her current medications and allergies were reviewed as documented in today's encounter. Medications, labs, diagnostic studies, consultations and follow-up as documented in this encounter. Return for KEEP APPT. Data Unavailable    Future Appointments   Date Time Provider Salvador Cheng   5/14/2021  2:30 [facility]   6/11/2021  3:00 PM [facility]   6/12/2021  9:00 AM [facility]         SUBJECTIVE/OBJECTIVE:    HPI    Patient complains of worsening bilateral knee pain and difficulty walking, worse in the left knee  She has bilateral knee pain, anterior, on the patella. Left knee cannot put pressure with her hand on it,  and has difficulty strengthening and getting up from sitting position  She says she Cannot climb up the stairs, it  hurts, one foot at a time, or has to lift up her leg with her hands. Right knee locks up  She has not tried knee braces  Patient has prior history of knee surgeries. Left knee meniscus surgery in 11/2016  She also has history of right knee scope   Intensity of pain is 8/10  Sometimes swelling, giving out in the morning  She has tried icy hot and  Ibuprofen 2-3 times a night    Wants to lose weight. Carlos Wayne has made a substantial good jackson effort to lose weight in a regimen for weight reduction based on caloric restriction, nutritional changes, and exercise  Carlos Black reports she did: Low-carb diet, drinking water, trying to stay more active and exercise, however it is making her knee pain worse    Contraindications to controlled substance anorexiant: NONE    Carlos Black reports no  use of illegal drug substances  Carlos Black reports alcohol use of :none    OARRS done today and okay  Plan: diet, exercise and start Adipex      Patient informed that when prescribed a controlled substance for weight loss, the provider is required by law to see the patient for an appointment every thirty days.  This is PHQ2 Score 0 0 0 2 0 0   PHQ9 Score 0 0 0 2 0 0     She is due for Mammogram.   Denies breast pain, lumps or nipple discharge. She declines breast exam today. Prior to Visit Medications    Medication Sig Taking? Authorizing Provider   tiZANidine (ZANAFLEX) 4 MG tablet TAKE 1 TABLET BY MOUTH EVERY 8 HOURS AS NEEDED FOR BACK PAIN. DO NOT DRIVE WHILE TAKING THIS MEDICATION Yes Denia Tidwell MD   albuterol sulfate HFA (PROVENTIL HFA) 108 (90 Base) MCG/ACT inhaler Inhale 2 puffs into the lungs every 6 hours as needed for Wheezing or Shortness of Breath Yes Denia Tidwell MD   butalbital-acetaminophen-caffeine (FIORICET, ESGIC) -40 MG per tablet Take 1 tablet by mouth every 6 hours as needed for Headaches or Migraine MAX 3 DAYS/WEEK Yes Denia Tidwell MD   levothyroxine (SYNTHROID) 50 MCG tablet Take 1 tablet by mouth every morning (before breakfast) Dose increased 2021 Yes Denia Tidwell MD   traZODone (DESYREL) 50 MG tablet Take 1 tablet by mouth nightly as needed for Sleep Yes Denia Tidwell MD   ZINC PO Take by mouth Yes Historical Provider, MD   HYDROcodone-acetaminophen (NORCO) 5-325 MG per tablet Take 1 tablet by mouth every 6 hours as needed for Pain.  Yes Historical Provider, MD   Glucosamine-Chondroitin-MSM (TRIPLE FLEX) [phone] MG TABS Take 2 tablets by mouth daily With food Yes Denia Tidwell MD   vitamin D (CHOLECALCIFEROL) 50 MCG ( UT) TABS tablet Take 1 tablet by mouth daily Yes Denia Tidwell MD   atorvastatin (LIPITOR) 10 MG tablet TAKE ONE TABLET BY MOUTH EVERY EVENING Yes Denia Tidwell MD   oxybutynin (DITROPAN-XL) 10 MG extended release tablet Take 1 tablet by mouth daily Yes Denia Tidwell MD       Social History     Tobacco Use    Smoking status: Former Smoker     Packs/day: 0.50     Years: 3.00     Pack years: 1.50     Types: Cigarettes     Quit date: 1988     Years since quittin.3    Smokeless tobacco: Never Used Substance Use Topics    Alcohol use: Yes     Comment: occasionally    Drug use: No         Review of Systems   Constitutional: Positive for fatigue and unexpected weight change. Negative for activity change, appetite change, chills, diaphoresis and fever. HENT: Negative for trouble swallowing. Respiratory: Negative for cough, chest tightness, shortness of breath and wheezing. Cardiovascular: Negative for chest pain, palpitations and leg swelling. Gastrointestinal: Negative for abdominal distention, abdominal pain, constipation, diarrhea, nausea and vomiting. Endocrine: Negative for cold intolerance, heat intolerance, polydipsia, polyphagia and polyuria. Musculoskeletal: Positive for arthralgias. Neurological: Negative for tremors. Hematological: Does not bruise/bleed easily. Psychiatric/Behavioral: Negative for dysphoric mood, self-injury, sleep disturbance and suicidal ideas. The patient is nervous/anxious.            -vital signs stable and within normal limits except severe obesity per BMI  /86   Pulse 67   Temp 97.2 °F (36.2 °C)   Ht 5' 3\" (1.6 m)   Wt 201 lb 9.6 oz (91.4 kg)   SpO2 99%   BMI 35.71 kg/m²      Physical Exam  Vitals signs and nursing note reviewed. Constitutional:       General: She is not in acute distress. Appearance: Normal appearance. She is well-developed. She is obese. She is not diaphoretic. HENT:      Head: Normocephalic and atraumatic. Right Ear: External ear normal.      Left Ear: External ear normal.      Nose: No mucosal edema. Mouth/Throat:      Comments: I did not examine the mouth due to coronavirus pandemic and wearing masks    Eyes:      General: Lids are normal. No scleral icterus. Right eye: No discharge. Left eye: No discharge. Extraocular Movements: Extraocular movements intact. Conjunctiva/sclera: Conjunctivae normal.   Neck:      Musculoskeletal: Normal range of motion and neck supple. CALCIUM 9.5 07/24/2020                Lab Results   Component Value Date    CHOL 126 02/19/2021    CHOL 204 (H) 07/24/2020    CHOL 201 (H) 03/23/2016     Lab Results   Component Value Date    TRIG 64 02/19/2021    TRIG 84 07/24/2020    TRIG 171 (H) 03/23/2016     Lab Results   Component Value Date    HDL 54 02/19/2021    HDL 49 07/24/2020    HDL 45 03/23/2016     Lab Results   Component Value Date    LDLCHOLESTEROL 59 02/19/2021    LDLCHOLESTEROL 138 (H) 07/24/2020    LDLCHOLESTEROL 122 03/23/2016     Lab Results   Component Value Date    CHOLHDLRATIO 2.3 02/19/2021    CHOLHDLRATIO 4.2 07/24/2020    CHOLHDLRATIO 4.5 03/23/2016       Lab Results   Component Value Date    LABA1C 5.6 07/24/2020    LABA1C 5.5 03/23/2016    LABA1C 5.4 05/03/2013     Lab Results   Component Value Date    VITD25 18.6 (L) 07/24/2020       Orders Placed This Encounter   Procedures    [facility] Digital Screen Bilateral [[id number]]     Standing Status:   Future     Standing Expiration Date:   4/21/2022    XR KNEE LEFT (3 VIEWS)     Standing Status:   Future     Standing Expiration Date:   4/21/2022     Order Specific Question:   Reason for exam:     Answer:   one position in upright position    XR KNEE RIGHT (3 VIEWS)     Standing Status:   Future     Standing Expiration Date:   4/21/2022     Order Specific Question:   Reason for exam:     Answer:   upright position    Vitamin D 25 Hydroxy     Standing Status:   Future     Standing Expiration Date:   12/21/2021    TSH without Reflex     Standing Status:   Future     Standing Expiration Date:   12/21/2021    T4, Free     Standing Status:   Future     Standing Expiration Date:   12/21/2021   Josesito Jackson MD Dermatology, [place]     Referral Priority:   Routine     Referral Type:   Eval and Treat     Referral Reason:   Specialty Services Required     Referred to Provider:   Belgica Henry MD     Requested Specialty:   Dermatology     Number of Visits Requested:   [address] [facility] [address], Luigi dorantes MD, Orthopedic Surgery, [place]     Referral Priority:   Routine     Referral Type:   Eval and Treat     Referral Reason:   Specialty Services Required     Referred to Provider:   Jacinda Esparza MD     Requested Specialty:   Orthopedic Surgery     Number of Visits Requested:   1       Orders Placed This Encounter   Medications    phentermine (ADIPEX-P) 37.5 MG tablet     Sig: Take 1 tablet by mouth every morning (before breakfast) for 30 days. Dispense:  30 tablet     Refill:  0    diclofenac sodium (VOLTAREN) 1 % GEL     Sig: Apply 2 g topically 2 times daily As needed for joint pains     Dispense:  100 g     Refill:  3       Medications Discontinued During This Encounter   Medication Reason    phentermine (ADIPEX-P) 37.5 MG tablet REORDER         Return for KEEP APPT. On this date 4/21/2021 I have spent  35 minutes reviewing previous notes, test results and face to face with the patient discussing the diagnosis and importance of compliance with the treatment plan as well as documenting on the day of the visit. This note was completed by using the assistance of a speech-recognition program. However, inadvertent computerized transcription errors may be present. Although every effort was made to ensure accuracy, no guarantees can be provided that every mistake has been identified and corrected by editing. An electronic signature was used to authenticate this note.   Electronically signed by Sonia Chu MD on 4/25/2021  12:16 PM

## 2021-04-25 PROBLEM — D22.9 CHANGE IN SKIN MOLE: Status: ACTIVE | Noted: 2021-04-25

## 2021-04-25 PROBLEM — M17.0 BILATERAL PRIMARY OSTEOARTHRITIS OF KNEE: Status: ACTIVE | Noted: 2021-04-25

## 2021-04-25 PROBLEM — H61.23 BILATERAL IMPACTED CERUMEN: Status: RESOLVED | Noted: 2021-01-19 | Resolved: 2021-04-25

## 2021-04-26 DIAGNOSIS — E78.5 HYPERLIPIDEMIA WITH TARGET LDL LESS THAN 100: ICD-10-CM

## 2021-04-26 RX ORDER — ATORVASTATIN CALCIUM 10 MG/1
10 TABLET, FILM COATED ORAL EVERY EVENING
Qty: 90 TABLET | Refills: 3 | Status: SHIPPED | OUTPATIENT
Start: 2021-04-26 | End: 2021-05-01 | Stop reason: SDUPTHER

## 2021-05-01 DIAGNOSIS — E78.5 HYPERLIPIDEMIA WITH TARGET LDL LESS THAN 100: ICD-10-CM

## 2021-05-03 RX ORDER — ATORVASTATIN CALCIUM 10 MG/1
10 TABLET, FILM COATED ORAL EVERY EVENING
Qty: 90 TABLET | Refills: 3 | Status: SHIPPED | OUTPATIENT
Start: 2021-05-03 | End: 2022-04-29

## 2021-05-14 ENCOUNTER — OFFICE VISIT (OUTPATIENT)
Dept: FAMILY MEDICINE CLINIC | Age: 56
End: 2021-05-14
Payer: COMMERCIAL

## 2021-05-14 ENCOUNTER — HOSPITAL ENCOUNTER (OUTPATIENT)
Age: 56
Setting detail: SPECIMEN
Discharge: HOME | End: 2021-05-14
Payer: COMMERCIAL

## 2021-05-14 VITALS
TEMPERATURE: 97.6 F | SYSTOLIC BLOOD PRESSURE: 128 MMHG | DIASTOLIC BLOOD PRESSURE: 82 MMHG | WEIGHT: 196 LBS | BODY MASS INDEX: 36.07 KG/M2 | OXYGEN SATURATION: 97 % | HEART RATE: 81 BPM | HEIGHT: 62 IN

## 2021-05-14 DIAGNOSIS — R01.1 MURMUR, CARDIAC: ICD-10-CM

## 2021-05-14 DIAGNOSIS — E78.5 HYPERLIPIDEMIA WITH TARGET LDL LESS THAN 100: ICD-10-CM

## 2021-05-14 DIAGNOSIS — E55.9 VITAMIN D DEFICIENCY: ICD-10-CM

## 2021-05-14 DIAGNOSIS — E03.9 ACQUIRED HYPOTHYROIDISM: ICD-10-CM

## 2021-05-14 DIAGNOSIS — M17.0 BILATERAL PRIMARY OSTEOARTHRITIS OF KNEE: ICD-10-CM

## 2021-05-14 DIAGNOSIS — E66.01 SEVERE OBESITY (BMI 35.0-39.9) WITH COMORBIDITY (HCC): Primary | ICD-10-CM

## 2021-05-14 LAB
THYROXINE, FREE: 1.16 NG/DL (ref 0.93–1.7)
TSH SERPL DL<=0.05 MIU/L-ACNC: 6.04 MIU/L (ref 0.3–5)
VITAMIN D 25-HYDROXY: 39.7 NG/ML (ref 30–100)

## 2021-05-14 PROCEDURE — 99214 OFFICE O/P EST MOD 30 MIN: CPT | Performed by: FAMILY MEDICINE

## 2021-05-14 RX ORDER — PHENTERMINE HYDROCHLORIDE 37.5 MG/1
37.5 TABLET ORAL
Qty: 30 TABLET | Refills: 0 | Status: SHIPPED | OUTPATIENT
Start: 2021-05-14 | End: 2021-06-13

## 2021-05-14 ASSESSMENT — ENCOUNTER SYMPTOMS
NAUSEA: 0
DIARRHEA: 0
COUGH: 0
WHEEZING: 0
ABDOMINAL DISTENTION: 0
SHORTNESS OF BREATH: 0
CHEST TIGHTNESS: 0
CONSTIPATION: 0
ABDOMINAL PAIN: 0

## 2021-05-14 NOTE — PROGRESS NOTES
Visit Information    Have you changed or started any medications since your last visit including any over-the-counter medicines, vitamins, or herbal medicines? no   Are you having any side effects from any of your medications? -  no  Have you stopped taking any of your medications? Is so, why? -  no    Have you seen any other physician or provider since your last visit? Yes - Records Obtained  Have you had any other diagnostic tests since your last visit? Yes - Records Obtained  Have you been seen in the emergency room and/or had an admission to a hospital since we last saw you? No  Have you had your routine dental cleaning in the past 6 months? no    Have you activated your [employer] account? If not, what are your barriers?  Yes     Patient Care Team:  Chase Romero MD as PCP - General (Family Medicine)  Chase Romero MD as PCP - [facility] Provider  Stacey Rich RN as Registered Nurse    Medical History Review  Past Medical, Family, and Social History reviewed and does contribute to the patient presenting condition    Health Maintenance   Topic Date Due    Shingles Vaccine (1 of 2) Never done    Breast cancer screen  02/14/2017    Flu vaccine (Season Ended) 09/01/2021    Lipid screen  02/19/2022    TSH testing  05/14/2022    Colon cancer screen colonoscopy  11/16/2022    Diabetes screen  07/24/2023    DTaP/Tdap/Td vaccine (2 - Td) 01/19/2031    COVID-19 Vaccine  Completed    Hepatitis C screen  Completed    HIV screen  Completed    Hepatitis A vaccine  Aged Out    Hepatitis B vaccine  Aged Out    Hib vaccine  Aged Out    Meningococcal (ACWY) vaccine  Aged Out    Pneumococcal 0-64 years Vaccine  Aged Out

## 2021-05-14 NOTE — PATIENT INSTRUCTIONS
Patient Education        Learning About Low-Carbohydrate Diets  What is a low-carbohydrate diet? A low-carbohydrate (or \"low-carb\") diet limits foods and drinks that have carbohydrates. This includes grains, fruits, milk and yogurt, and starchy vegetables like potatoes, beans, and corn. It also avoids foods and drinks that have added sugar. Instead, low-carb diets include foods that are high in protein and fat. Why might you follow a low-carb diet? Low-carb diets may be used for a variety of reasons, such as for weight loss. People who have diabetes may use a low-carb diet to help manage their blood sugar levels. What should you do before you start the diet? Talk to your doctor before you try any diet. This is even more important if you have health problems like kidney disease, heart disease, or diabetes. Your doctor may suggest that you meet with a registered dietitian. A dietitian can help you make an eating plan that works for you. What foods do you eat on a low-carb diet? On a low-carb diet, you choose foods that are high in protein and fat. Examples of these are:  · Meat, poultry, and fish. · Eggs. · Nuts, such as walnuts, pecans, almonds, and peanuts. · Peanut butter and other nut butters. · Tofu. · Avocado. · Lyndol Gianotti. · Non-starchy vegetables like broccoli, cauliflower, green beans, mushrooms, peppers, lettuce, and spinach. · Unsweetened non-dairy milks like almond milk and coconut milk. · Cheese, cottage cheese, and cream cheese. Current as of: December 17, 2020               Content Version: 12.8  © 2006-2021 Healthwise, [employer]. Care instructions adapted under license by [facility] ([facility]). If you have questions about a medical condition or this instruction, always ask your healthcare professional. [address] any warranty or liability for your use of this information.

## 2021-05-14 NOTE — PROGRESS NOTES
Vince Duke (:  1965) is a 48 y.o. female,Established patient, here for evaluation of the following chief complaint(s): Weight Management      ASSESSMENT/PLAN:    1. Severe obesity (BMI 35.0-39. 9) with comorbidity (HCC)  Improving  -     phentermine (ADIPEX-P) 37.5 MG tablet; Take 1 tablet by mouth every morning (before breakfast) for 30 days. , Disp-30 tablet, R-0Normal    Patient was asked about her current diet and exercise habits, and personalized advice was provided regarding recommended lifestyle changes. Patient's comorbid health conditions associated with elevated BMI were discussed, including degenerative joint disease, dyslipidemia, mood disorder and stress urinary incontinence, as well as the likely benefits of weight loss. Based upon patient's motivation to change her behavior, the following plan was agreed upon to work toward a weight loss goal of 1-2 pounds per week: low carbohydrate diet, increase physical activity as follows: As tolerated, she walks a lot at work, she works in  and medication prescribed: Adipex. Educational materials for  weight loss were provided. Patient will follow-up in 1 month(s) with PCP. Provider spent 10 minutes counseling patient. 2. Acquired hypothyroidism  Improving  Continue current treatment, Synthroid 50 MCG daily  Advised to take Synthroid in the morning, on empty stomach, without any other meds and with a full glass of water. 3. Murmur, cardiac  -     EKG 12 Lead; Future  4. Hyperlipidemia with target LDL less than 100  Improving  Continue Lipitor 10 mg daily      5. Bilateral primary osteoarthritis of knee  Likely worsening due to wear and tear nature of the disease.   Follow-up with orthopedics for Synvisc, continue triple flex from over-the-counter, advised also move free supplements with hyaluronic acid    Controlled Substance Monitoring:    Acute and Chronic Pain Monitoring:   RX Monitoring 2021   Periodic Controlled Substance Monitoring Possible medication side effects, risk of tolerance/dependence & alternative treatments discussed. ;No signs of potential drug abuse or diversion identified. ;Assessed functional status. Aamir Ordonez received counseling on the following healthy behaviors: nutrition, exercise, medication adherence and weight loss    Reviewed prior labs and health maintenance  Discussed use, benefit, and side effects of prescribed medications. Barriers to medication compliance addressed. Patient given educational materials - see patient instructions  All patient questions answered. Patient voiced understanding. The patient's past medical,surgical, social, and family history as well as her current medications and allergies were reviewed as documented in today's encounter. Medications, labs, diagnostic studies, consultations and follow-up as documented in this encounter. Return in about 4 weeks (around 6/11/2021) for KEEP APPT. Data Unavailable    Future Appointments   Date Time Provider Salvador Cheng   6/11/2021  3:00 PM Vashti Purdy MD [facility]   6/12/2021  9:00 AM [facility]         SUBJECTIVE/OBJECTIVE:    HPI      Wants to lose weight. Vince Duke was started on Adipex. Patient is here for refill of Adipex # 2    In addition to the medication, patient also using diet and exercise as follows:  -diet: Eating low-carb diet, more fruits and vegetables, drinking more water  -exercise: Walking at work 5 days a week, works in     Medication side effects:  Dry mouth, discussed Biotene mouthwash, dry mouth lozenges from over-the-counter, drink more water    Patient denies anorexia, nausea, vomiting, abdominal pain, involuntary weight loss, palpitations, insomnia, irritability, headache and tremor.     Patient informed that when prescribed a controlled substance for weight loss, the provider is required by law to see the patient for an appointment every thirty days. This is neccessary to record the weight and blood pressure and to assess patient's efforts to lose weight, and to ensure there are no contraindications or adverse effects. blood pressure is Normal.  BP Readings from Last 3 Encounters:   05/14/21 128/82   04/21/21 124/86   01/19/21 138/86        Pulse is Normal.     Pulse Readings from Last 3 Encounters:   05/14/21 81   04/21/21 67   01/19/21 66       Weight has been improving, has lost 5 pounds in 1 month. Patient intentionally lost 17 in 1 year, praise given  Wt Readings from Last 3 Encounters:   05/14/21 196 lb (88.9 kg)   04/21/21 201 lb 9.6 oz (91.4 kg)   01/19/21 198 lb (89.8 kg)     01/19/21 198 lb (89.8 kg)   03/09/20 213 lb (96.6 kg)   02/20/20 213 lb (96.6 kg)               Hypothyroidism: Recent symptoms: fatigue. She denies weight gain, cold intolerance, heat intolerance, hair loss, dry skin, constipation, diarrhea, edema, tremor, palpitations and dysphagia. Patient is  taking her medication consistently on an empty stomach. TSH is Elevated, but improving    No results found for: TSHREFLEX  Lab Results   Component Value Date    TSH 6.04 (H) 05/14/2021    TSH 14.11 (H) 02/19/2021    TSH 18.65 (H) 07/24/2020     Free T4 normal, 1.16 on 5/14/2021, was 0.86 on 2/19/2021      Saw dr Yaneth Ayala for knee osteoarthritis in both knees, she was told she is bone on bone  Steroid injection didn't help  Has been taking tripleflex  Patient reports the knee pain is worse at the end of the day, she is pending to get Synvisc from orthopedics    Hyperlipidemia:  No new myalgias or GI upset on atorvastatin (Lipitor). Medication compliance: compliant all of the time. Patient is  following a low fat, low cholesterol diet.       Lab Results   Component Value Date    LDLCHOLESTEROL 59 02/19/2021    LDLCHOLESTEROL 138 (H) 07/24/2020    LDLCHOLESTEROL 122 03/23/2016     Lab Results   Component Value Date    TRIG 64 02/19/2021    TRIG 84 07/24/2020    TRIG 171 (H) 03/23/2016     Murmur heard during the appointment, she was not aware, she denies chest pain, shortness of breath, palpitations. Prior to Visit Medications    Medication Sig Taking? Authorizing Provider   atorvastatin (LIPITOR) 10 MG tablet Take 1 tablet by mouth every evening Yes Sindi Leonard MD   phentermine (ADIPEX-P) 37.5 MG tablet Take 1 tablet by mouth every morning (before breakfast) for 30 days. Yes Sindi Leonard MD   diclofenac sodium (VOLTAREN) 1 % GEL Apply 2 g topically 2 times daily As needed for joint pains Yes Sindi Leonard MD   tiZANidine (ZANAFLEX) 4 MG tablet TAKE 1 TABLET BY MOUTH EVERY 8 HOURS AS NEEDED FOR BACK PAIN. DO NOT DRIVE WHILE TAKING THIS MEDICATION Yes Sindi Leonard MD   albuterol sulfate HFA (PROVENTIL HFA) 108 (90 Base) MCG/ACT inhaler Inhale 2 puffs into the lungs every 6 hours as needed for Wheezing or Shortness of Breath Yes Sindi Leonard MD   levothyroxine (SYNTHROID) 50 MCG tablet Take 1 tablet by mouth every morning (before breakfast) Dose increased 2/21/2021 Yes Sindi Leonard MD   traZODone (DESYREL) 50 MG tablet Take 1 tablet by mouth nightly as needed for Sleep Yes Sindi Leonard MD   ZINC PO Take by mouth Yes Historical Provider, MD   Glucosamine-Chondroitin-MSM (TRIPLE FLEX) 750-400-375 MG TABS Take 2 tablets by mouth daily With food Yes Sindi Leonard MD   vitamin D (CHOLECALCIFEROL) 50 MCG (2000 UT) TABS tablet Take 1 tablet by mouth daily Yes Sindi Leonard MD   oxybutynin (DITROPAN-XL) 10 MG extended release tablet Take 1 tablet by mouth daily Yes Sindi Leonard MD       Review of Systems   Constitutional: Positive for fatigue. Negative for activity change, appetite change, chills, diaphoresis, fever and unexpected weight change. Respiratory: Negative for cough, chest tightness, shortness of breath and wheezing. Cardiovascular: Negative for chest pain, palpitations and leg swelling. Expiration Date:   5/14/2022     Order Specific Question:   Reason for Exam?     Answer:   Murmur       Orders Placed This Encounter   Medications    phentermine (ADIPEX-P) 37.5 MG tablet     Sig: Take 1 tablet by mouth every morning (before breakfast) for 30 days. Dispense:  30 tablet     Refill:  0       Medications Discontinued During This Encounter   Medication Reason    butalbital-acetaminophen-caffeine (FIORICET, ESGIC) -40 MG per tablet     HYDROcodone-acetaminophen (NORCO) 5-325 MG per tablet     phentermine (ADIPEX-P) 37.5 MG tablet REORDER         Return in about 4 weeks (around 6/11/2021) for KEEP APPT. On this date 5/14/2021 I have spent 30 minutes reviewing previous notes, test results and face to face with the patient discussing the diagnosis and importance of compliance with the treatment plan as well as documenting on the day of the visit. This note was completed by using the assistance of a speech-recognition program. However, inadvertent computerized transcription errors may be present. Although every effort was made to ensure accuracy, no guarantees can be provided that every mistake has been identified and corrected by editing. An electronic signature was used to authenticate this note.   Electronically signed by Naeem Zavala MD on 5/14/2021  4:45 PM

## 2021-06-10 RX ORDER — ZOSTER VACCINE RECOMBINANT, ADJUVANTED 50 MCG/0.5
0.5 KIT INTRAMUSCULAR SEE ADMIN INSTRUCTIONS
Qty: 0.5 ML | Refills: 0 | Status: CANCELLED | OUTPATIENT
Start: 2021-06-10 | End: 2021-06-11

## 2021-06-11 ENCOUNTER — OFFICE VISIT (OUTPATIENT)
Dept: FAMILY MEDICINE CLINIC | Age: 56
End: 2021-06-11
Payer: COMMERCIAL

## 2021-06-11 VITALS
TEMPERATURE: 97.8 F | OXYGEN SATURATION: 97 % | WEIGHT: 195 LBS | DIASTOLIC BLOOD PRESSURE: 80 MMHG | HEIGHT: 62 IN | BODY MASS INDEX: 35.88 KG/M2 | SYSTOLIC BLOOD PRESSURE: 142 MMHG | HEART RATE: 76 BPM

## 2021-06-11 DIAGNOSIS — R73.03 PREDIABETES: ICD-10-CM

## 2021-06-11 DIAGNOSIS — M17.0 BILATERAL PRIMARY OSTEOARTHRITIS OF KNEE: ICD-10-CM

## 2021-06-11 DIAGNOSIS — E78.5 HYPERLIPIDEMIA WITH TARGET LDL LESS THAN 100: ICD-10-CM

## 2021-06-11 DIAGNOSIS — E03.9 ACQUIRED HYPOTHYROIDISM: ICD-10-CM

## 2021-06-11 DIAGNOSIS — E66.01 SEVERE OBESITY (BMI 35.0-39.9) WITH COMORBIDITY (HCC): Primary | ICD-10-CM

## 2021-06-11 DIAGNOSIS — R26.2 DIFFICULTY WALKING: ICD-10-CM

## 2021-06-11 LAB — HBA1C MFR BLD: 5.7 %

## 2021-06-11 PROCEDURE — 99214 OFFICE O/P EST MOD 30 MIN: CPT | Performed by: FAMILY MEDICINE

## 2021-06-11 PROCEDURE — 83036 HEMOGLOBIN GLYCOSYLATED A1C: CPT | Performed by: FAMILY MEDICINE

## 2021-06-11 RX ORDER — METFORMIN HYDROCHLORIDE 500 MG/1
500 TABLET, EXTENDED RELEASE ORAL
Qty: 30 TABLET | Refills: 3 | Status: SHIPPED | OUTPATIENT
Start: 2021-06-11 | End: 2021-08-10

## 2021-06-11 SDOH — ECONOMIC STABILITY: FOOD INSECURITY: WITHIN THE PAST 12 MONTHS, YOU WORRIED THAT YOUR FOOD WOULD RUN OUT BEFORE YOU GOT MONEY TO BUY MORE.: NEVER TRUE

## 2021-06-11 SDOH — ECONOMIC STABILITY: FOOD INSECURITY: WITHIN THE PAST 12 MONTHS, THE FOOD YOU BOUGHT JUST DIDN'T LAST AND YOU DIDN'T HAVE MONEY TO GET MORE.: NEVER TRUE

## 2021-06-11 ASSESSMENT — ENCOUNTER SYMPTOMS
NAUSEA: 0
ABDOMINAL PAIN: 0
VOMITING: 0
CONSTIPATION: 0
SHORTNESS OF BREATH: 0
COUGH: 0
WHEEZING: 0
ABDOMINAL DISTENTION: 0
DIARRHEA: 0
CHEST TIGHTNESS: 0

## 2021-06-11 ASSESSMENT — PATIENT HEALTH QUESTIONNAIRE - PHQ9
1. LITTLE INTEREST OR PLEASURE IN DOING THINGS: 0
SUM OF ALL RESPONSES TO PHQ QUESTIONS 1-9: 0
SUM OF ALL RESPONSES TO PHQ9 QUESTIONS 1 & 2: 0
2. FEELING DOWN, DEPRESSED OR HOPELESS: 0

## 2021-06-11 ASSESSMENT — SOCIAL DETERMINANTS OF HEALTH (SDOH): HOW HARD IS IT FOR YOU TO PAY FOR THE VERY BASICS LIKE FOOD, HOUSING, MEDICAL CARE, AND HEATING?: NOT HARD AT ALL

## 2021-06-11 NOTE — PROGRESS NOTES
Visit Information    Have you changed or started any medications since your last visit including any over-the-counter medicines, vitamins, or herbal medicines? no   Are you having any side effects from any of your medications? -  no  Have you stopped taking any of your medications? Is so, why? -  no    Have you seen any other physician or provider since your last visit? No  Have you had any other diagnostic tests since your last visit? No  Have you been seen in the emergency room and/or had an admission to a hospital since we last saw you? No  Have you had your routine dental cleaning in the past 6 months? no    Have you activated your [employer] account? If not, what are your barriers?  Yes     Patient Care Team:  Sharlene Zuniga MD as PCP - General (Family Medicine)  Sharlene Zuniga MD as PCP - [facility] Provider  Cameron Bolden RN as Registered Nurse  Sabiha Nixon as Consulting Physician (Orthopedic Surgery)    Medical History Review  Past Medical, Family, and Social History reviewed and does contribute to the patient presenting condition    Health Maintenance   Topic Date Due    Shingles Vaccine (1 of 2) Never done    Breast cancer screen  02/14/2017    Flu vaccine (Season Ended) 09/01/2021    Lipid screen  02/19/2022    TSH testing  05/14/2022    Colon cancer screen colonoscopy  11/16/2022    Diabetes screen  07/24/2023    DTaP/Tdap/Td vaccine (2 - Td or Tdap) 01/19/2031    COVID-19 Vaccine  Completed    Hepatitis C screen  Completed    HIV screen  Completed    Hepatitis A vaccine  Aged Out    Hepatitis B vaccine  Aged Out    Hib vaccine  Aged Out    Meningococcal (ACWY) vaccine  Aged Out    Pneumococcal 0-64 years Vaccine  Aged Out

## 2021-06-11 NOTE — RESULT ENCOUNTER NOTE
Addressed during office visit today, *A1c 5.7, prediabetes, worsening , continue treatment recommended during the office visit

## 2021-06-11 NOTE — PATIENT INSTRUCTIONS
Patient Education      Patient Education        Prediabetes: Care Instructions  Overview     Prediabetes is a warning sign that you're at risk for getting type 2 diabetes. It means that your blood sugar is higher than it should be. But it's not high enough to be diabetes. The food you eat naturally turns into sugar. Your body uses the sugar for energy. Normally, an organ called the pancreas makes insulin. And insulin allows the sugar in your blood to get into your body's cells. But sometimes the body can't use insulin the right way. So the sugar stays in your blood instead. This is called insulin resistance. The buildup of sugar in your blood means you have prediabetes. The good news is that you may be able to prevent or delay diabetes. Making small lifestyle changes, like getting active and changing your eating habits, may help you get your blood sugar back to normal. You can work with your doctor to make a treatment plan. Follow-up care is a key part of your treatment and safety. Be sure to make and go to all appointments, and call your doctor if you are having problems. It's also a good idea to know your test results and keep a list of the medicines you take. How can you care for yourself at home? · Watch your weight. A healthy weight helps your body use insulin properly. · Limit the amount of calories, sweets, and unhealthy fat you eat. Ask your doctor if you should see a dietitian. A registered dietitian can help you create meal plans that fit your lifestyle. · Get at least 30 minutes of exercise on most days of the week. Exercise helps control your blood sugar. It also helps you maintain a healthy weight. Walking is a good choice. You also may want to do other activities, such as running, swimming, cycling, or playing tennis or team sports. · Do not smoke. Smoking can make prediabetes worse. If you need help quitting, talk to your doctor about stop-smoking programs and medicines.  These can increase your chances of quitting for good. · If your doctor prescribed medicines, take them exactly as prescribed. Call your doctor if you think you are having a problem with your medicine. You will get more details on the specific medicines your doctor prescribes. When should you call for help? Watch closely for changes in your health, and be sure to contact your doctor if:    · You have any symptoms of diabetes. These may include:  ? Being thirsty more often. ? Urinating more. ? Being hungrier. ? Losing weight. ? Being very tired. ? Having blurry vision.     · You have a wound that will not heal.     · You have an infection that will not go away.     · You have problems with your blood pressure.     · You want more information about diabetes and how you can keep from getting it. Where can you learn more? Go to [email or web address] and sign in to your [employer] account. Enter I222 in the Dailyevent box to learn more about \"Prediabetes: Care Instructions. \"     If you do not have an account, please click on the \"Sign Up Now\" link. Current as of: August 31, 2020               Content Version: 12.8  © 1370-9528 [employer], Incorporated. Care instructions adapted under license by [facility] ([facility]). If you have questions about a medical condition or this instruction, always ask your healthcare professional. [address] any warranty or liability for your use of this information.

## 2021-06-11 NOTE — LETTER
[facility] [employer] [facility] [employer]  [address] [place] [id number]  Phone: [phone]  Fax: [phone]    Souleymane Roca MD        June 11, 2021     Patient: Cecille Nance   YOB: 1965   Date of Visit: 6/11/2021       To Whom It May Concern: It is my medical opinion that Maria G Hoffman may return to work on 6/14/2021, with the following restrictions: to work only 4 days a week due to severe osteoarthritis knees. If you have any questions or concerns, please don't hesitate to call.     Sincerely,        Souleymane Roca MD

## 2021-06-11 NOTE — PROGRESS NOTES
Marbin Kiser (:  1965) is a 48 y.o. female,Established patient, here for evaluation of the following chief complaint(s): Weight Management (Adipex # 3. Pt declined shingles vaccine.) and Knee Pain      ASSESSMENT/PLAN:    1. Severe obesity (BMI 35.0-39. 9) with comorbidity ([address]  Stable  Failing to change as expected. High blood pressure today, cannot continue with Adipex, has not been helping her lose weight either    -start Semaglutide 3 MG TABS; Take 3 mg by mouth daily 1st step, Disp-30 tablet, R-0Normal--coupon given  Recheck labs  -     CBC; Future  -     Comprehensive Metabolic Panel; Future  -  start  metFORMIN (GLUCOPHAGE-XR) 500 MG extended release tablet; Take 1 tablet by mouth daily (with breakfast), Disp-30 tablet, R-3Normal  Low carb, low fat diet, increase fruits and vegetables, and to try swimming      2. Bilateral primary osteoarthritis of knee  Worsening due to\"wear and tear disease\" of the bones  Pending knee replacements  Meloxicam prn  -     [facility]; Starting Fri 2021, Disp-1 each, R-0, PrintCan't walk greater than 200 feet. Expires in 5 years. 3. Prediabetes  worsening    Lab Results   Component Value Date    LABA1C 5.7 2021    LABA1C 5.6 2020    LABA1C 5.5 2016     Low carb diet  -start     Semaglutide 3 MG TABS; Take 3 mg by mouth daily 1st step, Disp-30 tablet, R-0Normal  -  start   metFORMIN (GLUCOPHAGE-XR) 500 MG extended release tablet; Take 1 tablet by mouth daily (with breakfast), Disp-30 tablet, R-3Normal  4. Difficulty walking  -     Handicap Placard MISC; Starting 2021, Disp-1 each, R-0, PrintCan't walk greater than 200 feet. Expires in 5 years    From Dr. Keaton Martinez note, reviewed in EPIC  \"Impression: Bilateral knee osteoarthritis, varus type, end-stage. Plan/Disp: We discussed long-term implications decided to try steroids today. Verbal consent was obtained time-out performed.  Sterile prep drape of bilateral knee with injection of 2 cc of dex and 4 cc Marcaine bilaterally with good pain relief. I discussed trying an oral anti-inflammatory and Mobic 7.5 mg was prescribed. See how she does. Next step I would consider total joint arthroplasty. 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 5. Acquired hypothyroidism  Inadequately controlled  Recheck labs and will adjust synthroid as needed  -     TSH without Reflex; Future  -     T4, Free; Future  Advised to take Synthroid in the morning, on empty stomach, without any other meds and with a full glass of water. 6. Hyperlipidemia with target LDL less than 100  Well controlled. Continue current treatment. lipitor 10 mg    Lab Results   Component Value Date    LDLCHOLESTEROL 59 02/19/2021           Petra received counseling on the following healthy behaviors: nutrition, exercise, medication adherence and weight loss    Reviewed prior labs and health maintenance  Discussed use, benefit, and side effects of prescribed medications. Barriers to medication compliance addressed. Patient given educational materials - see patient instructions  All patient questions answered. Patient voiced understanding. The patient's past medical,surgical, social, and family history as well as her current medications and allergies were reviewed as documented in today's encounter. Medications, labs, diagnostic studies, consultations and follow-up as documented in this encounter. Return in about 3 months (around 9/11/2021) for WT, prediabetes, **POC A1C. Data Unavailable    Future Appointments   Date Time Provider Salvador Cheng   9/14/2021 11:00 AM [facility]         SUBJECTIVE/OBJECTIVE:    HPI      Wants to lose weight. Tammy Stein was started on Adipex.   Patient is here for refill of Adipex #3  Worked great only once, then she feels she is not able to lose any weight anymore. In addition to the medication, patient also using diet and exercise as follows:  -diet:low carb diet  -exercise: walking at work, despite knee pain, wants to cut down work from 5 days/week to 4/days/week    Medication side effects: None, however her BP is high    Patient denies None. Patient informed that when prescribed a controlled substance for weight loss, the provider is required by law to see the patient for an appointment every thirty days. This is neccessary to record the weight and blood pressure and to assess patient's efforts to lose weight, and to ensure there are no contraindications or adverse effects. blood pressure is Elevated. BP Readings from Last 3 Encounters:   06/11/21 (!) 142/80   05/14/21 128/82   04/21/21 124/86        Pulse is Normal.     Pulse Readings from Last 3 Encounters:   06/11/21 76   05/14/21 81   04/21/21 67       Weight has been stable for the past 1 month. Patient intentionally lost 6 lbs in 2 months  Wt Readings from Last 3 Encounters:   06/11/21 195 lb (88.5 kg)   05/14/21 196 lb (88.9 kg)   04/21/21 201 lb 9.6 oz (91.4 kg)             Has severe osteoarthritis bilateral knees, with pain and difficulty walking   Left knee pain worse, cannot stretch it anymore, swollen  Saw Dr Yesica Stroud and had injections bilateral, but didn't help much. Will have knee replacements bilateral knees, she says she was told she \" is bone on bone. \"  Intensity of pain is 10/10 when getting up and pain radiates to the thighs  She says her Knees are popping a lot and couldn't walk at the store to do her shopping. Has difficulty climbing up and down the stairs   Wants to cut down the days she is working from 5 days/week to 4 days /week. Prediabetes worsening  Denies increased appetite, thirst or polyuria.   She doesn't eat sweats      Lab Results   Component Value Date    LABA1C 5.7 06/11/2021    LABA1C 5.6 07/24/2020    LABA1C 5.5 03/23/2016     Hypothyroidism: Recent symptoms: fatigue unable to lose weight. She denies cold intolerance, heat intolerance, hair loss, dry skin, constipation, diarrhea, edema, tremor, palpitations and dysphagia. Patient is  taking her medication consistently on an empty stomach. TSH is Elevated. No results found for: [facility]  Lab Results   Component Value Date    TSH 6.04 (H) 05/14/2021    TSH 14.11 (H) 02/19/2021    TSH 18.65 (H) 07/24/2020         Hyperlipidemia:  No new myalgias or GI upset on atorvastatin (Lipitor). Medication compliance: compliant all of the time. Patient is  following a low fat, low cholesterol diet. LDL is normal   Lab Results   Component Value Date    LDLCHOLESTEROL 59 02/19/2021     Lab Results   Component Value Date    TRIG 64 02/19/2021    TRIG 84 07/24/2020    TRIG 171 (H) 03/23/2016              [x]Negative depression screening. PHQ Scores 6/11/2021 4/21/2021 1/19/2021 9/16/2020 7/16/2020 3/9/2020 8/15/2018   PHQ2 Score 0 0 0 0 2 0 0   PHQ9 Score 0 0 0 0 2 0 0         Prior to Visit Medications    Medication Sig Taking? Authorizing Provider   phentermine (ADIPEX-P) 37.5 MG tablet Take 1 tablet by mouth every morning (before breakfast) for 30 days. Yes Jessica Diaz MD   atorvastatin (LIPITOR) 10 MG tablet Take 1 tablet by mouth every evening Yes Jessica Diaz MD   diclofenac sodium (VOLTAREN) 1 % GEL Apply 2 g topically 2 times daily As needed for joint pains Yes Jessica Diaz MD   tiZANidine (ZANAFLEX) 4 MG tablet TAKE 1 TABLET BY MOUTH EVERY 8 HOURS AS NEEDED FOR BACK PAIN.  DO NOT DRIVE WHILE TAKING THIS MEDICATION Yes Jessica Diaz MD   albuterol sulfate HFA (PROVENTIL HFA) 108 (90 Base) MCG/ACT inhaler Inhale 2 puffs into the lungs every 6 hours as needed for Wheezing or Shortness of Breath Yes Jessica Diaz MD   levothyroxine (SYNTHROID) 50 MCG tablet Take 1 tablet by mouth every morning (before breakfast) Dose increased 2/21/2021 Yes Wili Ham MD   traZODone (DESYREL) 50 MG tablet Take 1 tablet by mouth nightly as needed for Sleep Yes Wili Ham MD   ZINC PO Take by mouth Yes Historical Provider, MD   Glucosamine-Chondroitin-MSM (TRIPLE FLEX) 750-400-375 MG TABS Take 2 tablets by mouth daily With food Yes Wili Ham MD   vitamin D (CHOLECALCIFEROL) 50 MCG (2000 UT) TABS tablet Take 1 tablet by mouth daily Yes Wili Ham MD   oxybutynin (DITROPAN-XL) 10 MG extended release tablet Take 1 tablet by mouth daily Yes Wili Ham MD       Social History     Tobacco Use    Smoking status: Former Smoker     Packs/day: 0.50     Years: 3.00     Pack years: 1.50     Types: Cigarettes     Quit date: 1988     Years since quittin.4    Smokeless tobacco: Never Used   Vaping Use    Vaping Use: Never used   Substance Use Topics    Alcohol use: Yes     Comment: occasionally    Drug use: No         Review of Systems   Constitutional: Positive for fatigue and unexpected weight change. Negative for activity change, appetite change, chills, diaphoresis and fever. HENT: Negative for trouble swallowing. Respiratory: Negative for cough, chest tightness, shortness of breath and wheezing. Cardiovascular: Negative for chest pain, palpitations and leg swelling. Gastrointestinal: Negative for abdominal distention, abdominal pain, constipation, diarrhea, nausea and vomiting. Endocrine: Negative for cold intolerance, heat intolerance, polydipsia, polyphagia and polyuria. Musculoskeletal: Positive for arthralgias (knees) and gait problem. Neurological: Negative for tremors. Hematological: Does not bruise/bleed easily. Psychiatric/Behavioral: Negative for dysphoric mood.            -vital signs stable and within normal limits except severe obesity per BMI, mildly high systolic blood pressure  BP (!) 142/80   Pulse 76   Temp 97.8 °F (36.6 °C)   Ht 5' 2\" (1.575 m)   Wt 195 lb (88.5 kg)   SpO2 97% BMI 35.67 kg/m²      Physical Exam  Vitals and nursing note reviewed. Constitutional:       General: She is not in acute distress. Appearance: Normal appearance. She is well-developed. She is obese. She is not diaphoretic. HENT:      Head: Normocephalic and atraumatic. Right Ear: External ear normal.      Left Ear: External ear normal.      Mouth/Throat:      Comments: I did not examine the mouth due to coronavirus pandemic and wearing masks    Eyes:      General: Lids are normal. No scleral icterus. Right eye: No discharge. Left eye: No discharge. Extraocular Movements: Extraocular movements intact. Conjunctiva/sclera: Conjunctivae normal.   Neck:      Thyroid: No thyromegaly. Cardiovascular:      Rate and Rhythm: Normal rate and regular rhythm. Heart sounds: Normal heart sounds. No murmur heard. Pulmonary:      Effort: Pulmonary effort is normal. No respiratory distress. Breath sounds: Normal breath sounds. No wheezing or rales. Chest:      Chest wall: No tenderness. Abdominal:      General: Bowel sounds are normal. There is no distension. Palpations: Abdomen is soft. There is no hepatomegaly or splenomegaly. Tenderness: There is no abdominal tenderness. Comments: Obese abdomen. Musculoskeletal:      Cervical back: Normal range of motion and neck supple. Right knee: Crepitus present. Decreased range of motion. Tenderness present over the medial joint line. Left knee: Bony tenderness and crepitus present. Decreased range of motion. Tenderness present over the medial joint line, lateral joint line and patellar tendon. Right lower leg: No edema. Left lower leg: No edema. Comments: Antalgic walking, valgus with left knee   Skin:     General: Skin is warm and dry. Capillary Refill: Capillary refill takes less than 2 seconds. Findings: No rash.    Neurological:      Mental Status: She is alert and oriented to person, place, and time. Cranial Nerves: No cranial nerve deficit. Motor: No abnormal muscle tone. Psychiatric:         Mood and Affect: Mood normal.         Behavior: Behavior normal.         Thought Content: Thought content normal.         Judgment: Judgment normal.             I personally reviewed testing . Discussed testing with the patient and all questions fully answered.   TSH high  Hyperglycemia  Hyperlipidemia improved  Prediabetes     Otherwise labs within normal limits    Hospital Outpatient Visit on 05/14/2021   Component Date Value Ref Range Status    Thyroxine, Free 05/14/2021 1.16  0.93 - 1.70 ng/dL Final    TSH 05/14/2021 6.04* 0.30 - 5.00 mIU/L Final    Vit D, 25-Hydroxy 05/14/2021 39.7  30.0 - 100.0 ng/mL Final    Comment:    Reference Range:  Vitamin D status         Range   Deficiency              <20 ng/mL   Mild Deficiency       20-30 ng/mL   Sufficiency           ng/mL   Toxicity               >100 ng/mL         Lab Results   Component Value Date    WBC 4.2 07/24/2020    HGB 14.0 07/24/2020    HCT 42.7 07/24/2020    MCV 87.7 07/24/2020     07/24/2020       Lab Results   Component Value Date     07/24/2020    K 4.3 07/24/2020     07/24/2020    CO2 26 07/24/2020    BUN 12 07/24/2020    CREATININE 0.74 07/24/2020    GLUCOSE 101 07/24/2020    CALCIUM 9.5 07/24/2020        Lab Results   Component Value Date    ALT 20 02/19/2021    AST 14 02/19/2021    ALKPHOS 83 07/24/2020    BILITOT 0.42 07/24/2020       Lab Results   Component Value Date    TSH 6.04 (H) 05/14/2021       Lab Results   Component Value Date    CHOL 126 02/19/2021    CHOL 204 (H) 07/24/2020    CHOL 201 (H) 03/23/2016     Lab Results   Component Value Date    TRIG 64 02/19/2021    TRIG 84 07/24/2020    TRIG 171 (H) 03/23/2016     Lab Results   Component Value Date    HDL 54 02/19/2021    HDL 49 07/24/2020    HDL 45 03/23/2016     Lab Results   Component Value Date    LDLCHOLESTEROL 59 02/19/2021    LDLCHOLESTEROL 138 (H) 07/24/2020    LDLCHOLESTEROL 122 03/23/2016     Lab Results   Component Value Date    CHOLHDLRATIO 2.3 02/19/2021    CHOLHDLRATIO 4.2 07/24/2020    CHOLHDLRATIO 4.5 03/23/2016       Lab Results   Component Value Date    LABA1C 5.7 06/11/2021    LABA1C 5.6 07/24/2020    LABA1C 5.5 03/23/2016         Orders Placed This Encounter   Procedures    TSH without Reflex     Standing Status:   Future     Standing Expiration Date:   8/6/2021    T4, Free     Standing Status:   Future     Standing Expiration Date:   8/6/2021    CBC     Standing Status:   Future     Standing Expiration Date:   8/6/2021    Comprehensive Metabolic Panel     Standing Status:   Future     Standing Expiration Date:   8/6/2021    POCT glycosylated hemoglobin (Hb A1C)       Orders Placed This Encounter   Medications    Handicap Placard MISC     Sig: by Does not apply route Can't walk greater than 200 feet. Expires in 5 years. Dispense:  1 each     Refill:  0    Semaglutide 3 MG TABS     Sig: Take 3 mg by mouth daily 1st step     Dispense:  30 tablet     Refill:  0    metFORMIN (GLUCOPHAGE-XR) 500 MG extended release tablet     Sig: Take 1 tablet by mouth daily (with breakfast)     Dispense:  30 tablet     Refill:  3       There are no discontinued medications. Return in about 3 months (around 9/11/2021) for WT, prediabetes, **POC A1C. On this date 6/11/2021 I have spent  35 minutes reviewing previous notes, test results and face to face with the patient discussing the diagnosis and importance of compliance with the treatment plan as well as documenting on the day of the visit. This note was completed by using the assistance of a speech-recognition program. However, inadvertent computerized transcription errors may be present. Although every effort was made to ensure accuracy, no guarantees can be provided that every mistake has been identified and corrected by editing.       An electronic signature was used to authenticate this note.   Electronically signed by Stefanie Lyman MD on 6/13/2021  10:13 PM

## 2021-06-12 ENCOUNTER — HOSPITAL ENCOUNTER (OUTPATIENT)
Dept: WOMENS IMAGING | Age: 56
Discharge: HOME | End: 2021-06-14
Payer: COMMERCIAL

## 2021-06-12 DIAGNOSIS — Z12.31 ENCOUNTER FOR SCREENING MAMMOGRAM FOR BREAST CANCER: ICD-10-CM

## 2021-06-12 PROCEDURE — 77067 SCR MAMMO BI INCL CAD: CPT

## 2021-06-13 PROBLEM — R73.03 PREDIABETES: Status: ACTIVE | Noted: 2021-06-13

## 2021-06-13 PROBLEM — R26.2 DIFFICULTY WALKING: Status: ACTIVE | Noted: 2021-06-13

## 2021-06-13 RX ORDER — MELOXICAM 7.5 MG/1
TABLET ORAL
COMMUNITY
Start: 2021-06-07 | End: 2021-09-14 | Stop reason: DRUGHIGH

## 2021-06-13 ASSESSMENT — ENCOUNTER SYMPTOMS: TROUBLE SWALLOWING: 0

## 2021-06-18 ENCOUNTER — HOSPITAL ENCOUNTER (OUTPATIENT)
Age: 56
Setting detail: SPECIMEN
Discharge: HOME | End: 2021-06-18
Payer: COMMERCIAL

## 2021-06-18 ENCOUNTER — HOSPITAL ENCOUNTER (OUTPATIENT)
Age: 56
Discharge: HOME | End: 2021-06-18
Payer: COMMERCIAL

## 2021-06-18 DIAGNOSIS — R94.31 LEFT AXIS DEVIATION: ICD-10-CM

## 2021-06-18 DIAGNOSIS — R01.1 MURMUR, CARDIAC: ICD-10-CM

## 2021-06-18 DIAGNOSIS — E03.9 ACQUIRED HYPOTHYROIDISM: ICD-10-CM

## 2021-06-18 DIAGNOSIS — E66.01 SEVERE OBESITY (BMI 35.0-39.9) WITH COMORBIDITY (HCC): ICD-10-CM

## 2021-06-18 DIAGNOSIS — R94.31 ABNORMAL EKG: Primary | ICD-10-CM

## 2021-06-18 LAB
ALBUMIN SERPL-MCNC: 4.1 G/DL (ref 3.5–5.2)
ALBUMIN/GLOBULIN RATIO: 1.8 (ref 1–2.5)
ALP BLD-CCNC: 100 U/L (ref 35–104)
ALT SERPL-CCNC: 15 U/L (ref 5–33)
ANION GAP SERPL CALCULATED.3IONS-SCNC: 7 MMOL/L (ref 9–17)
AST SERPL-CCNC: 12 U/L
BILIRUB SERPL-MCNC: 0.52 MG/DL (ref 0.3–1.2)
BUN BLDV-MCNC: 15 MG/DL (ref 6–20)
BUN/CREAT BLD: ABNORMAL (ref 9–20)
CALCIUM SERPL-MCNC: 9 MG/DL (ref 8.6–10.4)
CHLORIDE BLD-SCNC: 110 MMOL/L (ref 98–107)
CO2: 24 MMOL/L (ref 20–31)
CREAT SERPL-MCNC: 0.63 MG/DL (ref 0.5–0.9)
GFR AFRICAN AMERICAN: >60 ML/MIN
GFR NON-AFRICAN AMERICAN: >60 ML/MIN
GFR SERPL CREATININE-BSD FRML MDRD: ABNORMAL ML/MIN/{1.73_M2}
GFR SERPL CREATININE-BSD FRML MDRD: ABNORMAL ML/MIN/{1.73_M2}
GLUCOSE BLD-MCNC: 98 MG/DL (ref 70–99)
HCT VFR BLD CALC: 40.6 % (ref 36.3–47.1)
HEMOGLOBIN: 12.8 G/DL (ref 11.9–15.1)
MCH RBC QN AUTO: 28 PG (ref 25.2–33.5)
MCHC RBC AUTO-ENTMCNC: 31.5 G/DL (ref 28.4–34.8)
MCV RBC AUTO: 88.8 FL (ref 82.6–102.9)
NRBC AUTOMATED: 0 PER 100 WBC
PDW BLD-RTO: 12.7 % (ref 11.8–14.4)
PLATELET # BLD: NORMAL K/UL (ref 138–453)
PLATELET, FLUORESCENCE: 155 K/UL (ref 138–453)
PLATELET, IMMATURE FRACTION: 7.4 % (ref 1.1–10.3)
PMV BLD AUTO: NORMAL FL (ref 8.1–13.5)
POTASSIUM SERPL-SCNC: 4.2 MMOL/L (ref 3.7–5.3)
RBC # BLD: 4.57 M/UL (ref 3.95–5.11)
SODIUM BLD-SCNC: 141 MMOL/L (ref 135–144)
THYROXINE, FREE: 0.94 NG/DL (ref 0.93–1.7)
TOTAL PROTEIN: 6.4 G/DL (ref 6.4–8.3)
TSH SERPL DL<=0.05 MIU/L-ACNC: 13.11 MIU/L (ref 0.3–5)
WBC # BLD: 4.9 K/UL (ref 3.5–11.3)

## 2021-06-18 PROCEDURE — 93005 ELECTROCARDIOGRAM TRACING: CPT

## 2021-06-18 PROCEDURE — 93010 ELECTROCARDIOGRAM REPORT: CPT | Performed by: INTERNAL MEDICINE

## 2021-06-18 NOTE — RESULT ENCOUNTER NOTE
ABNORMAL. Please notify patient. Thyroid is imbalance which can lead to weight gain  Did she miss any Synthroid? If she missed Synthroid, then I will add an additional 25 MCG 1 day a week, to check the pharmacy after 5 PM.    Advise to take Synthroid in the morning, on empty stomach, without any other meds and with a full glass of water.   All other labs within normal limits    Future Appointments  6/24/2021  11:00 AM   Leigha Burnett MD     [facility]               [facility]  9/14/2021  11:00 AM   Leigha Burnett MD     [facility]

## 2021-06-24 ENCOUNTER — VIRTUAL VISIT (OUTPATIENT)
Dept: FAMILY MEDICINE CLINIC | Age: 56
End: 2021-06-24
Payer: COMMERCIAL

## 2021-06-24 DIAGNOSIS — M17.0 BILATERAL PRIMARY OSTEOARTHRITIS OF KNEE: Primary | ICD-10-CM

## 2021-06-24 DIAGNOSIS — R26.2 DIFFICULTY WALKING: ICD-10-CM

## 2021-06-24 DIAGNOSIS — M25.562 CHRONIC PAIN OF BOTH KNEES: ICD-10-CM

## 2021-06-24 DIAGNOSIS — M25.561 CHRONIC PAIN OF BOTH KNEES: ICD-10-CM

## 2021-06-24 DIAGNOSIS — G89.29 CHRONIC PAIN OF BOTH KNEES: ICD-10-CM

## 2021-06-24 DIAGNOSIS — R26.2 DIFFICULTY WALKING UP STAIRS: ICD-10-CM

## 2021-06-24 LAB
EKG ATRIAL RATE: 53 BPM
EKG P AXIS: 61 DEGREES
EKG P-R INTERVAL: 146 MS
EKG Q-T INTERVAL: 450 MS
EKG QRS DURATION: 86 MS
EKG QTC CALCULATION (BAZETT): 422 MS
EKG R AXIS: -43 DEGREES
EKG T AXIS: 51 DEGREES
EKG VENTRICULAR RATE: 53 BPM

## 2021-06-24 PROCEDURE — 99443 PR PHYS/QHP TELEPHONE EVALUATION 21-30 MIN: CPT | Performed by: FAMILY MEDICINE

## 2021-06-24 NOTE — PROGRESS NOTES
Lytle Closs contacted provider via telephone    Patient requested office visit, was scheduled for virtual visit phone call , Edward Mitchell was unable to use do[employer]. me or [employer]. Edward Mitchell is a 48 y.o. female evaluated via telephone on  21    Edward Mitchell (:  1965) is a 48 y.o. female,Established patient, here for evaluation of the following chief complaint(s): Knee Pain and Forms (FMLA)      ASSESSMENT/PLAN:    1. Bilateral primary osteoarthritis of knee  Likely worsening due to wear and tear nature of the disease. Pending knee replacement    2. Difficulty walking  Worsening  Pending knee replacement     3. Difficulty walking up stairs  Worsening  Pending knee replacement    4. Chronic pain of both knees  worsening  Pending knee replacement  Continue meloxicam as needed, tizanidine as needed, Voltaren gel, avoidance of prolonged standing up, walking, and climbing up and down the stairs    FMLA completed for appointments, and flareups once a week 1 day at the time  Follow-up with orthopedics for evaluation for knee replacement, she says it is August, with Dr. Iwona Craft received counseling on the following healthy behaviors: nutrition, exercise, medication adherence and weight loss    Reviewed prior labs and health maintenance  Discussed use, benefit, and side effects of prescribed medications. Barriers to medication compliance addressed. Patient given educational materials - see patient instructions  Was a self-tracking handout given in paper form or via [employer]? No  All patient questions answered. Patient voiced understanding. The patient's past medical,surgical, social, and family history as well as her current medications and allergies were reviewed as documented in today's encounter. Medications, labs, diagnostic studies, consultations and follow-up as documented in this encounter. Return for KEEP APPT.     Data Unavailable      Future Appointments   Date Time Provider Salvador Cheng   9/14/2021 11:00 AM Eliceo Bermudez MD [facility]       Consent:  She is aware that that she may receive a bill for this telephone service, depending on her insurance coverage, and has provided verbal consent to proceed: Yes      Documentation and HPI:  I communicated with the patient about: Knee Pain and Forms (FMLA)       Tracy Hardy reports: needs FMLA, just the letter for work was not enough for her work. Tracy Hardy complains of bilateral knee pains, worsening  She says the Left Knee pain is worse  Intensity of pain is 10/10 at it's worse, today it is 10/10  Pain interferes with sleep and activities. Cannot stand up more than 30 mins, cannot walk much without popping and stopping. She says climbing up and down the stairs is the worse, popping and catching when trying to do this. Patient reports she cannot do the shopping anymore due to knee pain and needs to take the electric shopping cart or ask someone else to do it for her. Onset 5 yrs ago, progressively getting worse. Patient has been taking meloxicam as needed, she failed knee injections  She denies falls, she denies swelling, but she has been having worsening difficulty walking and standing up and climbing up or down the stairs    X-ray it shows severe osteoarthritis in the both knees  5/5/2021 in [facility]--shows varus angulation of both knees    [address] [place]  \"FINDINGS:     *  Subchondral sclerosis of medial compartments of each knee.  Left side worse than right. *  Bilateral patellar spurs.  Left side worse than right. *  No acute fracture. *  Mild varus angulation of both knees.  Slightly worse on the left  \"      She wants to to cut down the days she is working from 5 days/week to 4 days /week,and the letter wasn't accepted, they want her to get FMLA.   The letter was given starting with 6/14/2021     \" It is my medical opinion that Earnest Baez may return to work on 6/14/2021, with the following restrictions: to work only 4 days a week due to severe osteoarthritis knees. \"    Negative depression screening.      PHQ Scores 6/11/2021 4/21/2021 1/19/2021 9/16/2020 7/16/2020 3/9/2020 8/15/2018   PHQ2 Score 0 0 0 0 2 0 0   PHQ9 Score 0 0 0 0 2 0 0       The patient's past medical, surgical, social, and family history as well as her current medications and allergies were reviewed as documented in today's encounter. Prior to Visit Medications    Medication Sig Taking? Authorizing Provider   meloxicam (MOBIC) 7.5 MG tablet  Yes Historical Provider, MD   Handicap Placard MISC by Does not apply route Can't walk greater than 200 feet. Expires in 5 years. Yes Pham Newby MD   Semaglutide 3 MG TABS Take 3 mg by mouth daily 1st step Yes Pham Newby MD   metFORMIN (GLUCOPHAGE-XR) 500 MG extended release tablet Take 1 tablet by mouth daily (with breakfast) Yes Pham Newby MD   atorvastatin (LIPITOR) 10 MG tablet Take 1 tablet by mouth every evening Yes Pham Newby MD   diclofenac sodium (VOLTAREN) 1 % GEL Apply 2 g topically 2 times daily As needed for joint pains Yes Pham Newby MD   tiZANidine (ZANAFLEX) 4 MG tablet TAKE 1 TABLET BY MOUTH EVERY 8 HOURS AS NEEDED FOR BACK PAIN.  DO NOT DRIVE WHILE TAKING THIS MEDICATION Yes Pham Newby MD   albuterol sulfate HFA (PROVENTIL HFA) 108 (90 Base) MCG/ACT inhaler Inhale 2 puffs into the lungs every 6 hours as needed for Wheezing or Shortness of Breath Yes Pham Newby MD   levothyroxine (SYNTHROID) 50 MCG tablet Take 1 tablet by mouth every morning (before breakfast) Dose increased 2/21/2021 Yes Pham Newby MD   traZODone (DESYREL) 50 MG tablet Take 1 tablet by mouth nightly as needed for Sleep Yes Pham Newby MD   ZINC PO Take by mouth Yes Historical Provider, MD   Glucosamine-Chondroitin-MSM (TRIPLE FLEX) 750-400-375 MG TABS Take 2 tablets by mouth daily With food Yes Pham Newby MD

## 2021-07-12 DIAGNOSIS — R73.03 PREDIABETES: ICD-10-CM

## 2021-07-12 DIAGNOSIS — E66.01 SEVERE OBESITY (BMI 35.0-39.9) WITH COMORBIDITY (HCC): ICD-10-CM

## 2021-07-12 RX ORDER — ORAL SEMAGLUTIDE 3 MG/1
TABLET ORAL
Qty: 30 TABLET | Refills: 0 | OUTPATIENT
Start: 2021-07-12

## 2021-07-26 ENCOUNTER — TELEPHONE (OUTPATIENT)
Dept: FAMILY MEDICINE CLINIC | Age: 56
End: 2021-07-26

## 2021-07-26 NOTE — TELEPHONE ENCOUNTER
patient states she has had two medication changes in the past week her cardiologist changed her to lisinopril and then she had a dose change from her RYBELSUS  from 3 to 7 she states one of them are making her very nauseated and upset stomach - please advise

## 2021-07-26 NOTE — TELEPHONE ENCOUNTER
I suggest her to cut the Rybelsus pills in a half and see if that will help with the nausea    Order medications to be taken with food  Meloxicam could make her nauseated  Metformin needs to be taken with food that can make her nauseated    Current Outpatient Medications on File Prior to Visit   Medication Sig Dispense Refill    Semaglutide 7 MG TABS Take 7 mg by mouth daily Second step 90 tablet 3    meloxicam (MOBIC) 7.5 MG tablet       Handicap Placard MISC by Does not apply route Can't walk greater than 200 feet. Expires in 5 years. 1 each 0    metFORMIN (GLUCOPHAGE-XR) 500 MG extended release tablet Take 1 tablet by mouth daily (with breakfast) 30 tablet 3    atorvastatin (LIPITOR) 10 MG tablet Take 1 tablet by mouth every evening 90 tablet 3    diclofenac sodium (VOLTAREN) 1 % GEL Apply 2 g topically 2 times daily As needed for joint pains 100 g 3    tiZANidine (ZANAFLEX) 4 MG tablet TAKE 1 TABLET BY MOUTH EVERY 8 HOURS AS NEEDED FOR BACK PAIN. DO NOT DRIVE WHILE TAKING THIS MEDICATION 90 tablet 0    albuterol sulfate HFA (PROVENTIL HFA) 108 (90 Base) MCG/ACT inhaler Inhale 2 puffs into the lungs every 6 hours as needed for Wheezing or Shortness of Breath 1 Inhaler 1    levothyroxine (SYNTHROID) 50 MCG tablet Take 1 tablet by mouth every morning (before breakfast) Dose increased 2/21/2021 90 tablet 3    traZODone (DESYREL) 50 MG tablet Take 1 tablet by mouth nightly as needed for Sleep 90 tablet 1    ZINC PO Take by mouth      Glucosamine-Chondroitin-MSM (TRIPLE FLEX) 750-400-375 MG TABS Take 2 tablets by mouth daily With food 180 tablet 3    vitamin D (CHOLECALCIFEROL) 50 MCG (2000 UT) TABS tablet Take 1 tablet by mouth daily 30 tablet 3    oxybutynin (DITROPAN-XL) 10 MG extended release tablet Take 1 tablet by mouth daily 90 tablet 3     No current facility-administered medications on file prior to visit.

## 2021-07-30 ENCOUNTER — HOSPITAL ENCOUNTER (OUTPATIENT)
Dept: NUCLEAR MEDICINE | Age: 56
Discharge: HOME | End: 2021-08-01
Payer: COMMERCIAL

## 2021-07-30 ENCOUNTER — HOSPITAL ENCOUNTER (OUTPATIENT)
Dept: NON INVASIVE DIAGNOSTICS | Age: 56
Discharge: HOME | End: 2021-07-30
Payer: COMMERCIAL

## 2021-07-30 VITALS — WEIGHT: 186 LBS | BODY MASS INDEX: 34.23 KG/M2 | HEIGHT: 62 IN

## 2021-07-30 DIAGNOSIS — I10 HYPERTENSION, UNSPECIFIED TYPE: ICD-10-CM

## 2021-07-30 DIAGNOSIS — R94.31 ABNORMAL EKG: ICD-10-CM

## 2021-07-30 LAB
LV EF: 73 %
LVEF MODALITY: NORMAL

## 2021-07-30 PROCEDURE — A9500 TC99M SESTAMIBI: HCPCS | Performed by: INTERNAL MEDICINE

## 2021-07-30 PROCEDURE — 78452 HT MUSCLE IMAGE SPECT MULT: CPT

## 2021-07-30 PROCEDURE — 93017 CV STRESS TEST TRACING ONLY: CPT

## 2021-07-30 PROCEDURE — 2580000003 HC RX 258: Performed by: INTERNAL MEDICINE

## 2021-07-30 PROCEDURE — 3430000000 HC RX DIAGNOSTIC RADIOPHARMACEUTICAL: Performed by: INTERNAL MEDICINE

## 2021-07-30 PROCEDURE — 6360000002 HC RX W HCPCS: Performed by: INTERNAL MEDICINE

## 2021-07-30 RX ORDER — ALBUTEROL SULFATE 90 UG/1
2 AEROSOL, METERED RESPIRATORY (INHALATION) PRN
Status: ACTIVE | OUTPATIENT
Start: 2021-07-30 | End: 2021-07-30

## 2021-07-30 RX ORDER — SODIUM CHLORIDE 0.9 % (FLUSH) 0.9 %
5-40 SYRINGE (ML) INJECTION PRN
Status: ACTIVE | OUTPATIENT
Start: 2021-07-30 | End: 2021-07-30

## 2021-07-30 RX ORDER — AMINOPHYLLINE DIHYDRATE 25 MG/ML
50 INJECTION, SOLUTION INTRAVENOUS PRN
Status: ACTIVE | OUTPATIENT
Start: 2021-07-30 | End: 2021-07-30

## 2021-07-30 RX ORDER — ATROPINE SULFATE 0.1 MG/ML
0.5 INJECTION INTRAVENOUS EVERY 5 MIN PRN
Status: ACTIVE | OUTPATIENT
Start: 2021-07-30 | End: 2021-07-30

## 2021-07-30 RX ORDER — SODIUM CHLORIDE 9 MG/ML
500 INJECTION, SOLUTION INTRAVENOUS CONTINUOUS PRN
Status: ACTIVE | OUTPATIENT
Start: 2021-07-30 | End: 2021-07-30

## 2021-07-30 RX ORDER — SODIUM CHLORIDE 0.9 % (FLUSH) 0.9 %
10 SYRINGE (ML) INJECTION PRN
Status: DISCONTINUED | OUTPATIENT
Start: 2021-07-30 | End: 2021-08-02 | Stop reason: HOSPADM

## 2021-07-30 RX ORDER — METOPROLOL TARTRATE 5 MG/5ML
5 INJECTION INTRAVENOUS EVERY 5 MIN PRN
Status: ACTIVE | OUTPATIENT
Start: 2021-07-30 | End: 2021-07-30

## 2021-07-30 RX ORDER — NITROGLYCERIN 0.4 MG/1
0.4 TABLET SUBLINGUAL EVERY 5 MIN PRN
Status: ACTIVE | OUTPATIENT
Start: 2021-07-30 | End: 2021-07-30

## 2021-07-30 RX ADMIN — SODIUM CHLORIDE, PRESERVATIVE FREE 10 ML: 5 INJECTION INTRAVENOUS at 08:46

## 2021-07-30 RX ADMIN — TETRAKIS(2-METHOXYISOBUTYLISOCYANIDE)COPPER(I) TETRAFLUOROBORATE 11 MILLICURIE: 1 INJECTION, POWDER, LYOPHILIZED, FOR SOLUTION INTRAVENOUS at 07:34

## 2021-07-30 RX ADMIN — REGADENOSON 0.4 MG: 0.08 INJECTION, SOLUTION INTRAVENOUS at 09:08

## 2021-07-30 RX ADMIN — TETRAKIS(2-METHOXYISOBUTYLISOCYANIDE)COPPER(I) TETRAFLUOROBORATE 34.8 MILLICURIE: 1 INJECTION, POWDER, LYOPHILIZED, FOR SOLUTION INTRAVENOUS at 09:10

## 2021-07-30 RX ADMIN — SODIUM CHLORIDE, PRESERVATIVE FREE 10 ML: 5 INJECTION INTRAVENOUS at 07:34

## 2021-07-30 NOTE — PROGRESS NOTES
CST Lexiscan. Stress Tech performs patient preparation of physical comfort, review test procedures, pre-stress EKG. Lung Sounds clear t/o. Consent verified by pt. .  Educated patient on test procedure and possible side effects of Lexiscan as well as s/s to report. Cardiologist reviewed pre-test EKG and is present for test.  Patient tolerated test well with minor SOB, which resolved to baseline after test with caffeine. Start /90 HR 58  Stop /86 HR 75  EKG portion of testing is completed and negative, nuc. med. portion is still pending.

## 2021-07-30 NOTE — PROCEDURES
[address] [place], [place] [address]                              CARDIAC STRESS TEST    PATIENT NAME: Reza Luque                      :        1965  MED REC NO:   [id number]                              ROOM:  ACCOUNT NO:   [de-identified]                           ADMIT DATE: 2021  PROVIDER:     Johnny Gomez    DATE OF STUDY:  2021    TEST TYPE: LEXISCAN CARDIOLYTE STRESS TEST  INDICATION: EKG ABNORMALITIES  REFERRING PHYSICIAN: DR. Namrata Dubose. JUANITO    RESTING HEART RATE: 58 BEATS PER MINUTE  RESTING BLOOD PRESSURE: 150/90    MEDICATION(S) GIVEN: 0.4MG IV LEXISCAN  REASON FOR TERMINATION: MEDICATION INFUSION COMPLETE    RESTING EKG: ABNORMAL  COMMENTS: SINUS BRADYCARDIA, 58 BEATS PER MINUTE, NON-SPECIFIC T-WAVE  CHANGES  STRESS HEART RESPONSE: ABNORMAL RESPONSE  BLOOD PRESSURE RESPONSE: APPROPRIATE  STRESS EKGs: NO CHANGES SEEN  CHEST DISCOMFORT: NO PAIN  ISCHEMIC EKG CHANGES: NONE    EKG IMPRESSION: ELECTROCARDIOGRAPHICALLY NEGATIVE LEXISCAN STRESS TEST. RADIOISOTOPE RESULTS TO FOLLOW FROM THE DEPARTMENT OF NUCLEAR MEDICINE.             Kelly Archuleta    D: 2021 11:55:58       T: 2021 12:03:20     [id number]  Job#: [id number]     Doc#: Unknown    CC:    (Retain this field even if not dictated or not decipherable)

## 2021-07-31 NOTE — RESULT ENCOUNTER NOTE
First part of the stress test negative Mychart comment sent to patient.     Future Appointments  9/14/2021  11:00 AM   Saul Anthony MD     [facility]

## 2021-08-02 NOTE — RESULT ENCOUNTER NOTE
Noted  Future Appointments  9/14/2021  11:00 AM   [facility] Lara [facility]     [facility]

## 2021-08-10 DIAGNOSIS — E66.01 SEVERE OBESITY (BMI 35.0-39.9) WITH COMORBIDITY (HCC): ICD-10-CM

## 2021-08-10 DIAGNOSIS — R73.03 PREDIABETES: ICD-10-CM

## 2021-08-10 RX ORDER — METFORMIN HYDROCHLORIDE 500 MG/1
TABLET, EXTENDED RELEASE ORAL
Qty: 90 TABLET | Refills: 1 | Status: SHIPPED | OUTPATIENT
Start: 2021-08-10 | End: 2021-09-14 | Stop reason: SINTOL

## 2021-08-13 ENCOUNTER — HOSPITAL ENCOUNTER (OUTPATIENT)
Dept: CARDIAC CATH/INVASIVE PROCEDURES | Age: 56
Discharge: HOME | End: 2021-08-13
Payer: COMMERCIAL

## 2021-08-13 VITALS
TEMPERATURE: 98.3 F | SYSTOLIC BLOOD PRESSURE: 111 MMHG | WEIGHT: 186 LBS | BODY MASS INDEX: 34.23 KG/M2 | RESPIRATION RATE: 17 BRPM | OXYGEN SATURATION: 98 % | HEIGHT: 62 IN | DIASTOLIC BLOOD PRESSURE: 66 MMHG | HEART RATE: 72 BPM

## 2021-08-13 LAB
GFR NON-AFRICAN AMERICAN: >60 ML/MIN
GFR SERPL CREATININE-BSD FRML MDRD: >60 ML/MIN
GFR SERPL CREATININE-BSD FRML MDRD: NORMAL ML/MIN/{1.73_M2}
GLUCOSE BLD-MCNC: 105 MG/DL (ref 74–100)
PLATELET # BLD: 175 K/UL (ref 138–453)
POC BUN: 17 MG/DL (ref 8–26)
POC CHLORIDE: 111 MMOL/L (ref 98–107)
POC CREATININE: 0.7 MG/DL (ref 0.51–1.19)
POC HEMATOCRIT: 40 % (ref 36–46)
POC HEMOGLOBIN: 13.6 G/DL (ref 12–16)
POC POTASSIUM: 3.8 MMOL/L (ref 3.5–4.5)
POC SODIUM: 144 MMOL/L (ref 138–146)

## 2021-08-13 PROCEDURE — 2500000003 HC RX 250 WO HCPCS

## 2021-08-13 PROCEDURE — 82435 ASSAY OF BLOOD CHLORIDE: CPT

## 2021-08-13 PROCEDURE — 93458 L HRT ARTERY/VENTRICLE ANGIO: CPT | Performed by: INTERNAL MEDICINE

## 2021-08-13 PROCEDURE — 7100000001 HC PACU RECOVERY - ADDTL 15 MIN

## 2021-08-13 PROCEDURE — 82565 ASSAY OF CREATININE: CPT

## 2021-08-13 PROCEDURE — 82947 ASSAY GLUCOSE BLOOD QUANT: CPT

## 2021-08-13 PROCEDURE — 2580000003 HC RX 258: Performed by: INTERNAL MEDICINE

## 2021-08-13 PROCEDURE — 84520 ASSAY OF UREA NITROGEN: CPT

## 2021-08-13 PROCEDURE — 2709999900 HC NON-CHARGEABLE SUPPLY

## 2021-08-13 PROCEDURE — C1894 INTRO/SHEATH, NON-LASER: HCPCS

## 2021-08-13 PROCEDURE — 6360000002 HC RX W HCPCS

## 2021-08-13 PROCEDURE — 85014 HEMATOCRIT: CPT

## 2021-08-13 PROCEDURE — 7100000000 HC PACU RECOVERY - FIRST 15 MIN

## 2021-08-13 PROCEDURE — 84295 ASSAY OF SERUM SODIUM: CPT

## 2021-08-13 PROCEDURE — 85049 AUTOMATED PLATELET COUNT: CPT

## 2021-08-13 PROCEDURE — 84132 ASSAY OF SERUM POTASSIUM: CPT

## 2021-08-13 PROCEDURE — 6360000004 HC RX CONTRAST MEDICATION

## 2021-08-13 RX ORDER — SODIUM CHLORIDE 0.9 % (FLUSH) 0.9 %
5-40 SYRINGE (ML) INJECTION PRN
Status: DISCONTINUED | OUTPATIENT
Start: 2021-08-13 | End: 2021-08-14 | Stop reason: HOSPADM

## 2021-08-13 RX ORDER — SODIUM CHLORIDE 9 MG/ML
25 INJECTION, SOLUTION INTRAVENOUS PRN
Status: DISCONTINUED | OUTPATIENT
Start: 2021-08-13 | End: 2021-08-14 | Stop reason: HOSPADM

## 2021-08-13 RX ORDER — ACETAMINOPHEN 325 MG/1
650 TABLET ORAL EVERY 4 HOURS PRN
Status: DISCONTINUED | OUTPATIENT
Start: 2021-08-13 | End: 2021-08-14 | Stop reason: HOSPADM

## 2021-08-13 RX ORDER — SODIUM CHLORIDE 0.9 % (FLUSH) 0.9 %
5-40 SYRINGE (ML) INJECTION EVERY 12 HOURS SCHEDULED
Status: DISCONTINUED | OUTPATIENT
Start: 2021-08-13 | End: 2021-08-14 | Stop reason: HOSPADM

## 2021-08-13 RX ORDER — AMLODIPINE BESYLATE 5 MG/1
5 TABLET ORAL DAILY
COMMUNITY
End: 2022-04-08 | Stop reason: SDUPTHER

## 2021-08-13 RX ORDER — SODIUM CHLORIDE 9 MG/ML
INJECTION, SOLUTION INTRAVENOUS CONTINUOUS
Status: DISCONTINUED | OUTPATIENT
Start: 2021-08-13 | End: 2021-08-14 | Stop reason: HOSPADM

## 2021-08-13 RX ORDER — ASPIRIN 81 MG/1
81 TABLET, CHEWABLE ORAL DAILY
COMMUNITY
End: 2022-03-16 | Stop reason: HOSPADM

## 2021-08-13 RX ADMIN — SODIUM CHLORIDE: 9 INJECTION, SOLUTION INTRAVENOUS at 10:03

## 2021-08-13 NOTE — OP NOTE
[facility]    CARDIAC CATHETERIZATION    Date:   8/13/2021  Patient name:  Mamta Coronel  Date of admission:  8/13/2021  9:09 AM  MRN:   [id number]  YOB: 1965    Operators:  Primary:   Janie Kaplan MD (Attending Physician)    Assistant/CV fellow: Sarah Beth Walsh MD      Procedure performed:     [x] Left Heart Catheterization. [] Graft Angiography. [x] Left Ventriculography. [] Right Heart Catheterization. [x] Coronary Angiography. [] Aortic Valve Studies. [] PCI:      [] Other:       Pre Procedure Conscious Sedation Data:  ASA Class:    [] I [x] II [] III [] IV    Mallampati Class:  [] I [x] II [] III [] IV      Indication:  [] STEMI      [x] + Stress test  [] ACS      [] + EKG Changes  [] Non Q MI       [] Significant Risk Factors  [] Recurrent Angina             [] Diabetes Mellitus    [] New LBBB      [] Other.  [] CHF / Low EF changes     [] Abnormal CTA / Ca Score      Procedure:  Access:  [] Femoral  [x] Radial  artery       [x] Right  [] Left    Procedure: After informed consent was obtained with explanation of the risks and benefits, patient was brought to the cath lab. The access area was prepped and draped in sterile fashion. 1% lidocaine was used for local block. The artery was cannulated with 6  Fr sheath with brisk arterial blood return. The side port was frequently flushed and aspirated with normal saline. Estimated Blood Loss:  [x] Minimal < 25 cc [] Moderate 25-50 cc  [] >50 cc    Findings:      LAD: Diffuse irregularities 30-40%. LCx: Mild irregularities 20-30%. RCA: Mild irregularities 20-30%. Ramus: Mild irregularities 10-20%. The LV gram was performed in the RICH 30 position. LVEF: 60 %. Conclusions:     Minimal non-obstructive CAD. Normal LV systolic function. Recommendations:    Medical therapy as needed. Risk factor modification. ____________________________________________________________________    History and Risk Factors    [x] Hypertension     [] Family history of CAD  [x] Hyperlipidemia     [] Cerebrovascular Disease   [] Prior MI       [] Peripheral Vascular disease   [] Prior PCI              [x] Diabetes Mellitus    [] Left Main PCI. [] Currently on Dialysis. [] Prior CABG. [] Currently smoker. [] Cardiac Arrest outside of healthcare facility. [] Yes    [] No        Witnessed     [] Yes   [] No     Arrest after arrival of EMS  [] Yes   [] No     [] Cardiac Arrest at other Facility. [] Yes   [] No    Pre-Procedure Information. Heart Failure       [] Yes    [x] No        Class  [] I      [] II  [] III    [] IV. New Diagnosis    [] Yes  [] No    HF Type      [] Systolic   [] Diastolic          [] Unknown. Diagnostic Test:   EKG       [] Normal   [x] Abnormal    New antiarrhythmia medications:    [] Yes   [] No   New onset atrial fibrillation / Flutter     [] Yes   [] No   ECG Abnormalities:      [] V. Fib   [] Autumn V. Tach           [] NS V. T   [] New LBBB           [] T. Inv  []  ST dev > 0.5 mm         [] PVC's freq  [] PVC's infrequent    Stress Test Performed:      [x] Yes    [] No     Type:     [] Stress Echo   [] Exercise Stress Test (no imaging)      [] Stress Nuclear  [x] Stress Imaging     Results   [] Negative   [] Positive        [] Indeterminate  [] Unavailable     If Positive/ Risk / Extent of Ischemia:       [] Low  [] Intermediate         [] High  [] Unavailable      Cardiac CTA Performed:     [] Yes    [x] No      Results   [] CAD   [] Non obstructive CAD      [] No CAD   [] Uncertain      [] Unknown   [] Structural Disease.      Pre Procedure Medications:   [] Yes    [x] No         [] ASA   [] Beta Blockers      [] Nitrate   [] Ca Channel Blockers      [] Ranolazine   [] Statin       [] Plavix/Others antiplatelets      Electronically signed on 8/13/2021 at 11:07 AM by:    Jose Rao MD  Fellow, [address]      Attending Physician Statement  I have discussed the case of Jayashree Nguyen including pertinent history and exam findings with the resident. I have seen and examined the patient and the key elements of the encounter have been performed by me. I agree with the assessment, plan and orders as documented by the resident With changes made to the note.   I was present during entire procedure and performed all critical elements of the procedure    Electronically signed by Rajeev Aguilar MD on 8/13/2021 at 11:10 AM.    [facility]      [phone]

## 2021-08-13 NOTE — PROGRESS NOTES
Discharged teaching completed with  present. Vasc. Band removed and dsd and armboard to site. IV removed. Pt ambulated to BR. Ready for discharge via [facility].

## 2021-08-13 NOTE — H&P
[facility]   History & Physical               Today's Date: 8/13/2021  Patient Name: Mamta Coronel  Date of admission: 8/13/2021  9:09 AM  Patient's age: 48 y.o., 1965  Admission Dx: Abnormal stress ECG [R94.39]    Reason for Admission:  Left heart cath    CHIEF COMPLAINT:  Preoperative clearance. No chief complaint on file. History Obtained From:  patient, electronic medical record    HISTORY OF PRESENT ILLNESS:      The patient is a 48 y.o.  female who is admitted to the hospital for left heart cath. She was seen in the outpatient setting for preoperative clearance for knee surgery. Stress test was ordered and was abnormal.       Perfusion:  Significant perfusion defects at stress with some reversibility   at rest.       Function:  Normal left ventricular wall motion. Normal left ventricular   ejection of 73%. Patient has moderate perfusion defects with mild reversibility anterior wall. Mild perfusion defects at stress noted in the anterolateral wall and inferior   apical segment as well as the anterior septal wall midportion without   significant reperfusion. 19% of left ventricular myocardium is involved with   perfusion defects at stress. Past Medical History:   has a past medical history of Acquired hypothyroidism, Bilateral primary osteoarthritis of knee, Chronic back pain, Diarrhea, Distal radius fracture, Drug-seeking behavior, FANTA (generalized anxiety disorder), GERD (gastroesophageal reflux disease), H/O: hysterectomy, History of depression, Hyperlipidemia with target LDL less than 100, Mild episode of recurrent major depressive disorder ([address] Opioid dependence ([address] Severe obesity (BMI 35.0-39. 9) with comorbidity ([address] and Urge incontinence. Past Surgical History:   has a past surgical history that includes Hysterectomy; bladder suspension; Knee arthroscopy;  Tonsillectomy; fracture surgery (2/7/2013); pr egd transoral biopsy single/multiple (N/A, 11/16/2017); pr colonoscopy w/biopsy single/multiple (N/A, 11/16/2017); Cardiac catheterization (08/13/2021); and Wrist surgery. Home Medications:    Prior to Admission medications    Medication Sig Start Date End Date Taking? Authorizing Provider   amLODIPine (NORVASC) 5 MG tablet Take 5 mg by mouth daily   Yes Historical Provider, MD   aspirin 81 MG chewable tablet Take 81 mg by mouth daily   Yes Historical Provider, MD   metFORMIN (GLUCOPHAGE-XR) 500 MG extended release tablet TAKE 1 TABLET BY MOUTH DAILY WITH BREAKFAST 8/10/21  Yes Jessa Barroso MD   Semaglutide 7 MG TABS Take 7 mg by mouth daily Second step 7/12/21  Yes Jessa Barroso MD   meloxicam (MOBIC) 7.5 MG tablet  6/7/21  Yes Historical Provider, MD   atorvastatin (LIPITOR) 10 MG tablet Take 1 tablet by mouth every evening 5/3/21  Yes Jessa Barroso MD   levothyroxine (SYNTHROID) 50 MCG tablet Take 1 tablet by mouth every morning (before breakfast) Dose increased 2/21/2021 2/21/21  Yes Jessa Barroso MD   traZODone (DESYREL) 50 MG tablet Take 1 tablet by mouth nightly as needed for Sleep 2/17/21  Yes Jessa Barroso MD   vitamin D (CHOLECALCIFEROL) 50 MCG (2000 UT) TABS tablet Take 1 tablet by mouth daily 1/19/21  Yes Jessa Barroso MD   oxybutynin (DITROPAN-XL) 10 MG extended release tablet Take 1 tablet by mouth daily 9/16/20  Yes MD Ildefonso Cody MISC by Does not apply route Can't walk greater than 200 feet. Expires in 5 years. 6/11/21   Jessa Barroso MD   diclofenac sodium (VOLTAREN) 1 % GEL Apply 2 g topically 2 times daily As needed for joint pains 4/21/21   Jessa Barroso MD   tiZANidine (ZANAFLEX) 4 MG tablet TAKE 1 TABLET BY MOUTH EVERY 8 HOURS AS NEEDED FOR BACK PAIN.  DO NOT DRIVE WHILE TAKING THIS MEDICATION 3/18/21   Jessa Barroso MD   albuterol sulfate HFA (PROVENTIL HFA) 108 (90 Base) MCG/ACT inhaler Inhale 2 puffs into the lungs every 6 hours as needed for Wheezing or Shortness of Breath 3/3/21   Cornelia Blair MD   ZINC PO Take by mouth    Historical Provider, MD   Glucosamine-Chondroitin-MSM (TRIPLE FLEX) 953970-724 MG TABS Take 2 tablets by mouth daily With food 1/19/21   Cornelia Blair MD        Current Facility-Administered Medications: 0.9 % sodium chloride infusion, , Intravenous, Continuous    Allergies:  Patient has no known allergies. Social History:   reports that she quit smoking about 33 years ago. Her smoking use included cigarettes. She has a 1.50 pack-year smoking history. She has never used smokeless tobacco. She reports previous alcohol use. She reports that she does not use drugs. Family History: family history includes Breast Cancer (age of onset: 48) in her paternal aunt; COPD in her maternal grandfather; Cancer in her maternal cousin; Colon Cancer in her maternal grandfather; Depression in her mother; Heart Surgery in her father; High Blood Pressure in her mother; Jaden Christians in her maternal grandfather; Other in her brother. REVIEW OF SYSTEMS:      Constitutional: there has been no unanticipated weight loss. Eyes: No visual changes or diplopia. ENT: No Headaches  Cardiovascular:  Remaining as above  Respiratory: No cough  Gastrointestinal: No abdominal pain. No change in bowel or bladder habits. Genitourinary: No dysuria, trouble voiding, or hematuria. Neurological: No headache. PHYSICAL EXAM:      /81   Pulse 60   Temp 98.3 °F (36.8 °C) (Oral)   Resp 18   Ht 5' 2\" (1.575 m)   Wt 186 lb (84.4 kg)   SpO2 99%   BMI 34.02 kg/m²    No intake or output data in the 24 hours ending 08/13/21 0954        Constitutional and General Appearance:   alert, cooperative, no distress and appears stated age  HEENT:  PERRL, EOMI  Respiratory:  Normal excursion and expansion without use of accessory muscles  Resp Auscultation:  Good respiratory effort. No for increased work of breathing. On auscultation: clear to auscultation bilaterally  Cardiovascular:  Regular rate and rhythm  S1/S2  No murmurs  The apical impulse is not displaced  Abdomen:  Soft  Bowel sounds present  Non-tender to palpation  Extremities:  No cyanosis or clubbing  Lower extremity edema: No  Skin:  Warm and dry  Neurological:  Alert and oriented. Moves all extremities well      Labs:     CBC: No results for input(s): WBC, HGB, HCT, PLT in the last 72 hours. BMP:   Recent Labs     08/13/21  0947   CREATININE 0.70   LABGLOM >60         FASTING LIPID PANEL:  Lab Results   Component Value Date    HDL 54 02/19/2021    TRIG 64 02/19/2021         Patient's Active Problem List  Active Problems:    * No active hospital problems. *  Resolved Problems:    * No resolved hospital problems. *        IMPRESSION:      Positive stress test  Hypertension  Hyperlipidemia  DM type 2      RECOMMENDATIONS:    Left heart cath  Need cardiac cath for risk stratification. Risk and benefits of cardiac catheterization were discussed in detail. Risk of bleeding, requiring blood transfusion, vascular complication requiring surgery, renal insufficieny with need of dialysis, CVA, MI, death and anesthesia complications including intubation were discussed. Patient agrees to proceed and verbalizes understanding. Pre Procedure Conscious Sedation Data:     ASA Class:                  [] I [x] II [] III [] IV     Mallampati Class:       [] I [x] II [] III [] IV        Discussed with patient and Nurse. Yanna Coulter MD, M.D. Fellow, [address] [place] [address]      Please note that part of this chart were generated using voice recognition  dictation software. Although every effort was made to ensure the accuracy of this automated transcription, some errors in transcription may have occurred. Attending Physician Statement  I have discussed the case of Cam  including pertinent history and exam findings with the resident.  I have seen and examined the patient and the key elements of the encounter have been performed by me. I agree with the assessment, plan and orders as documented by the resident With changes made to the note.      Electronically signed by Enmanuel Virgen MD on 8/13/2021 at 11:07 AM.    [facility]      [phone]

## 2021-08-13 NOTE — PROGRESS NOTES
Patient admitted, consent signed, all questions answered. Pt ready for procedure. Bed in low position, call light to reach with side rails up 2 of 2. Groins clipped. Family at bedside with patient.

## 2021-09-14 ENCOUNTER — OFFICE VISIT (OUTPATIENT)
Dept: FAMILY MEDICINE CLINIC | Age: 56
End: 2021-09-14
Payer: COMMERCIAL

## 2021-09-14 VITALS
WEIGHT: 190 LBS | HEART RATE: 78 BPM | SYSTOLIC BLOOD PRESSURE: 118 MMHG | HEIGHT: 60 IN | BODY MASS INDEX: 37.3 KG/M2 | TEMPERATURE: 97.4 F | DIASTOLIC BLOOD PRESSURE: 68 MMHG | OXYGEN SATURATION: 98 %

## 2021-09-14 DIAGNOSIS — M25.561 CHRONIC PAIN OF BOTH KNEES: ICD-10-CM

## 2021-09-14 DIAGNOSIS — M25.562 CHRONIC PAIN OF BOTH KNEES: ICD-10-CM

## 2021-09-14 DIAGNOSIS — R73.9 HYPERGLYCEMIA: ICD-10-CM

## 2021-09-14 DIAGNOSIS — E78.5 HYPERLIPIDEMIA WITH TARGET LDL LESS THAN 100: ICD-10-CM

## 2021-09-14 DIAGNOSIS — G89.29 CHRONIC PAIN OF BOTH KNEES: ICD-10-CM

## 2021-09-14 DIAGNOSIS — I10 ESSENTIAL HYPERTENSION: Primary | ICD-10-CM

## 2021-09-14 DIAGNOSIS — I25.84 CORONARY ARTERY DISEASE DUE TO CALCIFIED CORONARY LESION: ICD-10-CM

## 2021-09-14 DIAGNOSIS — E03.9 ACQUIRED HYPOTHYROIDISM: ICD-10-CM

## 2021-09-14 DIAGNOSIS — M17.0 BILATERAL PRIMARY OSTEOARTHRITIS OF KNEE: ICD-10-CM

## 2021-09-14 DIAGNOSIS — I25.10 CORONARY ARTERY DISEASE DUE TO CALCIFIED CORONARY LESION: ICD-10-CM

## 2021-09-14 LAB — HBA1C MFR BLD: 5.1 %

## 2021-09-14 PROCEDURE — 99214 OFFICE O/P EST MOD 30 MIN: CPT | Performed by: FAMILY MEDICINE

## 2021-09-14 PROCEDURE — 83036 HEMOGLOBIN GLYCOSYLATED A1C: CPT | Performed by: FAMILY MEDICINE

## 2021-09-14 RX ORDER — LISINOPRIL 5 MG/1
TABLET ORAL
COMMUNITY
Start: 2021-07-09 | End: 2021-12-23

## 2021-09-14 RX ORDER — BUSPIRONE HYDROCHLORIDE 5 MG/1
5 TABLET ORAL PRN
COMMUNITY
End: 2021-12-23 | Stop reason: DRUGHIGH

## 2021-09-14 RX ORDER — MELOXICAM 15 MG/1
TABLET ORAL
COMMUNITY
Start: 2021-08-25 | End: 2022-03-16 | Stop reason: HOSPADM

## 2021-09-14 RX ORDER — CALCIUM CARBONATE 750 MG/1
300 TABLET, CHEWABLE ORAL 4 TIMES DAILY PRN
COMMUNITY
End: 2021-12-23

## 2021-09-14 ASSESSMENT — PATIENT HEALTH QUESTIONNAIRE - PHQ9
SUM OF ALL RESPONSES TO PHQ QUESTIONS 1-9: 0
2. FEELING DOWN, DEPRESSED OR HOPELESS: 0
SUM OF ALL RESPONSES TO PHQ QUESTIONS 1-9: 0
SUM OF ALL RESPONSES TO PHQ9 QUESTIONS 1 & 2: 0
SUM OF ALL RESPONSES TO PHQ QUESTIONS 1-9: 0
1. LITTLE INTEREST OR PLEASURE IN DOING THINGS: 0

## 2021-09-14 ASSESSMENT — ENCOUNTER SYMPTOMS
COUGH: 0
DIARRHEA: 0
NAUSEA: 0
CHEST TIGHTNESS: 0
WHEEZING: 0
ABDOMINAL DISTENTION: 0
ABDOMINAL PAIN: 0
TROUBLE SWALLOWING: 0
VOMITING: 0
CONSTIPATION: 0
SHORTNESS OF BREATH: 0

## 2021-09-14 NOTE — RESULT ENCOUNTER NOTE
Addressed during office visit today, A1c  5.1, within normal limits, resolved prediabetes,  Continue current treatment discussed during visit

## 2021-09-14 NOTE — PROGRESS NOTES
Visit Information    Have you changed or started any medications since your last visit including any over-the-counter medicines, vitamins, or herbal medicines? yes -    Have you stopped taking any of your medications? Is so, why? -  yes - METFORMIN STOPPED LAST MONTH DUE TO VOMITING   Are you having any side effects from any of your medications? - yes -     Have you seen any other physician or provider since your last visit? yes -    Have you had any other diagnostic tests since your last visit?  no   Have you been seen in the emergency room and/or had an admission in a hospital since we last saw you?  no   Have you had your routine dental cleaning in the past 6 months?  no     Do you have an active MyChart account? If no, what is the barrier?   Yes    Patient Care Team:  Jaquan Ramey MD as PCP - General (Family Medicine)  Jaquan Ramey [facility] PCP [facility]  Candelaria Al RN as Registered Nurse  Amaury Quinones as Consulting Physician (Orthopedic Surgery)    Medical History Review  Past Medical, Family, and Social History reviewed and does contribute to the patient presenting condition    Health Maintenance   Topic Date Due    Flu vaccine (1) Never done    Shingles Vaccine (1 of 2) 06/11/2022 (Originally 8/21/2016)    Lipid screen  02/19/2022    A1C test (Diabetic or Prediabetic)  06/11/2022    TSH testing  06/18/2022    Colon cancer screen colonoscopy  11/16/2022    Breast cancer screen  06/12/2023    DTaP/Tdap/Td vaccine (2 - Td or Tdap) 01/19/2031    COVID-19 Vaccine  Completed    Hepatitis C screen  Completed    HIV screen  Completed    Hepatitis A vaccine  Aged Out    Hepatitis B vaccine  Aged Out    Hib vaccine  Aged Out    Meningococcal (ACWY) vaccine  Aged Out    Pneumococcal 0-64 years Vaccine  Aged Out

## 2021-11-18 DIAGNOSIS — N32.81 OAB (OVERACTIVE BLADDER): ICD-10-CM

## 2021-11-18 RX ORDER — OXYBUTYNIN CHLORIDE 10 MG/1
10 TABLET, EXTENDED RELEASE ORAL DAILY
Qty: 90 TABLET | Refills: 3 | Status: SHIPPED | OUTPATIENT
Start: 2021-11-18 | End: 2022-10-25 | Stop reason: SDUPTHER

## 2021-11-18 NOTE — TELEPHONE ENCOUNTER
Please Approve or Refuse.   Send to Pharmacy per Pt's Request:     Next Visit Date:  3/16/2022   Last Visit Date: 9/14/2021    Hemoglobin A1C (%)   Date Value   09/14/2021 5.1   06/11/2021 5.7   07/24/2020 5.6             ( goal A1C is < 7)   BP Readings from Last 3 Encounters:   09/14/21 118/68   08/13/21 111/66   06/11/21 (!) 142/80          (goal 120/80)  BUN   Date Value Ref Range Status   06/18/2021 15 6 - 20 mg/dL Final     CREATININE   Date Value Ref Range Status   06/18/2021 0.63 0.50 - 0.90 mg/dL Final     POC Creatinine   Date Value Ref Range Status   08/13/2021 0.70 0.51 - 1.19 mg/dL Final     Potassium   Date Value Ref Range Status   06/18/2021 4.2 3.7 - 5.3 mmol/L Final

## 2021-11-21 DIAGNOSIS — G47.01 INSOMNIA DUE TO MEDICAL CONDITION: ICD-10-CM

## 2021-11-22 RX ORDER — TRAZODONE HYDROCHLORIDE 50 MG/1
TABLET ORAL
Qty: 90 TABLET | Refills: 1 | Status: SHIPPED | OUTPATIENT
Start: 2021-11-22 | End: 2021-12-23 | Stop reason: ALTCHOICE

## 2021-12-23 ENCOUNTER — OFFICE VISIT (OUTPATIENT)
Dept: FAMILY MEDICINE CLINIC | Age: 56
End: 2021-12-23
Payer: COMMERCIAL

## 2021-12-23 VITALS
OXYGEN SATURATION: 92 % | BODY MASS INDEX: 37.81 KG/M2 | HEART RATE: 101 BPM | DIASTOLIC BLOOD PRESSURE: 86 MMHG | WEIGHT: 192.6 LBS | TEMPERATURE: 97.4 F | SYSTOLIC BLOOD PRESSURE: 136 MMHG | HEIGHT: 60 IN

## 2021-12-23 DIAGNOSIS — I10 ESSENTIAL HYPERTENSION: Primary | ICD-10-CM

## 2021-12-23 DIAGNOSIS — M17.0 PRIMARY OSTEOARTHRITIS OF BOTH KNEES: ICD-10-CM

## 2021-12-23 DIAGNOSIS — E03.9 ACQUIRED HYPOTHYROIDISM: ICD-10-CM

## 2021-12-23 DIAGNOSIS — F41.1 GAD (GENERALIZED ANXIETY DISORDER): ICD-10-CM

## 2021-12-23 DIAGNOSIS — F33.1 MODERATE EPISODE OF RECURRENT MAJOR DEPRESSIVE DISORDER (HCC): ICD-10-CM

## 2021-12-23 DIAGNOSIS — Z96.652 HISTORY OF LEFT KNEE REPLACEMENT: ICD-10-CM

## 2021-12-23 PROCEDURE — 99214 OFFICE O/P EST MOD 30 MIN: CPT | Performed by: FAMILY MEDICINE

## 2021-12-23 RX ORDER — BUSPIRONE HYDROCHLORIDE 15 MG/1
15 TABLET ORAL 2 TIMES DAILY PRN
Qty: 60 TABLET | Refills: 0 | Status: SHIPPED | OUTPATIENT
Start: 2021-12-23 | End: 2022-01-19

## 2021-12-23 RX ORDER — ESCITALOPRAM OXALATE 10 MG/1
10 TABLET ORAL DAILY
Qty: 30 TABLET | Refills: 3 | Status: SHIPPED | OUTPATIENT
Start: 2021-12-23 | End: 2022-03-07 | Stop reason: SDUPTHER

## 2021-12-23 ASSESSMENT — ENCOUNTER SYMPTOMS
ABDOMINAL DISTENTION: 0
ABDOMINAL PAIN: 0
DIARRHEA: 0
WHEEZING: 0
SHORTNESS OF BREATH: 0
NAUSEA: 0
COUGH: 0
CHEST TIGHTNESS: 0
VOMITING: 0
CONSTIPATION: 0

## 2021-12-23 ASSESSMENT — COLUMBIA-SUICIDE SEVERITY RATING SCALE - C-SSRS
1. WITHIN THE PAST MONTH, HAVE YOU WISHED YOU WERE DEAD OR WISHED YOU COULD GO TO SLEEP AND NOT WAKE UP?: NO
2. HAVE YOU ACTUALLY HAD ANY THOUGHTS OF KILLING YOURSELF?: NO
6. HAVE YOU EVER DONE ANYTHING, STARTED TO DO ANYTHING, OR PREPARED TO DO ANYTHING TO END YOUR LIFE?: NO

## 2021-12-23 ASSESSMENT — PATIENT HEALTH QUESTIONNAIRE - PHQ9
4. FEELING TIRED OR HAVING LITTLE ENERGY: 3
8. MOVING OR SPEAKING SO SLOWLY THAT OTHER PEOPLE COULD HAVE NOTICED. OR THE OPPOSITE, BEING SO FIGETY OR RESTLESS THAT YOU HAVE BEEN MOVING AROUND A LOT MORE THAN USUAL: 3
2. FEELING DOWN, DEPRESSED OR HOPELESS: 2
SUM OF ALL RESPONSES TO PHQ QUESTIONS 1-9: 19
SUM OF ALL RESPONSES TO PHQ9 QUESTIONS 1 & 2: 4
7. TROUBLE CONCENTRATING ON THINGS, SUCH AS READING THE NEWSPAPER OR WATCHING TELEVISION: 3
1. LITTLE INTEREST OR PLEASURE IN DOING THINGS: 2
3. TROUBLE FALLING OR STAYING ASLEEP: 3
10. IF YOU CHECKED OFF ANY PROBLEMS, HOW DIFFICULT HAVE THESE PROBLEMS MADE IT FOR YOU TO DO YOUR WORK, TAKE CARE OF THINGS AT HOME, OR GET ALONG WITH OTHER PEOPLE: 0
5. POOR APPETITE OR OVEREATING: 3
SUM OF ALL RESPONSES TO PHQ QUESTIONS 1-9: 19
6. FEELING BAD ABOUT YOURSELF - OR THAT YOU ARE A FAILURE OR HAVE LET YOURSELF OR YOUR FAMILY DOWN: 0
SUM OF ALL RESPONSES TO PHQ QUESTIONS 1-9: 19
9. THOUGHTS THAT YOU WOULD BE BETTER OFF DEAD, OR OF HURTING YOURSELF: 0

## 2021-12-23 ASSESSMENT — ANXIETY QUESTIONNAIRES
GAD7 TOTAL SCORE: 14
6. BECOMING EASILY ANNOYED OR IRRITABLE: 3
7. FEELING AFRAID AS IF SOMETHING AWFUL MIGHT HAPPEN: 0
5. BEING SO RESTLESS THAT IT IS HARD TO SIT STILL: 3
1. FEELING NERVOUS, ANXIOUS, OR ON EDGE: 3
4. TROUBLE RELAXING: 3
2. NOT BEING ABLE TO STOP OR CONTROL WORRYING: 1
3. WORRYING TOO MUCH ABOUT DIFFERENT THINGS: 1
IF YOU CHECKED OFF ANY PROBLEMS ON THIS QUESTIONNAIRE, HOW DIFFICULT HAVE THESE PROBLEMS MADE IT FOR YOU TO DO YOUR WORK, TAKE CARE OF THINGS AT HOME, OR GET ALONG WITH OTHER PEOPLE: NOT DIFFICULT AT ALL

## 2021-12-23 NOTE — PROGRESS NOTES
Christiano Armenta (:  1965) is a 55 y.o. female,Established patient, here for evaluation of the following chief complaint(s): Anxiety, Knee Pain, Depression, and Hypertension      ASSESSMENT/PLAN:    1. Essential hypertension  Well controlled. Continue current treatment. Amlodipine 5 mg  Will recheck labs. -     Basic Metabolic Panel; Future  Discussed low salt diet and BP and pulse monitoring. 2. Acquired hypothyroidism  Uncontrolled  continue Synthroid 50 mcg and will adjust as needed   Advised to take Synthroid in the morning, on empty stomach, without any other meds and with a full glass of water.   -     TSH without Reflex; Future  -     T4, Free; Future  3. Primary osteoarthritis of both knees  status post left knee replacement  Pending replacement of the right knee  Meloxicam prn, rest, knee brace    4. FANTA (generalized anxiety disorder)  Worsening    Stop trazodone  Increased buspar  start Lexapro  Don't take meloxicam with lexapro    - start    escitalopram (LEXAPRO) 10 MG tablet; Take 1 tablet by mouth daily, Disp-30 tablet, R-3Normal  -  Restart increased dosage  busPIRone (BUSPAR) 15 MG tablet; Take 15 mg by mouth 2 times daily as needed (Anxiety), Disp-60 tablet, R-0Normal    5. Moderate episode of recurrent major depressive disorder (HCC)  worsening    -  start   escitalopram (LEXAPRO) 10 MG tablet; Take 1 tablet by mouth daily, Disp-30 tablet, R-3Normal  -   Restart increased dosage  busPIRone (BUSPAR) 15 MG tablet; Take 15 mg by mouth 2 times daily as needed (Anxiety), Disp-60 tablet, R-0Normal  6.  History of left knee replacement  Healed surgery  Advised not to overdue it  She says she will have 2 days off, declines letter for work   Advised cold compresses, continue Meloxicam prn with food        Tarsha Hoporfirio received counseling on the following healthy behaviors: nutrition, exercise and medication adherence  Reviewed prior labs and health maintenance  Discussed use, benefit, and side effects of prescribed medications. Barriers to medication compliance addressed. Patient given educational materials - see patient instructions  All patient questions answered. Patient voiced understanding. The patient's past medical,surgical, social, and family history as well as her current medications and allergies were reviewed as documented in today's encounter. Medications, labs, diagnostic studies, consultations and follow-up as documented in this encounter. Return for KEEP APPT. Data Unavailable    Future Appointments   Date Time Provider Salvador Skylar   3/16/2022  3:15 PM Nicole Henderson MD [facility]       SUBJECTIVE/OBJECTIVE:    Mary Beth Stroud complains of worsening depression and anxiety. Hypertension:    she  is not exercising, staying active, completed physical therapy, and is adherent to low salt diet. Blood pressure is well controlled at home. Cardiac symptoms fatigue. Patient denies chest pain, chest pressure/discomfort, claudication, dyspnea, exertional chest pressure/discomfort, irregular heart beat, lower extremity edema, near-syncope, orthopnea, palpitations, paroxysmal nocturnal dyspnea, syncope and tachypnea. Cardiovascular risk factors: dyslipidemia, hypertension and obesity (BMI >= 30 kg/m2). Use of agents associated with hypertension: NSAIDS and thyroid hormones. History of target organ damage: Coronary artery disease mild. Stress test 7/30/2021- was negative, second part was abnormal  Patient did have cardiac cath 8/13/2021 showed mild coronary artery disease, 30 to 40% in LAD. BP controlled. Mary Beth Stroud reports compliance with BP medications, and tolerates them well, denies side effects. blood pressure is Normal.    BP Readings from Last 3 Encounters:   12/23/21 136/86   09/14/21 118/68   08/13/21 111/66        Pulse is Elevated. mild tachycardia     Pulse Readings from Last 3 Encounters:   12/23/21 101   09/14/21 78   08/13/21 72         Hypothyroidism: Recent symptoms: fatigue, weight gain and anxiety. She denies weight loss, cold intolerance, heat intolerance, hair loss, dry skin, constipation, diarrhea, edema, tremor, palpitations and dysphagia. Patient is  taking her medication consistently on an empty stomach. TSH is Elevated. No results found for: [facility]  Lab Results   Component Value Date    TSH 13.11 (H) 2021    TSH 6.04 (H) 2021    TSH 14.11 (H) 2021        unintentional weight gain   Wt Readings from Last 3 Encounters:   21 192 lb 9.6 oz (87.4 kg)   21 190 lb (86.2 kg)   21 186 lb (84.4 kg)     Has bilateral knee osteoarthritis   Had left knee replacement, had rehab, wanted to return back to work  Intensity of pain is 4/10  Has been taking Meloxicam prn, using ice, knee brace. Israel Posada complains of worsening depression and anxiety  Ex   at age 64 yo due to [place] 23  Son was sick, got COVID 23,  2 weeks ago  She says she had 3 funerals in the past  2 weeks  Will have 2 days off, and declines letter for work  [employer] back to work by herself at  , but was alone with 26 kids, one infant which made her very anxious and and worried about safety and her own health as she just had left knee replacement. She says anxiety is so worse that she is constantly shaky, biting her lips  She says she had 3 funerals in the past  2 weeks  Will have 2 days off    Her mom is sick, in [place], and worries for her    Wants to start Celexa      PHQ-2 Over the past 2 weeks, how often have you been bothered by any of the following problems? Little interest or pleasure in doing things: More than half the days  Feeling down, depressed, or hopeless: More than half the days  PHQ-2 Score: 4  PHQ-9 Over the past 2 weeks, how often have you been bothered by any of the following problems?   Trouble falling or staying asleep, or sleeping too much: Nearly every day  Feeling tired or having little energy: Nearly every day  Poor all   Being so restless that it's hard to sit still - 2-Over half the days   Becoming easily annoyed or irritable - 2-Over half the days   Feeling afraid as if something awful might happen - 0-Not at all   FANTA-7 Total Score - 8          Current Outpatient Medications on File Prior to Visit   Medication Sig Dispense Refill    traZODone (DESYREL) 50 MG tablet TAKE 1 TABLET BY MOUTH EVERY NIGHT AS NEEDED FOR SLEEP 90 tablet 1    oxybutynin (DITROPAN-XL) 10 MG extended release tablet Take 1 tablet by mouth daily 90 tablet 3    busPIRone (BUSPAR) 5 MG tablet--NOT TAKING NOW Take 5 mg by mouth as needed      meloxicam (MOBIC) 15 MG tablet TAKE 1/2 TABLET BY MOUTH DAILY (Patient not taking: Reported on 2021)      amLODIPine (NORVASC) 5 MG tablet Take 5 mg by mouth daily      aspirin 81 MG chewable tablet Take 81 mg by mouth daily      Semaglutide 7 MG TABS Take 7 mg by mouth daily Second step 90 tablet 3    Handicap Placard MISC by Does not apply route Can't walk greater than 200 feet. Expires in 5 years. 1 each 0    atorvastatin (LIPITOR) 10 MG tablet Take 1 tablet by mouth every evening 90 tablet 3    albuterol sulfate HFA (PROVENTIL HFA) 108 (90 Base) MCG/ACT inhaler Inhale 2 puffs into the lungs every 6 hours as needed for Wheezing or Shortness of Breath 1 Inhaler 1    levothyroxine (SYNTHROID) 50 MCG tablet Take 1 tablet by mouth every morning (before breakfast) Dose increased 2021 90 tablet 3     No current facility-administered medications on file prior to visit.        Social History     Tobacco Use    Smoking status: Former Smoker     Packs/day: 0.50     Years: 3.00     Pack years: 1.50     Types: Cigarettes     Quit date: 1988     Years since quittin.0    Smokeless tobacco: Never Used   Vaping Use    Vaping Use: Never used   Substance Use Topics    Alcohol use: Not Currently     Comment: occasionally    Drug use: No       Review of Systems   Constitutional: Positive for fatigue and unexpected weight change. Negative for activity change, appetite change, chills, diaphoresis and fever. Respiratory: Negative for cough, chest tightness, shortness of breath and wheezing. Cardiovascular: Negative for chest pain, palpitations and leg swelling. Gastrointestinal: Negative for abdominal distention, abdominal pain, constipation, diarrhea, nausea and vomiting. Endocrine: Negative for cold intolerance, heat intolerance, polydipsia, polyphagia and polyuria. Musculoskeletal: Positive for arthralgias (knees). Skin: Negative for rash. Hematological: Does not bruise/bleed easily. Psychiatric/Behavioral: Negative for dysphoric mood and sleep disturbance. The patient is nervous/anxious.          -vital signs stable and within normal limits except severe Obesity per BMI and mild tachycardia . /86   Pulse 101   Temp 97.4 °F (36.3 °C)   Ht 5' (1.524 m)   Wt 192 lb 9.6 oz (87.4 kg)   SpO2 92%   BMI 37.61 kg/m²        Physical Exam  Vitals and nursing note reviewed. Constitutional:       General: She is not in acute distress. Appearance: Normal appearance. She is well-developed. She is obese. She is not diaphoretic. HENT:      Head: Normocephalic and atraumatic. Right Ear: External ear normal.      Left Ear: External ear normal.      Mouth/Throat:      Comments: I did not examine the mouth due to coronavirus pandemic and wearing masks    Eyes:      General: Lids are normal. No scleral icterus. Right eye: No discharge. Left eye: No discharge. Extraocular Movements: Extraocular movements intact. Conjunctiva/sclera: Conjunctivae normal.   Neck:      Thyroid: No thyromegaly. Cardiovascular:      Rate and Rhythm: Normal rate and regular rhythm. Heart sounds: Normal heart sounds. No murmur heard. Pulmonary:      Effort: Pulmonary effort is normal. No respiratory distress. Breath sounds: Normal breath sounds. No wheezing or rales. Chest:      Chest wall: No tenderness. Abdominal:      General: Bowel sounds are normal. There is no distension. Palpations: Abdomen is soft. There is no hepatomegaly or splenomegaly. Tenderness: There is no abdominal tenderness. Comments: Obese abdomen. Musculoskeletal:      Cervical back: Normal range of motion and neck supple. Right knee: Bony tenderness and crepitus present. Decreased range of motion. Tenderness present. Left knee: No crepitus. Decreased range of motion. Tenderness present. Right lower leg: No edema. Left lower leg: Edema present. Comments: Left knee warm, midline recent  surgical scar noted well healed, nontender. Warm, swollen left knee as expected. There is no sign of infection. Mildly antalgic walking   Skin:     General: Skin is warm and dry. Capillary Refill: Capillary refill takes less than 2 seconds. Findings: No rash. Neurological:      Mental Status: She is alert and oriented to person, place, and time. Cranial Nerves: No cranial nerve deficit. Motor: No abnormal muscle tone. Psychiatric:         Mood and Affect: Mood is anxious. Affect is labile and tearful. Speech: Speech is rapid and pressured. Behavior: Behavior normal.         Thought Content: Thought content normal.         Judgment: Judgment normal.             I personally reviewed testing with patient.   Increased TSH  hyperlipidemia improved  Otherwise labs within normal limits    Lab Results   Component Value Date    WBC 4.9 06/18/2021    HGB 12.8 06/18/2021    HCT 40.6 06/18/2021    MCV 88.8 06/18/2021     08/13/2021       Lab Results   Component Value Date     06/18/2021    K 4.2 06/18/2021     06/18/2021    CO2 24 06/18/2021    BUN 15 06/18/2021    CREATININE 0.70 08/13/2021    CREATININE 0.63 06/18/2021    GLUCOSE 98 06/18/2021    CALCIUM 9.0 06/18/2021        Lab Results   Component Value Date    ALT 15 06/18/2021 ZINC PO Patient Choice    traZODone (DESYREL) 50 MG tablet Alternate therapy    busPIRone (BUSPAR) 5 MG tablet DOSE ADJUSTMENT           On this date 12/23/2021 I have spent 35 minutes reviewing previous notes, test results and face to face with the patient discussing the diagnosis and importance of compliance with the treatment plan as well as documenting on the day of the visit. This note was completed by using the assistance of a speech-recognition program. However, inadvertent computerized transcription errors may be present. Although every effort was made to ensure accuracy, no guarantees can be provided that every mistake has been identified and corrected by editing. An electronic signature was used to authenticate this note.   Electronically signed by Madison Hatchet, MD on 12/24/2021 at 7:40 PM

## 2021-12-23 NOTE — PROGRESS NOTES
Visit Information    Have you changed or started any medications since your last visit including any over-the-counter medicines, vitamins, or herbal medicines? no   Have you stopped taking any of your medications? Is so, why? -  no  Are you having any side effects from any of your medications? - no    Have you seen any other physician or provider since your last visit? YES  Have you had any other diagnostic tests since your last visit? yes -    Have you been seen in the emergency room and/or had an admission in a hospital since we last saw you?  no   Have you had your routine dental cleaning in the past 6 months?  no     Do you have an active MyChart account? If no, what is the barrier?   Yes    Patient Care Team:  Nghia Fernandes MD as PCP - General (Family Medicine)  Nghia Fernandes MD as PCP - [facility] Provider  Abner Giordano RN as Registered Nurse  Bossman Martinez as Consulting Physician (Orthopedic Surgery)    Medical History Review  Past Medical, Family, and Social History reviewed and does contribute to the patient presenting condition    Health Maintenance   Topic Date Due    COVID-19 Vaccine (3 - Booster for Moderna series) 10/16/2021    Shingles Vaccine (1 of 2) 06/11/2022 (Originally 8/21/2016)    Flu vaccine (1) 06/30/2022 (Originally 9/1/2021)    Lipid screen  02/19/2022    TSH testing  06/18/2022    Potassium monitoring  08/13/2022    Creatinine monitoring  08/13/2022    A1C test (Diabetic or Prediabetic)  09/14/2022    Colon cancer screen colonoscopy  11/16/2022    Breast cancer screen  06/12/2023    DTaP/Tdap/Td vaccine (2 - Td or Tdap) 01/19/2031    Hepatitis C screen  Completed    HIV screen  Completed    Hepatitis A vaccine  Aged Out    Hepatitis B vaccine  Aged Out    Hib vaccine  Aged Out    Meningococcal (ACWY) vaccine  Aged Out    Pneumococcal 0-64 years Vaccine  Aged Out

## 2022-01-05 ENCOUNTER — HOSPITAL ENCOUNTER (OUTPATIENT)
Age: 57
Setting detail: SPECIMEN
Discharge: HOME | End: 2022-01-05

## 2022-01-05 ENCOUNTER — OFFICE VISIT (OUTPATIENT)
Dept: FAMILY MEDICINE CLINIC | Age: 57
End: 2022-01-05
Payer: COMMERCIAL

## 2022-01-05 VITALS
HEIGHT: 62 IN | OXYGEN SATURATION: 99 % | HEART RATE: 99 BPM | SYSTOLIC BLOOD PRESSURE: 139 MMHG | DIASTOLIC BLOOD PRESSURE: 82 MMHG | TEMPERATURE: 97.2 F | BODY MASS INDEX: 34.41 KG/M2 | WEIGHT: 187 LBS

## 2022-01-05 DIAGNOSIS — R52 BODY ACHES: ICD-10-CM

## 2022-01-05 DIAGNOSIS — R05.9 COUGH: Primary | ICD-10-CM

## 2022-01-05 DIAGNOSIS — J02.9 SORE THROAT: ICD-10-CM

## 2022-01-05 PROCEDURE — 99213 OFFICE O/P EST LOW 20 MIN: CPT | Performed by: PHYSICIAN ASSISTANT

## 2022-01-05 RX ORDER — BENZONATATE 200 MG/1
200 CAPSULE ORAL 3 TIMES DAILY PRN
Qty: 21 CAPSULE | Refills: 0 | Status: SHIPPED | OUTPATIENT
Start: 2022-01-05 | End: 2022-01-12

## 2022-01-05 ASSESSMENT — ENCOUNTER SYMPTOMS
SINUS PRESSURE: 1
VOMITING: 0
SINUS PAIN: 1
EYES NEGATIVE: 1
NAUSEA: 0
COUGH: 1
SORE THROAT: 1

## 2022-01-05 NOTE — LETTER
[facility]  [address] [address]  Phone: [phone]  Fax: [phone]    Jericho Roper        January 5, 2022     Patient: Bharath Roberts   YOB: 1965   Date of Visit: 1/5/2022       To Whom it May Concern:    Christina Campuzano was seen in my clinic on 1/5/2022. She  Is to remain off work pending COVID results  If you have any questions or concerns, please don't hesitate to call.     Sincerely,         Milton Cyr PA-C

## 2022-01-06 DIAGNOSIS — R05.9 COUGH: ICD-10-CM

## 2022-01-06 DIAGNOSIS — J02.9 SORE THROAT: ICD-10-CM

## 2022-01-06 DIAGNOSIS — R52 BODY ACHES: ICD-10-CM

## 2022-01-06 LAB
SARS-COV-2: ABNORMAL
SARS-COV-2: DETECTED
SOURCE: ABNORMAL

## 2022-01-06 NOTE — PROGRESS NOTES
[facility]  [address] [address] Terry Soliman  Phone: [phone]  Fax: [phone]       [address] [place] [address] Name: Dayna Perez  MRN: [id number]  [place] 1965  Date of evaluation: 1/5/2022  Provider: Geri oMser PA-C     CHIEF COMPLAINT       Chief Complaint   Patient presents with    Cough     temp this morning 99    Pharyngitis     started lat night           HISTORY OF PRESENT ILLNESS  (Location/Symptom, Timing/Onset, Context/Setting, Quality, Duration, Modifying Factors, Severity.)   Dayna Perez is a 55 y.o. White (non-) [1] female who presents to the office for evaluation of      Pharyngitis  This is a new problem. The current episode started yesterday. Associated symptoms include chills, congestion, coughing, fatigue, a fever, headaches, myalgias and a sore throat. Pertinent negatives include no nausea or vomiting. The symptoms are aggravated by drinking, eating, swallowing and coughing. Nursing Notes were reviewed. REVIEW OF SYSTEMS    (2-9 systems for level 4, 10 or more for level 5)     Review of Systems   Constitutional: Positive for chills, fatigue and fever. HENT: Positive for congestion, postnasal drip, sinus pressure, sinus pain and sore throat. Negative for ear discharge and ear pain. Eyes: Negative. Respiratory: Positive for cough. Cardiovascular: Negative. Gastrointestinal: Negative for nausea and vomiting. Genitourinary: Negative. Musculoskeletal: Positive for myalgias. Neurological: Positive for headaches. Except as noted above the remainder of the review of systems was reviewed andnegative. PAST MEDICAL HISTORY   History reviewed.     Past Medical History:   Diagnosis Date    Acquired hypothyroidism 7/27/2020    Bilateral primary osteoarthritis of knee 4/25/2021    Chronic back pain     Diarrhea     Distal radius fracture 1/28/13    Drug-seeking behavior 10/16/2014  Essential hypertension 9/14/2021    FANTA (generalized anxiety disorder)     GERD (gastroesophageal reflux disease)     H/O: hysterectomy 8/19/2020    History of depression     Hyperlipidemia with target LDL less than 100 7/19/2020    Mild episode of recurrent major depressive disorder ([facility] 7/19/2020    Opioid dependence ([facility] 10/16/2014    Severe obesity (BMI 35.0-39. 9) with comorbidity ([facility] 7/19/2020    Urge incontinence 3/9/2020         SURGICAL HISTORY     History reviewed. Past Surgical History:   Procedure Laterality Date    BLADDER SUSPENSION      CARDIAC CATHETERIZATION  08/13/2021    LAD: Diffuse irregularities 30-40%. , EF 60%    FRACTURE SURGERY  02/07/2013    right distial radius orif    HYSTERECTOMY      has ovaries    KNEE ARTHROSCOPY      right    LA COLONOSCOPY W/BIOPSY SINGLE/MULTIPLE N/A 11/16/2017    COLONOSCOPY WITH BIOPSY OF POLYP performed by Lester Jackson MD at [address] EGD TRANSORAL BIOPSY SINGLE/MULTIPLE N/A 11/16/2017    EGD BIOPSY performed by Lester Jackson MD at [address] [place] [address] Left 09/2021    Dr. Madeline Erazo       Current Outpatient Medications   Medication Sig Dispense Refill    escitalopram (LEXAPRO) 10 MG tablet Take 1 tablet by mouth daily 30 tablet 3    busPIRone (BUSPAR) 15 MG tablet Take 15 mg by mouth 2 times daily as needed (Anxiety) 60 tablet 0    oxybutynin (DITROPAN-XL) 10 MG extended release tablet Take 1 tablet by mouth daily 90 tablet 3    meloxicam (MOBIC) 15 MG tablet TAKE 1/2 TABLET BY MOUTH DAILY      amLODIPine (NORVASC) 5 MG tablet Take 5 mg by mouth daily      aspirin 81 MG chewable tablet Take 81 mg by mouth daily      Semaglutide 7 MG TABS Take 7 mg by mouth daily Second step 90 tablet 3    Handicap Placard MISC by Does not apply route Can't walk greater than 200 feet. Expires in 5 years.  1 each 0    atorvastatin (LIPITOR) 10 MG tablet Take 1 tablet by mouth every evening 90 tablet 3    albuterol sulfate HFA (PROVENTIL HFA) 108 (90 Base) MCG/ACT inhaler Inhale 2 puffs into the lungs every 6 hours as needed for Wheezing or Shortness of Breath 1 Inhaler 1    levothyroxine (SYNTHROID) 50 MCG tablet Take 1 tablet by mouth every morning (before breakfast) Dose increased 2021 90 tablet 3     No current facility-administered medications for this visit. ALLERGIES     Metformin and related    FAMILY HISTORY           Problem Relation Age of Onset    High Blood Pressure Mother     Depression Mother     Heart Surgery Father     Other Brother         scoliosis    Colon Cancer Maternal Grandfather     COPD Maternal Grandfather     Lung Cancer Maternal Grandfather         lung    Breast Cancer Paternal Aunt 48    Cancer Maternal Cousin         brain     Family Status   Relation Name Status    Mother  Alive    Father  Alive    Sister  Alive    Brother  Alive    MGF      PAunt  (Not Specified)    Abelardo Matson           SOCIAL HISTORY      reports that she quit smoking about 34 years ago. Her smoking use included cigarettes. She has a 1.50 pack-year smoking history. She has never used smokeless tobacco. She reports previous alcohol use. She reports that she does not use drugs. PHYSICAL EXAM    (up to 7 for level 4, 8 or more for level 5)     Vitals:    22 1902   BP: 139/82   Pulse: 99   Temp: 97.2 °F (36.2 °C)   SpO2: 99%   Weight: 187 lb (84.8 kg)   Height: 5' 2\" (1.575 m)         Physical Exam  Vitals and nursing note reviewed. Constitutional:       General: She is not in acute distress. Appearance: She is ill-appearing. HENT:      Head: Normocephalic and atraumatic. Right Ear: External ear normal.      Left Ear: External ear normal.      Nose: Congestion present. Mouth/Throat:      Mouth: Mucous membranes are moist.   Eyes:      Extraocular Movements: Extraocular movements intact. Conjunctiva/sclera: Conjunctivae normal.      Pupils: Pupils are equal, round, and reactive to light. Pulmonary:      Effort: Pulmonary effort is normal.   Abdominal:      Palpations: Abdomen is soft. Skin:     General: Skin is warm. Neurological:      Mental Status: She is alert and oriented to person, place, and time. DIFFERENTIAL DIAGNOSIS:         Norymagan Janet reviewed the disposition diagnosis with the patient and or their family/guardian. I have answered their questions and given discharge instructions. They voiced understanding of these instructions and did not have anyfurther questions or complaints. PROCEDURES:  No orders of the defined types were placed in this encounter. No results found for this visit on 01/05/22. Leonel Sandra     Return if symptoms worsen or fail to improve. DISCHARGEMEDICATIONS:  No orders of the defined types were placed in this encounter. Plan:  Specimen sent for a culture. Possible treatment alteration based on the results. I believe that this is likely a viral illness based on the physical exam findings. Tylenol/Motrin for fever/discomfort. Patient agreeable to treatment plan. Educational materials provided on AVS.  Follow up if symptoms do not improve/worsen. Steps to help prevent the spread of COVID-19 if you are sick  SOURCE - [email or web address]://ramsay-Peralta.info/. html     Stay home except to get medical care   ; Stay home: People who are mildly ill with COVID-19 are able to isolate at home during their illness.  You should restrict activities outside your home, except for getting medical care.   ; Avoid public areas: Do not go to work, school, or public areas.   ; Avoid public transportation: Avoid using public transportation, ride-sharing, or taxis.  ; Separate yourself from other people and animals in your home   ; Stay away from others: As much as possible, you should stay in a specific room and away from other people in your home. Also, you should use a separate bathroom, if available.   ; Limit contact with pets & animals: You should restrict contact with pets and other animals while you are sick with COVID-19, just like you would around other people. Although there have not been reports of pets or other animals becoming sick with COVID-19, it is still recommended that people sick with COVID-19 limit contact with animals until more information is known about the virus. ; When possible, have another member of your household care for your animals while you are sick. If you are sick with COVID-19, avoid contact with your pet, including petting, snuggling, being kissed or licked, and sharing food. If you must care for your pet or be around animals while you are sick, wash your hands before and after you interact with pets and wear a facemask. See COVID-19 and Animals for more information. Other considerations   The ill person should eat/be fed in their room if possible. Non-disposable  items used should be handled with gloves and washed with hot water or in a . Clean hands after handling used  items.  If possible, dedicate a lined trash can for the ill person. Use gloves when removing garbage bags, handling, and disposing of trash. Wash hands after handling or disposing of trash.  Consider consulting with your local health department about trash disposal guidance if available. Information for Household Members and Caregivers of Someone who is Sick   Call ahead before visiting your doctor   Call ahead: If you have a medical appointment, call the healthcare provider and tell them that you have or may have COVID-19. This will help the healthcare provider's office take steps to keep other people from getting infected or exposed. Wear a facemask if you are sick   ;  If you are sick: You should wear a facemask when you are around other people (e.g., sharing a room or vehicle) or pets and before you enter a healthcare provider's office. ; If you are caring for others: If the person who is sick is not able to wear a facemask (for example, because it causes trouble breathing), then people who live with the person who is sick should not stay in the same room with them, or they should wear a facemask if they enter a room with the person who is sick. Cover your coughs and sneezes   ; Cover: Cover your mouth and nose with a tissue when you cough or sneeze.   ; Dispose: Throw used tissues in a lined trash can.   ; Wash hands: Immediately wash your hands with soap and water for at least 20 seconds or, if soap and water are not available, clean your hands with an alcohol-based hand  that contains at least 60% alcohol. Clean your hands often   ; Wash hands: Wash your hands often with soap and water for at least 20 seconds, especially after blowing your nose, coughing, or sneezing; going to the bathroom; and before eating or preparing food.   ; Hand : If soap and water are not readily available, use an alcohol-based hand  with at least 60% alcohol, covering all surfaces of your hands and rubbing them together until they feel dry.   ; Soap and water: Soap and water are the best option if hands are visibly dirty.   ; Avoid touching: Avoid touching your eyes, nose, and mouth with unwashed hands. Handwashing Tips   ; Wet your hands with clean, running water (warm or cold), turn off the tap, and apply soap.  ; Lather your hands by rubbing them together with the soap. Lather the backs of your hands, between your fingers, and under your nails. ; Scrub your hands for at least 20 seconds. Need a timer? Hum the [place] from beginning to end twice.  ; Rinse your hands well under clean, running water.  ; Dry your hands using a clean towel or air dry them. Avoid sharing personal household items   ; Do not share:  You should not share dishes, drinking glasses, cups, eating utensils, towels, or bedding with other people or pets in your home.   ; Wash thoroughly after use: After using these items, they should be washed thoroughly with soap and water. Clean all high-touch surfaces everyday   ; Clean and disinfect: Practice routine cleaning of high touch surfaces.  ; High touch surfaces include counters, tabletops, doorknobs, bathroom fixtures, toilets, phones, keyboards, tablets, and bedside tables.  ; Disinfect areas with bodily fluids: Also, clean any surfaces that may have blood, stool, or body fluids on them.   ; Household : Use a household cleaning spray or wipe, according to the label instructions. Labels contain instructions for safe and effective use of the cleaning product including precautions you should take when applying the product, such as wearing gloves and making sure you have good ventilation during use of the product. Monitor your symptoms   Seek medical attention: Seek prompt medical attention if your illness is worsening     (e.g., difficulty breathing).   ; Call your doctor: Before seeking care, call your healthcare provider and tell them that you have, or are being evaluated for, COVID-19.   ; Wear a facemask when sick: Put on a facemask before you enter the facility. These steps will help the healthcare provider's office to keep other people in the office or waiting room from getting infected or exposed. ; Alert health department: Ask your healthcare provider to call the local or state health department. Persons who are placed under active monitoring or facilitated self-monitoring should follow instructions provided by their local health department or occupational health professionals, as appropriate.  ; Call 911 if you have a medical emergency: If you have a medical emergency and need to call 911, notify the dispatch personnel that you have, or are being evaluated for COVID-19.  If possible, put on a facemask before emergency medical services arrive. Patient instructed to return to the office if symptoms worsen, return, or have any other concerns. Patient understands and is agreeable.          Alia Jensen PA-C 1/5/2022 7:29 PM

## 2022-01-07 NOTE — RESULT ENCOUNTER NOTE
Please notify patient: [id number] positive, self isolate, if she needs letter for work to let us know as she will need an E visit to PCP, might need FMLA, Tylenol, please advise how to submit E visit    Future Appointments  3/16/2022  3:15 PM    Jody Vasquez MD     [facility]

## 2022-01-19 DIAGNOSIS — F33.1 MODERATE EPISODE OF RECURRENT MAJOR DEPRESSIVE DISORDER (HCC): ICD-10-CM

## 2022-01-19 DIAGNOSIS — F41.1 GAD (GENERALIZED ANXIETY DISORDER): ICD-10-CM

## 2022-01-19 RX ORDER — BUSPIRONE HYDROCHLORIDE 15 MG/1
TABLET ORAL
Qty: 180 TABLET | Refills: 3 | Status: SHIPPED | OUTPATIENT
Start: 2022-01-19 | End: 2022-03-07 | Stop reason: SDUPTHER

## 2022-01-19 NOTE — TELEPHONE ENCOUNTER
Please Approve or Refuse.   Send to Pharmacy per Pt's Request:      Next Visit Date:  3/16/2022   Last Visit Date: 12/23/2021    Hemoglobin A1C (%)   Date Value   09/14/2021 5.1   06/11/2021 5.7   07/24/2020 5.6             ( goal A1C is < 7)   BP Readings from Last 3 Encounters:   01/05/22 139/82   12/23/21 136/86   09/14/21 118/68          (goal 120/80)  BUN   Date Value Ref Range Status   06/18/2021 15 6 - 20 mg/dL Final     CREATININE   Date Value Ref Range Status   06/18/2021 0.63 0.50 - 0.90 mg/dL Final     POC Creatinine   Date Value Ref Range Status   08/13/2021 0.70 0.51 - 1.19 mg/dL Final     Potassium   Date Value Ref Range Status   06/18/2021 4.2 3.7 - 5.3 mmol/L Final

## 2022-02-26 ENCOUNTER — OFFICE VISIT (OUTPATIENT)
Dept: FAMILY MEDICINE CLINIC | Age: 57
End: 2022-02-26
Payer: COMMERCIAL

## 2022-02-26 VITALS
SYSTOLIC BLOOD PRESSURE: 130 MMHG | HEART RATE: 76 BPM | TEMPERATURE: 97.8 F | DIASTOLIC BLOOD PRESSURE: 85 MMHG | OXYGEN SATURATION: 99 %

## 2022-02-26 DIAGNOSIS — L01.00 IMPETIGO: ICD-10-CM

## 2022-02-26 DIAGNOSIS — B96.89 ACUTE BACTERIAL SINUSITIS: Primary | ICD-10-CM

## 2022-02-26 DIAGNOSIS — J01.90 ACUTE BACTERIAL SINUSITIS: Primary | ICD-10-CM

## 2022-02-26 DIAGNOSIS — H61.22 IMPACTED CERUMEN OF LEFT EAR: ICD-10-CM

## 2022-02-26 PROCEDURE — 69209 REMOVE IMPACTED EAR WAX UNI: CPT | Performed by: NURSE PRACTITIONER

## 2022-02-26 PROCEDURE — 99214 OFFICE O/P EST MOD 30 MIN: CPT | Performed by: NURSE PRACTITIONER

## 2022-02-26 RX ORDER — DOXYCYCLINE HYCLATE 100 MG
100 TABLET ORAL 2 TIMES DAILY
Qty: 20 TABLET | Refills: 0 | Status: SHIPPED | OUTPATIENT
Start: 2022-02-26 | End: 2022-03-08

## 2022-02-26 RX ORDER — CETIRIZINE HYDROCHLORIDE, PSEUDOEPHEDRINE HYDROCHLORIDE 5; 120 MG/1; MG/1
1 TABLET, FILM COATED, EXTENDED RELEASE ORAL 2 TIMES DAILY
Qty: 180 TABLET | Refills: 1 | Status: SHIPPED | OUTPATIENT
Start: 2022-02-26 | End: 2022-03-16 | Stop reason: ALTCHOICE

## 2022-02-26 ASSESSMENT — ENCOUNTER SYMPTOMS
COUGH: 0
ABDOMINAL PAIN: 0
VOMITING: 0
DIARRHEA: 0
SINUS PRESSURE: 1
SHORTNESS OF BREATH: 0
RHINORRHEA: 1
SWOLLEN GLANDS: 0
HOARSE VOICE: 0
SORE THROAT: 0

## 2022-02-26 NOTE — PROGRESS NOTES
[address] [facility] [address] [facility] [address]  Dept: [phone]  Dept Fax: [phone]    Rigo Clark is a 55 y.o. female who presents to the urgent care today for her medical conditions/complaints as notedbelow. Rigo Clark is c/o of Sinusitis (onset for a week, nose infection around nostils ) and Ear Fullness (onset for a month left ear , hard to hear )      HPI:     55 yr old female presents for sinus sx for at least a week. Left ear fullness, unable to hear out of it, likely cerumen impaction for 1 month. Tried ear wax removal drops but no relief yet. Denies pain to ears. No fevers. Hx covid 1 month ago. Recovered well except for nasal congestion. In addition, when get sinus drng, always develops impetigo to nare entry way, lesion left nare. Needs Bactroban refilled  Works in day Care. Sinusitis  This is a new problem. The current episode started 1 to 4 weeks ago. The problem has been gradually worsening since onset. There has been no fever. The pain is mild. Associated symptoms include congestion (nasal) and sinus pressure. Pertinent negatives include no chills, coughing, diaphoresis, ear pain, headaches, hoarse voice, neck pain, shortness of breath, sneezing, sore throat or swollen glands. Past treatments include nothing. The treatment provided no relief. Ear Fullness   There is pain in the left ear. This is a new problem. The current episode started 1 to 4 weeks ago. The problem occurs constantly. The problem has been unchanged. There has been no fever. The pain is at a severity of 0/10. The patient is experiencing no pain. Associated symptoms include hearing loss and rhinorrhea. Pertinent negatives include no abdominal pain, coughing, diarrhea, ear discharge, headaches, neck pain, rash, sore throat or vomiting. She has tried ear drops for the symptoms. The treatment provided no relief. There is no history of a chronic ear infection, hearing loss or a tympanostomy tube. Past Medical History:   Diagnosis Date    Acquired hypothyroidism 7/27/2020    Bilateral primary osteoarthritis of knee 4/25/2021    Chronic back pain     Diarrhea     Distal radius fracture 1/28/13    Drug-seeking behavior 10/16/2014    Essential hypertension 9/14/2021    FANTA (generalized anxiety disorder)     GERD (gastroesophageal reflux disease)     H/O: hysterectomy 8/19/2020    History of depression     Hyperlipidemia with target LDL less than 100 7/19/2020    Mild episode of recurrent major depressive disorder ([facility] 7/19/2020    Opioid dependence ([facility] 10/16/2014    Severe obesity (BMI 35.0-39. 9) [facility] 7/19/2020    Urge incontinence 3/9/2020        Current Outpatient Medications   Medication Sig Dispense Refill    doxycycline hyclate (VIBRA-TABS) 100 MG tablet Take 1 tablet by mouth 2 times daily for 10 days 20 tablet 0    cetirizine-psuedoephedrine (ZYRTEC-D) 5-120 MG per extended release tablet Take 1 tablet by mouth 2 times daily 180 tablet 1    mupirocin (BACTROBAN NASAL) 2 % nasal ointment Take by Nasal route 3  times daily. 1 each 3    busPIRone (BUSPAR) 15 MG tablet TAKE 1 TABLET BY MOUTH TWICE DAILY AS NEEDED FOR ANXIETY 180 tablet 3    escitalopram (LEXAPRO) 10 MG tablet Take 1 tablet by mouth daily 30 tablet 3    oxybutynin (DITROPAN-XL) 10 MG extended release tablet Take 1 tablet by mouth daily 90 tablet 3    meloxicam (MOBIC) 15 MG tablet TAKE 1/2 TABLET BY MOUTH DAILY      amLODIPine (NORVASC) 5 MG tablet Take 5 mg by mouth daily      aspirin 81 MG chewable tablet Take 81 mg by mouth daily      Semaglutide 7 MG TABS Take 7 mg by mouth daily Second step 90 tablet 3    Handicap Placard MISC by Does not apply route Can't walk greater than 200 feet. Expires in 5 years.  1 each 0    atorvastatin (LIPITOR) 10 MG tablet Take 1 tablet by mouth every evening 90 tablet 3  albuterol sulfate HFA (PROVENTIL HFA) 108 (90 Base) MCG/ACT inhaler Inhale 2 puffs into the lungs every 6 hours as needed for Wheezing or Shortness of Breath 1 Inhaler 1    levothyroxine (SYNTHROID) 50 MCG tablet Take 1 tablet by mouth every morning (before breakfast) Dose increased 2/21/2021 90 tablet 3     No current facility-administered medications for this visit. Allergies   Allergen Reactions    Metformin And Related      nausea and vomiting , severe stomach pain       Subjective:      Review of Systems   Constitutional: Negative for chills and diaphoresis. HENT: Positive for congestion (nasal), hearing loss, rhinorrhea and sinus pressure. Negative for ear discharge, ear pain, hoarse voice, sneezing and sore throat. Respiratory: Negative for cough and shortness of breath. Gastrointestinal: Negative for abdominal pain, diarrhea and vomiting. Musculoskeletal: Negative for neck pain. Skin: Negative for rash. Neurological: Negative for headaches. All other systems reviewed and are negative. 14 systems reviewed and negative except as listed in HPI. Objective:     Physical Exam  Vitals and nursing note reviewed. Constitutional:       General: She is not in acute distress. Appearance: Normal appearance. She is not ill-appearing, toxic-appearing or diaphoretic. HENT:      Head: Normocephalic and atraumatic. Right Ear: Tympanic membrane, ear canal and external ear normal.      Left Ear: External ear normal. There is impacted cerumen. Ears:      Comments: Unable to visualize TM due to cerumen impation     Nose: Nasal tenderness and congestion present. No rhinorrhea. Comments: juliane maxillary sinus tenderness  No facial swelling or erythema    Honey crusted lesion left lateral nare. Mouth/Throat:      Mouth: Mucous membranes are moist.      Pharynx: No oropharyngeal exudate or posterior oropharyngeal erythema.       Comments: + cobblestoning  Uvula midline no edema  Handling oral secretions without difficulty  Eyes:      General: No scleral icterus. Right eye: No discharge. Left eye: No discharge. Extraocular Movements: Extraocular movements intact. Conjunctiva/sclera: Conjunctivae normal.      Pupils: Pupils are equal, round, and reactive to light. Cardiovascular:      Rate and Rhythm: Normal rate and regular rhythm. Pulses: Normal pulses. Heart sounds: Normal heart sounds. Pulmonary:      Effort: Pulmonary effort is normal. No respiratory distress. Breath sounds: Normal breath sounds. No stridor. No wheezing, rhonchi or rales. Abdominal:      General: Bowel sounds are normal.      Palpations: Abdomen is soft. Tenderness: There is no abdominal tenderness. Musculoskeletal:         General: No signs of injury. Cervical back: Normal range of motion and neck supple. Comments: Ambulated to and from room, gait steady, moving all ext without difficulty   Lymphadenopathy:      Cervical: No cervical adenopathy. Skin:     General: Skin is warm and dry. Capillary Refill: Capillary refill takes less than 2 seconds. Findings: No rash ( no rash to visible skin). Neurological:      General: No focal deficit present. Mental Status: She is alert and oriented to person, place, and time. Psychiatric:         Mood and Affect: Mood normal.         Behavior: Behavior normal.       /85 (Site: Left Upper Arm, Position: Sitting, Cuff Size: Medium Adult)   Pulse 76   Temp 97.8 °F (36.6 °C) (Infrared)   SpO2 99%     Assessment:       Diagnosis Orders   1. Acute bacterial sinusitis  doxycycline hyclate (VIBRA-TABS) 100 MG tablet    cetirizine-psuedoephedrine (ZYRTEC-D) 5-120 MG per extended release tablet   2. Impacted cerumen of left ear  DC REMOVAL IMPACTED CERUMEN IRRIGATION/LVG UNILAT   3.  Impetigo  doxycycline hyclate (VIBRA-TABS) 100 MG tablet    mupirocin (BACTROBAN NASAL) 2 % nasal ointment Plan:    hx impetigo, works in day care - bactroban oint refilled also told to take 2 q-tips and apply the oint to inside juliane nares tid for 1 week  Zyrtec d for sinus sx  Based on duration and severity will tx as bacterial infection - doxy rx    Ceruminosis is noted. Wax is removed by irrigation with spray bottle of 3:1 warm water, hydrogen peroxide and manual debridement. Mod amount dry skin and wax removed. TM intact. Instructions for home care to prevent wax buildup are given. Pt lanre well   Return if symptoms worsen or fail to improve, for Make an Appt. with your Primary Care in 1 week. Orders Placed This Encounter   Medications    doxycycline hyclate (VIBRA-TABS) 100 MG tablet     Sig: Take 1 tablet by mouth 2 times daily for 10 days     Dispense:  20 tablet     Refill:  0    cetirizine-psuedoephedrine (ZYRTEC-D) 5-120 MG per extended release tablet     Sig: Take 1 tablet by mouth 2 times daily     Dispense:  180 tablet     Refill:  1    mupirocin (BACTROBAN NASAL) 2 % nasal ointment     Sig: Take by Nasal route 3  times daily. Dispense:  1 each     Refill:  3         Patient given educational materials - see patient instructions. Discussed use, benefit, and side effects of prescribed medications. All patient questions answered. Pt voicedunderstanding.     Electronically signed by NEHAL Lim CNP on 2/26/2022 at 3:01 PM

## 2022-02-26 NOTE — PATIENT INSTRUCTIONS
Patient Education        Sinusitis: Care Instructions  Your Care Instructions     Sinusitis is an infection of the lining of the sinus cavities in your head. Sinusitis often follows a cold. It causes pain and pressure in your head and face. In most cases, sinusitis gets better on its own in 1 to 2 weeks. But some mild symptoms may last for several weeks. Sometimes antibiotics are needed. Follow-up care is a key part of your treatment and safety. Be sure to make and go to all appointments, and call your doctor if you are having problems. It's also a good idea to know your test results and keep a list of the medicines you take. How can you care for yourself at home? · Take an over-the-counter pain medicine, such as acetaminophen (Tylenol), ibuprofen (Advil, Motrin), or naproxen (Aleve). Read and follow all instructions on the label. · If the doctor prescribed antibiotics, take them as directed. Do not stop taking them just because you feel better. You need to take the full course of antibiotics. · Be careful when taking over-the-counter cold or flu medicines and Tylenol at the same time. Many of these medicines have acetaminophen, which is Tylenol. Read the labels to make sure that you are not taking more than the recommended dose. Too much acetaminophen (Tylenol) can be harmful. · Breathe warm, moist air from a steamy shower, a hot bath, or a sink filled with hot water. Avoid cold, dry air. Using a humidifier in your home may help. Follow the directions for cleaning the machine. · Use saline (saltwater) nasal washes. This can help keep your nasal passages open and wash out mucus and bacteria. You can buy saline nose drops at a grocery store or drugstore. Or you can make your own at home by adding 1 teaspoon of salt and 1 teaspoon of baking soda to 2 cups of distilled water. If you make your own, fill a bulb syringe with the solution, insert the tip into your nostril, and squeeze gently.  Mirza Stout your nose.  · Put a hot, wet towel or a warm gel pack on your face 3 or 4 times a day for 5 to 10 minutes each time. · Try a decongestant nasal spray like oxymetazoline (Afrin). Do not use it for more than 3 days in a row. Using it for more than 3 days can make your congestion worse. When should you call for help? Call your doctor now or seek immediate medical care if:    · You have new or worse swelling or redness in your face or around your eyes.     · You have a new or higher fever. Watch closely for changes in your health, and be sure to contact your doctor if:    · You have new or worse facial pain.     · The mucus from your nose becomes thicker (like pus) or has new blood in it.     · You are not getting better as expected. Where can you learn more? Go to https://Acoustic TechnologiespePixonic.Energesis Pharmaceuticals. org and sign in to your Easy Ice account. Enter M921 in the Formlabs box to learn more about \"Sinusitis: Care Instructions. \"     If you do not have an account, please click on the \"Sign Up Now\" link. Current as of: September 8, 2021               Content Version: 13.1  © 4440-3328 hiogi. Care instructions adapted under license by [employer] [address] If you have questions about a medical condition or this instruction, always ask your healthcare professional. [place] [address] any warranty or liability for your use of this information. Patient Education        Impetigo: Care Instructions  Your Care Instructions  Impetigo (say \"hk-tmz-BE-go\") is a skin infection caused by bacteria. It causes blisters that break and become oozing, yellow, crusty sores. Impetigo can be anywhere on the body. Scratching the sores may spread the infection to other parts of the body. You can also spread it to others through close contact or when you share towels, clothing, and other items. Prescription antibiotic ointment or pills can usually cure impetigo.  (After a day of antibiotics, the infection should not spread.)  Follow-up care is a key part of your treatment and safety. Be sure to make and go to all appointments, and call your doctor if you are having problems. It's also a good idea to know your test results and keep a list of the medicines you take. How can you care for yourself at home? · Apply antibiotic ointment exactly as instructed. · If your doctor prescribed antibiotic pills, take them as directed. Do not stop using them just because you feel better. You need to take the full course of antibiotics. · Gently wash the sores with soap and water each day. If crusts form, your doctor may advise you to soften or remove the crusts. You can do this by soaking them in warm water and patting them dry. This can help the cream or ointment treat impetigo. · After you touch the area, wash your hands with soap and water. Or you can use an alcohol-based hand . · Don't share items such as towels, sheets, and clothing until the infection is gone. · Wash anything that may have touched the infected area. · Try to avoid scratching the area. When should you call for help? Call your doctor now or seek immediate medical care if:    · You have symptoms of a worse infection, such as:  ? Increased pain, swelling, warmth, or redness. ? Red streaks leading from the area. ? Pus draining from the area. ? A fever.     · Impetigo gets worse or spreads to other areas. Watch closely for changes in your health, and be sure to contact your doctor if:    · You do not get better as expected. Where can you learn more? Go to https://P2 Energy Solutionspepicewremocean.Mindset Studio. org and sign in to your Donordonut account. Enter X479 in the RealityMine box to learn more about \"Impetigo: Care Instructions. \"     If you do not have an account, please click on the \"Sign Up Now\" link. Current as of: September 20, 2021               Content Version: 13.1  © 4543-0786 Healthwise, [facility].    Care instructions adapted under license by [facility] ([facility]). If you have questions about a medical condition or this instruction, always ask your healthcare professional. [place] [address] any warranty or liability for your use of this information. Patient Education        Earwax Blockage: Care Instructions  Your Care Instructions     Earwax is a natural substance that protects the ear canal. Normally, earwax drains from the ears and does not cause problems. Sometimes earwax builds up and hardens. Earwax blockage (also called cerumen impaction) can cause some loss of hearing and pain. When wax is tightly packed, you will need to have your doctor remove it. Follow-up care is a key part of your treatment and safety. Be sure to make and go to all appointments, and call your doctor if you are having problems. It's also a good idea to know your test results and keep a list of the medicines you take. How can you care for yourself at home? · Do not try to remove earwax with cotton swabs, fingers, or other objects. This can make the blockage worse and damage the eardrum. · If your doctor recommends that you try to remove earwax at home:  ? Soften and loosen the earwax with warm mineral oil. You also can try hydrogen peroxide mixed with an equal amount of room temperature water. Place 2 drops of the fluid, warmed to body temperature, in the ear two times a day for up to 5 days. ? Once the wax is loose and soft, all that is usually needed to remove it from the ear canal is a gentle, warm shower. Direct the water into the ear, then tip your head to let the earwax drain out. Dry your ear thoroughly with a hair dryer set on low. Hold the dryer several inches from your ear. ? If the warm mineral oil and shower do not work, use an over-the-counter wax softener. Read and follow all instructions on the label. After using the wax softener, use an ear syringe to gently flush the ear.  Make sure the flushing solution is body temperature. Cool or hot fluids in the ear can cause dizziness. When should you call for help? Call your doctor now or seek immediate medical care if:    · Pus or blood drains from your ear.     · Your ears are ringing or feel full.     · You have a loss of hearing. Watch closely for changes in your health, and be sure to contact your doctor if:    · You have pain or reduced hearing after 1 week of home treatment.     · You have any new symptoms, such as nausea or balance problems. Where can you learn more? Go to https://Finale DessertspeJike Xueyuan.Utility and Environmental Solutions. org and sign in to your [employer] account. Enter U859 in the "A[employer] box to learn more about \"Earwax Blockage: Care Instructions. \"     If you do not have an account, please click on the \"Sign Up Now\" link. Current as of: July 1, 2021               Content Version: 13.1  © 6769-2459 Healthwise, Incorporated. Care instructions adapted under license by [facility] ([facility]). If you have questions about a medical condition or this instruction, always ask your healthcare professional. [place] [address] any warranty or liability for your use of this information.

## 2022-03-07 ENCOUNTER — TELEPHONE (OUTPATIENT)
Dept: FAMILY MEDICINE CLINIC | Age: 57
End: 2022-03-07

## 2022-03-07 ENCOUNTER — NURSE TRIAGE (OUTPATIENT)
Dept: OTHER | Facility: CLINIC | Age: 57
End: 2022-03-07

## 2022-03-07 ENCOUNTER — TELEMEDICINE (OUTPATIENT)
Dept: FAMILY MEDICINE CLINIC | Age: 57
End: 2022-03-07
Payer: COMMERCIAL

## 2022-03-07 DIAGNOSIS — F33.1 MODERATE EPISODE OF RECURRENT MAJOR DEPRESSIVE DISORDER (HCC): Primary | ICD-10-CM

## 2022-03-07 DIAGNOSIS — F41.1 GAD (GENERALIZED ANXIETY DISORDER): ICD-10-CM

## 2022-03-07 DIAGNOSIS — R11.0 NAUSEA: ICD-10-CM

## 2022-03-07 DIAGNOSIS — R19.7 DIARRHEA, UNSPECIFIED TYPE: ICD-10-CM

## 2022-03-07 PROCEDURE — 99213 OFFICE O/P EST LOW 20 MIN: CPT | Performed by: FAMILY MEDICINE

## 2022-03-07 RX ORDER — BUSPIRONE HYDROCHLORIDE 5 MG/1
5 TABLET ORAL 3 TIMES DAILY
Qty: 90 TABLET | Refills: 1 | Status: SHIPPED | OUTPATIENT
Start: 2022-03-07 | End: 2022-06-28

## 2022-03-07 RX ORDER — BISMUTH SUBSALICYLATE 262 MG/1
524 TABLET, CHEWABLE ORAL
Qty: 160 TABLET | Refills: 1 | Status: SHIPPED | OUTPATIENT
Start: 2022-03-07 | End: 2022-03-16 | Stop reason: HOSPADM

## 2022-03-07 RX ORDER — ONDANSETRON 4 MG/1
4 TABLET, FILM COATED ORAL 3 TIMES DAILY PRN
Qty: 60 TABLET | Refills: 1 | Status: SHIPPED | OUTPATIENT
Start: 2022-03-07 | End: 2022-06-09 | Stop reason: ALTCHOICE

## 2022-03-07 RX ORDER — ESCITALOPRAM OXALATE 20 MG/1
20 TABLET ORAL DAILY
Qty: 90 TABLET | Refills: 2 | Status: SHIPPED | OUTPATIENT
Start: 2022-03-07 | End: 2022-06-28

## 2022-03-07 ASSESSMENT — PATIENT HEALTH QUESTIONNAIRE - PHQ9
9. THOUGHTS THAT YOU WOULD BE BETTER OFF DEAD, OR OF HURTING YOURSELF: 0
7. TROUBLE CONCENTRATING ON THINGS, SUCH AS READING THE NEWSPAPER OR WATCHING TELEVISION: 3
2. FEELING DOWN, DEPRESSED OR HOPELESS: 3
4. FEELING TIRED OR HAVING LITTLE ENERGY: 3
SUM OF ALL RESPONSES TO PHQ QUESTIONS 1-9: 22
8. MOVING OR SPEAKING SO SLOWLY THAT OTHER PEOPLE COULD HAVE NOTICED. OR THE OPPOSITE, BEING SO FIGETY OR RESTLESS THAT YOU HAVE BEEN MOVING AROUND A LOT MORE THAN USUAL: 3
SUM OF ALL RESPONSES TO PHQ9 QUESTIONS 1 & 2: 6
SUM OF ALL RESPONSES TO PHQ QUESTIONS 1-9: 22
5. POOR APPETITE OR OVEREATING: 3
1. LITTLE INTEREST OR PLEASURE IN DOING THINGS: 3
3. TROUBLE FALLING OR STAYING ASLEEP: 2
6. FEELING BAD ABOUT YOURSELF - OR THAT YOU ARE A FAILURE OR HAVE LET YOURSELF OR YOUR FAMILY DOWN: 2
10. IF YOU CHECKED OFF ANY PROBLEMS, HOW DIFFICULT HAVE THESE PROBLEMS MADE IT FOR YOU TO DO YOUR WORK, TAKE CARE OF THINGS AT HOME, OR GET ALONG WITH OTHER PEOPLE: 1

## 2022-03-07 NOTE — LETTER
[facility]  [address] [place], [place] [address] [facility] [address]  Phone: [phone]  Fax: [phone]    Khalif Anguiano CNP        March 7, 2022     Patient: Delfina Landin   YOB: 1965   Date of Visit: 3/7/2022       To Whom it May Concern:    Nico Munoz was seen in my clinic on 3/7/2022. She may return to work on March 9, 2022. please excuse her from March 7th to [facility]. .    If you have any questions or concerns, please don't hesitate to call.     Sincerely,         NEHAL Bailey CNP

## 2022-03-07 NOTE — PATIENT INSTRUCTIONS
Patient Education        Depression and Chronic Disease: Care Instructions  Your Care Instructions     A chronic disease is one that you have for a long time. Some chronic diseases can be controlled, but they usually cannot be cured. Depression is common in people with chronic diseases, but it often goes unnoticed. Many people have concerns about seeking treatment for a mental health problem. You may think it's a sign of weakness, or you don't want people to know about it. It's important to overcome these reasons for not seeking treatment. Treating depression or anxiety is good for your health. Follow-up care is a key part of your treatment and safety. Be sure to make and go to all appointments, and call your doctor if you are having problems. It's also a good idea to know your test results and keep a list of the medicines you take. How can you care for yourself at home? Watch for symptoms of depression  The symptoms of depression are often subtle at first. You may think they are caused by your disease rather than depression. Or you may think it is normal to be depressed when you have a chronic disease. If you are depressed you may:  · Feel sad or hopeless. · Feel guilty or worthless. · Not enjoy the things you used to enjoy. · Feel hopeless, as though life is not worth living. · Have trouble thinking or remembering. · Have low energy, and you may not eat or sleep well. · Pull away from others. · Think often about death or killing yourself. (Keep the numbers for these national suicide hotlines: 3-127-336-TALK [1-915.595.7394] and 7-611-RQVAKGN [1-376.852.2172]. )  Get treatment  By treating your depression, you can feel more hopeful and have more energy. If you feel better, you may take better care of yourself, so your health may improve. · Talk to your doctor if you have any changes in mood during treatment for your disease. · Ask your doctor for help.  Counseling, antidepressant medicine, or a combination of the two can help most people with depression. Often a combination works best. Counseling can also help you cope with having a chronic disease. When should you call for help? Call 911 anytime you think you may need emergency care. For example, call if:    · You feel like hurting yourself or someone else.     · Someone you know has depression and is about to attempt or is attempting suicide. Call your doctor now or seek immediate medical care if:    · You hear voices.     · Someone you know has depression and:  ? Starts to give away his or her possessions. ? Uses illegal drugs or drinks alcohol heavily. ? Talks or writes about death, including writing suicide notes or talking about guns, knives, or pills. ? Starts to spend a lot of time alone. ? Acts very aggressively or suddenly appears calm. Watch closely for changes in your health, and be sure to contact your doctor if:    · You do not get better as expected. Where can you learn more? Go to https://[facility].Agency Systems. org and sign in to your [facility] account. Enter Q496 in the Gridstone Research box to learn more about \"Depression and Chronic Disease: Care Instructions. \"     If you do not have an account, please click on the \"Sign Up Now\" link. Current as of: June 16, 2021               Content Version: 13.1  © 6561-1481 Healthwise, Incorporated. Care instructions adapted under license by [facility] ([facility]). If you have questions about a medical condition or this instruction, always ask your healthcare professional. [place] [address] any warranty or liability for your use of this information. Patient Education        Anxiety Disorder: Care Instructions  Your Care Instructions     Anxiety is a normal reaction to stress. Difficult situations can cause you to have symptoms such as sweaty palms and a nervous feeling. In an anxiety disorder, the symptoms are far more severe.  Constant worry, muscle tension, trouble sleeping, nausea and diarrhea, and other symptoms can make normal daily activities difficult or impossible. These symptoms may occur for no reason, and they can affect your work, school, or social life. Medicines, counseling, and self-care can all help. Follow-up care is a key part of your treatment and safety. Be sure to make and go to all appointments, and call your doctor if you are having problems. It's also a good idea to know your test results and keep a list of the medicines you take. How can you care for yourself at home? · Take medicines exactly as directed. Call your doctor if you think you are having a problem with your medicine. · Go to your counseling sessions and follow-up appointments. · Recognize and accept your anxiety. Then, when you are in a situation that makes you anxious, say to yourself, \"This is not an emergency. I feel uncomfortable, but I am not in danger. I can keep going even if I feel anxious. \"  · Be kind to your body:  ? Relieve tension with exercise or a massage. ? Get enough rest.  ? Avoid alcohol, caffeine, nicotine, and illegal drugs. They can increase your anxiety level and cause sleep problems. ? Learn and do relaxation techniques. See below for more about these techniques. · Engage your mind. Get out and do something you enjoy. Go to a funny movie, or take a walk or hike. Plan your day. Having too much or too little to do can make you anxious. · Keep a record of your symptoms. Discuss your fears with a good friend or family member, or join a support group for people with similar problems. Talking to others sometimes relieves stress. · Get involved in social groups, or volunteer to help others. Being alone sometimes makes things seem worse than they are. · Get at least 30 minutes of exercise on most days of the week to relieve stress. Walking is a good choice.  You also may want to do other activities, such as running, swimming, cycling, or playing tennis or team sports. Relaxation techniques  Do relaxation exercises 10 to 20 minutes a day. You can play soothing, relaxing music while you do them, if you wish. · Tell others in your house that you are going to do your relaxation exercises. Ask them not to disturb you. · Find a comfortable place, away from all distractions and noise. · Lie down on your back, or sit with your back straight. · Focus on your breathing. Make it slow and steady. · Breathe in through your nose. Breathe out through either your nose or mouth. · Breathe deeply, filling up the area between your navel and your rib cage. Breathe so that your belly goes up and down. · Do not hold your breath. · Breathe like this for 5 to 10 minutes. Notice the feeling of calmness throughout your whole body. As you continue to breathe slowly and deeply, relax by doing the following for another 5 to 10 minutes:  · Tighten and relax each muscle group in your body. You can begin at your toes and work your way up to your head. · Imagine your muscle groups relaxing and becoming heavy. · Empty your mind of all thoughts. · Let yourself relax more and more deeply. · Become aware of the state of calmness that surrounds you. · When your relaxation time is over, you can bring yourself back to alertness by moving your fingers and toes and then your hands and feet and then stretching and moving your entire body. Sometimes people fall asleep during relaxation, but they usually wake up shortly afterward. · Always give yourself time to return to full alertness before you drive a car or do anything that might cause an accident if you are not fully alert. Never play a relaxation tape while you drive a car. When should you call for help? Call 911 anytime you think you may need emergency care. For example, call if:    · You feel you cannot stop from hurting yourself or someone else.    Keep the numbers for these national suicide hotlines: 3-780-233-TALK (1-508-424-600.987.6624) and 8-279-BCWIDRZ (7-230.396.4250). If you or someone you know talks about suicide or feeling hopeless, get help right away. Watch closely for changes in your health, and be sure to contact your doctor if:    · You have anxiety or fear that affects your life.     · You have symptoms of anxiety that are new or different from those you had before. Where can you learn more? Go to https://Totsy.Webee. org and sign in to your [employer] account. Enter P754 in the RADSONE box to learn more about \"Anxiety Disorder: Care Instructions. \"     If you do not have an account, please click on the \"Sign Up Now\" link. Current as of: September 23, 2020               Content Version: 12.9  © 2077-3416 Healthwise, Incorporated. Care instructions adapted under license by [facility] ([facility]). If you have questions about a medical condition or this instruction, always ask your healthcare professional. [address] any warranty or liability for your use of this information.

## 2022-03-07 NOTE — TELEPHONE ENCOUNTER
has appointment      Future Appointments   Date Time Provider Salvador Skylar   3/7/2022 10:00 AM NEHAL Bailey - CNP fp sc [facility]   3/16/2022  3:15 PM Jose Hernánedz MD fp [facility]

## 2022-03-07 NOTE — TELEPHONE ENCOUNTER
Future Appointments   Date Time Provider Salvador Skylar   3/7/2022 10:00 AM NEHAL Bailey - CNP fp sc [facility]   3/16/2022  3:15 PM Eliceo Bermudez MD fp sc [facility]

## 2022-03-07 NOTE — PROGRESS NOTES
[facility] [id number] [place] [address]   O: [phone] Rhys Hyman is a 55 y.o. female evaluated via telephone on 3/7/2022. CONSENT:  She and/or health care decision maker is aware that that she may receive a bill for this telephone service, depending on her insurance coverage, and has provided verbal consent to proceed: Yes    DOCUMENTATION:  Patient scheduled this appointment today due to Depression, Anxiety, Nausea & Vomiting, and Diarrhea  Tigist Nesbitt is a 55 y.o. female patient. Patient is an established patient of  Dr. Marquis Colvin . This appointment today due to some intermittent problems with nausea, vomiting, diarrhea that has been happening from Thursday that had improved from Friday Saturday and Sunday. However, distended again this morning. Patient reported that she knows this is related to her increasing problems with depression anxiety. Patient did not want to elaborate the current problems. However, patient stated that her  does not have a job and she is having some issues with her mother-in-law and her family. Patient continues to work and states that she is not able to take off work due to some finances. Tigist Nesbitt reported some ongoing issues with depression and anxiety. Symptoms includes difficulty concentrating, feelings of losing control, irritable, psychomotor agitation, racing thoughts, Nausea, diarrhea, vomiting intermittently and anhedonia, psychomotor retardation, feelings of worthlessness/guilt, difficulty concentrating and hopelessness. Current therapy includes lexapro, buspirone, which is working well for her. she denies adverse reaction to current therapy. she also denies suicidal/homicidal ideation, plan or intent. Patient not being managed by any specialist or psychiatrist declined referral today  .   PHQ-2 Over the past 2 weeks, how often have you been bothered by any of the following problems? Little interest or pleasure in doing things: (P) More than half the days  Feeling down, depressed, or hopeless: (P) Nearly every day  PHQ-2 Score: (P) 5  PHQ-9 Over the past 2 weeks, how often have you been bothered by any of the following problems? Trouble falling or staying asleep, or sleeping too much: (P) More than half the days  Feeling tired or having little energy: (P) Nearly every day  PHQ-9 Total Score: (P) 10  PHQ-9 Total Score: (P) 10   FANTA-7 SCREENING 12/23/2021 7/16/2020   Feeling nervous, anxious, or on edge Nearly every day -   Not being able to stop or control worrying Several days -   Worrying too much about different things Several days -   Trouble relaxing Nearly every day -   Being so restless that it is hard to sit still Nearly every day -   Becoming easily annoyed or irritable Nearly every day -   Feeling afraid as if something awful might happen Not at all -   FANTA-7 Total Score 14 -   How difficult have these problems made it for you to do your work, take care of things at home, or get along with other people? Not difficult at all -   Feeling nervous, anxious, or on edge - 2-Over half the days   Not able to stop or control worrying - 0-Not at all   Worrying too much about different things - 2-Over half the days   Trouble relaxing - 0-Not at all   Being so restless that it's hard to sit still - 2-Over half the days   Becoming easily annoyed or irritable - 2-Over half the days   Feeling afraid as if something awful might happen - 0-Not at all   FANTA-7 Total Score - 8         DEPRESSION SCREENING: Negative  PHQ Scores 12/23/2021 9/14/2021 6/11/2021 4/21/2021 1/19/2021 9/16/2020 7/16/2020   PHQ2 Score 4 0 0 0 0 0 2   PHQ9 Score 19 0 0 0 0 0 2     I communicated with the patient and/or health care decision maker about: PLAN     Review of Systems  Details of this discussion including any medical advice provided: Under assessment.     PHYSICAL EXAM[INSTRUCTIONS:  \"[x]\" Indicates a positive item  \"[]\" Indicates a negative item  -- DELETE ALL ITEMS NOT EXAMINED]  Patient-Reported Vitals 6/24/2021   Patient-Reported Weight 195 lbs   Patient-Reported Height 5 ft 2 in   Patient-Reported Temperature -     Constitutional: [x] No apparent distress      [] Abnormal -   Mental status: [x] Alert and awake  [x] Oriented to person/place/time[] Abnormal -   Pulmonary/Chest: [x] Respiratory effort normal         [] Abnormal -          Psychiatric:       [x] Normal Affect [] Abnormal -        [x] No Hallucinations  Other pertinent observable physical exam findings:-Moderately anxious, not suicidal or homicidal tearful, with pressured speech future oriented  Controlled Substance Monitoring:  Acute and Chronic Pain Monitoring:   RX Monitoring 5/14/2021   Periodic Controlled Substance Monitoring Possible medication side effects, risk of tolerance/dependence & alternative treatments discussed. ;No signs of potential drug abuse or diversion identified. ;Assessed functional status. ASSESSMENT  1. Moderate episode of recurrent major depressive disorder (HCC)  Worsening  Increased Lexapro  Decrease buspirone to be taking during the day  Continue current therapy. DISCUSSED and ADVISED TO:  Not stopping medication suddenly. See the specialist as discussed. Report for feelings of SI, HI, and hallucinations. Go to the ER for increasing urge to hurt yourself. - escitalopram (LEXAPRO) 20 MG tablet; Take 1 tablet by mouth daily  Dispense: 90 tablet; Refill: 2  - busPIRone (BUSPAR) 5 MG tablet; Take 1 tablet by mouth 3 times daily  Dispense: 90 tablet; Refill: 1    2. FANTA (generalized anxiety disorder)  Worsening  Increased Lexapro  Decrease buspirone to be taking during the day  Continue current therapy. Discussed how to recognize anxiety. Advised to relieve tension with exercise or a massage. Advised to get enough rest.  Advised to avoid alcohol, caffeine, nicotine, and illegal drugs.    Which can increase anxiety level and cause sleep problems. - escitalopram (LEXAPRO) 20 MG tablet; Take 1 tablet by mouth daily  Dispense: 90 tablet; Refill: 2  - busPIRone (BUSPAR) 5 MG tablet; Take 1 tablet by mouth 3 times daily  Dispense: 90 tablet; Refill: 1    3. Nausea  Failure to Improve  DISCUSSED and ADVISED TO:  Drink plenty of fluids, enough until urine is light yellow or clear like water. Choose water and other caffeine-free clear liquids. If you do not feel like eating or drinking, try taking small sips of water, sports drinks, or other rehydration drinks. Get plenty of rest.  Go to the Emergency Room if you are not able to tolerate any food or water. - ondansetron (ZOFRAN) 4 MG tablet; Take 1 tablet by mouth 3 times daily as needed for Nausea or Vomiting  Dispense: 60 tablet; Refill: 1    4. Diarrhea, unspecified type  Failure to Improve  Encouraged to take Pepto-Bismol  Encouraged to go the ER if it does not improve    - bismuth subsalicylate (PEPTO BISMOL) 262 MG chewable tablet; Take 2 tablets by mouth 4 times daily (before meals and nightly)  Dispense: 160 tablet; Refill: 1    On this date 3/7/2022 I have spent 20 minutes reviewing previous notes, test results and face to face with the patient discussing the diagnosis and importance of compliance with the treatment plan as well as documenting on the day of the visit. Lexus Redd is a 55 y.o. female patient  being evaluated by through a synchronous (real-time) audio-video encounter . The patient (or guardian if applicable) is aware that this is a billable service, which includes applicable co-pays. This Virtual Visit was conducted withpatient's (and/or legal guardian's) consent. The visit was conducted pursuant tot he emergency declaration under the [facility] waiver authority and the [employer] and [employer] General Act.  Patient identification was verified,and a caregiver was present when appropriate. The patient was located in a state where the provider was licensed to provide care. This note was completed by using the assistance of a speech-recognition program. However, inadvertent computerized transcription errors may be present. Although every effort was made to ensure accuracy, no guarantees can be provided that every mistake has been identified and corrected by editing.   Electronically signed by NEHAL Johnson CNP on 3/7/22 at 9:07 AM EST

## 2022-03-07 NOTE — TELEPHONE ENCOUNTER
Received call from Danay Rudolph at [facility] with [employer]. Subjective: Caller states \"abdominal pain\"     Current Symptoms: abdominal pain, started 5 days ago, vomiting, diarrhea, feels it may be stress related, no pain at this time     Onset: 5 days ago; sudden    Associated Symptoms: NA    Pain Severity: 0/10; N/A; none    Temperature: denies temp    What has been tried: Imodium     LMP: NA Pregnant: NA    Recommended disposition: See in Office Today    Care advice provided, patient verbalizes understanding; denies any other questions or concerns; instructed to call back for any new or worsening symptoms. Patient/Caller agrees with recommended disposition; writer provided warm transfer to [facility] for appointment scheduling     Attention Provider: Thank you for allowing me to participate in the care of your patient. The patient was connected to triage in response to information provided to the ECC/PSC. Please do not respond through this encounter as the response is not directed to a shared pool.           Reason for Disposition   Patient wants to be seen    Protocols used: ABDOMINAL PAIN - [facility] - ANISH LEI

## 2022-03-07 NOTE — TELEPHONE ENCOUNTER
Patient called stating that she is having some abdominal pain on/off since 03/03 Thursday due to stress she is stating may be the cause. Stated that she is under a lot of stress. Please advise. I did advise patient to request an e-visit for same day appt.

## 2022-03-16 ENCOUNTER — OFFICE VISIT (OUTPATIENT)
Dept: FAMILY MEDICINE CLINIC | Age: 57
End: 2022-03-16

## 2022-03-16 VITALS
HEART RATE: 66 BPM | WEIGHT: 197 LBS | HEIGHT: 62 IN | SYSTOLIC BLOOD PRESSURE: 120 MMHG | OXYGEN SATURATION: 98 % | TEMPERATURE: 98 F | BODY MASS INDEX: 36.25 KG/M2 | DIASTOLIC BLOOD PRESSURE: 84 MMHG

## 2022-03-16 DIAGNOSIS — F33.0 MILD EPISODE OF RECURRENT MAJOR DEPRESSIVE DISORDER (HCC): ICD-10-CM

## 2022-03-16 DIAGNOSIS — R73.03 PREDIABETES: ICD-10-CM

## 2022-03-16 DIAGNOSIS — I25.10 CORONARY ARTERY DISEASE INVOLVING NATIVE CORONARY ARTERY OF NATIVE HEART WITHOUT ANGINA PECTORIS: ICD-10-CM

## 2022-03-16 DIAGNOSIS — E03.9 ACQUIRED HYPOTHYROIDISM: ICD-10-CM

## 2022-03-16 DIAGNOSIS — E78.5 HYPERLIPIDEMIA WITH TARGET LDL LESS THAN 100: ICD-10-CM

## 2022-03-16 DIAGNOSIS — K29.00 OTHER ACUTE GASTRITIS WITHOUT HEMORRHAGE: ICD-10-CM

## 2022-03-16 DIAGNOSIS — K58.0 IRRITABLE BOWEL SYNDROME WITH DIARRHEA: Primary | ICD-10-CM

## 2022-03-16 DIAGNOSIS — I10 ESSENTIAL HYPERTENSION: ICD-10-CM

## 2022-03-16 DIAGNOSIS — Z23 ENCOUNTER FOR IMMUNIZATION: ICD-10-CM

## 2022-03-16 PROBLEM — Z96.652 PRESENCE OF LEFT ARTIFICIAL KNEE JOINT: Status: ACTIVE | Noted: 2021-09-29

## 2022-03-16 PROCEDURE — 99214 OFFICE O/P EST MOD 30 MIN: CPT | Performed by: FAMILY MEDICINE

## 2022-03-16 RX ORDER — DICYCLOMINE HYDROCHLORIDE 10 MG/1
10 CAPSULE ORAL 4 TIMES DAILY
Qty: 120 CAPSULE | Refills: 0 | Status: SHIPPED | OUTPATIENT
Start: 2022-03-16 | End: 2022-06-28 | Stop reason: SDUPTHER

## 2022-03-16 RX ORDER — OMEPRAZOLE 40 MG/1
40 CAPSULE, DELAYED RELEASE ORAL
Qty: 90 CAPSULE | Refills: 1 | Status: SHIPPED | OUTPATIENT
Start: 2022-03-16 | End: 2022-09-23

## 2022-03-16 ASSESSMENT — ENCOUNTER SYMPTOMS
CONSTIPATION: 0
CHEST TIGHTNESS: 0
ABDOMINAL DISTENTION: 0
SHORTNESS OF BREATH: 0
WHEEZING: 0
ABDOMINAL PAIN: 1
DIARRHEA: 1
COUGH: 0
VOMITING: 1
NAUSEA: 1

## 2022-03-16 ASSESSMENT — PATIENT HEALTH QUESTIONNAIRE - PHQ9
2. FEELING DOWN, DEPRESSED OR HOPELESS: 1
SUM OF ALL RESPONSES TO PHQ QUESTIONS 1-9: 2
SUM OF ALL RESPONSES TO PHQ9 QUESTIONS 1 & 2: 2
SUM OF ALL RESPONSES TO PHQ QUESTIONS 1-9: 2
1. LITTLE INTEREST OR PLEASURE IN DOING THINGS: 1

## 2022-03-16 NOTE — PROGRESS NOTES
Visit Information    Have you changed or started any medications since your last visit including any over-the-counter medicines, vitamins, or herbal medicines? no   Are you having any side effects from any of your medications? -  no  Have you stopped taking any of your medications? Is so, why? -  no    Have you seen any other physician or provider since your last visit? No  Have you had any other diagnostic tests since your last visit? No  Have you been seen in the emergency room and/or had an admission to a hospital since we last saw you? No  Have you had your routine dental cleaning in the past 6 months? no    Have you activated your [employer] account? If not, what are your barriers?  Yes     Patient Care Team:  Domenic Delcid MD as PCP - General (Family Medicine)  Domenic Delcid MD as PCP - [facility] Provider  Kedar Farmer RN as Registered Nurse  Gavi Osman as Consulting Physician (Orthopedic Surgery)    Medical History Review  Past Medical, Family, and Social History reviewed and does contribute to the patient presenting condition    Health Maintenance   Topic Date Due    COVID-19 Vaccine (3 - Booster for Moderna series) 09/16/2021    Lipid screen  02/19/2022    Shingles Vaccine (1 of 2) 06/11/2022 (Originally 8/21/2016)    Flu vaccine (1) 06/30/2022 (Originally 9/1/2021)    TSH testing  06/18/2022    Potassium monitoring  08/13/2022    Creatinine monitoring  08/13/2022    A1C test (Diabetic or Prediabetic)  09/14/2022    Colorectal Cancer Screen  11/16/2022    Depression Monitoring  03/07/2023    Breast cancer screen  06/12/2023    DTaP/Tdap/Td vaccine (2 - Td or Tdap) 01/19/2031    Hepatitis C screen  Completed    HIV screen  Completed    Hepatitis A vaccine  Aged Out    Hepatitis B vaccine  Aged Out    Hib vaccine  Aged Out    Meningococcal (ACWY) vaccine  Aged Out    Pneumococcal 0-64 years Vaccine  Aged [place]

## 2022-03-16 NOTE — PROGRESS NOTES
without any other meds and with a full glass of water. 7. Mild episode of recurrent major depressive disorder (HCC)  Improved  To continue with Lexapro 20 mg daily, and BuSpar 5 mg 3 times a day   8. Encounter for immunization  -     COVID-19 mRNA Vacc, Moderna, (MODERNA COVID-19 VACCINE) 100 MCG/0.5ML SUSP injection; Inject 0.25 mLs into the muscle once for 1 dose Needs Moderna Booster, Disp-0.25 mL, R-0Normal    9. Coronary artery disease involving native coronary artery of native heart without angina pectoris  Stable  Continue good blood pressure control, Lipitor 10 mg, and down the road we can start baby aspirin      Gerard Leigh received counseling on the following healthy behaviors: nutrition, exercise, medication adherence and weight loss. Reviewed prior labs and health maintenance  Discussed use, benefit, and side effects of prescribed medications. Barriers to medication compliance addressed. Patient given educational materials - see patient instructions  All patient questions answered. Patient voiced understanding. The patient's past medical,surgical, social, and family history as well as her current medications and allergies were reviewed as documented in today's encounter. Medications, labs, diagnostic studies, consultations and follow-up as documented in this encounter. Return in about 4 months (around 7/16/2022) for Face-2F-30mins PHYSICAL, VISION screen, PHQ9. .    Data Unavailable    Future Appointments   Date Time Provider Salvador Skylar   8/31/2022  9:15 AM [facility]         SUBJECTIVE/OBJECTIVE:    HPI    Patient complains today of stomach pain, nausea, vomiting and diarrhea. She reports abdominal cramps are better after having a bowel movement for the past few weeks. Patient reports having diarrhea even at nighttime, around 1-3 am, it wakes her up from sleep. She says she has been under a lot of stress.   She did have similar symptoms before, and she is not sure what medication was used at that time, but it did help. Patient reports she needs medication for diarrhea. Patient says in the past she was told she has IBS. Patient reports her elderly ill Mom is back home. She also has stress at work. We increased Lexapro, and started Buspar to help her depression and anxiety, but it does not help the GI symptoms. She has tried peptobysmol from over-the-counter, but doesn't help  Has Zofran, but doesn't take it. Patient reports she did have similar symptoms in the past, with diarrhea when being stressed out. She denies taking recent antibiotics, recent travel or eating at restaurants. Patient reports last week she threw up 3 days in a row, this week she did not. But she still has diarrhea, 2-3 bowel movements a day. She says she does not get constipated. She denies blood in the stool. She is up-to-date with both EGD and colonoscopy done in 2017. Hypertension:    she  is not exercising, but staying active,  and is adherent to low salt diet. Blood pressure is well controlled at home. Cardiac symptoms fatigue. Patient denies chest pain, chest pressure/discomfort, claudication, dyspnea, exertional chest pressure/discomfort, irregular heart beat, lower extremity edema, near-syncope, orthopnea, palpitations and paroxysmal nocturnal dyspnea. Cardiovascular risk factors: dyslipidemia, hypertension and obesity (BMI >= 30 kg/m2). Use of agents associated with hypertension: thyroid hormones. History of target organ damage: Nonobstructive coronary artery disease. 8/13/2021 cardiac cath showed nonobstructive coronary artery disease     \"Findings:     LAD: Diffuse irregularities 30-40%. LCx: Mild irregularities 20-30%. RCA: Mild irregularities 20-30%. Ramus: Mild irregularities 10-20%. \"          On Norvasc, tolerated well, denies side effects.     blood pressure is Normal.    BP Readings from Last 3 Encounters:   03/16/22 120/84   02/26/22 130/85 01/05/22 139/82        Pulse is Normal.    Pulse Readings from Last 3 Encounters:   03/16/22 66   02/26/22 76   01/05/22 99     Had COVID in January      Hyperlipidemia:  No new myalgias. Has GI upset on atorvastatin (Lipitor). Medication compliance: compliant all of the time. Patient is  following a low fat, low cholesterol diet. LDL is normal  Lab Results   Component Value Date    LDLCHOLESTEROL 59 02/19/2021     Lab Results   Component Value Date    TRIG 64 02/19/2021    TRIG 84 07/24/2020    TRIG 171 (H) 03/23/2016       Hyperglycemia, prediabetes improved    Taking Semaglutidine, tolerated well, denies side effects. Patient reports using [employer]. She wants to continue with it. Denies increased appetite, thirst or polyuria. Lab Results   Component Value Date    LABA1C 5.1 09/14/2021    LABA1C 5.7 06/11/2021    LABA1C 5.6 07/24/2020     There is unintentional weight gain of 5 pounds in 4 months  Wt Readings from Last 3 Encounters:   03/16/22 197 lb (89.4 kg)   01/05/22 187 lb (84.8 kg)   12/23/21 192 lb 9.6 oz (87.4 kg)         Hypothyroidism: Recent symptoms: fatigue and weight gain. She denies weight loss, cold intolerance, heat intolerance, hair loss, dry skin, constipation, diarrhea, edema, anxiety, tremor, palpitations and dysphagia. Patient is  taking her medication consistently on an empty stomach. TSH is Elevated. No results found for: TSHREFLEX  Lab Results   Component Value Date    TSH 13.11 (H) 06/18/2021    TSH 6.04 (H) 05/14/2021    TSH 14.11 (H) 02/19/2021       Depression  Improved depression with the medication, Lexapro and BuSpar    PHQ-2 Over the past 2 weeks, how often have you been bothered by any of the following problems? Little interest or pleasure in doing things: Several days  Feeling down, depressed, or hopeless: Several days  PHQ-2 Score: 2  PHQ-9 Over the past 2 weeks, how often have you been bothered by any of the following problems?   PHQ-9 Total Score: 2  PHQ-9 Total Score: 2       PHQ Scores 3/16/2022 3/7/2022 12/23/2021 9/14/2021 6/11/2021 4/21/2021 1/19/2021   PHQ2 Score 2 6 4 0 0 0 0   PHQ9 Score 2 22 19 0 0 0 0         Prior to Visit Medications    Medication Sig Taking? Authorizing Provider   escitalopram (LEXAPRO) 20 MG tablet Take 1 tablet by mouth daily Yes NEHAL Bailey CNP   busPIRone (BUSPAR) 5 MG tablet Take 1 tablet by mouth 3 times daily Yes NEHAL Bailey CNP   bismuth subsalicylate (PEPTO BISMOL) 262 MG chewable tablet Take 2 tablets by mouth 4 times daily (before meals and nightly) Yes NEHAL Bailey CNP   ondansetron (ZOFRAN) 4 MG tablet Take 1 tablet by mouth 3 times daily as needed for Nausea or Vomiting Yes NEHAL Bailey CNP   cetirizine-psuedoephedrine (ZYRTEC-D) 5-120 MG per extended release tablet Take 1 tablet by mouth 2 times daily Yes NEHAL Rodriguez CNP   mupirocin (BACTROBAN NASAL) 2 % nasal ointment Take by Nasal route 3  times daily. Yes NEHAL Rodriguez CNP   oxybutynin (DITROPAN-XL) 10 MG extended release tablet Take 1 tablet by mouth daily Yes Leigha Burnett MD   meloxicam (MOBIC) 15 MG tablet TAKE 1/2 TABLET BY MOUTH DAILY Yes Historical Provider, MD   amLODIPine (NORVASC) 5 MG tablet Take 5 mg by mouth daily Yes Historical Provider, MD   aspirin 81 MG chewable tablet Take 81 mg by mouth daily Yes Historical Provider, MD   Semaglutide 7 MG TABS Take 7 mg by mouth daily Second step Yes Leigha Burnett MD   Handicap Placard MISC by Does not apply route Can't walk greater than 200 feet. Expires in 5 years.  Yes Leigha Burnett MD   atorvastatin (LIPITOR) 10 MG tablet Take 1 tablet by mouth every evening Yes Leigha Burnett MD   levothyroxine (SYNTHROID) 50 MCG tablet Take 1 tablet by mouth every morning (before breakfast) Dose increased 2/21/2021 Yes Leigha Burnett MD   albuterol sulfate HFA (PROVENTIL HFA) 108 (90 Base) MCG/ACT inhaler Inhale 2 puffs into the lungs every 6 hours as needed for Wheezing or Shortness of Breath  Patient not taking: Reported on 3/16/2022  Earl Perez MD       Social History     Tobacco Use    Smoking status: Former Smoker     Packs/day: 0.50     Years: 3.00     Pack years: 1.50     Types: Cigarettes     Quit date: 1988     Years since quittin.2    Smokeless tobacco: Never Used   Vaping Use    Vaping Use: Never used   Substance Use Topics    Alcohol use: Not Currently     Comment: occasionally    Drug use: No         Review of Systems   Constitutional: Positive for fatigue and unexpected weight change. Negative for activity change, appetite change, chills, diaphoresis and fever. HENT: Negative for trouble swallowing. Respiratory: Negative for cough, chest tightness, shortness of breath and wheezing. Cardiovascular: Negative for chest pain, palpitations and leg swelling. Gastrointestinal: Positive for abdominal pain, diarrhea, nausea and vomiting. Negative for abdominal distention and constipation. Endocrine: Negative for cold intolerance, heat intolerance, polydipsia, polyphagia and polyuria. Musculoskeletal: Positive for arthralgias. Neurological: Negative for tremors. Psychiatric/Behavioral: Positive for dysphoric mood and sleep disturbance. Negative for self-injury. The patient is nervous/anxious.            -vital signs stable and within normal limits except severe obesity per BMI  /84   Pulse 66   Temp 98 °F (36.7 °C)   Ht 5' 2\" (1.575 m)   Wt 197 lb (89.4 kg)   SpO2 98%   BMI 36.03 kg/m²      Physical Exam  Vitals and nursing note reviewed. Constitutional:       General: She is not in acute distress. Appearance: Normal appearance. She is well-developed. She is obese. She is not diaphoretic. HENT:      Head: Normocephalic and atraumatic.       Right Ear: External ear normal.      Left Ear: External ear normal.      Mouth/Throat:      Comments: I did not examine the mouth due to coronavirus pandemic and wearing masks    Eyes:      General: Lids are normal. No scleral icterus. Right eye: No discharge. Left eye: No discharge. Extraocular Movements: Extraocular movements intact. Conjunctiva/sclera: Conjunctivae normal.   Neck:      Thyroid: No thyromegaly. Cardiovascular:      Rate and Rhythm: Normal rate and regular rhythm. Heart sounds: Normal heart sounds. No murmur heard. Pulmonary:      Effort: Pulmonary effort is normal. No respiratory distress. Breath sounds: Normal breath sounds. No wheezing or rales. Chest:      Chest wall: No tenderness. Abdominal:      General: Bowel sounds are normal. There is no distension. Palpations: Abdomen is soft. There is no hepatomegaly or splenomegaly. Tenderness: There is abdominal tenderness in the epigastric area. There is no guarding or rebound. Comments: Obese abdomen. The abdomen is completely nontender except the epigastrium at this time, I highly suspect IBS   Musculoskeletal:         General: Normal range of motion. Cervical back: Normal range of motion and neck supple. Right lower leg: No edema. Left lower leg: No edema. Skin:     General: Skin is warm and dry. Capillary Refill: Capillary refill takes less than 2 seconds. Findings: No rash. Neurological:      Mental Status: She is alert and oriented to person, place, and time. Cranial Nerves: No cranial nerve deficit. Motor: No abnormal muscle tone. Psychiatric:         Mood and Affect: Mood is anxious. Speech: Speech is rapid and pressured. Behavior: Behavior normal.         Thought Content: Thought content normal.         Judgment: Judgment normal.             I personally reviewed testing . Discussed testing with the patient and all questions fully answered. Patient had COVID-19 in January, advised to get for booster COVID-19 vaccine.   TSH high  Hyperlipidemia improved  Otherwise labs within normal limits    Hospital Outpatient Visit on 01/05/2022   Component Date Value Ref Range Status    SARS-CoV-2 01/05/2022        Final    Source 01/05/2022 . NASOPHARYNGEAL SWAB   Final    SARS-CoV-2 01/05/2022 DETECTED* Not Detected Final    Comment:       The specimen is POSITIVE for SARS-Cov-2, the novel coronavirus associated with COVID-19. Nick SARS-CoV-2 for use on the [employer] is a real-time RT-PCR test intended   for the qualitative detection of nucleic acids from SARS-CoV-2  in clinician-collected nasal, nasopharyngeal, and oropharyngeal swab specimens from   individuals who meet COVID-19 clinical and/or epidemiological criteria. Nick SARS-CoV-2 is for use only under Emergency Use Authorization (EUA) in laboratories   certified under [address] (CLIA), 42 U. S.C. §010V,   that meet requirements to perform high or moderate complexity tests. An individual without symptoms of COVID-19 and who is not shedding SARS-CoV-2 virus would   expect to have a negative (not detected) result in this assay.   Fact sheet for Healthcare Providers: [employer]Her[employer]es  Fact sheet for Patients: [employer]Her.es        METHODOLOG                           Y: RT-PCR        Results reported to the appropriate Health Department         Lab Results   Component Value Date    WBC 4.9 06/18/2021    HGB 12.8 06/18/2021    HCT 40.6 06/18/2021    MCV 88.8 06/18/2021     08/13/2021       Lab Results   Component Value Date     06/18/2021    K 4.2 06/18/2021     06/18/2021    CO2 24 06/18/2021    BUN 15 06/18/2021    CREATININE 0.70 08/13/2021    CREATININE 0.63 06/18/2021    GLUCOSE 98 06/18/2021    CALCIUM 9.0 06/18/2021        Lab Results   Component Value Date    ALT 15 06/18/2021    AST 12 06/18/2021    ALKPHOS 100 06/18/2021    BILITOT 0.52 06/18/2021       Lab Results   Component Value Date    TSH 13.11 (H) 06/18/2021       Lab Results   Component Value Date    CHOL 126 02/19/2021    CHOL 204 (H) 07/24/2020    CHOL 201 (H) 03/23/2016     Lab Results   Component Value Date    TRIG 64 02/19/2021    TRIG 84 07/24/2020    TRIG 171 (H) 03/23/2016     Lab Results   Component Value Date    HDL 54 02/19/2021    HDL 49 07/24/2020    HDL 45 03/23/2016     Lab Results   Component Value Date    LDLCHOLESTEROL 59 02/19/2021    LDLCHOLESTEROL 138 (H) 07/24/2020    LDLCHOLESTEROL 122 03/23/2016     Lab Results   Component Value Date    CHOLHDLRATIO 2.3 02/19/2021    CHOLHDLRATIO 4.2 07/24/2020    CHOLHDLRATIO 4.5 03/23/2016       Lab Results   Component Value Date    LABA1C 5.1 09/14/2021    LABA1C 5.7 06/11/2021    LABA1C 5.6 07/24/2020       No results found for: [id number]    No results found for: FOLATE    Lab Results   Component Value Date    VITD25 39.7 05/14/2021       Orders Placed This Encounter   Procedures    CBC     Standing Status:   Future     Standing Expiration Date:   3/16/2023    Comprehensive Metabolic Panel     Standing Status:   Future     Standing Expiration Date:   3/16/2023    Hemoglobin A1C     Standing Status:   Future     Standing Expiration Date:   3/16/2023    Lipid Panel     Standing Status:   Future     Standing Expiration Date:   3/16/2023     Order Specific Question:   Is Patient Fasting?/# of Hours     Answer:   8-10 Hours, water ok to drink    TSH     Standing Status:   Future     Standing Expiration Date:   3/16/2023    T4, Free     Standing Status:   Future     Standing Expiration Date:   3/16/2023       Orders Placed This Encounter   Medications    COVID-19 mRNA Vacc, Moderna, (MODERNA COVID-19 VACCINE) 100 MCG/0.5ML SUSP injection     Sig: Inject 0.25 mLs into the muscle once for 1 dose Needs Moderna Booster     Dispense:  0.25 mL     Refill:  0    dicyclomine (BENTYL) 10 MG capsule     Sig: Take 1 capsule by mouth 4 times daily Dispense:  120 capsule     Refill:  0    omeprazole (PRILOSEC) 40 MG delayed release capsule     Sig: Take 1 capsule by mouth every morning (before breakfast) 30 minutes after Synthroid     Dispense:  90 capsule     Refill:  1       Medications Discontinued During This Encounter   Medication Reason    bismuth subsalicylate (PEPTO BISMOL) 262 MG chewable tablet Stop Taking at Discharge    cetirizine-psuedoephedrine (ZYRTEC-D) 5-120 MG per extended release tablet Therapy completed    meloxicam (MOBIC) 15 MG tablet Stop Taking at Discharge    aspirin 81 MG chewable tablet Stop Taking at Discharge    albuterol sulfate HFA (PROVENTIL HFA) 108 (90 Base) MCG/ACT inhaler Therapy completed             On this date 3/16/2022 I have spent 35 minutes reviewing previous notes, test results and face to face with the patient discussing the diagnosis and importance of compliance with the treatment plan as well as documenting on the day of the visit. This note was completed by using the assistance of a speech-recognition program. However, inadvertent computerized transcription errors may be present. Although every effort was made to ensure accuracy, no guarantees can be provided that every mistake has been identified and corrected by editing. An electronic signature was used to authenticate this note.   Electronically signed by Jody Vasquez MD on 3/21/2022  7:10 PM

## 2022-03-21 PROBLEM — I25.10 CORONARY ARTERY DISEASE INVOLVING NATIVE CORONARY ARTERY OF NATIVE HEART WITHOUT ANGINA PECTORIS: Status: ACTIVE | Noted: 2022-03-21

## 2022-03-21 PROBLEM — K58.0 IRRITABLE BOWEL SYNDROME WITH DIARRHEA: Status: ACTIVE | Noted: 2022-03-21

## 2022-03-21 PROBLEM — K29.70 GASTRITIS: Status: ACTIVE | Noted: 2022-03-21

## 2022-03-21 ASSESSMENT — ENCOUNTER SYMPTOMS: TROUBLE SWALLOWING: 0

## 2022-04-08 DIAGNOSIS — I10 ESSENTIAL HYPERTENSION: Primary | ICD-10-CM

## 2022-04-08 RX ORDER — AMLODIPINE BESYLATE 5 MG/1
5 TABLET ORAL DAILY
Qty: 90 TABLET | Refills: 3 | Status: SHIPPED | OUTPATIENT
Start: 2022-04-08 | End: 2022-10-25 | Stop reason: SDUPTHER

## 2022-04-08 NOTE — TELEPHONE ENCOUNTER
Please Approve or Refuse.   Send to Pharmacy per Pt's Request:      Next Visit Date:  8/31/2022   Last Visit Date: 3/16/2022    Hemoglobin A1C (%)   Date Value   09/14/2021 5.1   06/11/2021 5.7   07/24/2020 5.6             ( goal A1C is < 7)   BP Readings from Last 3 Encounters:   03/16/22 120/84   02/26/22 130/85   01/05/22 139/82          (goal 120/80)  BUN   Date Value Ref Range Status   06/18/2021 15 6 - 20 mg/dL Final     CREATININE   Date Value Ref Range Status   06/18/2021 0.63 0.50 - 0.90 mg/dL Final     POC Creatinine   Date Value Ref Range Status   08/13/2021 0.70 0.51 - 1.19 mg/dL Final     Potassium   Date Value Ref Range Status   06/18/2021 4.2 3.7 - 5.3 mmol/L Final

## 2022-04-29 DIAGNOSIS — E78.5 HYPERLIPIDEMIA WITH TARGET LDL LESS THAN 100: ICD-10-CM

## 2022-04-29 RX ORDER — ATORVASTATIN CALCIUM 10 MG/1
10 TABLET, FILM COATED ORAL EVERY EVENING
Qty: 90 TABLET | Refills: 3 | Status: SHIPPED | OUTPATIENT
Start: 2022-04-29 | End: 2022-06-28

## 2022-06-09 ENCOUNTER — OFFICE VISIT (OUTPATIENT)
Dept: FAMILY MEDICINE CLINIC | Age: 57
End: 2022-06-09
Payer: COMMERCIAL

## 2022-06-09 VITALS
TEMPERATURE: 98.1 F | DIASTOLIC BLOOD PRESSURE: 73 MMHG | SYSTOLIC BLOOD PRESSURE: 109 MMHG | OXYGEN SATURATION: 98 % | HEART RATE: 101 BPM

## 2022-06-09 DIAGNOSIS — A08.4 VIRAL GASTROENTERITIS: ICD-10-CM

## 2022-06-09 DIAGNOSIS — R50.9 FEVER, UNSPECIFIED FEVER CAUSE: Primary | ICD-10-CM

## 2022-06-09 DIAGNOSIS — R79.89 ELEVATED LFTS: ICD-10-CM

## 2022-06-09 DIAGNOSIS — R11.2 NAUSEA AND VOMITING, INTRACTABILITY OF VOMITING NOT SPECIFIED, UNSPECIFIED VOMITING TYPE: ICD-10-CM

## 2022-06-09 DIAGNOSIS — R10.84 GENERALIZED ABDOMINAL PAIN: ICD-10-CM

## 2022-06-09 PROCEDURE — 99214 OFFICE O/P EST MOD 30 MIN: CPT

## 2022-06-09 RX ORDER — PROMETHAZINE HYDROCHLORIDE 25 MG/ML
12.5 INJECTION, SOLUTION INTRAMUSCULAR; INTRAVENOUS ONCE
Status: DISCONTINUED | OUTPATIENT
Start: 2022-06-09 | End: 2022-06-09

## 2022-06-09 RX ORDER — PROMETHAZINE HYDROCHLORIDE 12.5 MG/1
12.5 TABLET ORAL 3 TIMES DAILY PRN
Qty: 12 TABLET | Refills: 0 | Status: CANCELLED | OUTPATIENT
Start: 2022-06-09 | End: 2022-06-16

## 2022-06-09 RX ORDER — ONDANSETRON 4 MG/1
4 TABLET, ORALLY DISINTEGRATING ORAL ONCE
Status: COMPLETED | OUTPATIENT
Start: 2022-06-09 | End: 2022-06-09

## 2022-06-09 RX ORDER — ONDANSETRON 4 MG/1
4 TABLET, ORALLY DISINTEGRATING ORAL EVERY 8 HOURS PRN
Qty: 15 TABLET | Refills: 0 | Status: SHIPPED | OUTPATIENT
Start: 2022-06-09 | End: 2022-06-14

## 2022-06-09 RX ADMIN — ONDANSETRON 4 MG: 4 TABLET, ORALLY DISINTEGRATING ORAL at 14:01

## 2022-06-09 ASSESSMENT — ENCOUNTER SYMPTOMS
SWOLLEN GLANDS: 0
DIARRHEA: 0
NAUSEA: 1
ABDOMINAL PAIN: 0
VOMITING: 1
WHEEZING: 0
COUGH: 0
CHANGE IN BOWEL HABIT: 0
CONSTIPATION: 0
SORE THROAT: 0

## 2022-06-09 NOTE — PROGRESS NOTES
[address] [address]  Dept: [phone]  Dept Fax: [phone]    Eliceo Monsivais is a 55 y.o. female who presents to the urgent care today for her medical conditions/complaints as notedbelow. Eliceo Monsivais is c/o of Nausea (onset last night,nothing tried OTC), Generalized Body Aches (onset 3 days ago, tried OTC Tylenolol, cough), and Fever (last taken this morning, 100.6,fatigue, tested neg for covid, would like tested for flu)      HPI:     Covid vaccine? Yes, 2 doses  Symptoms started 3 days ago  Patient tested negative for COVID at home  Patient would like flu testing today  Hx: GERD, Gastritis, IBS  Depression-Not taking lexapro and buspar anymore per patient because she said it is too hard on her stomach    Fever   This is a new problem. The current episode started yesterday. The problem occurs daily. The maximum temperature noted was 100 to 100.9 F. Associated symptoms include headaches, muscle aches, nausea, sleepiness and vomiting (last night). Pertinent negatives include no abdominal pain, chest pain, congestion, coughing, diarrhea, ear pain, rash, sore throat, urinary pain or wheezing. She has tried acetaminophen for the symptoms. The treatment provided moderate relief. Risk factors: no contaminated food, no recent sickness, no recent travel and no sick contacts    Risk factors comment:  Patient works in   Nausea & Vomiting  This is a new problem. The current episode started yesterday. The problem occurs 2 to 4 times per day. The problem has been gradually worsening. Associated symptoms include fatigue, a fever, headaches, myalgias, nausea and vomiting (last night). Pertinent negatives include no abdominal pain, change in bowel habit, chest pain, congestion, coughing, rash, sore throat, swollen glands or urinary symptoms. Nothing aggravates the symptoms.  She has tried acetaminophen and lying down for the symptoms. The treatment provided mild relief. Past Medical History:   Diagnosis Date    Acquired hypothyroidism 7/27/2020    Bilateral primary osteoarthritis of knee 4/25/2021    Chronic back pain     Coronary artery disease involving native coronary artery of native heart without angina pectoris 3/21/2022    Diarrhea     Distal radius fracture 1/28/13    Drug-seeking behavior 10/16/2014    Essential hypertension 9/14/2021    FANTA (generalized anxiety disorder)     GERD (gastroesophageal reflux disease)     H/O: hysterectomy 8/19/2020    History of depression     Hyperlipidemia with target LDL less than 100 7/19/2020    Irritable bowel syndrome with diarrhea 3/21/2022    Mild episode of recurrent major depressive disorder ([facility] 7/19/2020    Opioid dependence ([facility] 10/16/2014    Severe obesity (BMI 35.0-39. 9) [facility] 7/19/2020    Urge incontinence 3/9/2020        Current Outpatient Medications   Medication Sig Dispense Refill    ondansetron (ZOFRAN ODT) 4 MG disintegrating tablet Take 1 tablet by mouth every 8 hours as needed for Nausea or Vomiting 15 tablet 0    amLODIPine (NORVASC) 5 MG tablet Take 1 tablet by mouth daily 90 tablet 3    dicyclomine (BENTYL) 10 MG capsule Take 1 capsule by mouth 4 times daily 120 capsule 0    omeprazole (PRILOSEC) 40 MG delayed release capsule Take 1 capsule by mouth every morning (before breakfast) 30 minutes after Synthroid 90 capsule 1    oxybutynin (DITROPAN-XL) 10 MG extended release tablet Take 1 tablet by mouth daily 90 tablet 3    Semaglutide 7 MG TABS Take 7 mg by mouth daily Second step 90 tablet 3    Handicap Placard MISC by Does not apply route Can't walk greater than 200 feet. Expires in 5 years.  1 each 0    levothyroxine (SYNTHROID) 50 MCG tablet Take 1 tablet by mouth every morning (before breakfast) Dose increased 2/21/2021 90 tablet 3    atorvastatin (LIPITOR) 10 MG tablet TAKE 1 TABLET BY MOUTH EVERY EVENING (Patient not taking: Reported on 6/9/2022) 90 tablet 3    escitalopram (LEXAPRO) 20 MG tablet Take 1 tablet by mouth daily (Patient not taking: Reported on 6/9/2022) 90 tablet 2    busPIRone (BUSPAR) 5 MG tablet Take 1 tablet by mouth 3 times daily (Patient not taking: Reported on 6/9/2022) 90 tablet 1    mupirocin (BACTROBAN NASAL) 2 % nasal ointment Take by Nasal route 3  times daily. (Patient not taking: Reported on 6/9/2022) 1 each 3     No current facility-administered medications for this visit. Allergies   Allergen Reactions    Metformin      Sick to stomach     Metformin And Related      nausea and vomiting , severe stomach pain       Subjective:      Review of Systems   Constitutional: Positive for fatigue and fever. HENT: Negative for congestion, ear pain and sore throat. Respiratory: Negative for cough and wheezing. Cardiovascular: Negative for chest pain. Gastrointestinal: Positive for nausea and vomiting (last night). Negative for abdominal pain, change in bowel habit, constipation and diarrhea. Genitourinary: Positive for flank pain. Negative for difficulty urinating and dysuria. Musculoskeletal: Positive for myalgias. Negative for gait problem. Skin: Negative for rash. Neurological: Positive for dizziness and headaches. All other systems reviewed and are negative. 14 systems reviewed and negative except as listed in HPI. Objective:     Physical Exam  Vitals reviewed. Constitutional:       General: She is not in acute distress. Appearance: She is ill-appearing. She is not toxic-appearing or diaphoretic. HENT:      Head: Normocephalic. Right Ear: Tympanic membrane normal.      Left Ear: Tympanic membrane normal.      Nose: Nose normal. No congestion or rhinorrhea. Mouth/Throat:      Mouth: Mucous membranes are moist.      Pharynx: Oropharynx is clear. No oropharyngeal exudate or posterior oropharyngeal erythema.    Eyes: General:         Right eye: No discharge. Left eye: No discharge. Conjunctiva/sclera: Conjunctivae normal.   Cardiovascular:      Rate and Rhythm: Normal rate and regular rhythm. Heart sounds: Normal heart sounds. No murmur heard. No friction rub. Pulmonary:      Effort: Pulmonary effort is normal. No respiratory distress. Breath sounds: Normal breath sounds. No stridor. No wheezing, rhonchi or rales. Chest:      Chest wall: No tenderness. Abdominal:      General: Bowel sounds are normal. There is no distension. Palpations: Abdomen is soft. There is no hepatomegaly, splenomegaly, mass or pulsatile mass. Tenderness: There is generalized abdominal tenderness. There is right CVA tenderness and left CVA tenderness. There is no guarding or rebound. Negative signs include Rovsing's sign and McBurney's sign. Musculoskeletal:         General: Normal range of motion. Cervical back: Normal range of motion and neck supple. No rigidity or tenderness. Lymphadenopathy:      Cervical: No cervical adenopathy. Skin:     General: Skin is warm and dry. Neurological:      Mental Status: She is alert and oriented to person, place, and time. Psychiatric:         Mood and Affect: Mood normal.         Behavior: Behavior normal.       /73   Pulse (!) 101   Temp 98.1 °F (36.7 °C)   SpO2 98%     Assessment:       Diagnosis Orders   1. Fever, unspecified fever cause  POCT Influenza A/B    CBC with Auto Differential    Comprehensive Metabolic Panel    Lipase    CT ABDOMEN PELVIS WO CONTRAST Additional Contrast? Radiologist Recommendation   2.  Nausea and vomiting, intractability of vomiting not specified, unspecified vomiting type  ondansetron (ZOFRAN ODT) 4 MG disintegrating tablet    CBC with Auto Differential    Comprehensive Metabolic Panel    Lipase    CT ABDOMEN PELVIS WO CONTRAST Additional Contrast? Radiologist Recommendation    ondansetron (ZOFRAN-ODT) disintegrating tablet 4 mg    DISCONTINUED: promethazine (PHENERGAN) injection 12.5 mg   3. Generalized abdominal pain  CBC with Auto Differential    Comprehensive Metabolic Panel    Lipase    CT ABDOMEN PELVIS WO CONTRAST Additional Contrast? Radiologist Recommendation   4. Viral gastroenteritis       No results found for this visit on 06/09/22. Plan:   -Rapid flu negative  -Based on clinical exam findings, will treat as gastroenteritis however with GI history, will order labs and CT  -Advised patient to get labs done first for kidney function and then CT  -May need a GI referral if symptoms do not improve or based on CT findings  -Zofran for nausea/vomitting  -Encouraged proper hand washing.  -Increase fluid intake.  -Advised slow introduction of foods, starting with clear liquids.   -Recommended the BRAT diet until able to tolerate other solids. -Advised patient to follow up with PCP  -Advised patient to go to ER for any emergent concerns   Return if symptoms worsen or fail to improve. Orders Placed This Encounter   Medications    ondansetron (ZOFRAN ODT) 4 MG disintegrating tablet     Sig: Take 1 tablet by mouth every 8 hours as needed for Nausea or Vomiting     Dispense:  15 tablet     Refill:  0    DISCONTD: promethazine (PHENERGAN) injection 12.5 mg    ondansetron (ZOFRAN-ODT) disintegrating tablet 4 mg         Patient given educational materials - see patient instructions. Discussed use, benefit, and side effects of prescribed medications. All patient questions answered. Pt voiced understanding.     Electronically signed by NEHAL Kimball NP on 6/9/2022 at 2:26 PM

## 2022-06-09 NOTE — PATIENT INSTRUCTIONS
Patient Education        Fever: Care Instructions  Overview     A fever is a high body temperature. It's one way your body fights illness. A temperature of up to 102°F can be helpful, because it helps the body respond to infection. Most healthy people can have a fever as high as 103°F to 104°F for short periods of time without problems. In most cases, a fever means that youhave a minor illness. Follow-up care is a key part of your treatment and safety. Be sure to make and go to all appointments, and call your doctor if you are having problems. It's also a good idea to know your test results and keep alist of the medicines you take. How can you care for yourself at home?  Drink plenty of fluids to prevent dehydration. Choose water and other clear liquids. If you have to limit fluids because of a health problem, talk with your doctor before you increase the amount of fluids you drink.  Take an over-the-counter medicine, such as acetaminophen (Tylenol), ibuprofen (Advil, Motrin) or naproxen (Aleve), to relieve your symptoms. Read and follow all instructions on the label. No one younger than 20 should take aspirin. It has been linked to Reye syndrome, a serious illness.  Rest, and limit activity.  Wear lightweight clothing.  Eat mild foods, such as soup. When should you call for help? Call your doctor now or seek immediate medical care if:     You have a fever of 104°F or higher.      You have a fever that stays high.      You have a fever and feel confused or often feel dizzy.      You have trouble breathing.      You have a fever with a stiff neck or a severe headache. Watch closely for changes in your health, and be sure to contact your doctor if:     You have any problems with your medicine, or you get a fever after starting a new medicine.      You do not get better as expected. Where can you learn more? Go to https://chjameseb.myOrder. org and sign in to your [employer] account. Tried calling, VM box not set up    Enter F025 in the [facility] box to learn more about \"Fever: Care Instructions. \"     If you do not have an account, please click on the \"Sign Up Now\" link. Current as of: July 1, 2021               Content Version: 13.2  © 2006-2022 [employer]. Care instructions adapted under license by [facility] ([facility]). If you have questions about a medical condition or this instruction, always ask your healthcare professional. [place] [address] any warranty or liability for your use of this information. Patient Education        Nausea and Vomiting: Care Instructions  Overview     When you are nauseated, you may feel weak and sweaty and notice a lot of saliva in your mouth. Nausea often leads to vomiting. Most of the time you do not needto worry about nausea and vomiting, but they can be signs of other illnesses. Two common causes of nausea and vomiting are a stomach infection and food poisoning. Nausea and vomiting from a viral stomach infection will usually start to improve within 24 hours. Nausea and vomiting from food poisoning maylast from 12 to 48 hours. The doctor has checked you carefully, but problems can develop later. If you notice any problems or new symptoms, get medical treatment right away. Follow-up care is a key part of your treatment and safety. Be sure to make and go to all appointments, and call your doctor if you are having problems. It's also a good idea to know your test results and keep alist of the medicines you take. How can you care for yourself at home?  To prevent dehydration, drink plenty of fluids. Choose water and other clear liquids until you feel better. If you have kidney, heart, or liver disease and have to limit fluids, talk with your doctor before you increase the amount of fluids you drink.  Rest in bed until you feel better.    When you are able to eat, try clear soups, mild foods, and liquids until all symptoms are gone for 12 to 48 hours. Other good choices include dry toast, crackers, cooked cereal, and gelatin dessert, such as Jell-O. When should you call for help? Call 911 anytime you think you may need emergency care. For example, call if:     You passed out (lost consciousness). Call your doctor now or seek immediate medical care if:     You have symptoms of dehydration, such as:  ? Dry eyes and a dry mouth. ? Passing only a little urine. ? Feeling thirstier than usual.      You have new or worsening belly pain.      You have a new or higher fever.      You vomit blood or what looks like coffee grounds. Watch closely for changes in your health, and be sure to contact your doctor if:     You have ongoing nausea and vomiting.      Your vomiting is getting worse.      Your vomiting lasts longer than 2 days.      You are not getting better as expected. Where can you learn more? Go to https://TokBoxpepicewPremier Diagnostics.Novadiol. org and sign in to your [email or web address] account. Enter 25 [id number] in the [facility] box to learn more about \"Nausea and Vomiting: Care Instructions. \"     If you do not have an account, please click on the \"Sign Up Now\" link. Current as of: July 1, 2021               Content Version: 13.2  © 2006-2022 [employer]. Care instructions adapted under license by [facility] ([facility]). If you have questions about a medical condition or this instruction, always ask your healthcare professional. [place] [address] any warranty or liability for your use of this information. Patient Education        Gastroenteritis: Care Instructions  Overview     Gastroenteritis is an illness that may cause nausea, vomiting, and diarrhea. Itcan be caused by bacteria or a virus. You will probably begin to feel better in 1 to 2 days. In the meantime, get plenty of rest and make sure you do not become dehydrated. Dehydration occurswhen your body loses too much fluid.   Follow-up care is a key part of your treatment and safety. Be sure to make and go to all appointments, and call your doctor if you are having problems. It's also a good idea to know your test results and keep alist of the medicines you take. How can you care for yourself at home?  If your doctor prescribed antibiotics, take them as directed. Do not stop taking them just because you feel better. You need to take the full course of antibiotics.  Drink plenty of fluids to prevent dehydration. Choose water and other clear liquids until you feel better. If you have kidney, heart, or liver disease and have to limit fluids, talk with your doctor before you increase your fluid intake.  Drink fluids slowly, in frequent, small amounts, because drinking too much too fast can cause vomiting.  Begin eating mild foods, such as dry toast, yogurt, applesauce, bananas, and rice. Avoid spicy, hot, or high-fat foods, and do not drink alcohol or caffeine for a day or two. Do not drink milk or eat ice cream until you are feeling better. How to prevent gastroenteritis   Keep hot foods hot and cold foods cold.  Do not eat meats, dressings, salads, or other foods that have been kept at room temperature for more than 2 hours.  Use a thermometer to check your refrigerator. It should be between 34°F and 40°F.   Defrost meats in the refrigerator or microwave, not on the kitchen counter.  Keep your hands and your kitchen clean. Wash your hands, cutting boards, and countertops with hot soapy water frequently.  Cook meat until it is well done.  Do not eat raw eggs or uncooked sauces made with raw eggs.  Do not take chances. If food looks or tastes spoiled, throw it out. When should you call for help? Call 911 anytime you think you may need emergency care. For example, call if:     You vomit blood or what looks like coffee grounds.      You passed out (lost consciousness).      You pass maroon or very bloody stools.    Call your doctor now or seek immediate medical care if:     You have severe belly pain.      You have signs of needing more fluids. You have sunken eyes, a dry mouth, and pass only a little urine.      You feel like you are going to faint.      You have increased belly pain that does not go away in 1 to 2 days.      You have new or increased nausea, or you are vomiting.      You have a new or higher fever.      Your stools are black and tarlike or have streaks of blood. Watch closely for changes in your health, and be sure to contact your doctor if:     You are dizzy or lightheaded.      You urinate less than usual, or your urine is dark yellow or brown.      You do not feel better with each day that goes by. Where can you learn more? Go to https://MV Sistemas.Care.com. org and sign in to your [employer] account. Enter N142 in the [facility] box to learn more about \"Gastroenteritis: Care Instructions. \"     If you do not have an account, please click on the \"Sign Up Now\" link. Current as of: July 1, 2021               Content Version: 13.2  © 1015-9044 [employer]. Care instructions adapted under license by [facility] ([facility]). If you have questions about a medical condition or this instruction, always ask your healthcare professional. [address] any warranty or liability for your use of this information.

## 2022-06-09 NOTE — LETTER
[facility]  [facility]  Phone: [phone]  Fax: [phone]    NEHAL Palomo NP        June 9, 2022     Patient: Eunice Ybarra   YOB: 1965   Date of Visit: 6/9/2022       To Whom It May Concern: It is my medical opinion that Antoine Lebron may return to work on 6/11/2022. If you have any questions or concerns, please don't hesitate to call.     Sincerely,        NEHAL Palomo NP

## 2022-06-10 ENCOUNTER — HOSPITAL ENCOUNTER (OUTPATIENT)
Age: 57
Setting detail: SPECIMEN
Discharge: HOME | End: 2022-06-10

## 2022-06-10 DIAGNOSIS — R11.2 NAUSEA AND VOMITING, INTRACTABILITY OF VOMITING NOT SPECIFIED, UNSPECIFIED VOMITING TYPE: ICD-10-CM

## 2022-06-10 DIAGNOSIS — R50.9 FEVER, UNSPECIFIED FEVER CAUSE: ICD-10-CM

## 2022-06-10 DIAGNOSIS — R10.84 GENERALIZED ABDOMINAL PAIN: ICD-10-CM

## 2022-06-10 LAB
ABSOLUTE EOS #: <0.03 K/UL (ref 0–0.44)
ABSOLUTE IMMATURE GRANULOCYTE: <0.03 K/UL (ref 0–0.3)
ABSOLUTE LYMPH #: 0.9 K/UL (ref 1.1–3.7)
ABSOLUTE MONO #: 0.41 K/UL (ref 0.1–1.2)
ALBUMIN SERPL-MCNC: 4.1 G/DL (ref 3.5–5.2)
ALBUMIN/GLOBULIN RATIO: 1.5 (ref 1–2.5)
ALP BLD-CCNC: 101 U/L (ref 35–104)
ALT SERPL-CCNC: 99 U/L (ref 5–33)
ANION GAP SERPL CALCULATED.3IONS-SCNC: 16 MMOL/L (ref 9–17)
AST SERPL-CCNC: 84 U/L
BASOPHILS # BLD: 0 % (ref 0–2)
BASOPHILS ABSOLUTE: <0.03 K/UL (ref 0–0.2)
BILIRUB SERPL-MCNC: 0.56 MG/DL (ref 0.3–1.2)
BUN BLDV-MCNC: 10 MG/DL (ref 6–20)
CALCIUM SERPL-MCNC: 9.2 MG/DL (ref 8.6–10.4)
CHLORIDE BLD-SCNC: 105 MMOL/L (ref 98–107)
CO2: 22 MMOL/L (ref 20–31)
CREAT SERPL-MCNC: 0.83 MG/DL (ref 0.5–0.9)
EOSINOPHILS RELATIVE PERCENT: 0 % (ref 1–4)
GFR AFRICAN AMERICAN: >60 ML/MIN
GFR NON-AFRICAN AMERICAN: >60 ML/MIN
GFR SERPL CREATININE-BSD FRML MDRD: ABNORMAL ML/MIN/{1.73_M2}
GLUCOSE BLD-MCNC: 116 MG/DL (ref 70–99)
HCT VFR BLD CALC: 42.2 % (ref 36.3–47.1)
HEMOGLOBIN: 13.3 G/DL (ref 11.9–15.1)
IMMATURE GRANULOCYTES: 1 %
LIPASE: 18 U/L (ref 13–60)
LYMPHOCYTES # BLD: 22 % (ref 24–43)
MCH RBC QN AUTO: 28.9 PG (ref 25.2–33.5)
MCHC RBC AUTO-ENTMCNC: 31.5 G/DL (ref 28.4–34.8)
MCV RBC AUTO: 91.5 FL (ref 82.6–102.9)
MONOCYTES # BLD: 10 % (ref 3–12)
NRBC AUTOMATED: 0 PER 100 WBC
PDW BLD-RTO: 13.5 % (ref 11.8–14.4)
PLATELET # BLD: 147 K/UL (ref 138–453)
PMV BLD AUTO: 12.2 FL (ref 8.1–13.5)
POTASSIUM SERPL-SCNC: 3.9 MMOL/L (ref 3.7–5.3)
RBC # BLD: 4.61 M/UL (ref 3.95–5.11)
SEG NEUTROPHILS: 67 % (ref 36–65)
SEGMENTED NEUTROPHILS ABSOLUTE COUNT: 2.67 K/UL (ref 1.5–8.1)
SODIUM BLD-SCNC: 143 MMOL/L (ref 135–144)
TOTAL PROTEIN: 6.9 G/DL (ref 6.4–8.3)
WBC # BLD: 4 K/UL (ref 3.5–11.3)

## 2022-06-16 DIAGNOSIS — E03.9 ACQUIRED HYPOTHYROIDISM: ICD-10-CM

## 2022-06-16 RX ORDER — LEVOTHYROXINE SODIUM 0.05 MG/1
50 TABLET ORAL
Qty: 90 TABLET | Refills: 0 | Status: SHIPPED | OUTPATIENT
Start: 2022-06-16 | End: 2022-06-17 | Stop reason: SDUPTHER

## 2022-06-17 ENCOUNTER — HOSPITAL ENCOUNTER (OUTPATIENT)
Dept: CT IMAGING | Age: 57
Discharge: HOME | End: 2022-06-19
Payer: COMMERCIAL

## 2022-06-17 ENCOUNTER — HOSPITAL ENCOUNTER (OUTPATIENT)
Age: 57
Discharge: HOME | End: 2022-06-17
Payer: COMMERCIAL

## 2022-06-17 ENCOUNTER — HOSPITAL ENCOUNTER (OUTPATIENT)
Dept: ULTRASOUND IMAGING | Age: 57
Discharge: HOME | End: 2022-06-19
Payer: COMMERCIAL

## 2022-06-17 ENCOUNTER — PATIENT MESSAGE (OUTPATIENT)
Dept: FAMILY MEDICINE CLINIC | Age: 57
End: 2022-06-17

## 2022-06-17 DIAGNOSIS — R50.9 FEVER, UNSPECIFIED FEVER CAUSE: ICD-10-CM

## 2022-06-17 DIAGNOSIS — R10.84 GENERALIZED ABDOMINAL PAIN: ICD-10-CM

## 2022-06-17 DIAGNOSIS — E03.9 ACQUIRED HYPOTHYROIDISM: Primary | ICD-10-CM

## 2022-06-17 DIAGNOSIS — R11.2 NAUSEA AND VOMITING, INTRACTABILITY OF VOMITING NOT SPECIFIED, UNSPECIFIED VOMITING TYPE: ICD-10-CM

## 2022-06-17 DIAGNOSIS — R79.89 ELEVATED LFTS: ICD-10-CM

## 2022-06-17 DIAGNOSIS — R73.03 PREDIABETES: ICD-10-CM

## 2022-06-17 DIAGNOSIS — I10 ESSENTIAL HYPERTENSION: ICD-10-CM

## 2022-06-17 DIAGNOSIS — E03.9 ACQUIRED HYPOTHYROIDISM: ICD-10-CM

## 2022-06-17 DIAGNOSIS — E78.5 HYPERLIPIDEMIA WITH TARGET LDL LESS THAN 100: ICD-10-CM

## 2022-06-17 LAB
ALBUMIN SERPL-MCNC: 4.3 G/DL (ref 3.5–5.2)
ALP BLD-CCNC: 107 U/L (ref 35–104)
ALT SERPL-CCNC: 53 U/L (ref 5–33)
ANION GAP SERPL CALCULATED.3IONS-SCNC: 12 MMOL/L (ref 9–17)
AST SERPL-CCNC: 31 U/L
BILIRUB SERPL-MCNC: 0.37 MG/DL (ref 0.3–1.2)
BUN BLDV-MCNC: 13 MG/DL (ref 6–20)
CALCIUM SERPL-MCNC: 9.7 MG/DL (ref 8.6–10.4)
CHLORIDE BLD-SCNC: 106 MMOL/L (ref 98–107)
CHOLESTEROL/HDL RATIO: 4.4
CHOLESTEROL: 222 MG/DL
CO2: 25 MMOL/L (ref 20–31)
CREAT SERPL-MCNC: 0.74 MG/DL (ref 0.5–0.9)
ESTIMATED AVERAGE GLUCOSE: 123 MG/DL
GFR AFRICAN AMERICAN: >60 ML/MIN
GFR NON-AFRICAN AMERICAN: >60 ML/MIN
GFR SERPL CREATININE-BSD FRML MDRD: ABNORMAL ML/MIN/{1.73_M2}
GLUCOSE BLD-MCNC: 103 MG/DL (ref 70–99)
HBA1C MFR BLD: 5.9 % (ref 4–6)
HCT VFR BLD CALC: 41.8 % (ref 36–46)
HDLC SERPL-MCNC: 50 MG/DL
HEMOGLOBIN: 13.5 G/DL (ref 12–16)
LDL CHOLESTEROL: 141 MG/DL (ref 0–130)
MCH RBC QN AUTO: 28.2 PG (ref 26–34)
MCHC RBC AUTO-ENTMCNC: 32.3 G/DL (ref 31–37)
MCV RBC AUTO: 87.2 FL (ref 80–100)
PDW BLD-RTO: 14.1 % (ref 11.5–14.9)
PLATELET # BLD: 277 K/UL (ref 150–450)
PMV BLD AUTO: 9.2 FL (ref 6–12)
POTASSIUM SERPL-SCNC: 3.8 MMOL/L (ref 3.7–5.3)
RBC # BLD: 4.79 M/UL (ref 4–5.2)
SODIUM BLD-SCNC: 143 MMOL/L (ref 135–144)
THYROXINE, FREE: 0.74 NG/DL (ref 0.93–1.7)
TOTAL PROTEIN: 7.3 G/DL (ref 6.4–8.3)
TRIGL SERPL-MCNC: 153 MG/DL
TSH SERPL DL<=0.05 MIU/L-ACNC: 24.76 UIU/ML (ref 0.3–5)
WBC # BLD: 5.8 K/UL (ref 3.5–11)

## 2022-06-17 PROCEDURE — 84443 ASSAY THYROID STIM HORMONE: CPT

## 2022-06-17 PROCEDURE — 80053 COMPREHEN METABOLIC PANEL: CPT

## 2022-06-17 PROCEDURE — 85027 COMPLETE CBC AUTOMATED: CPT

## 2022-06-17 PROCEDURE — 83036 HEMOGLOBIN GLYCOSYLATED A1C: CPT

## 2022-06-17 PROCEDURE — 84439 ASSAY OF FREE THYROXINE: CPT

## 2022-06-17 PROCEDURE — 76705 ECHO EXAM OF ABDOMEN: CPT

## 2022-06-17 PROCEDURE — 36415 COLL VENOUS BLD VENIPUNCTURE: CPT

## 2022-06-17 PROCEDURE — 74176 CT ABD & PELVIS W/O CONTRAST: CPT

## 2022-06-17 PROCEDURE — 6360000004 HC RX CONTRAST MEDICATION

## 2022-06-17 PROCEDURE — 80061 LIPID PANEL: CPT

## 2022-06-17 RX ORDER — LEVOTHYROXINE SODIUM 0.07 MG/1
75 TABLET ORAL
Qty: 30 TABLET | Refills: 3 | Status: SHIPPED | OUTPATIENT
Start: 2022-06-17 | End: 2022-07-12

## 2022-06-17 RX ADMIN — IOHEXOL 50 ML: 240 INJECTION, SOLUTION INTRATHECAL; INTRAVASCULAR; INTRAVENOUS; ORAL at 09:43

## 2022-06-17 NOTE — TELEPHONE ENCOUNTER
From: Dr. Jessa Barroso  To: Breanna Hem  Sent: 6/17/2022 5:59 PM EDT  Subject: labs    Demond Skeeters,    Prediabetes worsening   Liver function tests greatly improved  Lipids are high  Thyroid is unbalanced, you need more Synthroid--DID YOU RAN OUT OR MISSED SYNTHROID 50? Otherwise labs within normal limits  continue current treatment      If you have any questions, please let me know.     Jessa Barroso MD  [address] [facility] [address]  [address] [address]  Dept: [phone]  Dept Fax: [phone]

## 2022-06-17 NOTE — RESULT ENCOUNTER NOTE
Please notify patient: *Prediabetes worsening   Liver function tests greatly improved  Lipids are high  Thyroid is unbalanced, you need more Synthroid    Otherwise labs within normal limits  continue current treatment    Future Appointments  8/31/2022  9:15 AM    MD viviana Gresham

## 2022-06-18 PROBLEM — K76.0 FATTY LIVER: Status: ACTIVE | Noted: 2022-06-18

## 2022-06-18 PROBLEM — N20.0 BILATERAL KIDNEY STONES: Status: ACTIVE | Noted: 2022-06-18

## 2022-06-28 ENCOUNTER — OFFICE VISIT (OUTPATIENT)
Dept: FAMILY MEDICINE CLINIC | Age: 57
End: 2022-06-28
Payer: COMMERCIAL

## 2022-06-28 VITALS
TEMPERATURE: 97.1 F | DIASTOLIC BLOOD PRESSURE: 88 MMHG | SYSTOLIC BLOOD PRESSURE: 138 MMHG | HEART RATE: 55 BPM | OXYGEN SATURATION: 97 %

## 2022-06-28 DIAGNOSIS — L01.00 IMPETIGO: Primary | ICD-10-CM

## 2022-06-28 DIAGNOSIS — T36.95XA ANTIBIOTIC-INDUCED YEAST INFECTION: ICD-10-CM

## 2022-06-28 DIAGNOSIS — B37.9 ANTIBIOTIC-INDUCED YEAST INFECTION: ICD-10-CM

## 2022-06-28 DIAGNOSIS — K58.0 IRRITABLE BOWEL SYNDROME WITH DIARRHEA: ICD-10-CM

## 2022-06-28 PROCEDURE — 99213 OFFICE O/P EST LOW 20 MIN: CPT | Performed by: NURSE PRACTITIONER

## 2022-06-28 RX ORDER — CEPHALEXIN 500 MG/1
500 CAPSULE ORAL 4 TIMES DAILY
Qty: 28 CAPSULE | Refills: 0 | Status: SHIPPED | OUTPATIENT
Start: 2022-06-28 | End: 2022-07-05

## 2022-06-28 RX ORDER — DICYCLOMINE HYDROCHLORIDE 10 MG/1
10 CAPSULE ORAL 4 TIMES DAILY
Qty: 120 CAPSULE | Refills: 0 | Status: SHIPPED | OUTPATIENT
Start: 2022-06-28 | End: 2022-07-20

## 2022-06-28 RX ORDER — FLUCONAZOLE 150 MG/1
150 TABLET ORAL DAILY
Qty: 1 TABLET | Refills: 0 | Status: SHIPPED | OUTPATIENT
Start: 2022-06-28 | End: 2022-06-29

## 2022-06-28 SDOH — ECONOMIC STABILITY: FOOD INSECURITY: WITHIN THE PAST 12 MONTHS, YOU WORRIED THAT YOUR FOOD WOULD RUN OUT BEFORE YOU GOT MONEY TO BUY MORE.: NEVER TRUE

## 2022-06-28 SDOH — ECONOMIC STABILITY: FOOD INSECURITY: WITHIN THE PAST 12 MONTHS, THE FOOD YOU BOUGHT JUST DIDN'T LAST AND YOU DIDN'T HAVE MONEY TO GET MORE.: NEVER TRUE

## 2022-06-28 ASSESSMENT — ENCOUNTER SYMPTOMS: SORE THROAT: 0

## 2022-06-28 ASSESSMENT — SOCIAL DETERMINANTS OF HEALTH (SDOH): HOW HARD IS IT FOR YOU TO PAY FOR THE VERY BASICS LIKE FOOD, HOUSING, MEDICAL CARE, AND HEATING?: NOT HARD AT ALL

## 2022-06-28 NOTE — TELEPHONE ENCOUNTER
Please Approve or Refuse.   Send to Pharmacy per Pt's Request:      Next Visit Date:  8/31/2022   Last Visit Date: 3/16/2022    Hemoglobin A1C (%)   Date Value   06/17/2022 5.9   09/14/2021 5.1   06/11/2021 5.7             ( goal A1C is < 7)   BP Readings from Last 3 Encounters:   06/28/22 138/88   06/09/22 109/73   03/16/22 120/84          (goal 120/80)  BUN   Date Value Ref Range Status   06/17/2022 13 6 - 20 mg/dL Final     CREATININE   Date Value Ref Range Status   06/17/2022 0.74 0.50 - 0.90 mg/dL Final     Potassium   Date Value Ref Range Status   06/17/2022 3.8 3.7 - 5.3 mmol/L Final

## 2022-06-28 NOTE — LETTER
[facility] [address] [facility] [address]  Phone: [phone]  Fax: [phone]    NEHAL Frausto CNP        June 28, 2022     Patient: Mamta Coronel   YOB: 1965   Date of Visit: 6/28/2022       To Whom It May Concern: It is my medical opinion that Gigi Menard was seen in my clinc today. .    If you have any questions or concerns, please don't hesitate to call.     Sincerely,        NEHAL Frausto CNP

## 2022-06-28 NOTE — PROGRESS NOTES
[address] [facility] [address] [facility] [address]  Dept: [phone]  Dept Fax: [phone]    Nancy Mcduffie is a 55 y.o. female who presents to the urgent care today for her medical conditions/complaints as notedbelow. Nancy Mcduffie is c/o of [place] (pt stated that she has a bump that itechs on her chin. onset saturday )      HPI:     55 yr old female presents for itchy bump on her chin, stated 3 days ago. Reports breaks out at nose or chin when stressed or ill. Has been under stress from caring from ill mom  Using bactroban, abreva with no relief. Pustules with honey colored drng. Using antibiotic soap with no relief. Had to leave work because so itchy. No hx mrsa but bactroban usually clears sx, not this time, keflex sometimes also needed    Rash  This is a new problem. The current episode started in the past 7 days. The problem has been gradually worsening since onset. The affected locations include the face. The rash is characterized by draining, itchiness, redness and swelling (pustules). She was exposed to nothing. Pertinent negatives include no anorexia, fatigue, fever or sore throat. Past treatments include antibiotic cream. The treatment provided no relief.        Past Medical History:   Diagnosis Date    Acquired hypothyroidism 7/27/2020    Bilateral kidney stones 6/18/2022    Bilateral kidney stones 6/18/2022    Bilateral primary osteoarthritis of knee 4/25/2021    Chronic back pain     Coronary artery disease involving native coronary artery of native heart without angina pectoris 3/21/2022    Diarrhea     Distal radius fracture 1/28/13    Drug-seeking behavior 10/16/2014    Essential hypertension 9/14/2021    Fatty liver 6/18/2022    Fatty liver 6/18/2022    FANTA (generalized anxiety disorder)     GERD (gastroesophageal reflux disease)     H/O: hysterectomy 8/19/2020    History of depression     Hyperlipidemia with target LDL less than 100 7/19/2020    Irritable bowel syndrome with diarrhea 3/21/2022    Mild episode of recurrent major depressive disorder ([facility] 7/19/2020    Opioid dependence ([facility] 10/16/2014    Severe obesity (BMI 35.0-39. 9) with comorbidity ([facility] 7/19/2020    Urge incontinence 3/9/2020        Current Outpatient Medications   Medication Sig Dispense Refill    cephALEXin (KEFLEX) 500 MG capsule Take 1 capsule by mouth 4 times daily for 7 days 28 capsule 0    fluconazole (DIFLUCAN) 150 MG tablet Take 1 tablet by mouth daily for 1 dose 1 tablet 0    levothyroxine (SYNTHROID) 75 MCG tablet Take 1 tablet by mouth every morning (before breakfast) Dose increased 6/17/2022 30 tablet 3    amLODIPine (NORVASC) 5 MG tablet Take 1 tablet by mouth daily 90 tablet 3    dicyclomine (BENTYL) 10 MG capsule Take 1 capsule by mouth 4 times daily 120 capsule 0    omeprazole (PRILOSEC) 40 MG delayed release capsule Take 1 capsule by mouth every morning (before breakfast) 30 minutes after Synthroid 90 capsule 1    mupirocin (BACTROBAN NASAL) 2 % nasal ointment Take by Nasal route 3  times daily. 1 each 3    oxybutynin (DITROPAN-XL) 10 MG extended release tablet Take 1 tablet by mouth daily 90 tablet 3    Handicap Placard MISC by Does not apply route Can't walk greater than 200 feet. Expires in 5 years. 1 each 0     No current facility-administered medications for this visit. Allergies   Allergen Reactions    Metformin      Sick to stomach     Metformin And Related      nausea and vomiting , severe stomach pain       Subjective:      Review of Systems   Constitutional: Negative for fatigue and fever. HENT: Negative for sore throat. Gastrointestinal: Negative for anorexia. Skin: Positive for rash. All other systems reviewed and are negative. 14 systems reviewed and negative except as listed in HPI. Objective:     Physical Exam  Vitals and nursing note reviewed. voicedunderstanding.     Electronically signed by Severo Moron, APRN - CNP on 6/28/2022 at 9:22 AM

## 2022-06-28 NOTE — PATIENT INSTRUCTIONS
Patient Education        Impetigo: Care Instructions  Your Care Instructions  Impetigo (say \"xd-zab-UZ-go\") is a skin infection caused by bacteria. It causesblisters that break and become oozing, yellow, crusty sores. Impetigo can be anywhere on the body. Scratching the sores may spread the infection to other parts of the body. You can also spread it to others throughclose contact or when you share towels, clothing, and other items. Prescription antibiotic ointment or pills can usually cure impetigo. (After aday of antibiotics, the infection should not spread.)  Follow-up care is a key part of your treatment and safety. Be sure to make and go to all appointments, and call your doctor if you are having problems. It's also a good idea to know your test results and keep alist of the medicines you take. How can you care for yourself at home?  Apply antibiotic ointment exactly as instructed.  If your doctor prescribed antibiotic pills, take them as directed. Do not stop using them just because you feel better. You need to take the full course of antibiotics.  Gently wash the sores with soap and water each day. If crusts form, your doctor may advise you to soften or remove the crusts. You can do this by soaking them in warm water and patting them dry. This can help the cream or ointment treat impetigo.  After you touch the area, wash your hands with soap and water. Or you can use an alcohol-based hand .  Don't share items such as towels, sheets, and clothing until the infection is gone.  Wash anything that may have touched the infected area.  Try to avoid scratching the area. When should you call for help? Call your doctor now or seek immediate medical care if:     You have symptoms of a worse infection, such as:  ? Increased pain, swelling, warmth, or redness. ? Red streaks leading from the area. ? Pus draining from the area.   ? A fever.      Impetigo gets worse or spreads to other areas. Watch closely for changes in your health, and be sure to contact your doctor if:     You do not get better as expected. Where can you learn more? Go to [email or web address] and sign in to your [employer] account. Enter J114 in the Userstorylab box to learn more about \"Impetigo: Care Instructions. \"     If you do not have an account, please click on the \"Sign Up Now\" link. Current as of: September 20, 2021               Content Version: 13.3  © 4990-3408 Healthwise, Incorporated. Care instructions adapted under license by [facility] ([facility]). If you have questions about a medical condition or this instruction, always ask your healthcare professional. [address] any warranty or liability for your use of this information.

## 2022-07-12 DIAGNOSIS — E03.9 ACQUIRED HYPOTHYROIDISM: ICD-10-CM

## 2022-07-12 RX ORDER — LEVOTHYROXINE SODIUM 0.07 MG/1
TABLET ORAL
Qty: 30 TABLET | Refills: 3 | Status: SHIPPED | OUTPATIENT
Start: 2022-07-12 | End: 2022-10-25 | Stop reason: SDUPTHER

## 2022-07-20 DIAGNOSIS — K58.0 IRRITABLE BOWEL SYNDROME WITH DIARRHEA: ICD-10-CM

## 2022-07-20 RX ORDER — DICYCLOMINE HYDROCHLORIDE 10 MG/1
CAPSULE ORAL
Qty: 120 CAPSULE | Refills: 0 | Status: SHIPPED | OUTPATIENT
Start: 2022-07-20

## 2022-07-20 NOTE — TELEPHONE ENCOUNTER
Please Approve or Refuse.   [facility]      Next Visit Date:  8/31/2022   Last Visit Date: 3/16/2022    Hemoglobin A1C (%)   Date Value   06/17/2022 5.9   09/14/2021 5.1   06/11/2021 5.7             ( goal A1C is < 7)   BP Readings from Last 3 Encounters:   06/28/22 138/88   06/09/22 109/73   03/16/22 120/84          (goal 120/80)  BUN   Date Value Ref Range Status   06/17/2022 13 6 - 20 mg/dL Final     CREATININE   Date Value Ref Range Status   06/17/2022 0.74 0.50 - 0.90 mg/dL Final     Potassium   Date Value Ref Range Status   06/17/2022 3.8 3.7 - 5.3 mmol/L Final

## 2022-09-12 DIAGNOSIS — E03.9 ACQUIRED HYPOTHYROIDISM: ICD-10-CM

## 2022-09-12 RX ORDER — LEVOTHYROXINE SODIUM 0.05 MG/1
50 TABLET ORAL
Qty: 90 TABLET | Refills: 0 | OUTPATIENT
Start: 2022-09-12

## 2022-09-12 NOTE — TELEPHONE ENCOUNTER
Patient is dosage of 75 MCG daily  She is also to do the blood work for thyroid recheck, orders in the computer

## 2022-09-23 DIAGNOSIS — K29.00 OTHER ACUTE GASTRITIS WITHOUT HEMORRHAGE: ICD-10-CM

## 2022-09-23 RX ORDER — OMEPRAZOLE 40 MG/1
CAPSULE, DELAYED RELEASE ORAL
Qty: 90 CAPSULE | Refills: 1 | Status: SHIPPED | OUTPATIENT
Start: 2022-09-23 | End: 2022-09-23 | Stop reason: SDUPTHER

## 2022-09-23 RX ORDER — OMEPRAZOLE 40 MG/1
CAPSULE, DELAYED RELEASE ORAL
Qty: 90 CAPSULE | Refills: 1 | Status: SHIPPED | OUTPATIENT
Start: 2022-09-23 | End: 2022-10-25 | Stop reason: SDUPTHER

## 2022-09-23 NOTE — TELEPHONE ENCOUNTER
Please Approve or Refuse.   Send to Pharmacy per Pt's Request:      Next Visit Date:  10/25/2022   Last Visit Date: 3/16/2022    Hemoglobin A1C (%)   Date Value   06/17/2022 5.9   09/14/2021 5.1   06/11/2021 5.7             ( goal A1C is < 7)   BP Readings from Last 3 Encounters:   06/28/22 138/88   06/09/22 109/73   03/16/22 120/84          (goal 120/80)  BUN   Date Value Ref Range Status   06/17/2022 13 6 - 20 mg/dL Final     Creatinine   Date Value Ref Range Status   06/17/2022 0.74 0.50 - 0.90 mg/dL Final     Potassium   Date Value Ref Range Status   06/17/2022 3.8 3.7 - 5.3 mmol/L Final

## 2022-09-23 NOTE — TELEPHONE ENCOUNTER
Please Approve or Refuse.   Send to Pharmacy per Pt's Request: [facility]      Next Visit Date:  Visit date not found   Last Visit Date: 3/16/2022    Hemoglobin A1C (%)   Date Value   06/17/2022 5.9   09/14/2021 5.1   06/11/2021 5.7             ( goal A1C is < 7)   BP Readings from Last 3 Encounters:   06/28/22 138/88   06/09/22 109/73   03/16/22 120/84          (goal 120/80)  BUN   Date Value Ref Range Status   06/17/2022 13 6 - 20 mg/dL Final     Creatinine   Date Value Ref Range Status   06/17/2022 0.74 0.50 - 0.90 mg/dL Final     Potassium   Date Value Ref Range Status   06/17/2022 3.8 3.7 - 5.3 mmol/L Final

## 2022-10-15 ENCOUNTER — APPOINTMENT (OUTPATIENT)
Dept: GENERAL RADIOLOGY | Age: 57
End: 2022-10-15

## 2022-10-15 ENCOUNTER — HOSPITAL ENCOUNTER (EMERGENCY)
Age: 57
Discharge: HOME | End: 2022-10-15
Attending: EMERGENCY MEDICINE

## 2022-10-15 VITALS
WEIGHT: 210 LBS | HEIGHT: 62 IN | TEMPERATURE: 98.7 F | SYSTOLIC BLOOD PRESSURE: 146 MMHG | RESPIRATION RATE: 17 BRPM | DIASTOLIC BLOOD PRESSURE: 83 MMHG | OXYGEN SATURATION: 98 % | BODY MASS INDEX: 38.64 KG/M2 | HEART RATE: 60 BPM

## 2022-10-15 DIAGNOSIS — M25.511 RIGHT SHOULDER PAIN, UNSPECIFIED CHRONICITY: Primary | ICD-10-CM

## 2022-10-15 PROCEDURE — 73030 X-RAY EXAM OF SHOULDER: CPT

## 2022-10-15 PROCEDURE — 99284 EMERGENCY DEPT VISIT MOD MDM: CPT

## 2022-10-15 PROCEDURE — 96372 THER/PROPH/DIAG INJ SC/IM: CPT

## 2022-10-15 PROCEDURE — 6360000002 HC RX W HCPCS: Performed by: EMERGENCY MEDICINE

## 2022-10-15 RX ORDER — NAPROXEN 500 MG/1
500 TABLET ORAL 2 TIMES DAILY PRN
Qty: 20 TABLET | Refills: 0 | Status: SHIPPED | OUTPATIENT
Start: 2022-10-15 | End: 2022-10-25 | Stop reason: ALTCHOICE

## 2022-10-15 RX ORDER — KETOROLAC TROMETHAMINE 30 MG/ML
30 INJECTION, SOLUTION INTRAMUSCULAR; INTRAVENOUS ONCE
Status: DISCONTINUED | OUTPATIENT
Start: 2022-10-15 | End: 2022-10-15

## 2022-10-15 RX ORDER — ACETAMINOPHEN 325 MG/1
650 TABLET ORAL ONCE
Status: DISCONTINUED | OUTPATIENT
Start: 2022-10-15 | End: 2022-10-15

## 2022-10-15 RX ORDER — KETOROLAC TROMETHAMINE 30 MG/ML
30 INJECTION, SOLUTION INTRAMUSCULAR; INTRAVENOUS ONCE
Status: COMPLETED | OUTPATIENT
Start: 2022-10-15 | End: 2022-10-15

## 2022-10-15 RX ADMIN — KETOROLAC TROMETHAMINE 30 MG: 30 INJECTION, SOLUTION INTRAMUSCULAR at 07:43

## 2022-10-15 ASSESSMENT — PAIN DESCRIPTION - LOCATION: LOCATION: SHOULDER

## 2022-10-15 ASSESSMENT — ENCOUNTER SYMPTOMS
ABDOMINAL PAIN: 0
COLOR CHANGE: 0
EYE PAIN: 0
BACK PAIN: 0
SHORTNESS OF BREATH: 0

## 2022-10-15 ASSESSMENT — PAIN SCALES - GENERAL
PAINLEVEL_OUTOF10: 10
PAINLEVEL_OUTOF10: 10

## 2022-10-15 ASSESSMENT — PAIN - FUNCTIONAL ASSESSMENT: PAIN_FUNCTIONAL_ASSESSMENT: 0-10

## 2022-10-15 ASSESSMENT — PAIN DESCRIPTION - ORIENTATION: ORIENTATION: RIGHT

## 2022-10-15 NOTE — ED NOTES
Mode of arrival (squad #, walk in, police, etc) : Walk in      Chief complaint(s): Right shoulder pain       Arrival Note (brief scenario, treatment PTA, etc). : Pt arrives to the ED with complaint of right sided shoulder pain that has been present for 1 month. Pt reports that pain is not improving despite over the counter medications such as icy hot and ibuprofen or heat/ice. Pt reports that she has no insurance and cannot see her pcp for this pain therefore she is seeking treatment at the ED. Pt is A&OX4 with no associated complaints or concerns. C= \"Have you ever felt that you should Cut down on your drinking? \"  No  A= \"Have people Annoyed you by criticizing your drinking? \"  No  G= \"Have you ever felt bad or Guilty about your drinking? \"  No  E= \"Have you ever had a drink as an Eye-opener first thing in the morning to steady your nerves or to help a hangover? \"  No      Deferred []      Reason for deferring: N/A    *If yes to two or more: probable alcohol abuse. Yusuf Alvarado RN  10/15/22 4417

## 2022-10-15 NOTE — ED PROVIDER NOTES
EMERGENCY DEPARTMENT ENCOUNTER    Pt Name: Carlos Mckeon  MRN: [id number]  [place] 1965  Date of evaluation: 10/15/22  CHIEF COMPLAINT       Chief Complaint   Patient presents with    Shoulder Pain     right     HISTORY OF PRESENT ILLNESS   64-year-old female presents for complaints of right shoulder pain. Patient reports that she been having right shoulder pain for the last month. States that it is worse when she moves her arm, denies anything making it significantly better, states that she has been using ice heat and icy hot at home. Denies any new numbness tingling or weakness to the extremity, denies any specific injuries that she can think of, she denies any specific overuse that she can think of. Denies any associated chest pain or shortness of breath. Denies any neck or back pain. The history is provided by the patient. REVIEW OF SYSTEMS     Review of Systems   Constitutional:  Negative for fever. HENT:  Negative for congestion and ear pain. Eyes:  Negative for pain. Respiratory:  Negative for shortness of breath. Cardiovascular:  Negative for chest pain, palpitations and leg swelling. Gastrointestinal:  Negative for abdominal pain. Genitourinary:  Negative for dysuria and flank pain. Musculoskeletal:  Negative for back pain. Shoulder pain   Skin:  Negative for color change. Neurological:  Negative for numbness and headaches. Psychiatric/Behavioral:  Negative for confusion. All other systems reviewed and are negative.   PASTMEDICAL HISTORY     Past Medical History:   Diagnosis Date    Acquired hypothyroidism 7/27/2020    Bilateral kidney stones 6/18/2022    Bilateral kidney stones 6/18/2022    Bilateral primary osteoarthritis of knee 4/25/2021    Chronic back pain     Coronary artery disease involving native coronary artery of native heart without angina pectoris 3/21/2022    Diarrhea     Distal radius fracture 1/28/13    Drug-seeking behavior 10/16/2014 Essential hypertension 9/14/2021    Fatty liver 6/18/2022    Fatty liver 6/18/2022    FANTA (generalized anxiety disorder)     GERD (gastroesophageal reflux disease)     H/O: hysterectomy 8/19/2020    History of depression     Hyperlipidemia with target LDL less than 100 7/19/2020    Irritable bowel syndrome with diarrhea 3/21/2022    Mild episode of recurrent major depressive disorder ([facility] 7/19/2020    Opioid dependence ([facility] 10/16/2014    Severe obesity (BMI 35.0-39. 9) with comorbidity ([facility] 7/19/2020    Urge incontinence 3/9/2020     Past Problem List  Patient Active Problem List   Diagnosis Code    Chronic pain of both knees M25.561, M25.562, G89.29    Chronic back pain greater than 3 months duration M54.9, G89.29    Gastroesophageal reflux disease without esophagitis K21.9    FANTA (generalized anxiety disorder) F41.1    Bulge of lumbar disc without myelopathy M51.36    Vitamin D deficiency E55.9    Severe obesity (BMI 35.0-39. 9) with comorbidity (HCC) E66.01    Hyperlipidemia with target LDL less than 100 E78.5    Chronic fatigue R53.82    OAB (overactive bladder) N32.81    Insomnia due to medical condition G47.01    Mild episode of recurrent major depressive disorder (HCC) F33.0    Acquired hypothyroidism E03.9    H/O: hysterectomy Z90.710    Anxiety F41.9    Chronic right-sided low back pain without sciatica M54.50, G89.29    Primary osteoarthritis of both knees M17.0    Change in skin mole D22.9    Murmur, cardiac R01.1    Prediabetes R73.03    Difficulty walking R26.2    Left axis deviation R94.31    Abnormal EKG R94.31    Difficulty walking up stairs R26.2    Essential hypertension I10    History of left knee replacement Z96.652    Moderate episode of recurrent major depressive disorder (HCC) F33.1    Presence of left artificial knee joint Z96.652    Irritable bowel syndrome with diarrhea K58.0    Gastritis K29.70    Coronary artery disease involving native coronary artery of native heart without angina pectoris I25.10    Fatty liver K76.0    Bilateral kidney stones N20.0     SURGICAL HISTORY       Past Surgical History:   Procedure Laterality Date    BLADDER SUSPENSION      CARDIAC CATHETERIZATION  2021    LAD: Diffuse irregularities 30-40%. , EF 60%    FRACTURE SURGERY  2013    right distial radius orif    HYSTERECTOMY (CERVIX STATUS UNKNOWN)      has ovaries    KNEE ARTHROSCOPY      right    OR COLONOSCOPY W/BIOPSY SINGLE/MULTIPLE N/A 2017    COLONOSCOPY WITH BIOPSY OF POLYP performed by Lizy Morley MD at [facility] EGD TRANSORAL BIOPSY SINGLE/MULTIPLE N/A 2017    EGD BIOPSY performed by Lizy Morley MD at [address] [place] [address] 2021    Dr. Stuart [place]       Previous Medications    AMLODIPINE (NORVASC) 5 MG TABLET    Take 1 tablet by mouth daily    DICYCLOMINE (BENTYL) 10 MG CAPSULE    TAKE 1 CAPSULE BY MOUTH FOUR TIMES A DAY    HANDICAP PLACARD MISC    by Does not apply route Can't walk greater than 200 feet. Expires in 5 years. LEVOTHYROXINE (SYNTHROID) 75 MCG TABLET    TAKE 1 TABLET BY MOUTH EVERY MORNING (BEFORE BREAKFAST)    MUPIROCIN (BACTROBAN NASAL) 2 % NASAL OINTMENT    Take by Nasal route 3  times daily. OMEPRAZOLE (PRILOSEC) 40 MG DELAYED RELEASE CAPSULE    TAKE 1 CAPSULE BY MOUTH IN THE MORNING BEFORE BREAKFAST *30 MINUTES AFTER SYNTHROID*    OXYBUTYNIN (DITROPAN-XL) 10 MG EXTENDED RELEASE TABLET    Take 1 tablet by mouth daily     ALLERGIES     is allergic to metformin and metformin and related. FAMILY HISTORY     She indicated that her mother is alive. She indicated that her father is alive. She indicated that her sister is alive. She indicated that her brother is alive. She indicated that her maternal grandfather is . She indicated that the status of her paternal aunt is unknown. She indicated that her maternal cousin is .      SOCIAL HISTORY       Social History Tobacco Use    Smoking status: Former     Packs/day: 0.50     Years: 3.00     Pack years: 1.50     Types: Cigarettes     Quit date: 1988     Years since quittin.8    Smokeless tobacco: Never   Vaping Use    Vaping Use: Never used   Substance Use Topics    Alcohol use: Not Currently     Comment: occasionally    Drug use: No     PHYSICAL EXAM     INITIAL VITALS: BP (!) 146/83   Pulse 60   Temp 98.7 °F (37.1 °C) (Oral)   Resp 17   Ht 5' 2\" (1.575 m)   Wt 210 lb (95.3 kg)   SpO2 98%   BMI 38.41 kg/m²    Physical Exam  Vitals and nursing note reviewed. Constitutional:       General: She is not in acute distress. Appearance: Normal appearance. She is not toxic-appearing. HENT:      Head: Normocephalic and atraumatic. Nose: Nose normal.      Mouth/Throat:      Mouth: Mucous membranes are moist.      Pharynx: Oropharynx is clear. Eyes:      Extraocular Movements: Extraocular movements intact. Conjunctiva/sclera: Conjunctivae normal.      Pupils: Pupils are equal, round, and reactive to light. Cardiovascular:      Rate and Rhythm: Normal rate and regular rhythm. Pulses: Normal pulses. Radial pulses are 2+ on the right side and 2+ on the left side. Heart sounds: Normal heart sounds. Pulmonary:      Effort: Pulmonary effort is normal.      Breath sounds: Normal breath sounds. Abdominal:      General: Bowel sounds are normal. There is no distension. Palpations: Abdomen is soft. Tenderness: There is no abdominal tenderness. Musculoskeletal:         General: Normal range of motion. Right shoulder: Tenderness present. Normal strength. Normal pulse. Cervical back: Normal range of motion. No spinous process tenderness or muscular tenderness. Comments: Tenderness over the right biceps head, pain with empty can test   Skin:     General: Skin is warm and dry. Capillary Refill: Capillary refill takes less than 2 seconds.    Neurological: General: No focal deficit present. Mental Status: She is alert and oriented to person, place, and time. Cranial Nerves: Cranial nerves 2-12 are intact. Sensory: Sensation is intact. Motor: Motor function is intact. Psychiatric:         Mood and Affect: Mood normal.         Thought Content: Thought content does not include homicidal or suicidal ideation. MEDICAL DECISION MAKING:   11year-old female presents for complaints of right shoulder pain x1 month. On initial exam patient in no acute distress vitals are stable, neurovascularly intact, +2 radial pulse, 5 out of 5 strength in the right upper extremity, sensation intact, does have tenderness over the biceps head as well some tenderness of the AC joint, pain with empty can test, she is denying any chest pain or shortness of breath and given chronicity doubt any ACS, will check x-ray and symptomatic treatment    X-ray was reviewed showing mild degenerative changes, no fracture or dislocation    Patient was reevaluated reports that her pain is improved after meds    Suspect pain could be related to possible tendon or ligament injury    This was discussed with patient, discussed concern for possible rotator cuff injury, discussed continued supportive care will provide information for orthopedic surgery follow-up and discussed return precautions, patient was understanding comfortable plan and discharge    Patient/Guardian was informed of their diagnosis and told to follow up with PCP & orthopedic surgery in 1-3 days. Patient demonstrates understanding and agreement with the plan. They were given the opportunity to ask questions and those questions were answered to the best of our ability with the available information. Patient/Guardian told to return to the ED for any new, worsening, changing or persistent symptoms. This dictation was prepared using Aibo voice recognition software.           CRITICAL CARE: PROCEDURES:    Procedures    DIAGNOSTIC RESULTS   EKG:All EKG's are interpreted by the Emergency Department Physician who either signs or Co-signs this chart in the absence of a cardiologist.        RADIOLOGY:All plain film, CT, MRI, and formal ultrasound images (except ED bedside ultrasound) are read by the radiologist, see reports below, unless otherwisenoted in MDM or here. XR SHOULDER RIGHT (MIN 2 VIEWS)   Final Result   1. Mild degenerative changes as detailed above. 2. No acute fracture or dislocation. LABS: All lab results were reviewed by myself, and all abnormals are listed below. Labs Reviewed - No data to display    EMERGENCY DEPARTMENTCOURSE:         Vitals:    Vitals:    10/15/22 0727   BP: (!) 146/83   Pulse: 60   Resp: 17   Temp: 98.7 °F (37.1 °C)   TempSrc: Oral   SpO2: 98%   Weight: 210 lb (95.3 kg)   Height: 5' 2\" (1.575 m)       The patient was given the following medications while in the emergency department:  Orders Placed This Encounter   Medications    DISCONTD: ketorolac (TORADOL) injection 30 mg    ketorolac (TORADOL) injection 30 mg    DISCONTD: acetaminophen (TYLENOL) tablet 650 mg    naproxen (NAPROSYN) 500 MG tablet     Sig: Take 1 tablet by mouth 2 times daily as needed for Pain     Dispense:  20 tablet     Refill:  0     CONSULTS:  None    FINAL IMPRESSION      1.  Right shoulder pain, unspecified chronicity          DISPOSITION/PLAN   DISPOSITION Decision To Discharge 10/15/2022 09:17:48 AM      PATIENT REFERRED TO:  Abdiel Holm MD  [address] [place], [place] [address]nick.  [address]  [address] [place] [address] [facility]  [phone]    Schedule an appointment as soon as possible for a visit       [facility] ED  [facility]  [address] [facility] [address] [phone]    As needed, If symptoms worsen    Diya Caballero MD  [facility]  [address] [phone]    Schedule an appointment as soon as possible for a visit     DISCHARGE MEDICATIONS:  New Prescriptions    NAPROXEN

## 2022-10-24 RX ORDER — NAPROXEN 500 MG/1
500 TABLET ORAL 2 TIMES DAILY PRN
Qty: 20 TABLET | Refills: 0 | Status: CANCELLED | OUTPATIENT
Start: 2022-10-24

## 2022-10-24 RX ORDER — DICYCLOMINE HYDROCHLORIDE 10 MG/1
CAPSULE ORAL
Qty: 120 CAPSULE | Refills: 3 | Status: CANCELLED | OUTPATIENT
Start: 2022-10-24

## 2022-10-24 NOTE — PROGRESS NOTES
Visit Information    Have you changed or started any medications since your last visit including any over-the-counter medicines, vitamins, or herbal medicines? no   Are you having any side effects from any of your medications? -  no  Have you stopped taking any of your medications? Is so, why? -  no    Have you seen any other physician or provider since your last visit? No  Have you had any other diagnostic tests since your last visit? No  Have you been seen in the emergency room and/or had an admission to a hospital since we last saw you? No  Have you had your routine dental cleaning in the past 6 months? yes -     Have you activated your [employer] account? If not, what are your barriers?  Yes     Patient Care Team:  Teto Plummer MD as PCP - General (Family Medicine)  Teto Plummer MD as PCP - [facility]  Brionna Stoner RN as Registered Nurse  Gemini Myers as Consulting Physician (Orthopedic Surgery)    Medical History Review  Past Medical, Family, and Social History reviewed and does contribute to the patient presenting condition    Health Maintenance   Topic Date Due    Shingles vaccine (1 of 2) Never done    COVID-19 Vaccine (3 - Booster for Orie Huguenin series) 06/11/2021    Breast cancer screen  06/12/2022    Flu vaccine (1) Never done    Colorectal Cancer Screen  11/16/2022    Depression Monitoring  03/16/2023    A1C test (Diabetic or Prediabetic)  06/17/2023    Lipids  06/17/2027    DTaP/Tdap/Td vaccine (2 - Td or Tdap) 01/19/2031    Hepatitis C screen  Completed    HIV screen  Completed    Hepatitis A vaccine  Aged Out    Hib vaccine  Aged Out    Meningococcal (ACWY) vaccine  Aged Out    Pneumococcal 0-64 years Vaccine  Aged Out

## 2022-10-25 ENCOUNTER — TELEMEDICINE (OUTPATIENT)
Dept: FAMILY MEDICINE CLINIC | Age: 57
End: 2022-10-25

## 2022-10-25 DIAGNOSIS — Z23 ENCOUNTER FOR IMMUNIZATION: ICD-10-CM

## 2022-10-25 DIAGNOSIS — Z12.31 ENCOUNTER FOR SCREENING MAMMOGRAM FOR BREAST CANCER: ICD-10-CM

## 2022-10-25 DIAGNOSIS — E03.9 ACQUIRED HYPOTHYROIDISM: ICD-10-CM

## 2022-10-25 DIAGNOSIS — Z12.11 SCREEN FOR COLON CANCER: ICD-10-CM

## 2022-10-25 DIAGNOSIS — R73.9 HYPERGLYCEMIA: ICD-10-CM

## 2022-10-25 DIAGNOSIS — E66.01 SEVERE OBESITY (BMI 35.0-39.9) WITH COMORBIDITY (HCC): ICD-10-CM

## 2022-10-25 DIAGNOSIS — N32.81 OAB (OVERACTIVE BLADDER): ICD-10-CM

## 2022-10-25 DIAGNOSIS — E78.5 HYPERLIPIDEMIA WITH TARGET LDL LESS THAN 100: ICD-10-CM

## 2022-10-25 DIAGNOSIS — I25.10 CORONARY ARTERY DISEASE INVOLVING NATIVE CORONARY ARTERY OF NATIVE HEART WITHOUT ANGINA PECTORIS: ICD-10-CM

## 2022-10-25 DIAGNOSIS — S43.421A SPRAIN OF RIGHT ROTATOR CUFF CAPSULE, INITIAL ENCOUNTER: ICD-10-CM

## 2022-10-25 DIAGNOSIS — I10 ESSENTIAL HYPERTENSION: Primary | ICD-10-CM

## 2022-10-25 PROBLEM — M17.11 PRIMARY OSTEOARTHRITIS OF RIGHT KNEE: Status: ACTIVE | Noted: 2022-06-22

## 2022-10-25 PROCEDURE — 99214 OFFICE O/P EST MOD 30 MIN: CPT | Performed by: FAMILY MEDICINE

## 2022-10-25 RX ORDER — NAPROXEN 500 MG/1
500 TABLET ORAL 2 TIMES DAILY PRN
Qty: 60 TABLET | Refills: 0 | Status: SHIPPED | OUTPATIENT
Start: 2022-10-25

## 2022-10-25 RX ORDER — AMLODIPINE BESYLATE 5 MG/1
5 TABLET ORAL DAILY
Qty: 90 TABLET | Refills: 3 | Status: SHIPPED | OUTPATIENT
Start: 2022-10-25

## 2022-10-25 RX ORDER — OMEPRAZOLE 40 MG/1
CAPSULE, DELAYED RELEASE ORAL
Qty: 90 CAPSULE | Refills: 1 | Status: SHIPPED | OUTPATIENT
Start: 2022-10-25

## 2022-10-25 RX ORDER — BACLOFEN 10 MG/1
10 TABLET ORAL 3 TIMES DAILY PRN
Qty: 90 TABLET | Refills: 0 | Status: SHIPPED | OUTPATIENT
Start: 2022-10-25

## 2022-10-25 RX ORDER — LIRAGLUTIDE 6 MG/ML
0.6 INJECTION SUBCUTANEOUS DAILY
Qty: 2 ADJUSTABLE DOSE PRE-FILLED PEN SYRINGE | Refills: 3 | Status: SHIPPED | OUTPATIENT
Start: 2022-10-25

## 2022-10-25 RX ORDER — METHYLPREDNISOLONE 4 MG/1
TABLET ORAL
Qty: 1 KIT | Refills: 0 | Status: SHIPPED | OUTPATIENT
Start: 2022-10-25 | End: 2022-11-22 | Stop reason: SDUPTHER

## 2022-10-25 RX ORDER — OXYBUTYNIN CHLORIDE 10 MG/1
10 TABLET, EXTENDED RELEASE ORAL DAILY
Qty: 90 TABLET | Refills: 3 | Status: SHIPPED | OUTPATIENT
Start: 2022-10-25

## 2022-10-25 RX ORDER — LEVOTHYROXINE SODIUM 0.07 MG/1
TABLET ORAL
Qty: 30 TABLET | Refills: 3 | Status: SHIPPED | OUTPATIENT
Start: 2022-10-25

## 2022-10-25 ASSESSMENT — ENCOUNTER SYMPTOMS
COUGH: 0
CHEST TIGHTNESS: 0
CONSTIPATION: 0
SHORTNESS OF BREATH: 0
WHEEZING: 0
DIARRHEA: 0
ABDOMINAL PAIN: 0
NAUSEA: 0
VOMITING: 0
ABDOMINAL DISTENTION: 0

## 2022-10-25 NOTE — PROGRESS NOTES
10/25/2022    TELEHEALTH EVALUATION -- Audio/Visual (During [id number] public health emergency)      Mary Diaz (:  1965) is a 65 y.o. female,Established patient, here for evaluation of the following chief complaint(s): Hypertension, Medication Refill, Shoulder Pain (right), Hyperlipidemia, Thyroid Problem, Health Maintenance (No insurance now, awaiting for Medicaid), and Hyperglycemia        ASSESSMENT/PLAN:    1. Essential hypertension  Well controlled. Continue current treatment. Will recheck labs. Discussed low salt diet and BP and pulse monitoring.    -     amLODIPine (NORVASC) 5 MG tablet; Take 1 tablet by mouth daily, Disp-90 tablet, R-3Normal  -     CBC; Future  -     Comprehensive Metabolic Panel; Future  -     Magnesium; Future  -     Urinalysis with Reflex to Culture; Future  -     Uric Acid; Future   2. Sprain of right rotator cuff capsule, initial encounter on 2022  Ongoing  -start     methylPREDNISolone (MEDROL, LEONIDAS,) 4 MG tablet; Take by mouth, with food. Keep low carb, low salt diet while taking it, Disp-1 kit, R-0Normal  -  start   baclofen (LIORESAL) 10 MG tablet; Take 1 tablet by mouth 3 times daily as needed (MUSCLE SPASMS) Causes sedation, do not drive while taking this medication, Disp-90 tablet, R-0Normal  -  start   naproxen (NAPROSYN) 500 MG tablet; Take 1 tablet by mouth 2 times daily as needed for Pain Take with food, Disp-60 tablet, R-0Normal  Declines physical therapy due to no insurance at this time  Call or return if symptoms persist or fail to improve, and will refer to orthopedics. 3. Hyperglycemia  Worsening    Lab Results   Component Value Date    LABA1C 5.9 2022    LABA1C 5.1 2021    LABA1C 5.7 2021     Could not tolerate metformin, due to severe GI symptoms  -     Vitamin B12 & Folate; Future  -     Hemoglobin A1C; Future  -    start Liraglutide (VICTOZA) 18 MG/3ML SOPN SC injection;  Inject 0.6 mg into the skin daily, Disp-2 Adjustable Dose Pre-filled Pen Syringe, R-3Normal  -     Insulin Pen Needle 31G X 5 MM MISC; DAILY Starting Tue 10/25/2022, Disp-100 each, R-3, NormalTO BE USED with VICTOZA, DAILY  Low carb, low fat diet, increase fruits and vegetables, and exercise 4-5 times a week 30-40 minutes a day, or walk 1-2 hours per day, or wear a pedometer and get at least 10,000 steps per day. 4. Hyperlipidemia with target LDL less than 100  Inadequately controlled  Not on statin, she would like recheck of the lipid panel after she has done lifestyle changes  Lab Results   Component Value Date    LDLCHOLESTEROL 141 (H) 06/17/2022       -     Lipid Panel; Future  Low carb, low fat diet, increase fruits and vegetables, and exercise 4-5 times a week 30-40 minutes a day, or walk 1-2 hours per day, or wear a pedometer and get at least 10,000 steps per day. 5. Acquired hypothyroidism  Inadequately controlled, but likely improving due to adjustment of Synthroid in June  Recheck labs  Lab Results   Component Value Date    TSH 24.76 (H) 06/17/2022       -     levothyroxine (SYNTHROID) 75 MCG tablet; TAKE 1 TABLET BY MOUTH EVERY MORNING (BEFORE BREAKFAST), Disp-30 tablet, R-3Normal  -     TSH; Future  -     T4, Free; Future  6. OAB (overactive bladder)  Improved with medication, continue current treatment  Will rule out UTI  -     Urinalysis with Reflex to Culture; Future  -     oxybutynin (DITROPAN-XL) 10 MG extended release tablet; Take 1 tablet by mouth daily, Disp-90 tablet, R-3Normal  7. Severe obesity (BMI 35.0-39. 9) with comorbidity (HCC)  Worsening  Wt Readings from Last 3 Encounters:   10/15/22 210 lb (95.3 kg)   03/16/22 197 lb (89.4 kg)   01/05/22 187 lb (84.8 kg)     Low carb, low fat diet, increase fruits and vegetables, and exercise 4-5 times a week 30-40 minutes a day, or walk 1-2 hours per day, or wear a pedometer and get at least 10,000 steps per day. Start Victoza, will use good Rx  8.  Encounter for screening mammogram for breast cancer  -     JUSTIN REBECCA DIGITAL SCREEN BILATERAL; Future  9. Encounter for immunization  -     influenza quadrivalent subunit vaccine (FLUCELVAX) 0.5 ML injection; Inject 0.5 mLs into the muscle once for 1 dose Okay to substitute, Disp-0.5 mL, R-0Normal  -     cOVID-19mRNA bival vac moderna (MODERNA COVID-19 BIVAL BOOSTER) 50 MCG/0.5ML SUSP injection; Inject 0.5 mLs into the muscle once for 1 dose, Disp-1 mL, R-0Normal  10. Coronary artery disease involving native coronary artery of native heart without angina pectoris  Per cardiac cath completed 2021  Minimal, 30 to 40% in LAD, otherwise 10 to 20%, EF 60%  Would benefit from statin, she declines at this time she would like to do the blood work first  Good blood pressure control discussed    11. Screen for colon cancer  Will do colonoscopy when she gets her insurance back    Kenn Terry received counseling on the following healthy behaviors: nutrition, exercise, and medication adherence  Reviewed prior labs and health maintenance  Discussed use, benefit, and side effects of prescribed medications. Barriers to medication compliance addressed. Patient given educational materials - see patient instructions  All patient questions answered. Patient voiced understanding. The patient's past medical,surgical, social, and family history as well as her current medications and allergies were reviewed as documented in today's encounter. Medications, labs, diagnostic studies, consultations and follow-up as documented in this encounter. Return in about 4 months (around 2023) for Visit type PHYSICAL, VISION screen, PHQ9. .    Data Unavailable    No future appointments.       SUBJECTIVE/OBJECTIVE:  Joyce Merida (:  1965) has requested an audio/video evaluation for the following concern(s):Hypertension, Medication Refill, Shoulder Pain (right), Hyperlipidemia, Thyroid Problem, Health Maintenance (No insurance now, awaiting for Medicaid), and Hyperglycemia        Hypertension:    she  is not exercising, but she is very active, and is adherent to low salt diet. Blood pressure is well controlled at home. Cardiac symptoms fatigue. Patient denies chest pain, chest pressure/discomfort, claudication, dyspnea, exertional chest pressure/discomfort, irregular heart beat, lower extremity edema, near-syncope, orthopnea, palpitations, paroxysmal nocturnal dyspnea, syncope, and tachypnea. Cardiovascular risk factors: dyslipidemia, hypertension, and obesity (BMI >= 30 kg/m2). Use of agents associated with hypertension: NSAIDS and thyroid hormones. History of target organ damage: none. Patient had cardiac cath 8/13/2021 showing minimal nonobstructive coronary artery disease and normal ejection fraction, 60%    BP controlled. Libby Lebron reports compliance with BP medications, and tolerates them well, denies side effects. blood pressure is Normal.  130/80 mmHg    BP Readings from Last 3 Encounters:   10/15/22 (!) 146/83   06/28/22 138/88   06/09/22 109/73        Pulse is Normal.    Pulse Readings from Last 3 Encounters:   10/15/22 60   06/28/22 55   06/09/22 (!) 101       Weight 210 pounds and height 5 foot 2 inches, BMI 38.41 kg/M2 severe obesity per BMI, she wants to lose weight  Has been eating healthier and being more active, but unable to lose weight    Weight is worsening  Wt Readings from Last 3 Encounters:   10/15/22 210 lb (95.3 kg)   03/16/22 197 lb (89.4 kg)   01/05/22 187 lb (84.8 kg)       Patient reports right shoulder rotator cuff strain and sprain for 1 month, which happened approximatelly 9/25/22 while at work doing strenuous activities, lifting and throwing. Woke up with it the next day. She says it is not work comp  Cannot  over head and has difficulty getting dressed, washing her hair or combing her hair with her right arm. She is right-handed.   Tried ice, bengay over the counter, but did not help  Has been walking a lot  Intensity of pain is 9/10  Will go to orthopedics if not getting better  Pulled the dog on 10/15/2022 and made it worse again  An x-ray was done showing mild degenerative changes on 10/15/2022  She was seen in emergency room on 10/15/2022     Impression   1. Mild degenerative changes as detailed above. 2. No acute fracture or dislocation. Prediabetes  Patient reports she has stopped drinking pop. Unable to lose weight. Metformin gave her severe GI symptoms. Denies increased appetite, thirst or polyuria. She could not tolerate Adipex due to high blood pressure    Lab Results   Component Value Date    LABA1C 5.9 06/17/2022    LABA1C 5.1 09/14/2021    LABA1C 5.7 06/11/2021       Hyperlipidemia:  Patient is  following a low fat, low cholesterol diet. She is not exercising regularly. OTC Supplements:none     LDL is high with high triglycerides  Lab Results   Component Value Date    LDLCHOLESTEROL 141 (H) 06/17/2022     Lab Results   Component Value Date    TRIG 153 (H) 06/17/2022    TRIG 64 02/19/2021    TRIG 84 07/24/2020     Hypothyroidism: Recent symptoms: fatigue, weight gain, and anxiety. She denies weight loss, cold intolerance, heat intolerance, hair loss, dry skin, constipation, diarrhea, edema, tremor, palpitations, and dysphagia. Patient is  taking her medication consistently on an empty stomach. TSH is Elevated. No results found for: [facility]  Lab Results   Component Value Date    TSH 24.76 (H) 06/17/2022    TSH 13.11 (H) 06/18/2021    TSH 6.04 (H) 05/14/2021         Has overactive bladder with frequency of urination improved with oxybutynin. She denies nocturia, urine incontinence or burning with urination    She is due for Mammogram.   Denies breast pain, lumps or nipple discharge      Patient is due for colon cancer screening. Maryolu Paredes denies  nausea, vomiting, diarrhea, constipation, blood in the stool or abdominal pain.   We discussed options, she would like to have:  colonoscopy when she gets her colonoscopy back                 Review of Systems   Constitutional:  Positive for fatigue and unexpected weight change. Negative for activity change, appetite change, chills, diaphoresis and fever. Respiratory:  Negative for cough, chest tightness, shortness of breath and wheezing. Cardiovascular:  Negative for chest pain, palpitations and leg swelling. Gastrointestinal:  Negative for abdominal distention, abdominal pain, constipation, diarrhea, nausea and vomiting. Endocrine: Negative for cold intolerance, heat intolerance, polydipsia, polyphagia and polyuria. Musculoskeletal:  Positive for arthralgias (right shoulder, knees). Neurological:  Positive for weakness (RUE due to right shoulder pain). Psychiatric/Behavioral:  The patient is nervous/anxious. Prior to Visit Medications    Medication Sig Taking? Authorizing Provider   naproxen (NAPROSYN) 500 MG tablet Take 1 tablet by mouth 2 times daily as needed for Pain Yes Chacho Knox,    omeprazole (PRILOSEC) 40 MG delayed release capsule TAKE 1 CAPSULE BY MOUTH IN THE MORNING BEFORE BREAKFAST *30 MINUTES AFTER SYNTHROID* Yes Max Chauhan MD   dicyclomine (BENTYL) 10 MG capsule TAKE 1 CAPSULE BY MOUTH FOUR TIMES A DAY Yes Max Chauhan MD   levothyroxine (SYNTHROID) 75 MCG tablet TAKE 1 TABLET BY MOUTH EVERY MORNING (BEFORE BREAKFAST) Yes Max Chauhan MD   amLODIPine (NORVASC) 5 MG tablet Take 1 tablet by mouth daily Yes Max Chauhan MD   mupirocin (BACTROBAN NASAL) 2 % nasal ointment Take by Nasal route 3  times daily. Patient taking differently: Take by Nasal route 3  times daily. Yes NEHAL Garcia - CNP   oxybutynin (DITROPAN-XL) 10 MG extended release tablet Take 1 tablet by mouth daily Yes Max Chauhan MD   Handicap Placard MISC by Does not apply route Can't walk greater than 200 feet. Expires in 5 years.  Yes Max Chauhan MD       Social History     Tobacco Use    Smoking status: Former     Packs/day: 0.50     Years: 3.00     Pack years: 1.50     Types: Cigarettes     Quit date: 1988     Years since quittin.8    Smokeless tobacco: Never   Vaping Use    Vaping Use: Never used   Substance Use Topics    Alcohol use: Not Currently     Comment: occasionally    Drug use: No          PHYSICAL EXAMINATION:    Vital Signs: (As obtained by patient/caregiver or practitioner observation)  -vital signs stable and within normal limits except severe obesity per BMI, BMI 38.41 kg/M2  Patient-Reported Vitals 10/25/2022   Patient-Reported Weight 210 lbs   Patient-Reported Height 5' 2\"   Patient-Reported Systolic 819   Patient-Reported Diastolic 80   Patient-Reported Temperature -           Intensity of pain is 9/10       Constitutional: [x] Appears well-developed and well-nourished [x] No apparent distress      [x] Abnormal- obese      Mental status  [x] Alert and awake  [x] Oriented to person/place/time [x]Able to follow commands      Eyes:  EOM    [x]  Normal  [] Abnormal-  Sclera  [x]  Normal  [] Abnormal -         Discharge [x]  None visible  [] Abnormal -    HENT:   [x] Normocephalic, atraumatic.   [] Abnormal   [x] Mouth/Throat: Mucous membranes are moist.     External Ears [x] Normal  [] Abnormal-     Neck: [x] No visualized mass     Pulmonary/Chest: [x] Respiratory effort normal.  [x] No visualized signs of difficulty breathing or respiratory distress        [] Abnormal        Musculoskeletal:   [x] Normal gait with no signs of ataxia         [x] Normal range of motion of neck        [x] Abnormal-unable to lift up right arm above the neck, needs help from the other arm to lift up above the neck line  Also has difficulty putting her right arm on the back        Neurological:        [x] No Facial Asymmetry (Cranial nerve 7 motor function) (limited exam to video visit)          [x] No gaze palsy        [] Abnormal-            Skin:        [x] No significant exanthematous lesions or discoloration noted on facial skin         [] Abnormal-            Psychiatric:      [x] No Hallucinations       [x]Mood is normal      [x]Behavior is normal      [x]Judgment is normal      [x]Thought content is normal       [] Abnormal-     Other pertinent observable physical exam findings-none    Due to this being a TeleHealth encounter, evaluation of the following organ systems is limited: Vitals/Constitutional/EENT/Resp/CV/GI//MS/Neuro/Skin/Heme-Lymph-Imm. I personally reviewed most recent labs reviewed with the patient and all questions fully answered.    Hyperglycemia and mild prediabetes  Increased ALT likely fatty liver  TSH not controlled  Hyperlipidemia    Otherwise labs within normal limits        Lab Results   Component Value Date    WBC 5.8 06/17/2022    HGB 13.5 06/17/2022    HCT 41.8 06/17/2022    MCV 87.2 06/17/2022     06/17/2022       Lab Results   Component Value Date/Time     06/17/2022 09:43 AM    K 3.8 06/17/2022 09:43 AM     06/17/2022 09:43 AM    CO2 25 06/17/2022 09:43 AM    BUN 13 06/17/2022 09:43 AM    CREATININE 0.74 06/17/2022 09:43 AM    GLUCOSE 103 06/17/2022 09:43 AM    CALCIUM 9.7 06/17/2022 09:43 AM        Lab Results   Component Value Date    LABA1C 5.9 06/17/2022    LABA1C 5.1 09/14/2021    LABA1C 5.7 06/11/2021         Lab Results   Component Value Date    ALT 53 (H) 06/17/2022    AST 31 06/17/2022    ALKPHOS 107 (H) 06/17/2022    BILITOT 0.37 06/17/2022       Lab Results   Component Value Date    TSH 24.76 (H) 06/17/2022       Lab Results   Component Value Date    CHOL 222 (H) 06/17/2022    CHOL 126 02/19/2021    CHOL 204 (H) 07/24/2020     Lab Results   Component Value Date    TRIG 153 (H) 06/17/2022    TRIG 64 02/19/2021    TRIG 84 07/24/2020     Lab Results   Component Value Date    HDL 50 06/17/2022    HDL 54 02/19/2021    HDL 49 07/24/2020     Lab Results   Component Value Date    LDLCHOLESTEROL 141 (H) 06/17/2022    LDLCHOLESTEROL 59 02/19/2021    LDLCHOLESTEROL 138 (H) 07/24/2020       Lab Results   Component Value Date    CHOLHDLRATIO 4.4 06/17/2022    CHOLHDLRATIO 2.3 02/19/2021    CHOLHDLRATIO 4.2 07/24/2020       Lab Results   Component Value Date    LABA1C 5.9 06/17/2022    LABA1C 5.1 09/14/2021    LABA1C 5.7 06/11/2021       Orders Placed This Encounter   Medications    amLODIPine (NORVASC) 5 MG tablet     Sig: Take 1 tablet by mouth daily     Dispense:  90 tablet     Refill:  3    levothyroxine (SYNTHROID) 75 MCG tablet     Sig: TAKE 1 TABLET BY MOUTH EVERY MORNING (BEFORE BREAKFAST)     Dispense:  30 tablet     Refill:  3    omeprazole (PRILOSEC) 40 MG delayed release capsule     Sig: TAKE 1 CAPSULE BY MOUTH IN THE MORNING BEFORE BREAKFAST *30 MINUTES AFTER SYNTHROID*     Dispense:  90 capsule     Refill:  1    oxybutynin (DITROPAN-XL) 10 MG extended release tablet     Sig: Take 1 tablet by mouth daily     Dispense:  90 tablet     Refill:  3    methylPREDNISolone (MEDROL, LEONIDAS,) 4 MG tablet     Sig: Take by mouth, with food.  Keep low carb, low salt diet while taking it     Dispense:  1 kit     Refill:  0    baclofen (LIORESAL) 10 MG tablet     Sig: Take 1 tablet by mouth 3 times daily as needed (MUSCLE SPASMS) Causes sedation, do not drive while taking this medication     Dispense:  90 tablet     Refill:  0    naproxen (NAPROSYN) 500 MG tablet     Sig: Take 1 tablet by mouth 2 times daily as needed for Pain Take with food     Dispense:  60 tablet     Refill:  0    influenza quadrivalent subunit vaccine (FLUCELVAX) 0.5 ML injection     Sig: Inject 0.5 mLs into the muscle once for 1 dose Okay to substitute     Dispense:  0.5 mL     Refill:  0    cOVID-19mRNA bival vac moderna (MODERNA COVID-19 BIVAL BOOSTER) 50 MCG/0.5ML SUSP injection     Sig: Inject 0.5 mLs into the muscle once for 1 dose     Dispense:  1 mL     Refill:  0    Liraglutide (VICTOZA) 18 MG/3ML SOPN SC injection     Sig: Inject 0.6 mg into the skin daily     Dispense:  2 Adjustable Dose Pre-filled Pen Syringe     Refill:  3    Insulin Pen Needle 31G X 5 MM MISC     Si each by Does not apply route daily TO BE USED with VICTOZA, DAILY     Dispense:  100 each     Refill:  3       Orders Placed This Encounter   Procedures    JUSTIN REBECCA DIGITAL SCREEN BILATERAL     Standing Status:   Future     Standing Expiration Date:   2023    CBC     Standing Status:   Future     Standing Expiration Date:   10/25/2023    Comprehensive Metabolic Panel     Standing Status:   Future     Standing Expiration Date:   10/25/2023    Magnesium     Standing Status:   Future     Standing Expiration Date:   10/25/2023    TSH     Standing Status:   Future     Standing Expiration Date:   10/25/2023    T4, Free     Standing Status:   Future     Standing Expiration Date:   10/25/2023    Urinalysis with Reflex to Culture     Standing Status:   Future     Standing Expiration Date:   10/25/2023     Order Specific Question:   SPECIFY(EX-CATH,MIDSTREAM,CYSTO,ETC)? Answer:   MIDSTREAM    Uric Acid     Standing Status:   Future     Standing Expiration Date:   10/25/2023    Lipid Panel     Standing Status:   Future     Standing Expiration Date:   10/25/2023     Order Specific Question:   Is Patient Fasting?/# of Hours     Answer:   8-10 Hours, water ok to drink    Vitamin B12 & Folate     Standing Status:   Future     Standing Expiration Date:   10/25/2023    Hemoglobin A1C     Standing Status:   Future     Standing Expiration Date:   10/25/2023       Medications Discontinued During This Encounter   Medication Reason    naproxen (NAPROSYN) 500 MG tablet Therapy completed    oxybutynin (DITROPAN-XL) 10 MG extended release tablet REORDER    amLODIPine (NORVASC) 5 MG tablet REORDER    levothyroxine (SYNTHROID) 75 MCG tablet REORDER    omeprazole (PRILOSEC) 40 MG delayed release capsule REORDER              Js Hall, was evaluated through a synchronous (real-time) audio-video encounter.  The patient (or guardian if applicable) is aware that this is a billable service, which includes applicable co-pays. The virtual visit was conducted with patient's (and/or legal guardian consent). Verbal consent to proceed has been obtained within the past 12 months. The visit was conducted pursuant to the emergency declaration under the [facility], [address] [place] [address] authority and the [employer] Act. Patient identification was verified    Patient was alone   Provider was located at primary practice location. The patient was located at home, in a state where the provider was licensed to provide care. On this date 10/25/2022 I have spent 35 minutes reviewing previous notes, test results and face to face with the patient discussing the diagnosis and importance of compliance with the treatment plan as well as documenting on the day of the visit. --Maura Bolivar MD on 11/1/2022 at 8:10 AM    An electronic signature was used to authenticate this note.

## 2022-11-01 PROBLEM — S43.421A SPRAIN OF RIGHT ROTATOR CUFF CAPSULE: Status: ACTIVE | Noted: 2022-11-01

## 2022-11-01 PROBLEM — R73.9 HYPERGLYCEMIA: Status: ACTIVE | Noted: 2022-11-01

## 2022-11-22 ENCOUNTER — PATIENT MESSAGE (OUTPATIENT)
Dept: FAMILY MEDICINE CLINIC | Age: 57
End: 2022-11-22

## 2022-11-22 DIAGNOSIS — S43.421A SPRAIN OF RIGHT ROTATOR CUFF CAPSULE, INITIAL ENCOUNTER: ICD-10-CM

## 2022-11-22 RX ORDER — METHYLPREDNISOLONE 4 MG/1
TABLET ORAL
Qty: 1 KIT | Refills: 0 | Status: SHIPPED | OUTPATIENT
Start: 2022-11-22

## 2022-11-22 NOTE — TELEPHONE ENCOUNTER
From: Aquiles Chavez  To: Dr. Trinh Partida: 11/22/2022 12:21 PM EST  Subject: Shoulder    Could I please get a refill for steroid for my shoulder it really helped and now it's hurting real bad again thank you

## 2022-12-27 ENCOUNTER — OFFICE VISIT (OUTPATIENT)
Dept: FAMILY MEDICINE CLINIC | Age: 57
End: 2022-12-27

## 2022-12-27 ENCOUNTER — TELEPHONE (OUTPATIENT)
Dept: FAMILY MEDICINE CLINIC | Age: 57
End: 2022-12-27

## 2022-12-27 VITALS
TEMPERATURE: 98.1 F | HEART RATE: 57 BPM | OXYGEN SATURATION: 97 % | DIASTOLIC BLOOD PRESSURE: 84 MMHG | SYSTOLIC BLOOD PRESSURE: 139 MMHG

## 2022-12-27 DIAGNOSIS — M54.2 NECK PAIN: Primary | ICD-10-CM

## 2022-12-27 DIAGNOSIS — Z87.39 HISTORY OF ROTATOR CUFF SYNDROME: ICD-10-CM

## 2022-12-27 DIAGNOSIS — R51.9 ACUTE NONINTRACTABLE HEADACHE, UNSPECIFIED HEADACHE TYPE: ICD-10-CM

## 2022-12-27 PROCEDURE — 3078F DIAST BP <80 MM HG: CPT | Performed by: NURSE PRACTITIONER

## 2022-12-27 PROCEDURE — 3074F SYST BP LT 130 MM HG: CPT | Performed by: NURSE PRACTITIONER

## 2022-12-27 PROCEDURE — 99213 OFFICE O/P EST LOW 20 MIN: CPT | Performed by: NURSE PRACTITIONER

## 2022-12-27 RX ORDER — CYCLOBENZAPRINE HCL 10 MG
10 TABLET ORAL 3 TIMES DAILY PRN
Qty: 21 TABLET | Refills: 0 | Status: SHIPPED | OUTPATIENT
Start: 2022-12-27 | End: 2023-01-06

## 2022-12-27 RX ORDER — IBUPROFEN 800 MG/1
800 TABLET ORAL EVERY 8 HOURS PRN
Qty: 30 TABLET | Refills: 0 | Status: SHIPPED | OUTPATIENT
Start: 2022-12-27 | End: 2023-01-06

## 2022-12-27 ASSESSMENT — ENCOUNTER SYMPTOMS
PHOTOPHOBIA: 0
TROUBLE SWALLOWING: 0
VISUAL CHANGE: 0

## 2022-12-27 NOTE — PROGRESS NOTES
[address] [address]  Dept: [phone]  Dept Fax: [phone]    Telma Allen is a 65 y.o. female who presents to the urgent care today for her medical conditions/complaints as notedbelow. Telma Allen is c/o of Headache (Onset for 3 days, lightheadedness when standing, pain is in back of head. ) and Fatigue      HPI:     65 yr old female presents for HA, feels lightheaded when stands only. Hx rotator cuff injury, ins does not start until 1/1/2023. Seen in ER for it, steroid made sx better but then sx returned after steroid  Rt shoulder hurts and going into her rt neck and up back of head causing HA. Not sleeping well due to the pain. No chest pain, syncope or sob    Neck Pain   This is a new problem. The current episode started in the past 7 days. The problem occurs constantly. The problem has been gradually worsening. Associated with: rt shoulder pain. The pain is present in the right side. The quality of the pain is described as burning, shooting and stabbing. The pain is moderate. The symptoms are aggravated by position. Associated symptoms include headaches. Pertinent negatives include no chest pain, fever, leg pain, numbness, pain with swallowing, paresis, photophobia, syncope, tingling, trouble swallowing, visual change, weakness or weight loss. She has tried NSAIDs and acetaminophen for the symptoms. The treatment provided mild relief.      Past Medical History:   Diagnosis Date    Acquired hypothyroidism 7/27/2020    Bilateral kidney stones 6/18/2022    Bilateral kidney stones 6/18/2022    Bilateral primary osteoarthritis of knee 4/25/2021    Chronic back pain     Coronary artery disease involving native coronary artery of native heart without angina pectoris 3/21/2022    Diarrhea     Distal radius fracture 1/28/13    Drug-seeking behavior 10/16/2014    Essential hypertension 9/14/2021    Fatty liver 6/18/2022    Fatty liver 6/18/2022    FANTA (generalized anxiety disorder)     GERD (gastroesophageal reflux disease)     H/O: hysterectomy 8/19/2020    History of depression     Hyperlipidemia with target LDL less than 100 7/19/2020    Irritable bowel syndrome with diarrhea 3/21/2022    Mild episode of recurrent major depressive disorder ([facility] [address] 7/19/2020    Opioid dependence ([facility] [address] 10/16/2014    Severe obesity (BMI 35.0-39. 9) with comorbidity ([facility] [address] 7/19/2020    Urge incontinence 3/9/2020        Current Outpatient Medications   Medication Sig Dispense Refill    cyclobenzaprine (FLEXERIL) 10 MG tablet Take 1 tablet by mouth 3 times daily as needed for Muscle spasms 21 tablet 0    ibuprofen (IBU) 800 MG tablet Take 1 tablet by mouth every 8 hours as needed for Pain 30 tablet 0    amLODIPine (NORVASC) 5 MG tablet Take 1 tablet by mouth daily 90 tablet 3    levothyroxine (SYNTHROID) 75 MCG tablet TAKE 1 TABLET BY MOUTH EVERY MORNING (BEFORE BREAKFAST) 30 tablet 3    omeprazole (PRILOSEC) 40 MG delayed release capsule TAKE 1 CAPSULE BY MOUTH IN THE MORNING BEFORE BREAKFAST *30 MINUTES AFTER SYNTHROID* 90 capsule 1    oxybutynin (DITROPAN-XL) 10 MG extended release tablet Take 1 tablet by mouth daily 90 tablet 3    baclofen (LIORESAL) 10 MG tablet Take 1 tablet by mouth 3 times daily as needed (MUSCLE SPASMS) Causes sedation, do not drive while taking this medication 90 tablet 0    Liraglutide (VICTOZA) 18 MG/3ML SOPN SC injection Inject 0.6 mg into the skin daily 2 Adjustable Dose Pre-filled Pen Syringe 3    Insulin Pen Needle 31G X 5 MM MISC 1 each by Does not apply route daily TO BE USED with VICTOZA, DAILY 100 each 3    Handicap Placard MISC by Does not apply route Can't walk greater than 200 feet. Expires in 5 years. 1 each 0    methylPREDNISolone (MEDROL, LEONIDAS,) 4 MG tablet Take by mouth, with food.  Keep low carb, low salt diet while taking it (Patient not taking: Reported on 12/27/2022) 1 kit 0    naproxen (NAPROSYN) 500 MG tablet Take 1 tablet by mouth 2 times daily as needed for Pain Take with food (Patient not taking: Reported on 12/27/2022) 60 tablet 0    dicyclomine (BENTYL) 10 MG capsule TAKE 1 CAPSULE BY MOUTH FOUR TIMES A DAY (Patient not taking: Reported on 12/27/2022) 120 capsule 0    mupirocin (BACTROBAN NASAL) 2 % nasal ointment Take by Nasal route 3  times daily. (Patient not taking: Reported on 12/27/2022) 1 each 3     No current facility-administered medications for this visit. Allergies   Allergen Reactions    Metformin      Sick to stomach     Metformin And Related      nausea and vomiting , severe stomach pain       Subjective:      Review of Systems   Constitutional:  Negative for fever and weight loss. HENT:  Negative for trouble swallowing. Eyes:  Negative for photophobia. Cardiovascular:  Negative for chest pain and syncope. Musculoskeletal:  Positive for neck pain. Neurological:  Positive for headaches. Negative for tingling, weakness and numbness. All other systems reviewed and are negative. 14 systems reviewed and negative except as listed in HPI. Objective:     Physical Exam  Vitals and nursing note reviewed. Constitutional:       General: She is not in acute distress. Appearance: Normal appearance. She is not ill-appearing, toxic-appearing or diaphoretic. HENT:      Head: Normocephalic and atraumatic. Right Ear: External ear normal.      Left Ear: External ear normal.   Eyes:      General: No scleral icterus. Right eye: No discharge. Left eye: No discharge. Conjunctiva/sclera: Conjunctivae normal.   Neck:      Comments: Rt trapezius tenderness and spasming  Cardiovascular:      Rate and Rhythm: Normal rate and regular rhythm. Pulses: Normal pulses. Heart sounds: Normal heart sounds. Pulmonary:      Effort: Pulmonary effort is normal.      Breath sounds: Normal breath sounds.    Musculoskeletal: Cervical back: Normal range of motion and neck supple. Tenderness present. No rigidity. Comments: Gait steady  Rt ant shoulder tenderness   Lymphadenopathy:      Cervical: No cervical adenopathy. Skin:     General: Skin is warm and dry. Capillary Refill: Capillary refill takes less than 2 seconds. Findings: No rash (no rash to visible skin). Neurological:      General: No focal deficit present. Mental Status: She is alert. Psychiatric:         Mood and Affect: Mood normal.     /84 (Site: Right Upper Arm, Position: Supine)   Pulse 57   Temp 98.1 °F (36.7 °C) (Tympanic)   SpO2 97%     Assessment:       Diagnosis Orders   1. Neck pain        2. Acute nonintractable headache, unspecified headache type        3. History of rotator cuff syndrome            Plan:      Rotator cuff injury few weeks ago, seen in ER, no ins until 1/1/2023  Trapezius tightening up  Fully reproducible tenderness, goes into rt neck  I believe this is causing her HA sx  Sits very stoically with rt arm held close to body and shoulder higher than left  Orthostatic vital neg - c/o dizziness on standing at time  Motrin rx  Flexeril rx - has taken in past, s.e. reviewed  Massage, bengay or other muscle rubs  Has referral for orthopedics, enc to call and schedule appt to be seen so no wait when ins goes into effect  Gentle rom exercises with rt shoulder to avoid frozen shoulder  Return for Make an Appt. with your Primary Care in 1 week. Orders Placed This Encounter   Medications    cyclobenzaprine (FLEXERIL) 10 MG tablet     Sig: Take 1 tablet by mouth 3 times daily as needed for Muscle spasms     Dispense:  21 tablet     Refill:  0    ibuprofen (IBU) 800 MG tablet     Sig: Take 1 tablet by mouth every 8 hours as needed for Pain     Dispense:  30 tablet     Refill:  0           Patient given educational materials - see patient instructions. Discussed use, benefit, and side effects of prescribed medications. All patient questions answered. Pt voicedunderstanding.     Electronically signed by Dennie Cordoba, APRN - CNP on 12/27/2022 at 12:10 PM

## 2022-12-27 NOTE — TELEPHONE ENCOUNTER
Incoming call came from [facility]. Patient is complaining of onset symptoms of blurred vision, dizziness, and headaches (in back of head). Been ongoing for the past 3 days. I did Triage phone call and gathered enough information. Due to the serious onset symptoms/conditions. Patient was advised to seek medical attention with nearest Emergency room department/walk in clinic/urgent care. To be seen and evaluated. Also once discharged. Patient was advised to give our office a call back to schedule a follow up appointment with provider. Patient verbalized : Yes. No future appointments.

## 2022-12-27 NOTE — TELEPHONE ENCOUNTER
Noted. Thank you! I agree with emergency room evaluation, possible stroke    No future appointments.

## 2023-01-04 ENCOUNTER — TELEPHONE (OUTPATIENT)
Dept: FAMILY MEDICINE CLINIC | Age: 58
End: 2023-01-04

## 2023-01-04 DIAGNOSIS — R42 VERTIGO: Primary | ICD-10-CM

## 2023-01-04 RX ORDER — MECLIZINE HYDROCHLORIDE 25 MG/1
25 TABLET ORAL 3 TIMES DAILY PRN
Qty: 30 TABLET | Refills: 0 | Status: SHIPPED | OUTPATIENT
Start: 2023-01-04 | End: 2023-01-14

## 2023-01-04 RX ORDER — MECLIZINE HYDROCHLORIDE 25 MG/1
25 TABLET ORAL 3 TIMES DAILY PRN
Qty: 30 TABLET | Refills: 0 | Status: SHIPPED | OUTPATIENT
Start: 2023-01-04 | End: 2023-01-04

## 2023-01-04 NOTE — TELEPHONE ENCOUNTER
When getting better in a few days with a spinning to get the flu shot and COVID shot at the same time, in order not to get severe infections if something happens     Diagnosis Orders   1. Vertigo  meclizine (ANTIVERT) 25 MG tablet    DISCONTINUED: meclizine (ANTIVERT) 25 MG tablet           No future appointments.

## 2023-01-04 NOTE — TELEPHONE ENCOUNTER
Meclizine 25 mg every 8 hrs prn  If worsening symptoms in 2 to 3 days, to let us know or go back to urgent care or emergency room    Any symptoms of sinus infection or ear pain? No future appointments.

## 2023-01-13 ENCOUNTER — OFFICE VISIT (OUTPATIENT)
Dept: FAMILY MEDICINE CLINIC | Age: 58
End: 2023-01-13

## 2023-01-13 VITALS
DIASTOLIC BLOOD PRESSURE: 86 MMHG | SYSTOLIC BLOOD PRESSURE: 132 MMHG | HEART RATE: 83 BPM | OXYGEN SATURATION: 96 % | TEMPERATURE: 98.1 F

## 2023-01-13 DIAGNOSIS — H66.002 NON-RECURRENT ACUTE SUPPURATIVE OTITIS MEDIA OF LEFT EAR WITHOUT SPONTANEOUS RUPTURE OF TYMPANIC MEMBRANE: ICD-10-CM

## 2023-01-13 DIAGNOSIS — H61.22 IMPACTED CERUMEN OF LEFT EAR: Primary | ICD-10-CM

## 2023-01-13 PROCEDURE — 3079F DIAST BP 80-89 MM HG: CPT | Performed by: NURSE PRACTITIONER

## 2023-01-13 PROCEDURE — 99213 OFFICE O/P EST LOW 20 MIN: CPT | Performed by: NURSE PRACTITIONER

## 2023-01-13 PROCEDURE — 3075F SYST BP GE 130 - 139MM HG: CPT | Performed by: NURSE PRACTITIONER

## 2023-01-13 PROCEDURE — 69209 REMOVE IMPACTED EAR WAX UNI: CPT | Performed by: NURSE PRACTITIONER

## 2023-01-13 RX ORDER — CEFDINIR 300 MG/1
300 CAPSULE ORAL 2 TIMES DAILY
Qty: 20 CAPSULE | Refills: 0 | Status: SHIPPED | OUTPATIENT
Start: 2023-01-13 | End: 2023-01-23

## 2023-01-13 ASSESSMENT — ENCOUNTER SYMPTOMS
ABDOMINAL PAIN: 0
DIARRHEA: 0
RHINORRHEA: 0
COUGH: 0

## 2023-01-13 NOTE — PROGRESS NOTES
[id number] [facility] [address]  Dept: [phone]  Dept Fax: [phone]    Pasquale Rucker is a 65 y.o. female who presents to the urgent care today for her medical conditions/complaints as notedbelow. Pasquale Rucker is c/o of Otalgia (Onset since last night ) and Dizziness      HPI:     65 yr old female presents for left ear pain. No URI sx  Had recent issues with dizziness - taking meclizine with relief. Back to work so now only using at night. Otalgia   There is pain in the left ear. This is a new problem. The current episode started today. The problem occurs constantly. The problem has been unchanged. There has been no fever. The pain is moderate. Associated symptoms include headaches and hearing loss. Pertinent negatives include no abdominal pain, coughing, diarrhea, ear discharge or rhinorrhea. She has tried nothing for the symptoms. The treatment provided no relief. There is no history of a chronic ear infection, hearing loss or a tympanostomy tube.      Past Medical History:   Diagnosis Date    Acquired hypothyroidism 7/27/2020    Bilateral kidney stones 6/18/2022    Bilateral kidney stones 6/18/2022    Bilateral primary osteoarthritis of knee 4/25/2021    Chronic back pain     Coronary artery disease involving native coronary artery of native heart without angina pectoris 3/21/2022    Diarrhea     Distal radius fracture 1/28/13    Drug-seeking behavior 10/16/2014    Essential hypertension 9/14/2021    Fatty liver 6/18/2022    Fatty liver 6/18/2022    FANTA (generalized anxiety disorder)     GERD (gastroesophageal reflux disease)     H/O: hysterectomy 8/19/2020    History of depression     Hyperlipidemia with target LDL less than 100 7/19/2020    Irritable bowel syndrome with diarrhea 3/21/2022    Mild episode of recurrent major depressive disorder ([address] 7/19/2020    Opioid dependence ([address] 10/16/2014    Severe obesity (BMI 35.0-39. 9) with comorbidity ([facility] [address] 7/19/2020    Urge incontinence 3/9/2020        Current Outpatient Medications   Medication Sig Dispense Refill    cefdinir (OMNICEF) 300 MG capsule Take 1 capsule by mouth 2 times daily for 10 days 20 capsule 0    meclizine (ANTIVERT) 25 MG tablet Take 1 tablet by mouth 3 times daily as needed for Dizziness 30 tablet 0    methylPREDNISolone (MEDROL, LEONIDAS,) 4 MG tablet Take by mouth, with food. Keep low carb, low salt diet while taking it 1 kit 0    amLODIPine (NORVASC) 5 MG tablet Take 1 tablet by mouth daily 90 tablet 3    levothyroxine (SYNTHROID) 75 MCG tablet TAKE 1 TABLET BY MOUTH EVERY MORNING (BEFORE BREAKFAST) 30 tablet 3    omeprazole (PRILOSEC) 40 MG delayed release capsule TAKE 1 CAPSULE BY MOUTH IN THE MORNING BEFORE BREAKFAST *30 MINUTES AFTER SYNTHROID* 90 capsule 1    oxybutynin (DITROPAN-XL) 10 MG extended release tablet Take 1 tablet by mouth daily 90 tablet 3    baclofen (LIORESAL) 10 MG tablet Take 1 tablet by mouth 3 times daily as needed (MUSCLE SPASMS) Causes sedation, do not drive while taking this medication 90 tablet 0    naproxen (NAPROSYN) 500 MG tablet Take 1 tablet by mouth 2 times daily as needed for Pain Take with food 60 tablet 0    Liraglutide (VICTOZA) 18 MG/3ML SOPN SC injection Inject 0.6 mg into the skin daily 2 Adjustable Dose Pre-filled Pen Syringe 3    Insulin Pen Needle 31G X 5 MM MISC 1 each by Does not apply route daily TO BE USED with VICTOZA, DAILY 100 each 3    mupirocin (BACTROBAN NASAL) 2 % nasal ointment Take by Nasal route 3  times daily. (Patient taking differently: Take by Nasal route 3  times daily. ) 1 each 3    Handicap Placard MISC by Does not apply route Can't walk greater than 200 feet. Expires in 5 years.  1 each 0    ibuprofen (IBU) 800 MG tablet Take 1 tablet by mouth every 8 hours as needed for Pain 30 tablet 0    dicyclomine (BENTYL) 10 MG capsule TAKE 1 CAPSULE BY MOUTH FOUR TIMES A DAY (Patient not taking: No sig reported) 120 capsule 0     No current facility-administered medications for this visit. Allergies   Allergen Reactions    Metformin      Sick to stomach     Metformin And Related      nausea and vomiting , severe stomach pain       Subjective:      Review of Systems   HENT:  Positive for ear pain and hearing loss. Negative for ear discharge and rhinorrhea. Respiratory:  Negative for cough. Gastrointestinal:  Negative for abdominal pain and diarrhea. Neurological:  Positive for headaches. All other systems reviewed and are negative. 14 systems reviewed and negative except as listed in HPI. Objective:     Physical Exam  Vitals and nursing note reviewed. Constitutional:       General: She is not in acute distress. Appearance: Normal appearance. She is not ill-appearing, toxic-appearing or diaphoretic. HENT:      Head: Normocephalic and atraumatic. Right Ear: Tympanic membrane and external ear normal. There is no impacted cerumen. Left Ear: There is impacted cerumen. Ears:      Comments: Rt tm visible but moderate cerumen in canal, - tm pearly grey with good cone light  Eyes:      General: No scleral icterus. Right eye: No discharge. Left eye: No discharge. Conjunctiva/sclera: Conjunctivae normal.      Pupils: Pupils are equal, round, and reactive to light. Cardiovascular:      Rate and Rhythm: Normal rate and regular rhythm. Pulses: Normal pulses. Heart sounds: Normal heart sounds. Pulmonary:      Effort: Pulmonary effort is normal.   Musculoskeletal:      Cervical back: Neck supple. Comments: Gait steady   Skin:     General: Skin is warm and dry. Capillary Refill: Capillary refill takes less than 2 seconds. Neurological:      General: No focal deficit present. Mental Status: She is alert.    Psychiatric:         Mood and Affect: Mood normal.     /86   Pulse 83   Temp 98.1 °F (36.7 °C) (Infrared)   SpO2 96%     Assessment:       Diagnosis Orders   1. Impacted cerumen of left ear  UT REMOVAL IMPACTED CERUMEN IRRIGATION/LVG UNILAT      2. Non-recurrent acute suppurative otitis media of left ear without spontaneous rupture of tympanic membrane            Plan:    Rt ear flushed with warm water and hydrogen peroxide 3:1 concentration and small bits wax removed. Pt lanre well. Small amount still retained in canal but not flushing out after 1 full spray bottle used.     Left ear flushed with 3:1 concentration warm water and hydrogen peroxide. Copious amounts of hardened, black and yellow wax removed. Pt lanre well. Left tm injected and bulging    Tx left om  Cefdinir rx  Use home ear wax removal drops and candles to relieve rest of wax in rt ear canal PRN.   Reviewed over-the-counter treatments for symptom management.    Return for make appt with Family Doc in 2 weeks for ear check.    Orders Placed This Encounter   Medications    cefdinir (OMNICEF) 300 MG capsule     Sig: Take 1 capsule by mouth 2 times daily for 10 days     Dispense:  20 capsule     Refill:  0         Patient given educational materials - see patient instructions.  Discussed use, benefit, and side effects of prescribed medications.  All patient questions answered.  Pt voicedunderstanding.    Electronically signed by NEHAL Lima CNP on 1/13/2023 at 4:25 PM

## 2023-02-23 ENCOUNTER — OFFICE VISIT (OUTPATIENT)
Dept: FAMILY MEDICINE CLINIC | Age: 58
End: 2023-02-23

## 2023-02-23 VITALS
SYSTOLIC BLOOD PRESSURE: 148 MMHG | DIASTOLIC BLOOD PRESSURE: 88 MMHG | TEMPERATURE: 98.5 F | HEART RATE: 65 BPM | OXYGEN SATURATION: 98 %

## 2023-02-23 DIAGNOSIS — J06.9 VIRAL URI: Primary | ICD-10-CM

## 2023-02-23 DIAGNOSIS — E03.9 ACQUIRED HYPOTHYROIDISM: ICD-10-CM

## 2023-02-23 DIAGNOSIS — N32.81 OAB (OVERACTIVE BLADDER): ICD-10-CM

## 2023-02-23 PROCEDURE — 3077F SYST BP >= 140 MM HG: CPT

## 2023-02-23 PROCEDURE — 3079F DIAST BP 80-89 MM HG: CPT

## 2023-02-23 PROCEDURE — 99213 OFFICE O/P EST LOW 20 MIN: CPT

## 2023-02-23 RX ORDER — LEVOTHYROXINE SODIUM 0.07 MG/1
TABLET ORAL
Qty: 30 TABLET | Refills: 0 | Status: SHIPPED | OUTPATIENT
Start: 2023-02-23 | End: 2023-03-30 | Stop reason: SDUPTHER

## 2023-02-23 RX ORDER — OXYBUTYNIN CHLORIDE 10 MG/1
10 TABLET, EXTENDED RELEASE ORAL DAILY
Qty: 30 TABLET | Refills: 0 | Status: SHIPPED | OUTPATIENT
Start: 2023-02-23 | End: 2023-03-30 | Stop reason: SDUPTHER

## 2023-02-23 ASSESSMENT — ENCOUNTER SYMPTOMS
NAUSEA: 1
SWOLLEN GLANDS: 0
SINUS PAIN: 0
EYE ITCHING: 0
CHANGE IN BOWEL HABIT: 0
VOMITING: 1
SORE THROAT: 0
ABDOMINAL PAIN: 0
COUGH: 1
EYE PAIN: 0

## 2023-02-23 NOTE — PATIENT INSTRUCTIONS
Patient Education        Upper Respiratory Infection (Cold): Care Instructions  Overview     An upper respiratory infection, or URI, is an infection of the nose, sinuses, or throat. URIs are spread by coughs, sneezes, and direct contact. The common cold is the most frequent kind of URI. The flu and sinus infections are other kinds of URIs. Almost all URIs are caused by viruses. Antibiotics won't cure them. But you can treat most infections with home care. This may include drinking lots of fluids and taking over-the-counter pain medicine. You will probably feel better in 4 to 10 days. Follow-up care is a key part of your treatment and safety. Be sure to make and go to all appointments, and call your doctor if you are having problems. It's also a good idea to know your test results and keep a list of the medicines you take. How can you care for yourself at home? To prevent dehydration, drink plenty of fluids. Choose water and other clear liquids until you feel better. If you have kidney, heart, or liver disease and have to limit fluids, talk with your doctor before you increase the amount of fluids you drink. Ask your doctor if you can take an over-the-counter pain medicine, such as acetaminophen (Tylenol), ibuprofen (Advil, Motrin), or naproxen (Aleve). Be safe with medicines. Read and follow all instructions on the label. No one younger than 20 should take aspirin. It has been linked to Reye syndrome, a serious illness. Be careful when taking over-the-counter cold or flu medicines and Tylenol at the same time. Many of these medicines have acetaminophen, which is Tylenol. Read the labels to make sure that you are not taking more than the recommended dose. Too much acetaminophen (Tylenol) can be harmful. Get plenty of rest.  Use saline (saltwater) nasal washes to help keep your nasal passages open and wash out mucus and allergens. You can buy saline nose sprays at a grocery store or drugstore.  Follow the instructions on the package. Or you can make your own at home. Add 1 teaspoon of non-iodized salt and 1 teaspoon of baking soda to 2 cups of distilled or boiled and cooled water. Fill a squeeze bottle or neti pot with the nasal wash. Then put the tip into your nostril, and lean over the sink. With your mouth open, gently squirt the liquid. Repeat on the other side. Use a vaporizer or humidifier to add moisture to your bedroom. Follow the instructions for cleaning the machine. Do not smoke or allow others to smoke around you. If you need help quitting, talk to your doctor about stop-smoking programs and medicines. These can increase your chances of quitting for good. When should you call for help? Call 911 anytime you think you may need emergency care. For example, call if:    You have severe trouble breathing. Call your doctor now or seek immediate medical care if:    You seem to be getting much sicker. You have new or worse trouble breathing. You have a new or higher fever. You have a new rash. Watch closely for changes in your health, and be sure to contact your doctor if:    You have a new symptom, such as a sore throat, an earache, or sinus pain. You cough more deeply or more often, especially if you notice more mucus or a change in the color of your mucus. You do not get better as expected. Where can you learn more? Go to http://www.woods.com/ and enter K520 to learn more about \"Upper Respiratory Infection (Cold): Care Instructions. \"  Current as of: February 9, 2022               Content Version: 13.5  © 2006-2022 [employer]. Care instructions adapted under license by [facility] [facility] If you have questions about a medical condition or this instruction, always ask your healthcare professional. [place] [address] any warranty or liability for your use of this information.

## 2023-02-23 NOTE — LETTER
[facility]  [address]  Phone: [phone]  Fax: [phone]    NEHLA Razo NP        February 23, 2023     Patient: Naty Trimble   YOB: 1965   Date of Visit: 2/23/2023       To Whom It May Concern: It is my medical opinion that Cindy Joaquin may return to work on 2/24/2023. If you have any questions or concerns, please don't hesitate to call.     Sincerely,        NEHAL Razo NP

## 2023-02-23 NOTE — PROGRESS NOTES
[address] [facility]IC[facility]  [facility] [address] [facility] [address]  Dept: [phone]  Dept Fax: [phone]    Silvana Cardoza is a 65 y.o. female who presents to the urgent care today for her medical conditions/complaints as notedbelow. Silvana Cardoza is c/o of Headache (Onset since yesterday, did at home covid this morning was negative. ), Generalized Body Aches, and Congestion      HPI:     Patient reports at home Covid test is negative. Self pay  Needs a work note    Generalized Body Aches  This is a new problem. The current episode started yesterday. Associated symptoms include chills, coughing, fatigue, headaches, myalgias, nausea and vomiting. Pertinent negatives include no abdominal pain, change in bowel habit, chest pain, congestion, diaphoresis, fever, numbness, rash, sore throat, swollen glands or weakness. Nothing aggravates the symptoms. Treatments tried: OTC Tx. The treatment provided no relief. URI   This is a new problem. The current episode started yesterday. The problem has been gradually worsening. There has been no fever. Associated symptoms include coughing, headaches, nausea and vomiting. Pertinent negatives include no abdominal pain, chest pain, congestion, dysuria, joint pain, joint swelling, rash, sinus pain, sore throat or swollen glands. Treatments tried: OTC Tx. The treatment provided no relief.      Past Medical History:   Diagnosis Date    Acquired hypothyroidism 7/27/2020    Bilateral kidney stones 6/18/2022    Bilateral kidney stones 6/18/2022    Bilateral primary osteoarthritis of knee 4/25/2021    Chronic back pain     Coronary artery disease involving native coronary artery of native heart without angina pectoris 3/21/2022    Diarrhea     Distal radius fracture 1/28/13    Drug-seeking behavior 10/16/2014    Essential hypertension 9/14/2021    Fatty liver 6/18/2022    Fatty liver 6/18/2022    FANTA (generalized anxiety disorder)     GERD (gastroesophageal reflux disease)     H/O: hysterectomy 8/19/2020    History of depression     Hyperlipidemia with target LDL less than 100 7/19/2020    Irritable bowel syndrome with diarrhea 3/21/2022    Mild episode of recurrent major depressive disorder ([facility] 7/19/2020    Opioid dependence ([facility] [address] 10/16/2014    Severe obesity (BMI 35.0-39. 9) with comorbidity ([facility] [address] [facility] 7/19/2020    Urge incontinence 3/9/2020        Current Outpatient Medications   Medication Sig Dispense Refill    amLODIPine (NORVASC) 5 MG tablet Take 1 tablet by mouth daily 90 tablet 3    levothyroxine (SYNTHROID) 75 MCG tablet TAKE 1 TABLET BY MOUTH EVERY MORNING (BEFORE BREAKFAST) 30 tablet 3    omeprazole (PRILOSEC) 40 MG delayed release capsule TAKE 1 CAPSULE BY MOUTH IN THE MORNING BEFORE BREAKFAST *30 MINUTES AFTER SYNTHROID* 90 capsule 1    oxybutynin (DITROPAN-XL) 10 MG extended release tablet Take 1 tablet by mouth daily 90 tablet 3    baclofen (LIORESAL) 10 MG tablet Take 1 tablet by mouth 3 times daily as needed (MUSCLE SPASMS) Causes sedation, do not drive while taking this medication 90 tablet 0    Insulin Pen Needle 31G X 5 MM MISC 1 each by Does not apply route daily TO BE USED with VICTOZA, DAILY 100 each 3    ibuprofen (IBU) 800 MG tablet Take 1 tablet by mouth every 8 hours as needed for Pain 30 tablet 0    naproxen (NAPROSYN) 500 MG tablet Take 1 tablet by mouth 2 times daily as needed for Pain Take with food (Patient not taking: Reported on 2/23/2023) 60 tablet 0    Liraglutide (VICTOZA) 18 MG/3ML SOPN SC injection Inject 0.6 mg into the skin daily (Patient not taking: Reported on 2/23/2023) 2 Adjustable Dose Pre-filled Pen Syringe 3    dicyclomine (BENTYL) 10 MG capsule TAKE 1 CAPSULE BY MOUTH FOUR TIMES A DAY (Patient not taking: No sig reported) 120 capsule 0    Handicap Placard MISC by Does not apply route Can't walk greater than 200 feet. Expires in 5 years.  1 each 0     No current facility-administered medications for this visit. Allergies   Allergen Reactions    Metformin      Sick to stomach     Metformin And Related      nausea and vomiting , severe stomach pain       Subjective:      Review of Systems   Constitutional:  Positive for chills and fatigue. Negative for diaphoresis and fever. HENT:  Negative for congestion, sinus pain and sore throat. Eyes:  Negative for pain and itching. Respiratory:  Positive for cough. Cardiovascular:  Negative for chest pain. Gastrointestinal:  Positive for nausea and vomiting. Negative for abdominal pain and change in bowel habit. Genitourinary:  Negative for difficulty urinating and dysuria. Musculoskeletal:  Positive for myalgias. Negative for joint pain. Skin:  Negative for rash. Neurological:  Positive for headaches. Negative for dizziness, weakness and numbness. All other systems reviewed and are negative. 14 systems reviewed and negative except as listed in HPI. Objective:     Physical Exam  Vitals reviewed. Constitutional:       General: She is not in acute distress. Appearance: Normal appearance. She is obese. She is not ill-appearing, toxic-appearing or diaphoretic. HENT:      Head: Normocephalic and atraumatic. Right Ear: No swelling or tenderness. A middle ear effusion is present. Tympanic membrane is not erythematous. Left Ear: No swelling or tenderness. A middle ear effusion is present. Tympanic membrane is not erythematous. Nose: Nasal tenderness and congestion present. Right Sinus: No maxillary sinus tenderness or frontal sinus tenderness. Left Sinus: No maxillary sinus tenderness or frontal sinus tenderness. Mouth/Throat:      Lips: Pink. Mouth: Mucous membranes are dry. Pharynx: Oropharynx is clear. No pharyngeal swelling, oropharyngeal exudate or posterior oropharyngeal erythema. Eyes:      General:         Right eye: No discharge.          Left eye: No discharge. Conjunctiva/sclera: Conjunctivae normal.   Cardiovascular:      Rate and Rhythm: Normal rate. Heart sounds: Normal heart sounds. Pulmonary:      Effort: Pulmonary effort is normal. No respiratory distress. Breath sounds: Normal breath sounds. No stridor. No wheezing, rhonchi or rales. Chest:      Chest wall: No tenderness. Musculoskeletal:         General: No swelling or tenderness. Normal range of motion. Cervical back: Normal range of motion and neck supple. No rigidity or tenderness. Lymphadenopathy:      Cervical: No cervical adenopathy. Skin:     General: Skin is warm and dry. Capillary Refill: Capillary refill takes less than 2 seconds. Findings: No erythema or rash. Neurological:      Mental Status: She is alert and oriented to person, place, and time. Motor: No weakness. Coordination: Coordination normal.      Gait: Gait normal.   Psychiatric:         Mood and Affect: Mood normal.         Behavior: Behavior normal.         Thought Content: Thought content normal.         Judgment: Judgment normal.     BP (!) 148/88   Pulse 65   Temp 98.5 °F (36.9 °C) (Tympanic)   SpO2 98%     Assessment:       Diagnosis Orders   1. Viral URI            Plan:   -Based on Hx and clinical clinical exam findings, will treat as a viral URI at this time  -Advised to continue with symptomatic treatment.  -Use acetaminophen or ibuprofen for fever, sore throat, or muscle aches.  -Recommend using a cool-mist humidifier.  -May use normal saline nose drops with suction bulb removal for nasal congestion.  -Encouraged to drink extra fluids to help loosen secretions and hydrate the body. -Application of warm, moist compresses to the face several times daily will help with discomfort.   -Humidifiers should be used daily.   -Seek medical attention immediately for increased work of breathing or other concerning symptoms.  -Follow-up with PCP as needed.       Return if symptoms worsen or fail to improve. No orders of the defined types were placed in this encounter. Patient given educational materials - see patient instructions. Discussed use, benefit, and side effects of prescribed medications. All patient questions answered. Pt voiced understanding.     Electronically signed by NEHAL Ham NP on 2/23/2023 at 8:57 AM

## 2023-03-30 DIAGNOSIS — E03.9 ACQUIRED HYPOTHYROIDISM: ICD-10-CM

## 2023-03-30 DIAGNOSIS — N32.81 OAB (OVERACTIVE BLADDER): ICD-10-CM

## 2023-03-30 RX ORDER — LEVOTHYROXINE SODIUM 0.07 MG/1
TABLET ORAL
Qty: 30 TABLET | Refills: 0 | Status: SHIPPED | OUTPATIENT
Start: 2023-03-30 | End: 2023-05-30 | Stop reason: SDUPTHER

## 2023-03-30 RX ORDER — OMEPRAZOLE 40 MG/1
CAPSULE, DELAYED RELEASE ORAL
Qty: 90 CAPSULE | Refills: 1 | Status: SHIPPED | OUTPATIENT
Start: 2023-03-30

## 2023-03-30 RX ORDER — OXYBUTYNIN CHLORIDE 10 MG/1
10 TABLET, EXTENDED RELEASE ORAL DAILY
Qty: 30 TABLET | Refills: 0 | Status: SHIPPED | OUTPATIENT
Start: 2023-03-30 | End: 2023-05-12 | Stop reason: SDUPTHER

## 2023-04-07 ENCOUNTER — OFFICE VISIT (OUTPATIENT)
Dept: FAMILY MEDICINE CLINIC | Age: 58
End: 2023-04-07

## 2023-04-07 VITALS
SYSTOLIC BLOOD PRESSURE: 154 MMHG | OXYGEN SATURATION: 98 % | HEART RATE: 81 BPM | TEMPERATURE: 97 F | DIASTOLIC BLOOD PRESSURE: 93 MMHG

## 2023-04-07 DIAGNOSIS — H66.001 NON-RECURRENT ACUTE SUPPURATIVE OTITIS MEDIA OF RIGHT EAR WITHOUT SPONTANEOUS RUPTURE OF TYMPANIC MEMBRANE: Primary | ICD-10-CM

## 2023-04-07 PROCEDURE — 99213 OFFICE O/P EST LOW 20 MIN: CPT

## 2023-04-07 PROCEDURE — 3077F SYST BP >= 140 MM HG: CPT

## 2023-04-07 PROCEDURE — 3079F DIAST BP 80-89 MM HG: CPT

## 2023-04-07 RX ORDER — AMOXICILLIN AND CLAVULANATE POTASSIUM 875; 125 MG/1; MG/1
1 TABLET, FILM COATED ORAL 2 TIMES DAILY
Qty: 20 TABLET | Refills: 0 | Status: SHIPPED | OUTPATIENT
Start: 2023-04-07 | End: 2023-04-17

## 2023-04-07 ASSESSMENT — ENCOUNTER SYMPTOMS
COUGH: 1
SINUS COMPLAINT: 1
SHORTNESS OF BREATH: 0
VOMITING: 0
SWOLLEN GLANDS: 0
RHINORRHEA: 1
SORE THROAT: 1
DIARRHEA: 0
HOARSE VOICE: 1
EYE PAIN: 0
SINUS PRESSURE: 1
EYE ITCHING: 0
ABDOMINAL PAIN: 0

## 2023-04-07 NOTE — PROGRESS NOTES
[address] [address]  Dept: [phone]  Dept Fax: [phone]    Pasquale Rucker is a 65 y.o. female who presents to the urgent care today for her medical conditions/complaints as notedbelow. Pasquale Rucker is c/o of Otalgia (Onset this morning right ear ) and Sinus Problem (Onset for 3 days headache, cough and sore throat. )      HPI:     SELF PAY    Otalgia   There is pain in the right ear. This is a new problem. The current episode started today. The problem occurs constantly. The problem has been gradually worsening. There has been no fever. Associated symptoms include coughing, headaches, rhinorrhea and a sore throat. Pertinent negatives include no abdominal pain, diarrhea, ear discharge, hearing loss, neck pain, rash or vomiting. She has tried acetaminophen (coricidin) for the symptoms. The treatment provided no relief. There is no history of a chronic ear infection, hearing loss or a tympanostomy tube. Sinus Problem  This is a new problem. The current episode started in the past 7 days (in the past 3 days). The problem has been gradually worsening since onset. There has been no fever. The pain is moderate. Associated symptoms include chills, congestion, coughing, ear pain, headaches, a hoarse voice, sinus pressure and a sore throat. Pertinent negatives include no diaphoresis, neck pain, shortness of breath, sneezing or swollen glands. Treatments tried: coricidin, excedrin. The treatment provided mild relief.      Past Medical History:   Diagnosis Date    Acquired hypothyroidism 7/27/2020    Bilateral kidney stones 6/18/2022    Bilateral kidney stones 6/18/2022    Bilateral primary osteoarthritis of knee 4/25/2021    Chronic back pain     Coronary artery disease involving native coronary artery of native heart without angina pectoris 3/21/2022    Diarrhea     Distal radius fracture

## 2023-05-09 ENCOUNTER — OFFICE VISIT (OUTPATIENT)
Dept: FAMILY MEDICINE CLINIC | Age: 58
End: 2023-05-09

## 2023-05-09 VITALS
TEMPERATURE: 98.4 F | DIASTOLIC BLOOD PRESSURE: 84 MMHG | OXYGEN SATURATION: 98 % | HEART RATE: 77 BPM | SYSTOLIC BLOOD PRESSURE: 130 MMHG

## 2023-05-09 DIAGNOSIS — R05.9 COUGH IN ADULT PATIENT: ICD-10-CM

## 2023-05-09 DIAGNOSIS — J01.10 ACUTE NON-RECURRENT FRONTAL SINUSITIS: Primary | ICD-10-CM

## 2023-05-09 PROCEDURE — 3075F SYST BP GE 130 - 139MM HG: CPT

## 2023-05-09 PROCEDURE — 3079F DIAST BP 80-89 MM HG: CPT

## 2023-05-09 PROCEDURE — 99212 OFFICE O/P EST SF 10 MIN: CPT

## 2023-05-09 RX ORDER — AMOXICILLIN AND CLAVULANATE POTASSIUM 875; 125 MG/1; MG/1
1 TABLET, FILM COATED ORAL 2 TIMES DAILY
Qty: 20 TABLET | Refills: 0 | Status: SHIPPED | OUTPATIENT
Start: 2023-05-09 | End: 2023-05-19

## 2023-05-09 RX ORDER — PREDNISONE 20 MG/1
40 TABLET ORAL DAILY
Qty: 10 TABLET | Refills: 0 | Status: SHIPPED | OUTPATIENT
Start: 2023-05-09 | End: 2023-05-14

## 2023-05-09 RX ORDER — BENZONATATE 100 MG/1
200 CAPSULE ORAL 3 TIMES DAILY PRN
Qty: 60 CAPSULE | Refills: 0 | Status: SHIPPED | OUTPATIENT
Start: 2023-05-09 | End: 2023-06-06

## 2023-05-09 ASSESSMENT — ENCOUNTER SYMPTOMS
WHEEZING: 0
BACK PAIN: 0
HEARTBURN: 0
TROUBLE SWALLOWING: 0
PHOTOPHOBIA: 0
RHINORRHEA: 0
ABDOMINAL DISTENTION: 0
GASTROINTESTINAL NEGATIVE: 1
ABDOMINAL PAIN: 0
COLOR CHANGE: 0
SINUS PAIN: 0
NAUSEA: 0
APNEA: 0
HEMOPTYSIS: 0
RECTAL PAIN: 0
EYE REDNESS: 0
SORE THROAT: 1
EYE PAIN: 0
DIARRHEA: 0
ANAL BLEEDING: 0
FACIAL SWELLING: 0
BLOOD IN STOOL: 0
EYE DISCHARGE: 1
CHEST TIGHTNESS: 0
CONSTIPATION: 0
CHOKING: 0
VOMITING: 0
VOICE CHANGE: 0
STRIDOR: 0
SINUS PRESSURE: 0
COUGH: 1
EYE ITCHING: 0
SHORTNESS OF BREATH: 0

## 2023-05-09 NOTE — PROGRESS NOTES
sinusitis  -     amoxicillin-clavulanate (AUGMENTIN) 875-125 MG per tablet; Take 1 tablet by mouth 2 times daily for 10 days, Disp-20 tablet, R-0Normal  -     predniSONE (DELTASONE) 20 MG tablet; Take 2 tablets by mouth daily for 5 days, Disp-10 tablet, R-0Normal  2. Cough in adult patient  -     benzonatate (TESSALON) 100 MG capsule; Take 2 capsules by mouth 3 times daily as needed for Cough, Disp-60 capsule, R-0Normal    Due to self pay status, deferring cxr. Continue over-the-counter medications as needed for symptoms such as Tylenol/Motrin, Mucinex for chest congestion. Use honey to the back of throat, salt water gargle as needed for sore throat, cough. Go to the ER for shortness of breath, chest pain. Patient verbalized understanding. Follow Up Instructions:      No follow-ups on file. Orders Placed This Encounter   Medications    amoxicillin-clavulanate (AUGMENTIN) 875-125 MG per tablet     Sig: Take 1 tablet by mouth 2 times daily for 10 days     Dispense:  20 tablet     Refill:  0    predniSONE (DELTASONE) 20 MG tablet     Sig: Take 2 tablets by mouth daily for 5 days     Dispense:  10 tablet     Refill:  0    benzonatate (TESSALON) 100 MG capsule     Sig: Take 2 capsules by mouth 3 times daily as needed for Cough     Dispense:  60 capsule     Refill:  0           Based on the duration and severity of the symptoms-- I will treat this as bacterial at this time. Patient instructed to complete antibiotic prescription fully. May use Motrin/Tylenol for fever/pain. Saline washes, salt water gargles and over the counter preparations if desired. Patient agreeable to treatment plan. Educational materials provided on AVS.  Follow up if symptoms do not improve/worsen. Patient and/or parent given educational materials - see patient instructions. Discussed use, benefit, and side effects of prescribed medications. All patient questions answered.   Patient and/or parent voiced

## 2023-05-12 DIAGNOSIS — E03.9 ACQUIRED HYPOTHYROIDISM: ICD-10-CM

## 2023-05-12 DIAGNOSIS — N32.81 OAB (OVERACTIVE BLADDER): ICD-10-CM

## 2023-05-12 RX ORDER — OXYBUTYNIN CHLORIDE 10 MG/1
10 TABLET, EXTENDED RELEASE ORAL DAILY
Qty: 30 TABLET | Refills: 0 | Status: SHIPPED | OUTPATIENT
Start: 2023-05-12 | End: 2023-06-18 | Stop reason: SDUPTHER

## 2023-05-12 RX ORDER — LEVOTHYROXINE SODIUM 0.07 MG/1
TABLET ORAL
Qty: 30 TABLET | Refills: 0 | OUTPATIENT
Start: 2023-05-12

## 2023-05-27 ENCOUNTER — HOSPITAL ENCOUNTER (OUTPATIENT)
Age: 58
Discharge: HOME | End: 2023-05-27

## 2023-05-27 DIAGNOSIS — E03.9 ACQUIRED HYPOTHYROIDISM: ICD-10-CM

## 2023-05-27 DIAGNOSIS — N32.81 OAB (OVERACTIVE BLADDER): ICD-10-CM

## 2023-05-27 DIAGNOSIS — I10 ESSENTIAL HYPERTENSION: ICD-10-CM

## 2023-05-27 DIAGNOSIS — R73.9 HYPERGLYCEMIA: ICD-10-CM

## 2023-05-27 DIAGNOSIS — E78.5 HYPERLIPIDEMIA WITH TARGET LDL LESS THAN 100: ICD-10-CM

## 2023-05-27 LAB
ALBUMIN SERPL-MCNC: 4.4 G/DL (ref 3.5–5.2)
ALP SERPL-CCNC: 111 U/L (ref 35–104)
ALT SERPL-CCNC: 27 U/L (ref 5–33)
ANION GAP SERPL CALCULATED.3IONS-SCNC: 9 MMOL/L (ref 9–17)
AST SERPL-CCNC: 16 U/L
BILIRUB SERPL-MCNC: 0.6 MG/DL (ref 0.3–1.2)
BILIRUB UR QL STRIP: NEGATIVE
BUN SERPL-MCNC: 10 MG/DL (ref 6–20)
CALCIUM SERPL-MCNC: 9.6 MG/DL (ref 8.6–10.4)
CHLORIDE SERPL-SCNC: 104 MMOL/L (ref 98–107)
CHOLEST SERPL-MCNC: 220 MG/DL
CHOLESTEROL/HDL RATIO: 4.7
CLARITY UR: CLEAR
CO2 SERPL-SCNC: 27 MMOL/L (ref 20–31)
COLOR UR: YELLOW
COMMENT UA: NORMAL
CREAT SERPL-MCNC: 0.81 MG/DL (ref 0.5–0.9)
ERYTHROCYTE [DISTWIDTH] IN BLOOD BY AUTOMATED COUNT: 13.7 % (ref 11.5–14.9)
FOLATE SERPL-MCNC: >20 NG/ML
GFR SERPL CREATININE-BSD FRML MDRD: >60 ML/MIN/1.73M2
GLUCOSE SERPL-MCNC: 116 MG/DL (ref 70–99)
GLUCOSE UR STRIP-MCNC: NEGATIVE MG/DL
HCT VFR BLD AUTO: 41.5 % (ref 36–46)
HDLC SERPL-MCNC: 47 MG/DL
HGB BLD-MCNC: 14.1 G/DL (ref 12–16)
HGB UR QL STRIP.AUTO: NEGATIVE
KETONES UR STRIP-MCNC: NEGATIVE MG/DL
LDLC SERPL CALC-MCNC: 148 MG/DL (ref 0–130)
LEUKOCYTE ESTERASE UR QL STRIP: NEGATIVE
MAGNESIUM SERPL-MCNC: 2.3 MG/DL (ref 1.6–2.6)
MCH RBC QN AUTO: 28.5 PG (ref 26–34)
MCHC RBC AUTO-ENTMCNC: 33.9 G/DL (ref 31–37)
MCV RBC AUTO: 84.1 FL (ref 80–100)
NITRITE UR QL STRIP: NEGATIVE
PH UR STRIP: 6.5 [PH] (ref 5–8)
PLATELET # BLD AUTO: 197 K/UL (ref 150–450)
PMV BLD AUTO: 9.1 FL (ref 6–12)
POTASSIUM SERPL-SCNC: 3.9 MMOL/L (ref 3.7–5.3)
PROT SERPL-MCNC: 7.2 G/DL (ref 6.4–8.3)
PROT UR STRIP-MCNC: NEGATIVE MG/DL
RBC # BLD AUTO: 4.93 M/UL (ref 4–5.2)
SODIUM SERPL-SCNC: 140 MMOL/L (ref 135–144)
SP GR UR STRIP: 1.01 (ref 1–1.03)
T4 FREE SERPL-MCNC: 1 NG/DL (ref 0.9–1.7)
TRIGL SERPL-MCNC: 127 MG/DL
TSH SERPL-MCNC: 7.52 UIU/ML (ref 0.3–5)
URATE SERPL-MCNC: 4.2 MG/DL (ref 2.4–5.7)
UROBILINOGEN UR STRIP-ACNC: NORMAL
VIT B12 SERPL-MCNC: 772 PG/ML (ref 232–1245)
WBC OTHER # BLD: 4.4 K/UL (ref 3.5–11)

## 2023-05-27 PROCEDURE — 85027 COMPLETE CBC AUTOMATED: CPT

## 2023-05-27 PROCEDURE — 84443 ASSAY THYROID STIM HORMONE: CPT

## 2023-05-27 PROCEDURE — 86800 THYROGLOBULIN ANTIBODY: CPT

## 2023-05-27 PROCEDURE — 82607 VITAMIN B-12: CPT

## 2023-05-27 PROCEDURE — 81003 URINALYSIS AUTO W/O SCOPE: CPT

## 2023-05-27 PROCEDURE — 84550 ASSAY OF BLOOD/URIC ACID: CPT

## 2023-05-27 PROCEDURE — 83036 HEMOGLOBIN GLYCOSYLATED A1C: CPT

## 2023-05-27 PROCEDURE — 36415 COLL VENOUS BLD VENIPUNCTURE: CPT

## 2023-05-27 PROCEDURE — 83735 ASSAY OF MAGNESIUM: CPT

## 2023-05-27 PROCEDURE — 84439 ASSAY OF FREE THYROXINE: CPT

## 2023-05-27 PROCEDURE — 80061 LIPID PANEL: CPT

## 2023-05-27 PROCEDURE — 86376 MICROSOMAL ANTIBODY EACH: CPT

## 2023-05-27 PROCEDURE — 82746 ASSAY OF FOLIC ACID SERUM: CPT

## 2023-05-27 PROCEDURE — 80053 COMPREHEN METABOLIC PANEL: CPT

## 2023-05-28 LAB
EST. AVERAGE GLUCOSE BLD GHB EST-MCNC: 123 MG/DL
HBA1C MFR BLD: 5.9 % (ref 4–6)

## 2023-05-30 DIAGNOSIS — E03.9 ACQUIRED HYPOTHYROIDISM: ICD-10-CM

## 2023-05-30 LAB
THYROGLOBULIN AB: <12 IU/ML (ref 0–40)
THYROPEROXIDASE AB SERPL IA-ACNC: 237 IU/ML (ref 0–25)

## 2023-05-30 RX ORDER — LEVOTHYROXINE SODIUM 0.07 MG/1
TABLET ORAL
Qty: 30 TABLET | Refills: 3 | Status: SHIPPED | OUTPATIENT
Start: 2023-05-30

## 2023-05-30 NOTE — TELEPHONE ENCOUNTER
Please Approve or Refuse.   Send to Pharmacy per Pt's Request: [facility]     Next Visit Date:  Visit date not found   Last Visit Date: 10/25/2022    Hemoglobin A1C (%)   Date Value   05/27/2023 5.9   06/17/2022 5.9   09/14/2021 5.1             ( goal A1C is < 7)   BP Readings from Last 3 Encounters:   05/09/23 130/84   04/11/23 137/85   04/07/23 (!) 154/93          (goal 120/80)  BUN   Date Value Ref Range Status   05/27/2023 10 6 - 20 mg/dL Final     Creatinine   Date Value Ref Range Status   05/27/2023 0.81 0.50 - 0.90 mg/dL Final     Potassium   Date Value Ref Range Status   05/27/2023 3.9 3.7 - 5.3 mmol/L Final

## 2023-05-31 ENCOUNTER — HOSPITAL ENCOUNTER (OUTPATIENT)
Dept: ULTRASOUND IMAGING | Age: 58
Discharge: HOME | End: 2023-06-02

## 2023-05-31 ENCOUNTER — PATIENT MESSAGE (OUTPATIENT)
Dept: FAMILY MEDICINE CLINIC | Age: 58
End: 2023-05-31

## 2023-05-31 DIAGNOSIS — Z12.11 SCREEN FOR COLON CANCER: Primary | ICD-10-CM

## 2023-05-31 DIAGNOSIS — E03.9 ACQUIRED HYPOTHYROIDISM: ICD-10-CM

## 2023-05-31 DIAGNOSIS — E78.5 HYPERLIPIDEMIA WITH TARGET LDL LESS THAN 100: Primary | ICD-10-CM

## 2023-05-31 PROBLEM — E03.8 HYPOTHYROIDISM DUE TO HASHIMOTO'S THYROIDITIS: Status: ACTIVE | Noted: 2020-07-27

## 2023-05-31 PROBLEM — E06.3 HYPOTHYROIDISM DUE TO HASHIMOTO'S THYROIDITIS: Status: ACTIVE | Noted: 2020-07-27

## 2023-05-31 PROCEDURE — 76536 US EXAM OF HEAD AND NECK: CPT

## 2023-05-31 RX ORDER — PRAVASTATIN SODIUM 20 MG
20 TABLET ORAL
Qty: 90 TABLET | Refills: 1 | Status: SHIPPED | OUTPATIENT
Start: 2023-05-31

## 2023-05-31 NOTE — PROGRESS NOTES
ant     Diagnosis Orders   1.  Hyperlipidemia with target LDL less than 100  pravastatin (PRAVACHOL) 20 MG tablet           Future Appointments   Date Time Provider Salvador Cheng   5/31/2023  3:00 PM [facility] ULTRASOUND RM [address] [facility] Radiolog

## 2023-05-31 NOTE — RESULT ENCOUNTER NOTE
Please notify patient: Thyroid ultrasound shows some chronic changes related to thyroiditis, no nodules, good!'  Needs to schedule her next appointment    She is overdue for colonoscopy, can I place referral?    No future appointments.

## 2023-06-01 NOTE — TELEPHONE ENCOUNTER
From: Cynthia Chow  To: Dr. Canales Bard: 5/31/2023 6:19 PM EDT  Subject: Colonoscopy     I have financial assistance till June 11 so if they can do it b4 then yes

## 2023-06-18 DIAGNOSIS — N32.81 OAB (OVERACTIVE BLADDER): ICD-10-CM

## 2023-06-19 RX ORDER — OXYBUTYNIN CHLORIDE 10 MG/1
10 TABLET, EXTENDED RELEASE ORAL DAILY
Qty: 30 TABLET | Refills: 0 | Status: SHIPPED | OUTPATIENT
Start: 2023-06-19

## 2023-06-19 NOTE — TELEPHONE ENCOUNTER
Please Approve or Refuse.   Send to Pharmacy per Pt's Request: [facility]     Next Visit Date:  sent [employer] message   Last Visit Date: 10/25/2022    Hemoglobin A1C (%)   Date Value   05/27/2023 5.9   06/17/2022 5.9   09/14/2021 5.1             ( goal A1C is < 7)   BP Readings from Last 3 Encounters:   05/09/23 130/84   04/11/23 137/85   04/07/23 (!) 154/93          (goal 120/80)  BUN   Date Value Ref Range Status   05/27/2023 10 6 - 20 mg/dL Final     Creatinine   Date Value Ref Range Status   05/27/2023 0.81 0.50 - 0.90 mg/dL Final     Potassium   Date Value Ref Range Status   05/27/2023 3.9 3.7 - 5.3 mmol/L Final

## 2023-07-05 DIAGNOSIS — R73.9 HYPERGLYCEMIA: ICD-10-CM

## 2023-07-05 RX ORDER — LIRAGLUTIDE 6 MG/ML
0.6 INJECTION SUBCUTANEOUS DAILY
Qty: 2 ADJUSTABLE DOSE PRE-FILLED PEN SYRINGE | Refills: 3 | Status: SHIPPED | OUTPATIENT
Start: 2023-07-05

## 2023-07-05 NOTE — TELEPHONE ENCOUNTER
Please Approve or Refuse.   Send to Pharmacy per Pt's Request:      Next Visit Date:  Visit date not found   Last Visit Date: 10/25/2022    Hemoglobin A1C (%)   Date Value   05/27/2023 5.9   06/17/2022 5.9   09/14/2021 5.1             ( goal A1C is < 7)   BP Readings from Last 3 Encounters:   05/09/23 130/84   04/11/23 137/85   04/07/23 (!) 154/93          (goal 120/80)  BUN   Date Value Ref Range Status   05/27/2023 10 6 - 20 mg/dL Final     Creatinine   Date Value Ref Range Status   05/27/2023 0.81 0.50 - 0.90 mg/dL Final     Potassium   Date Value Ref Range Status   05/27/2023 3.9 3.7 - 5.3 mmol/L Final

## 2023-08-13 DIAGNOSIS — E03.9 ACQUIRED HYPOTHYROIDISM: ICD-10-CM

## 2023-08-14 RX ORDER — LEVOTHYROXINE SODIUM 0.07 MG/1
TABLET ORAL
Qty: 30 TABLET | Refills: 0 | OUTPATIENT
Start: 2023-08-14

## 2023-08-14 RX ORDER — OMEPRAZOLE 40 MG/1
CAPSULE, DELAYED RELEASE ORAL
Qty: 90 CAPSULE | Refills: 0 | OUTPATIENT
Start: 2023-08-14

## 2023-08-18 NOTE — PROGRESS NOTES
[facility] Hospitalist History and Physical      PCP: Anastasiia Sky NP  Notified via Epic/PS if available    Chief Complaint:   No chief complaint on file.    Adnexal mass    History Of Present Illness:   Patient is a 64-year-old woman with history of DVT, dyslipidemia, who was recently diagnosed with an adnexal mass measuring 5 cm associated with elevated tumor markers.  Patient was seen by Dr. Mendieta and decision was made to proceed with surgery.  Patient underwent laparoscopic MADI/BSO today.  Postoperatively patient appears comfortable does not appear to be in any acute distress her pain is well controlled and she is hemodynamically stable.  Her  is at bedside.      14 point Review of Systems is negative except for as noted above.      Past Medical History  Past Medical History:   Diagnosis Date   • DVT (deep venous thrombosis) (CMD) 2014    Provoked.  right leg   • Fracture of right wrist    • Meniscus degeneration, right 2014   • MS (multiple sclerosis) (CMD)    • Pulmonary emboli (CMD) 2014    Bilateral   • STI (sexually transmitted infection)     hx of GC   • Vitamin D deficiency 04/09/2019       Surgical History  Past Surgical History:   Procedure Laterality Date   • Fracture surgery Right     wrist   • Tubal ligation Bilateral 1994        Social History  Social History     Tobacco Use   • Smoking status: Former     Current packs/day: 0.00   • Smokeless tobacco: Never   • Tobacco comments:     unknown qhen quit   Vaping Use   • Vaping Use: never used   Substance Use Topics   • Alcohol use: Not Currently     Alcohol/week: 3.0 standard drinks of alcohol     Types: 3 Standard drinks or equivalent per week     Comment: 3 x week mix drinks   • Drug use: Not Currently     Patient denies other alcohol, tobacco, or illicit drug use except for as noted above.    Family History  Family History   Problem Relation Age of Onset   • Hypertension Mother    • Diabetes Father    • Hyperlipidemia Sister    • Diabetes  Visit Information    Have you changed or started any medications since your last visit including any over-the-counter medicines, vitamins, or herbal medicines? no   Are you having any side effects from any of your medications? -  no  Have you stopped taking any of your medications? Is so, why? -  no    Have you seen any other physician or provider since your last visit? No  Have you had any other diagnostic tests since your last visit? Yes - Records Obtained  Have you been seen in the emergency room and/or had an admission to a hospital since we last saw you? No  Have you had your routine dental cleaning in the past 6 months? no    Have you activated your [employer] account? If not, what are your barriers?  Yes     Patient Care Team:  Solo Vasquez MD as PCP - General (Family Medicine)  Solo Vasquez MD as PCP - [facility]    Medical History Review  Past Medical, Family, and Social History reviewed and does contribute to the patient presenting condition    Health Maintenance   Topic Date Due    DTaP/Tdap/Td vaccine (1 - Tdap) 08/21/1985    Shingles Vaccine (1 of 2) 08/21/2016    Cervical cancer screen  01/20/2017    Breast cancer screen  02/14/2017    Flu vaccine (1) 09/01/2020    Lipid screen  07/24/2021    TSH testing  07/24/2021    Colon cancer screen colonoscopy  11/16/2022    Diabetes screen  07/24/2023    HIV screen  Completed    Hepatitis A vaccine  Aged Out    Hepatitis B vaccine  Aged Out    Hib vaccine  Aged Out    Meningococcal (ACWY) vaccine  Aged Out    Pneumococcal 0-64 years Vaccine  Aged Out Sister    • Cancer Sister         bile duct   • Diabetes Brother    • Stroke Brother    • Myocardial Infarction Brother    • Autoimmune Disease Neg Hx      Patient denies/does not have knowledge / is not aware / of any other chronic conditions in mother father or direct siblings.    Allergies  ALLERGIES:  Acetaminophen, Peanut   (food or med), and Shellfish allergy   (food or med)  Patient denies/does not have knowledge of any other allergies    Home Medications  Medications Prior to Admission   Medication Sig Dispense Refill   • B Complex Vitamins (B COMPLEX PO) Take 1 tablet by mouth daily.     • fenofibrate (TRICOR) 145 MG tablet Take 1 tablet by mouth daily.     • [DISCONTINUED] aspirin (ECOTRIN) 81 MG EC tablet Take 81 mg by mouth daily.     • ammonium lactate (LAC-HYDRIN) 12 % cream Apply to feet 1-2 times a day     • docusate sodium (COLACE) 100 MG capsule Take 1 capsuleby mouth twice daily after surgery to prevent constipation     • enoxaparin (LOVENOX) 40 MG/0.4ML injectable solution INJECT 40 MG SUBCUTANEOUSLY DAILY. USE FOR 2 WEEKS AFTER SURGERY TO PREVENT BLOOD CLOTS     • tolnaftate (TINACTIN) 1 % topical solution Apply 1 Application topically in the morning and 1 Application in the evening. Apply to affected toenail     • [DISCONTINUED] metroNIDAZOLE (METROGEL-VAGINAL) 0.75 % vaginal gel insert 1 applicatorful vaginally daily for 5 nights prior to surgery     • naLOXone (NARCAN) 4 MG/0.1ML nasal liquid FOR SUSPECTED OPIOID OVERDOSE, ADMINISTER A SINGLE SPRAY INTRANASALLY INTO 1 NOSTRIL, THEN SEEK EMERGENCY MEDICAL CARE. MAY REPEAT EVERY 2 TO 3 MINUTES IF MINIMAL OR NO RESPONSE     • oxyCODONE, IMM REL, (ROXICODONE) 5 MG immediate release tablet take 1 tablet by mouthevery 4 to 6 hours as needed for pain. use for moderate or severe postoperative pain     • Polyethylene Glycol 3350 (PEG 3350) 17 g Pack use 1 powder in packet by mouth 2 times per day as needed for constipation after surgery     • baclofen  (LIORESAL) 10 MG tablet Take 10 mg by mouth in the morning and 10 mg in the evening.     • Multiple Vitamins-Calcium (ONE-A-DAY WOMENS FORMULA PO) Take 1 tablet by mouth daily.     • Cholecalciferol (VITAMIN D3 PO) Take 1 capsule by mouth daily.     • Probiotic Product (PROBIOTIC PO) Take 1 capsule by mouth daily.     • DISPENSE \"Skin lightening cream\" - Apply topically to dark spots on face once daily as directed.     • fexofenadine (ALLEGRA) 180 MG tablet Take 180 mg by mouth 4 times daily.     • ibuprofen (MOTRIN) 600 MG tablet Take 1 tablet by mouth every 6 hours as needed for Pain. 30 tablet 0   • lidocaine (LIDODERM) 5 % patch Place 1 patch onto the skin every 24 hours. Remove patch 12 hours after applying 10 patch 0   • simvastatin (ZOCOR) 20 MG tablet Take 20 mg by mouth nightly.     • BIOTIN PO Take 1 capsule by mouth daily.     • DiphenhydrAMINE HCl (BENADRYL ALLERGY PO) Take 1 tablet by mouth daily as needed (allergies).     • EPINEPHrine 0.3 MG/0.3ML auto-injector Inject 1 dose into the mid outer thigh in case of tongue swelling, throat closure, difficulty swallowing, or allergic emergency.  Is now 2 each 1     Reviewed with patient.     Inpatient Medications  Current Facility-Administered Medications   Medication Dose Route Frequency Provider Last Rate Last Admin   • lactated ringers infusion   Intravenous Continuous Sofiya Mendieta MD 75 mL/hr at 08/18/23 1141 New Bag at 08/18/23 1141   • HYDROmorphone (DILAUDID) injection 0.2 mg  0.2 mg Intravenous Q5 Min PRN Ella Lucia MD       • fentaNYL (SUBLIMAZE) injection 25 mcg  25 mcg Intravenous Q5 Min PRN Ella Lucia MD       • fentaNYL (SUBLIMAZE) injection 50 mcg  50 mcg Intravenous Q5 Min PRN Ella Lucia MD       • HYDROmorphone (DILAUDID) injection 0.4 mg  0.4 mg Intravenous Q5 Min PRN Ella Lucia MD       • ondansetron (ZOFRAN) injection 4 mg  4 mg Intravenous BID PRN Ella Lucia MD       • droperidol  (INAPSINE) injection 0.625 mg  0.625 mg Intravenous Once PRN Ella Lucia MD       • prochlorperazine (COMPAZINE) injection 5 mg  5 mg Intravenous Q4H PRN Ella Lucia MD       • diphenhydrAMINE (BENADRYL) injection 25 mg  25 mg Intravenous Q4H PRN Ella Lucia MD       • naLOXone (NARCAN) injection 0.2 mg  0.2 mg Intravenous Q5 Min PRN Ella Lucia MD       • hydrALAZINE (APRESOLINE) injection 5 mg  5 mg Intravenous Q10 Min PRN Ella Lucia MD       • labetalol (NORMODYNE) injection 5 mg  5 mg Intravenous Q10 Min PRN Ella Lucia MD       • PHENYLephrine (DIANA-SYNEPHRINE) 100 MCG/ML injection 100 mcg  100 mcg Intravenous PRN Ella Lucia MD       • ePHEDrine 5 mg injection  5 mg Intravenous Once PRN Ella Lucia MD       • racepinephrine (S2) 2.25 % inhalation solution 0.5 mL  0.5 mL Nebulization Once Ella Lucia MD       • ceFAZolin (ANCEF) syringe 2,000 mg  2,000 mg Intravenous 3 times per day Sofiya Mendieta MD         All inpatient medications, side effects and potential interactions discussed.    In/Out    Intake/Output Summary (Last 24 hours) at 8/18/2023 1208  Last data filed at 8/18/2023 1123  Gross per 24 hour   Intake 2000 ml   Output 475 ml   Net 1525 ml        Physical Exam  Visit Vitals  /58 (BP Location: RUE - Right upper extremity, Patient Position: Semi-Helm's)   Pulse 78   Temp 97.5 °F (36.4 °C) (Temporal)   Resp 16   Ht 5' 1.5\" (1.562 m)   Wt 83.9 kg (185 lb)   SpO2 95%   BMI 34.39 kg/m²       Physical Exam:  General: Alert and oriented, no acute distress  Eyes: no scleral icterus, no conjunctival erythema   Cardio: S1, S2, RRR, no murmur, rub, gallop or thrills noted.   Pulm: Lungs clear to auscultation bilaterally, no wheeze or rhonchi noted. No chest wall tenderness  GI: Soft, non-tender, nondistended. Normal bowel sounds auscultated x4 quadrants  : No suprapubic Tenderness, no CVA tenderness bilaterally  Ext: No upper  or lower extremity edema noted. No cords palpated.   Musculoskeletal: 5/5 strength both upper and lower extremities. No joint tenderness or erythema.  Skin: No abnormal bruising or discoloration noted. No jaundice.   Psych: Appropriate mood and affect. Good Insight and Judgment  Neuro: No focal motor or sensory deficits noted. Pt appropriately follows commands.    Labs   No results found    Invalid input(s): \"GFRAA\", \"ALKPHOS\"    Imaging  No orders to display       Microbiology Results     None          I personally reviewed the patient's imaging, radiology and report(s).     LAST ECHO/ECHO STRESS:  No valid procedures specified.    Echocardiogram Results Personally reviewed by me.     Assessment/Plan:  All the following conditions PRESENT ON ADMISSION.   Patient is a 64-year-old woman with history of DVT, dyslipidemia, who was recently diagnosed with an adnexal mass measuring 5 cm associated with elevated tumor markers.  Patient was seen by Dr. Mendieta and decision was made to proceed with surgery.  Patient underwent laparoscopic MADI/BSO today.  Postoperatively patient appears comfortable does not appear to be in any acute distress her pain is well controlled and she is hemodynamically stable.  Her  is at bedside.    *Adnexal mass    Status post XI ROBOTIC ASSISTED LAPAROSCOPIC TOTAL HYSTERECTOMY, BILATERAL SALPINGO OOPHORECTOMY,  lysis of adhesions  Received Ancef in OR  Regular diet per surgery    *History of DVT continue Lovenox    *Postoperative pain controlled with oral meds    *Dyslipidemia  On statins at home            DVT Prophylaxis:    Diet: Npo Diet With Exceptions; Medications  Baseline Activity: Ambulates Independently    CODE STATUS:   Code Status: Full Resuscitation    Physician Notification:  Consultants notified of patient via [employer].  Communication: with patient, nurse, ER physician / Resident    MORE than 75 MINS WERE SPENT ON THIS PATIENTS CARE TODAY. This includes the following:  Reviewed all vitals, medications, new orders, I/O, labs, micro, radiology, nurses notes, pertinent consultant notes which are reflected in assessment and plan.This does not include time spent on other items of care such as smoking cessation counseling, prolonged care time, and or advanced care planning if applicable.   (Level 1 HP: 40 min, Level 2 HP: 55 min, Level 3 HP: 75 min)       Jackie Cordova MD    [facility] Hospitalist  8/18/2023 12:08 PM

## 2023-09-07 ENCOUNTER — TELEPHONE (OUTPATIENT)
Dept: FAMILY MEDICINE CLINIC | Age: 58
End: 2023-09-07

## 2023-09-07 DIAGNOSIS — Z12.11 SCREEN FOR COLON CANCER: Primary | ICD-10-CM

## 2023-09-07 NOTE — TELEPHONE ENCOUNTER
YADIR[facility] Bautista [facility] Clinical Support Pool  Subject: Referral Request     Reason for referral request? pt calling in to get referral to another   doctor for colonoscopy, wants to go to dr. Reida Lundborg, please advise. Provider patient wants to be referred to(if known): Vamshi Maldonado     Provider Phone Number(if known):      Additional Information for Provider?   ---------------------------------------------------------------------------   --------------   Leonora [place] Javier     [phone]; OK to leave message on voicemail   ---------------------------------------------------------------------------   --------------

## 2023-09-07 NOTE — TELEPHONE ENCOUNTER
Diagnosis Orders   1. Screen for colon cancer  Telma Malave MD, Gastroenterology, [place]           No future appointments.

## 2023-09-08 ENCOUNTER — TELEPHONE (OUTPATIENT)
Dept: GASTROENTEROLOGY | Age: 58
End: 2023-09-08

## 2023-09-08 NOTE — TELEPHONE ENCOUNTER
Patient LVM for a call back to schedule from referral.  - Screen for colon cancer  Last colonoscopy with Dr Abdirahman Baig was 11/16/17, polyps, and a 5 year recall.

## 2023-09-11 NOTE — TELEPHONE ENCOUNTER
Writer called pt back to schedule colon recall, pt did not answer, Serjio Quezada asking for a return call.

## 2023-09-12 DIAGNOSIS — Z86.010 HISTORY OF COLON POLYPS: Primary | ICD-10-CM

## 2023-09-12 RX ORDER — POLYETHYLENE GLYCOL 3350 17 G/17G
POWDER, FOR SOLUTION ORAL
Qty: 238 G | Refills: 0 | Status: SHIPPED | OUTPATIENT
Start: 2023-09-12

## 2023-09-12 RX ORDER — BISACODYL 5 MG/1
TABLET, DELAYED RELEASE ORAL
Qty: 4 TABLET | Refills: 0 | Status: SHIPPED | OUTPATIENT
Start: 2023-09-12

## 2023-09-12 NOTE — TELEPHONE ENCOUNTER
Writer called pt back to schedule colon, per colon screen questionnaire pt can be scheduled for colon;    [facility] Erica Monday [email or web address] recall(5 yr) [email or web address].    Reviewed bowel prep instructions with patient over phone and mailed to home address.

## 2023-09-14 ENCOUNTER — HOSPITAL ENCOUNTER (OUTPATIENT)
Dept: PREADMISSION TESTING | Age: 58
Discharge: HOME | End: 2023-09-18

## 2023-09-14 VITALS — WEIGHT: 214 LBS | HEIGHT: 60 IN | BODY MASS INDEX: 42.01 KG/M2

## 2023-09-14 NOTE — PROGRESS NOTES
Pre-op Instructions For Out-Patient Endoscopy Surgery    Medication Instructions:  Please stop herbs and any supplements now (includes vitamins and minerals). Please contact your surgeon and prescribing physician for pre-op instructions for any blood thinners. If you have inhalers/aerosol treatments at home, please use them the morning of your surgery and bring the inhalers with you to the hospital.    Please take the following medications the morning of your surgery with a sip of water:    AMLODIPINE, LEVOTHYROXINE. Surgery Instructions:  After midnight before surgery:  Do not eat or drink anything, including water, mints, gum, and hard candy. You may brush your teeth without swallowing. No smoking, chewing tobacco, or street drugs. Please shower or bathe before surgery. Please do not wear any cologne, lotion, powder, jewelry, piercings, perfume, makeup, nail polish, hair accessories, or hair spray on the day of surgery. Wear loose comfortable clothing. Leave your valuables at home but bring a payment source for any after-surgery prescriptions you plan to fill at [facility]. Bring a storage case for any glasses/contacts. An adult who is responsible for you MUST drive you home and should be with you for the first 24 hours after surgery. The Day of Surgery:  Arrive at [facility] AT [facility] Surgery Entrance at the time directed by your surgeon and check in at the desk. If you have a living will or healthcare power of , please bring a copy. You will be taken to the pre-op holding area where you will be prepared for surgery. A physical assessment will be performed by a nurse practitioner or house officer. Your IV will be started and you will meet your anesthesiologist.    When you go to surgery, your family will be directed to the surgical waiting room, where the doctor should speak with them after your surgery.     After surgery, you will be taken to the

## 2023-09-14 NOTE — PRE-PROCEDURE INSTRUCTIONS
No answer, left message ? Unable to leave message ? When were you told to arrive at hospital ?  [address]    Do you have a  ? yes    Are you on any blood thinners ? no                 If yes when did you stop taking ? Do you have your prep Rx filled and instruction ? yes    Nothing to eat the day before , only clear liquids. Are you experiencing any covid symptoms ? no    Do you have any infections or rash we should be aware of ? no      Do you have the Hibiclens soap to use the night before and the morning of surgery ? Nothing to eat or drink after midnight, only a sip of water to take any medication instructed to take the night before. Wear comfortable clothing, leave any valuables at home, remove any jewelry and body piercing .

## 2023-09-15 ENCOUNTER — ANESTHESIA EVENT (OUTPATIENT)
Dept: ENDOSCOPY | Age: 58
End: 2023-09-15
Payer: COMMERCIAL

## 2023-09-15 NOTE — PROGRESS NOTES
Dr. Sommer Plata, anesthesia, was contacted and informed of patient's history and planned surgery. No orders received and no clearance required.

## 2023-09-18 ENCOUNTER — HOSPITAL ENCOUNTER (OUTPATIENT)
Age: 58
Setting detail: OUTPATIENT SURGERY
Discharge: HOME | End: 2023-09-18
Attending: INTERNAL MEDICINE | Admitting: INTERNAL MEDICINE
Payer: COMMERCIAL

## 2023-09-18 ENCOUNTER — ANESTHESIA (OUTPATIENT)
Dept: ENDOSCOPY | Age: 58
End: 2023-09-18
Payer: COMMERCIAL

## 2023-09-18 VITALS
OXYGEN SATURATION: 95 % | WEIGHT: 209 LBS | SYSTOLIC BLOOD PRESSURE: 108 MMHG | TEMPERATURE: 97 F | DIASTOLIC BLOOD PRESSURE: 72 MMHG | HEIGHT: 60 IN | RESPIRATION RATE: 18 BRPM | HEART RATE: 61 BPM | BODY MASS INDEX: 41.03 KG/M2

## 2023-09-18 PROCEDURE — 2709999900 HC NON-CHARGEABLE SUPPLY: Performed by: INTERNAL MEDICINE

## 2023-09-18 PROCEDURE — 2580000003 HC RX 258: Performed by: ANESTHESIOLOGY

## 2023-09-18 PROCEDURE — 7100000011 HC PHASE II RECOVERY - ADDTL 15 MIN: Performed by: INTERNAL MEDICINE

## 2023-09-18 PROCEDURE — 6360000002 HC RX W HCPCS: Performed by: NURSE ANESTHETIST, CERTIFIED REGISTERED

## 2023-09-18 PROCEDURE — 3700000000 HC ANESTHESIA ATTENDED CARE: Performed by: INTERNAL MEDICINE

## 2023-09-18 PROCEDURE — 7100000010 HC PHASE II RECOVERY - FIRST 15 MIN: Performed by: INTERNAL MEDICINE

## 2023-09-18 PROCEDURE — 45378 DIAGNOSTIC COLONOSCOPY: CPT | Performed by: INTERNAL MEDICINE

## 2023-09-18 PROCEDURE — 2500000003 HC RX 250 WO HCPCS: Performed by: ANESTHESIOLOGY

## 2023-09-18 PROCEDURE — 2500000003 HC RX 250 WO HCPCS: Performed by: NURSE ANESTHETIST, CERTIFIED REGISTERED

## 2023-09-18 PROCEDURE — 3700000001 HC ADD 15 MINUTES (ANESTHESIA): Performed by: INTERNAL MEDICINE

## 2023-09-18 PROCEDURE — 3609027000 HC COLONOSCOPY: Performed by: INTERNAL MEDICINE

## 2023-09-18 RX ORDER — SCOLOPAMINE TRANSDERMAL SYSTEM 1 MG/1
1 PATCH, EXTENDED RELEASE TRANSDERMAL
Status: DISCONTINUED | OUTPATIENT
Start: 2023-09-18 | End: 2023-09-18 | Stop reason: HOSPADM

## 2023-09-18 RX ORDER — SODIUM CHLORIDE 0.9 % (FLUSH) 0.9 %
5-40 SYRINGE (ML) INJECTION EVERY 12 HOURS SCHEDULED
Status: DISCONTINUED | OUTPATIENT
Start: 2023-09-18 | End: 2023-09-18 | Stop reason: HOSPADM

## 2023-09-18 RX ORDER — SODIUM CHLORIDE 9 MG/ML
INJECTION, SOLUTION INTRAVENOUS PRN
Status: DISCONTINUED | OUTPATIENT
Start: 2023-09-18 | End: 2023-09-18 | Stop reason: HOSPADM

## 2023-09-18 RX ORDER — SODIUM CHLORIDE 0.9 % (FLUSH) 0.9 %
5-40 SYRINGE (ML) INJECTION PRN
Status: DISCONTINUED | OUTPATIENT
Start: 2023-09-18 | End: 2023-09-18 | Stop reason: HOSPADM

## 2023-09-18 RX ORDER — LIDOCAINE HYDROCHLORIDE 10 MG/ML
1 INJECTION, SOLUTION EPIDURAL; INFILTRATION; INTRACAUDAL; PERINEURAL
Status: COMPLETED | OUTPATIENT
Start: 2023-09-18 | End: 2023-09-18

## 2023-09-18 RX ORDER — PROPOFOL 10 MG/ML
INJECTION, EMULSION INTRAVENOUS PRN
Status: DISCONTINUED | OUTPATIENT
Start: 2023-09-18 | End: 2023-09-18 | Stop reason: SDUPTHER

## 2023-09-18 RX ORDER — ONDANSETRON 2 MG/ML
INJECTION INTRAMUSCULAR; INTRAVENOUS PRN
Status: DISCONTINUED | OUTPATIENT
Start: 2023-09-18 | End: 2023-09-18 | Stop reason: SDUPTHER

## 2023-09-18 RX ORDER — LIDOCAINE HYDROCHLORIDE 20 MG/ML
INJECTION, SOLUTION EPIDURAL; INFILTRATION; INTRACAUDAL; PERINEURAL PRN
Status: DISCONTINUED | OUTPATIENT
Start: 2023-09-18 | End: 2023-09-18 | Stop reason: SDUPTHER

## 2023-09-18 RX ORDER — PROPOFOL 10 MG/ML
INJECTION, EMULSION INTRAVENOUS CONTINUOUS PRN
Status: DISCONTINUED | OUTPATIENT
Start: 2023-09-18 | End: 2023-09-18 | Stop reason: SDUPTHER

## 2023-09-18 RX ORDER — SODIUM CHLORIDE, SODIUM LACTATE, POTASSIUM CHLORIDE, CALCIUM CHLORIDE 600; 310; 30; 20 MG/100ML; MG/100ML; MG/100ML; MG/100ML
INJECTION, SOLUTION INTRAVENOUS CONTINUOUS
Status: DISCONTINUED | OUTPATIENT
Start: 2023-09-18 | End: 2023-09-18 | Stop reason: HOSPADM

## 2023-09-18 RX ADMIN — PROPOFOL 100 MCG/KG/MIN: 10 INJECTION, EMULSION INTRAVENOUS at 10:11

## 2023-09-18 RX ADMIN — ONDANSETRON 4 MG: 2 INJECTION INTRAMUSCULAR; INTRAVENOUS at 10:14

## 2023-09-18 RX ADMIN — LIDOCAINE HYDROCHLORIDE 1 ML: 10 INJECTION, SOLUTION EPIDURAL; INFILTRATION; INTRACAUDAL; PERINEURAL at 08:36

## 2023-09-18 RX ADMIN — PROPOFOL 80 MG: 10 INJECTION, EMULSION INTRAVENOUS at 10:11

## 2023-09-18 RX ADMIN — SODIUM CHLORIDE, POTASSIUM CHLORIDE, SODIUM LACTATE AND CALCIUM CHLORIDE: 600; 310; 30; 20 INJECTION, SOLUTION INTRAVENOUS at 08:35

## 2023-09-18 RX ADMIN — LIDOCAINE HYDROCHLORIDE 40 MG: 20 INJECTION, SOLUTION EPIDURAL; INFILTRATION; INTRACAUDAL; PERINEURAL at 10:11

## 2023-09-18 ASSESSMENT — PAIN - FUNCTIONAL ASSESSMENT: PAIN_FUNCTIONAL_ASSESSMENT: NONE - DENIES PAIN

## 2023-09-18 ASSESSMENT — ENCOUNTER SYMPTOMS
RESPIRATORY NEGATIVE: 1
EYES NEGATIVE: 1
GASTROINTESTINAL NEGATIVE: 1

## 2023-09-18 ASSESSMENT — PAIN SCALES - GENERAL: PAINLEVEL_OUTOF10: 0

## 2023-09-18 NOTE — H&P
HISTORY and [id number]       NAME:  Josh Mcardle  MRN: [id number]   YOB: 1965   Date: 9/18/2023   Age: 63 y.o. Gender: female       COMPLAINT AND PRESENT HISTORY:     Josh Mcardle is 63 y.o.,  female, presents for COLONOSCOPY DIAGNOSTIC     Primary dx: History of colon polyps [Z86.010]. HPI:  Josh Mcardle is 63 y.o.,   female, having a Diagnostic Colonoscopy. Prior Colonoscopy was done 2017 with polyp removed. Patient has hx of Colon Polyps. Pt has no hx of Diverticulosis. Patient denies any  FH of Colon Cancer. Patient reports no changes in bowel habits. No GI /Rectal bleeding, experiencing red/ black/ BRBPR stools. Patient has no  history of abd. pain , no nausea or vomiting, no abdominal bloating or weight loss Patient denies any Dysphagia. Pt has hx of GERD   Pt is on a PPI that is helping to control symptoms. Pt denies fever/chills, chest pain or SOB     Review of additional significant medical hx:  (See chart for additional detail, including current medications /see ROS for current S/S):     NPO status: pt NPO since the past midnight   Medications taken TODAY (with sip of water): pt took all her am medication today with sip of water   Anticoagulation status: none  Prep fully completed: YES. Pt reports her LBM is liquid   Denies personal hx of blood clots. Denies personal hx of MRSA infection. Denies any personal or family hx of previous complications w/anesthesia. pt has hx of PONV. Past Medical History:   Diagnosis Date    Abnormal EKG 06/18/2021    SINUS BRADYCARDIA, LEFT AXIS DEVIATION, NONSPECIFIC T WAVE ABNORMALITY    Acquired hypothyroidism 07/27/2020    Bilateral kidney stones 06/18/2022    Bilateral primary osteoarthritis of knee 04/25/2021    Chronic back pain     Coronary artery disease involving native coronary artery of native heart without angina pectoris 03/21/2022    PT. DENIES.     Diarrhea     Distal radius

## 2023-09-18 NOTE — OP NOTE
PROCEDURE NOTE    DATE OF PROCEDURE: 9/18/2023    SURGEON: Neema Long MD  Facility : [facility]  ASSISTANT: None  Anesthesia: MAC  PREOPERATIVE DIAGNOSIS:   History of polyps    POSTOPERATIVE DIAGNOSIS: as described below    OPERATION: Total colonoscopy     ANESTHESIA: Moderate Sedation    ESTIMATED BLOOD LOSS: less than 50     COMPLICATIONS: None. SPECIMENS:  Was Not Obtained    HISTORY: The patient is a 63y.o. year old female with history of above preop diagnosis. I recommended colonoscopy with possible biopsy or polypectomy and I explained the risk, benefits, expected outcome, and alternatives to the procedure. Risks included but are not limited to bleeding, infection, respiratory distress, hypotension, and perforation of the colon and possibility of missing a lesion. The patient understands and is in agreement. The patient was counseled at length about the risks of ana Covid-19 during their perioperative period and any recovery window from their procedure. The patient was made aware that ana Covid-19  may worsen their prognosis for recovering from their procedure  and lend to a higher morbidity and/or mortality risk. All material risks, benefits, and reasonable alternatives including postponing the procedure were discussed. The patient will wish to proceed with the procedure at this time. PROCEDURE: The patient was given IV conscious sedation. The patient's SPO2 remained above 90% throughout the procedure. The colonoscope was inserted per rectum and advanced under direct vision to the cecum without difficulty. Post sedation note : The patient's SPO2 remained above 90% throughout the procedure. the vital signs remained stable , and no immediate complication form the procedure noted, patient will be ready for d/c when criteria is met . The prep was fair.       Findings:  Terminal ileum: normal    Cecum/Ascending colon: normal    Transverse colon:

## 2023-09-18 NOTE — DISCHARGE INSTRUCTIONS
eat a lot of processed, low fiber foods. Diverticuli develop due to firm stool and high pressures in the colon, along with secondary spasm, which causes outpouchings to occus where the small arteries penetrate the wall of the colon to feed the internal lining. These occur most commonly on the left side and lower portions of the colon. Diverticuli can then cause bleeding from the arteries at these sites of weakness when they rupture or the diveritculi can get blocked with stool and debris and become obstructed, causing diverticulosis, which is due to an infection. When these are infected, diverticulitis, it is often treated with antibiotics and bowel rest, but when severe, recurrent, or if rupture of the colon occurs, it may require surgery. Following appropriate dietary changes and taking the proper precautions is therefore very important. DIET:  You should increase your dietary fiber intake and take a fiber supplement twice a day. Make sure that you are taking a supplement that is just fiber and is not a laxative, which should be noted on the package. Starting a fiber supplement may cause increased gas, more frequent bowel movements, and distension at first but this should improve after a couple of weeks. Try to eat whole wheat breads and pasta, more fruits and vegetables, along with brown rice and plenty of fluids. Avoid small undigestible food items that could get stuck in these outpouchings, such as unpopped popcorn kernals, whole corn, small undigestible seeds, etc..    ACTIVITY:  Exercise is also a great way to prevent constipation and is encouraged. It may also help prevent progression of your diverticulosis. Always make sure you take in plenty of fluids when exercising. MEDICATIONS:  Take an over-the-counter fiber supplement as noted above twice daily. If your symptoms don't improve with fiber and dietary changes alone your physician may also recommend psyllium or methylcellulose as well.

## 2023-09-18 NOTE — DISCHARGE INSTR - DIET
Good nutrition is important when healing from an illness, injury, or surgery. Follow any nutrition recommendations given to you during your hospital stay. If you were given an oral nutrition supplement while in the hospital, continue to take this supplement at home. You can take it with meals, in-between meals, and/or before bedtime. These supplements can be purchased at most local grocery stores, pharmacies, and chain [employer]-stores. If you have any questions about your diet or nutrition, call the hospital and ask for the dietitian. High-Fiber Diet     What Is Fiber? Dietary fiber is a form of carbohydrate found in plants that cannot be digested by humans. All plants contain fiber, including fruits, vegetables, grains, and legumes. Fiber is often classified into two categories: soluble and insoluble. Soluble fiber draws water into the bowel and can help slow digestion. Examples of foods that are high in soluble fiber include oatmeal, oat bran, barley, legumes (eg, beans and peas), apples, and strawberries. Insoluble fiber speeds digestion and can add bulk to the stool. Examples of foods that are high in insoluble fiber include whole-wheat products, wheat bran, cauliflower, green beans, and potatoes. Why Follow a High-Fiber Diet? A high-fiber diet is often recommended to prevent and treat constipation , hemorrhoids , diverticulitis , and irritable bowel syndrome . Eating a high-fiber diet can also help improve your cholesterol levels, lower your risk of coronary heart disease , reduce your risk of type 2 diabetes , and lower your weight. For people with type 1 or 2 diabetes, a high-fiber diet can also help stabilize blood sugar levels. How Much Fiber Should I Eat? A high-fiber diet should contain  20-35 grams  of fiber a day. This is actually the amount recommended for the general adult population; however, most Americans eat only 15 grams of fiber per day.    Digestion of Fiber   Eating a

## 2023-09-20 ENCOUNTER — TELEPHONE (OUTPATIENT)
Dept: FAMILY MEDICINE CLINIC | Age: 58
End: 2023-09-20

## 2023-09-20 DIAGNOSIS — D22.9 CHANGE IN SKIN MOLE: Primary | ICD-10-CM

## 2023-09-20 NOTE — TELEPHONE ENCOUNTER
Spoke with pt and confirmed pt did not have insurance at the time that and she would need a new referral paced to:      Cory Morrison MD   [address]   [facility], [address] [place] [address],[address]   [phone]    Mole located near her belly button about the size of a dime , with a purple

## 2023-09-20 NOTE — TELEPHONE ENCOUNTER
----- Message from Harriet Hashimoto sent at 9/19/2023  2:42 PM EDT -----  Subject: Appointment Request    Reason for Call: Established Patient Appointment needed: Semi-Routine Skin   Problem    QUESTIONS    Reason for appointment request? No appointments available during search     Additional Information for Provider? patient is requesting to have a call   back to schedule a appointment for symptoms of skin issues of a mole   located near her belly button about the size of a dime , with a purple   ring around it that is tender to touch.  ---------------------------------------------------------------------------  --------------  Jorge Luis TO  [phone]; OK to leave message on voicemail  ---------------------------------------------------------------------------  --------------  SCRIPT ANSWERS

## 2023-10-10 ENCOUNTER — OFFICE VISIT (OUTPATIENT)
Dept: FAMILY MEDICINE CLINIC | Age: 58
End: 2023-10-10
Payer: COMMERCIAL

## 2023-10-10 ENCOUNTER — HOSPITAL ENCOUNTER (OUTPATIENT)
Age: 58
Setting detail: SPECIMEN
Discharge: HOME | End: 2023-10-10

## 2023-10-10 VITALS
WEIGHT: 209 LBS | TEMPERATURE: 98 F | HEART RATE: 83 BPM | OXYGEN SATURATION: 97 % | DIASTOLIC BLOOD PRESSURE: 84 MMHG | SYSTOLIC BLOOD PRESSURE: 132 MMHG | BODY MASS INDEX: 40.82 KG/M2

## 2023-10-10 DIAGNOSIS — U07.1 POSITIVE SELF-ADMINISTERED ANTIGEN TEST FOR COVID-19: Primary | ICD-10-CM

## 2023-10-10 DIAGNOSIS — J06.9 VIRAL URI: ICD-10-CM

## 2023-10-10 DIAGNOSIS — U07.1 POSITIVE SELF-ADMINISTERED ANTIGEN TEST FOR COVID-19: ICD-10-CM

## 2023-10-10 PROCEDURE — 99213 OFFICE O/P EST LOW 20 MIN: CPT

## 2023-10-10 PROCEDURE — 3079F DIAST BP 80-89 MM HG: CPT

## 2023-10-10 PROCEDURE — 3075F SYST BP GE 130 - 139MM HG: CPT

## 2023-10-10 ASSESSMENT — ENCOUNTER SYMPTOMS
NAUSEA: 1
SORE THROAT: 1
SINUS PAIN: 1
COUGH: 1
EYE ITCHING: 0
DIARRHEA: 0
VOMITING: 1
EYE PAIN: 0
ABDOMINAL PAIN: 0
SWOLLEN GLANDS: 1

## 2023-10-10 NOTE — PROGRESS NOTES
aches.  -Recommend using a cool-mist humidifier.  -Mucinex OTC  -Will send out COVID19 testing.   -Patient instructed to self-quarantine until testing results are back.  -Patient instructed not to return to work until results are back.  -Discussed symptoms that will warrant urgent ED evaluation/treatment.  -Seek medical attention immediately for increased work of breathing or other concerning symptoms.  -Instructed to increase fluid intake.   -Suggested humidifier and mist therapy.  -Avoid irritants such as smoke. -Reviewed over-the-counter treatments for symptom management.  -Follow-up with PCP as needed. No follow-ups on file. Orders Placed This Encounter   Medications    nirmatrelvir/ritonavir 300/100 (PAXLOVID) 20 x 150 MG & 10 x 100MG TBPK     Sig: Take 3 tablets (two 150 mg nirmatrelvir and one 100 mg ritonavir tablets) by mouth every 12 hours for 5 days. Dispense:  30 tablet     Refill:  0     Order Specific Question:   Does this patient qualify for COVID-19 antIviral therapy based on criteria for treatment? Answer:   Yes         Patient given educational materials - see patient instructions. Discussed use, benefit, and side effects of prescribed medications. All patient questions answered. Pt voiced understanding.     Electronically signed by Karyle Masson, APRN - NP on 10/10/2023 at 6:28 PM
Hypereosinophilia of unclear etiology. Broad Ddx includes hypereosinophilia syndrome, hematological malignancy,  rheumatological process, parasitic infection.  Though Strongyloides has been negative in past, this does not r/o parasitic infections.  -C3, C4, CRP, KHADRA, EMILY, IgE sent by outpatient dermatology  -Hematology consult re: myeloproliferative process  -Stool ova & parasites  -VitB12, may be elevated in some hematological disorders  -CE, to assess for myocardial injury  -consider Rheumatology c/s

## 2023-10-11 LAB
SARS-COV-2 RNA RESP QL NAA+PROBE: ABNORMAL
SARS-COV-2 RNA RESP QL NAA+PROBE: DETECTED
SOURCE: ABNORMAL

## 2023-11-29 ENCOUNTER — OFFICE VISIT (OUTPATIENT)
Dept: FAMILY MEDICINE CLINIC | Age: 58
End: 2023-11-29
Payer: COMMERCIAL

## 2023-11-29 VITALS
SYSTOLIC BLOOD PRESSURE: 132 MMHG | WEIGHT: 213 LBS | OXYGEN SATURATION: 98 % | HEART RATE: 68 BPM | BODY MASS INDEX: 41.82 KG/M2 | HEIGHT: 60 IN | TEMPERATURE: 97.8 F | DIASTOLIC BLOOD PRESSURE: 78 MMHG

## 2023-11-29 DIAGNOSIS — E06.3 HYPOTHYROIDISM DUE TO HASHIMOTO'S THYROIDITIS: ICD-10-CM

## 2023-11-29 DIAGNOSIS — I10 ESSENTIAL HYPERTENSION: Primary | ICD-10-CM

## 2023-11-29 DIAGNOSIS — R73.03 PREDIABETES: ICD-10-CM

## 2023-11-29 DIAGNOSIS — Z11.59 ENCOUNTER FOR SCREENING FOR OTHER VIRAL DISEASES: ICD-10-CM

## 2023-11-29 DIAGNOSIS — E03.8 HYPOTHYROIDISM DUE TO HASHIMOTO'S THYROIDITIS: ICD-10-CM

## 2023-11-29 DIAGNOSIS — N32.81 OAB (OVERACTIVE BLADDER): ICD-10-CM

## 2023-11-29 DIAGNOSIS — F33.0 MILD EPISODE OF RECURRENT MAJOR DEPRESSIVE DISORDER (HCC): ICD-10-CM

## 2023-11-29 DIAGNOSIS — K21.9 GASTROESOPHAGEAL REFLUX DISEASE WITHOUT ESOPHAGITIS: ICD-10-CM

## 2023-11-29 DIAGNOSIS — E78.5 HYPERLIPIDEMIA WITH TARGET LDL LESS THAN 100: ICD-10-CM

## 2023-11-29 DIAGNOSIS — F41.9 ANXIETY: ICD-10-CM

## 2023-11-29 DIAGNOSIS — Z12.31 ENCOUNTER FOR SCREENING MAMMOGRAM FOR BREAST CANCER: ICD-10-CM

## 2023-11-29 DIAGNOSIS — E66.01 MORBID OBESITY WITH BMI OF 40.0-44.9, ADULT (HCC): ICD-10-CM

## 2023-11-29 PROCEDURE — 3074F SYST BP LT 130 MM HG: CPT | Performed by: FAMILY MEDICINE

## 2023-11-29 PROCEDURE — 99214 OFFICE O/P EST MOD 30 MIN: CPT | Performed by: FAMILY MEDICINE

## 2023-11-29 PROCEDURE — 3078F DIAST BP <80 MM HG: CPT | Performed by: FAMILY MEDICINE

## 2023-11-29 RX ORDER — FAMOTIDINE 20 MG/1
20 TABLET, FILM COATED ORAL 2 TIMES DAILY
Qty: 180 TABLET | Refills: 1 | Status: SHIPPED | OUTPATIENT
Start: 2023-11-29

## 2023-11-29 RX ORDER — PRAVASTATIN SODIUM 20 MG
20 TABLET ORAL
Qty: 90 TABLET | Refills: 1 | Status: SHIPPED | OUTPATIENT
Start: 2023-11-29

## 2023-11-29 RX ORDER — FLUOXETINE 10 MG/1
10 CAPSULE ORAL EVERY MORNING
Qty: 30 CAPSULE | Refills: 3 | Status: SHIPPED | OUTPATIENT
Start: 2023-11-29

## 2023-11-29 RX ORDER — LEVOTHYROXINE SODIUM 0.07 MG/1
75 TABLET ORAL
Qty: 90 TABLET | Refills: 3 | Status: SHIPPED | OUTPATIENT
Start: 2023-11-29

## 2023-11-29 SDOH — ECONOMIC STABILITY: INCOME INSECURITY: HOW HARD IS IT FOR YOU TO PAY FOR THE VERY BASICS LIKE FOOD, HOUSING, MEDICAL CARE, AND HEATING?: NOT HARD AT ALL

## 2023-11-29 SDOH — ECONOMIC STABILITY: HOUSING INSECURITY
IN THE LAST 12 MONTHS, WAS THERE A TIME WHEN YOU DID NOT HAVE A STEADY PLACE TO SLEEP OR SLEPT IN A SHELTER (INCLUDING NOW)?: NO

## 2023-11-29 SDOH — ECONOMIC STABILITY: FOOD INSECURITY: WITHIN THE PAST 12 MONTHS, THE FOOD YOU BOUGHT JUST DIDN'T LAST AND YOU DIDN'T HAVE MONEY TO GET MORE.: NEVER TRUE

## 2023-11-29 SDOH — ECONOMIC STABILITY: FOOD INSECURITY: WITHIN THE PAST 12 MONTHS, YOU WORRIED THAT YOUR FOOD WOULD RUN OUT BEFORE YOU GOT MONEY TO BUY MORE.: NEVER TRUE

## 2023-11-29 ASSESSMENT — PATIENT HEALTH QUESTIONNAIRE - PHQ9
7. TROUBLE CONCENTRATING ON THINGS, SUCH AS READING THE NEWSPAPER OR WATCHING TELEVISION: 0
SUM OF ALL RESPONSES TO PHQ9 QUESTIONS 1 & 2: 2
SUM OF ALL RESPONSES TO PHQ QUESTIONS 1-9: 6
9. THOUGHTS THAT YOU WOULD BE BETTER OFF DEAD, OR OF HURTING YOURSELF: 0
8. MOVING OR SPEAKING SO SLOWLY THAT OTHER PEOPLE COULD HAVE NOTICED. OR THE OPPOSITE, BEING SO FIGETY OR RESTLESS THAT YOU HAVE BEEN MOVING AROUND A LOT MORE THAN USUAL: 0
2. FEELING DOWN, DEPRESSED OR HOPELESS: 2
10. IF YOU CHECKED OFF ANY PROBLEMS, HOW DIFFICULT HAVE THESE PROBLEMS MADE IT FOR YOU TO DO YOUR WORK, TAKE CARE OF THINGS AT HOME, OR GET ALONG WITH OTHER PEOPLE: 0
SUM OF ALL RESPONSES TO PHQ QUESTIONS 1-9: 6
SUM OF ALL RESPONSES TO PHQ QUESTIONS 1-9: 6
4. FEELING TIRED OR HAVING LITTLE ENERGY: 2
6. FEELING BAD ABOUT YOURSELF - OR THAT YOU ARE A FAILURE OR HAVE LET YOURSELF OR YOUR FAMILY DOWN: 0
1. LITTLE INTEREST OR PLEASURE IN DOING THINGS: 0
SUM OF ALL RESPONSES TO PHQ QUESTIONS 1-9: 6
5. POOR APPETITE OR OVEREATING: 0
3. TROUBLE FALLING OR STAYING ASLEEP: 2

## 2023-11-29 ASSESSMENT — ANXIETY QUESTIONNAIRES
5. BEING SO RESTLESS THAT IT IS HARD TO SIT STILL: 1
7. FEELING AFRAID AS IF SOMETHING AWFUL MIGHT HAPPEN: 1
IF YOU CHECKED OFF ANY PROBLEMS ON THIS QUESTIONNAIRE, HOW DIFFICULT HAVE THESE PROBLEMS MADE IT FOR YOU TO DO YOUR WORK, TAKE CARE OF THINGS AT HOME, OR GET ALONG WITH OTHER PEOPLE: EXTREMELY DIFFICULT
GAD7 TOTAL SCORE: 7
4. TROUBLE RELAXING: 1
3. WORRYING TOO MUCH ABOUT DIFFERENT THINGS: 1
1. FEELING NERVOUS, ANXIOUS, OR ON EDGE: 1
6. BECOMING EASILY ANNOYED OR IRRITABLE: 1
2. NOT BEING ABLE TO STOP OR CONTROL WORRYING: 1
1. FEELING NERVOUS, ANXIOUS, OR ON EDGE: 1

## 2023-11-29 ASSESSMENT — COLUMBIA-SUICIDE SEVERITY RATING SCALE - C-SSRS
5. HAVE YOU STARTED TO WORK OUT OR WORKED OUT THE DETAILS OF HOW TO KILL YOURSELF? DO YOU INTEND TO CARRY OUT THIS PLAN?: NO
4. HAVE YOU HAD THESE THOUGHTS AND HAD SOME INTENTION OF ACTING ON THEM?: NO
3. HAVE YOU BEEN THINKING ABOUT HOW YOU MIGHT KILL YOURSELF?: NO
7. DID THIS OCCUR IN THE LAST THREE MONTHS: NO

## 2023-11-29 ASSESSMENT — ENCOUNTER SYMPTOMS
CHEST TIGHTNESS: 0
DIARRHEA: 0
ABDOMINAL DISTENTION: 0
WHEEZING: 0
NAUSEA: 0
CONSTIPATION: 0
ABDOMINAL PAIN: 0
SHORTNESS OF BREATH: 0
COUGH: 0
VOMITING: 0

## 2023-11-29 NOTE — PROGRESS NOTES
Visit Information    Have you changed or started any medications since your last visit including any over-the-counter medicines, vitamins, or herbal medicines? yes -     Have you stopped taking any of your medications? Is so, why? -  yes -    Are you having any side effects from any of your medications? - yes    Have you seen any other physician or provider since your last visit? yes  Have you had any other diagnostic tests since your last visit?  no   Have you been seen in the emergency room and/or had an admission in a hospital since we last saw you?  no   Have you had your routine dental cleaning in the past 6 months?  no     Do you have an active MyChart account? If no, what is the barrier?   Yes    Patient Care Team:  Tierney Zaldivar MD as PCP - General (Family Medicine)  Tierney Zaldivar MD as PCP - Empaneled Provider  Janene Koenig RN as Registered Nurse  Liliana Mortimer, MD as Consulting Physician (Orthopedic Surgery)    Medical History Review  Past Medical, Family, and Social History reviewed and does contribute to the patient presenting condition    Health Maintenance   Topic Date Due    Hepatitis B vaccine (1 of 3 - 3-dose series) Never done    Shingles vaccine (1 of 2) Never done    Breast cancer screen  06/12/2022    Depression Monitoring  03/16/2023    Flu vaccine (1) Never done    COVID-19 Vaccine (3 - 2023-24 season) 09/01/2023    A1C test (Diabetic or Prediabetic)  05/27/2024    Lipids  05/27/2024    Colorectal Cancer Screen  09/18/2028    DTaP/Tdap/Td vaccine (2 - Td or Tdap) 01/19/2031    Hepatitis C screen  Completed    HIV screen  Completed    Hepatitis A vaccine  Aged Out    Hib vaccine  Aged Out    Meningococcal (ACWY) vaccine  Aged Out    Pneumococcal 0-64 years Vaccine  Aged Out    Diabetes screen  Discontinued    Cervical cancer screen  Discontinued

## 2023-11-29 NOTE — PROGRESS NOTES
Lem Schirmer (:  1965) is a 63 y.o. female,Established patient, here for evaluation of the following chief complaint(s): Weight Management, Weight Gain (Discuss weight loss options ), Health Maintenance (Declines vaccination today), Discuss Medications, Medication Refill, Anxiety (Discuss anxiety medication), and Depression      ASSESSMENT/PLAN:    1. Essential hypertension  Well controlled. Continue current treatment. Amlodipine 5 mg  Will recheck labs. Discussed low salt diet and BP and pulse monitoring.    -     CBC; Future  -     Comprehensive Metabolic Panel; Future  -     TSH; Future  -     T4, Free; Future  -     Uric Acid; Future  -     Urinalysis with Reflex to Culture; Future  -     Magnesium; Future    2. Prediabetes  Improved prediabetes   But ongoing  Lab Results   Component Value Date    LABA1C 5.7 2023    LABA1C 5.9 2023    LABA1C 5.9 2022       -     Hemoglobin A1C; Future  -     Vitamin B12 & Folate; Future  - start    Dulaglutide (TRULICITY) 0.46 DQ/8.1HW SOPN; Inject 0.75 mg into the skin every 7 days If pen needle is needed, please request, Disp-4 Adjustable Dose Pre-filled Pen Syringe, R-0Normal  3. OAB (overactive bladder)  Ongoing  rule out UTI  Refer to urology  -     Yaneth Gross MD, Urology, [place]  -     Urinalysis with Reflex to Culture; Future  4. Hyperlipidemia with target LDL less than 100  ongoing  -continue      pravastatin (PRAVACHOL) 20 MG tablet; Take 1 tablet by mouth Daily with supper, Disp-90 tablet, R-1Normal  -     Lipid Panel; Future  5. Hypothyroidism due to Hashimoto's thyroiditis  Improved but not controlled  Recheck labs, rule out autoimmune  disorder    -     levothyroxine (SYNTHROID) 75 MCG tablet; Take 1 tablet by mouth every morning (before breakfast), Disp-90 tablet, R-3Normal  -     TSH; Future  -     T4, Free; Future  -     KALIE Screen with Reflex; Future  -     Tissue Transglutaminase Antibody IgA w Reflex; Future  6.

## 2023-11-30 ENCOUNTER — HOSPITAL ENCOUNTER (OUTPATIENT)
Age: 58
Setting detail: SPECIMEN
Discharge: HOME | End: 2023-11-30

## 2023-11-30 DIAGNOSIS — Z11.59 ENCOUNTER FOR SCREENING FOR OTHER VIRAL DISEASES: ICD-10-CM

## 2023-11-30 DIAGNOSIS — E78.5 HYPERLIPIDEMIA WITH TARGET LDL LESS THAN 100: ICD-10-CM

## 2023-11-30 DIAGNOSIS — E06.3 HYPOTHYROIDISM DUE TO HASHIMOTO'S THYROIDITIS: ICD-10-CM

## 2023-11-30 DIAGNOSIS — N32.81 OAB (OVERACTIVE BLADDER): ICD-10-CM

## 2023-11-30 DIAGNOSIS — I10 ESSENTIAL HYPERTENSION: ICD-10-CM

## 2023-11-30 DIAGNOSIS — R73.03 PREDIABETES: ICD-10-CM

## 2023-11-30 DIAGNOSIS — E03.8 HYPOTHYROIDISM DUE TO HASHIMOTO'S THYROIDITIS: ICD-10-CM

## 2023-11-30 LAB
ALBUMIN SERPL-MCNC: 4.3 G/DL (ref 3.5–5.2)
ALBUMIN/GLOB SERPL: 2 {RATIO} (ref 1–2.5)
ALP SERPL-CCNC: 95 U/L (ref 35–104)
ALT SERPL-CCNC: 15 U/L (ref 10–35)
ANION GAP SERPL CALCULATED.3IONS-SCNC: 10 MMOL/L (ref 9–16)
AST SERPL-CCNC: 21 U/L (ref 10–35)
BACTERIA URNS QL MICRO: ABNORMAL
BILIRUB SERPL-MCNC: 0.4 MG/DL (ref 0–1.2)
BILIRUB UR QL STRIP: NEGATIVE
BUN SERPL-MCNC: 15 MG/DL (ref 6–20)
CALCIUM SERPL-MCNC: 9.4 MG/DL (ref 8.6–10.4)
CHLORIDE SERPL-SCNC: 109 MMOL/L (ref 98–107)
CHOLEST SERPL-MCNC: 169 MG/DL (ref 0–199)
CHOLESTEROL/HDL RATIO: 4
CLARITY UR: ABNORMAL
CO2 SERPL-SCNC: 25 MMOL/L (ref 20–31)
COLOR UR: YELLOW
CREAT SERPL-MCNC: 0.7 MG/DL (ref 0.5–0.9)
EPI CELLS #/AREA URNS HPF: ABNORMAL /HPF (ref 0–5)
ERYTHROCYTE [DISTWIDTH] IN BLOOD BY AUTOMATED COUNT: 13.5 % (ref 11.8–14.4)
EST. AVERAGE GLUCOSE BLD GHB EST-MCNC: 117 MG/DL
FOLATE SERPL-MCNC: 11.8 NG/ML (ref 4.8–24.2)
GFR SERPL CREATININE-BSD FRML MDRD: >60 ML/MIN/1.73M2
GLUCOSE SERPL-MCNC: 88 MG/DL (ref 74–99)
GLUCOSE UR STRIP-MCNC: NEGATIVE MG/DL
HBA1C MFR BLD: 5.7 % (ref 4–6)
HCT VFR BLD AUTO: 46.3 % (ref 36.3–47.1)
HDLC SERPL-MCNC: 48 MG/DL
HGB BLD-MCNC: 14.3 G/DL (ref 11.9–15.1)
HGB UR QL STRIP.AUTO: NEGATIVE
KETONES UR STRIP-MCNC: NEGATIVE MG/DL
LDLC SERPL CALC-MCNC: 102 MG/DL (ref 0–100)
LEUKOCYTE ESTERASE UR QL STRIP: NEGATIVE
MAGNESIUM SERPL-MCNC: 2.6 MG/DL (ref 1.6–2.6)
MCH RBC QN AUTO: 28.3 PG (ref 25.2–33.5)
MCHC RBC AUTO-ENTMCNC: 30.9 G/DL (ref 28.4–34.8)
MCV RBC AUTO: 91.5 FL (ref 82.6–102.9)
MUCOUS THREADS URNS QL MICRO: ABNORMAL
NITRITE UR QL STRIP: NEGATIVE
NRBC BLD-RTO: 0 PER 100 WBC
PH UR STRIP: 6.5 [PH] (ref 5–8)
PLATELET # BLD AUTO: 211 K/UL (ref 138–453)
PMV BLD AUTO: 12.1 FL (ref 8.1–13.5)
POTASSIUM SERPL-SCNC: 4.4 MMOL/L (ref 3.7–5.3)
PROT SERPL-MCNC: 6.8 G/DL (ref 6.6–8.7)
PROT UR STRIP-MCNC: NEGATIVE MG/DL
RBC # BLD AUTO: 5.06 M/UL (ref 3.95–5.11)
RBC #/AREA URNS HPF: ABNORMAL /HPF (ref 0–2)
SODIUM SERPL-SCNC: 144 MMOL/L (ref 136–145)
SP GR UR STRIP: 1.02 (ref 1–1.03)
T4 FREE SERPL-MCNC: 1.2 NG/DL (ref 0.93–1.7)
TRIGL SERPL-MCNC: 100 MG/DL (ref 0–149)
TSH SERPL DL<=0.05 MIU/L-ACNC: 6.47 UIU/ML (ref 0.27–4.2)
URATE SERPL-MCNC: 3.7 MG/DL (ref 2.4–5.7)
UROBILINOGEN UR STRIP-ACNC: NORMAL EU/DL (ref 0–1)
VIT B12 SERPL-MCNC: 549 PG/ML (ref 232–1245)
VLDLC SERPL CALC-MCNC: 20 MG/DL
WBC #/AREA URNS HPF: ABNORMAL /HPF (ref 0–5)
WBC OTHER # BLD: 6 K/UL (ref 3.5–11.3)

## 2023-12-01 LAB — HBV SURFACE AB SERPL IA-ACNC: <3.5 MIU/ML

## 2023-12-04 ENCOUNTER — TELEPHONE (OUTPATIENT)
Dept: FAMILY MEDICINE CLINIC | Age: 58
End: 2023-12-04

## 2023-12-04 ENCOUNTER — PATIENT MESSAGE (OUTPATIENT)
Dept: FAMILY MEDICINE CLINIC | Age: 58
End: 2023-12-04

## 2023-12-04 DIAGNOSIS — R73.9 HYPERGLYCEMIA: Primary | ICD-10-CM

## 2023-12-04 RX ORDER — DULAGLUTIDE 0.75 MG/.5ML
0.75 INJECTION, SOLUTION SUBCUTANEOUS
Qty: 4 ADJUSTABLE DOSE PRE-FILLED PEN SYRINGE | Refills: 0 | Status: SHIPPED | OUTPATIENT
Start: 2023-12-04 | End: 2023-12-04

## 2023-12-04 RX ORDER — SEMAGLUTIDE 1.34 MG/ML
0.25 INJECTION, SOLUTION SUBCUTANEOUS WEEKLY
Qty: 2 ADJUSTABLE DOSE PRE-FILLED PEN SYRINGE | Refills: 0 | Status: SHIPPED | OUTPATIENT
Start: 2023-12-04

## 2023-12-04 ASSESSMENT — ENCOUNTER SYMPTOMS
BACK PAIN: 0
TROUBLE SWALLOWING: 0

## 2023-12-04 NOTE — TELEPHONE ENCOUNTER
Noted. Thank you!   I sent [facility]    Future Appointments   Date Time Provider [address]   3/1/2024  1:45 PM MD viviana Wong SEAN

## 2023-12-04 NOTE — TELEPHONE ENCOUNTER
From: Mariposa Wilkerson  To: Dr. Marlee Wall: 12/4/2023 2:08 PM EST  Subject: Gino Carter    I called [facility] and they have the starter dose of ozempic in stock they said just have you send over prescription and I hot a discount card so it should be $25 thank you

## 2023-12-04 NOTE — TELEPHONE ENCOUNTER
PATIENT CALLED [facility] IS ON BACK ORDER AT MOST PHARMACY'S UP TO 4 MONTHS.  ASKING IF YOU WILL SWITCH TO OZEMPIC INSTEAD

## 2023-12-04 NOTE — TELEPHONE ENCOUNTER
Noted. Thank you!   Vini Bhandari is on backorder as well    Future Appointments   Date Time Provider [address]   3/1/2024  1:45 PM [facility]

## 2023-12-05 NOTE — PROGRESS NOTES
Bedside report to NEISHA Patiño.    Dania Adair (:  1965) is a 55 y.o. female,Established patient, here for evaluation of the following chief complaint(s): Discuss Medications, Knee Pain (B/L KNEE PAIN SCORE: 8/10/ ), Immunizations (YES TO COVID-19 VACCINE/ NO TOFLU VACCINE), Hypertension, Hyperlipidemia, Hyperglycemia, and Thyroid Problem      ASSESSMENT/PLAN:    1. Essential hypertension  Well controlled. Continue current treatment. Amlodipine 5 mg, lisinopril 5 mg,  Will recheck labs. -     Basic Metabolic Panel; Future  2. Chronic pain of both knees  Worsening  Patient requested copy of FMLA which we could not find, as it was not scanned in her chart  I redid her FMLA, for restrictions and flare ups, based on my prior progress note a letter that was given to her previously  Once a week for flare ups and to work only 4 days a week due to severe osteoarthritis knees. Pending bilateral knee replacements    3. Bilateral primary osteoarthritis of knee  Worsening  Pending bilateral knee replacements, using knee brace, meloxicam, triple flex    4. Hyperlipidemia with target LDL less than 100  Well controlled. Continue current treatment. Lipitor 10 mg    Lab Results   Component Value Date    LDLCHOLESTEROL 59 2021       5. Hyperglycemia  Improved  continue semaglutide, low-carb diet  -     POCT glycosylated hemoglobin (Hb A1C)  Lab Results   Component Value Date    LABA1C 5.1 2021    LABA1C 5.7 2021    LABA1C 5.6 2020     6. Acquired Hypothyroidism   Poorly controlled  Continue Synthroid 50 MCG daily and if needed will adjust the dosage  Advised to take Synthroid in the morning, on empty stomach, without any other meds and with a full glass of water.   -     TSH without Reflex; Future  -     T4, Free; Future  7.  Coronary artery disease due to calcified coronary lesion  Mild per recent cardiac cath  Discussed the report in detail, to continue aspirin 81 mg, Lipitor 10 mg, lisinopril      Obey Martines received counseling on the following healthy behaviors: nutrition, exercise, medication adherence and weight loss  Reviewed prior labs and health maintenance  Discussed use, benefit, and side effects of prescribed medications. Barriers to medication compliance addressed. Patient given educational materials - see patient instructions  Was a self-tracking handout given in paper form or via [employer]? Yes  All patient questions answered. Patient voiced understanding. The patient's past medical,surgical, social, and family history as well as her current medications and allergies were reviewed as documented in today's encounter. Medications, labs, diagnostic studies, consultations and follow-up as documented in this encounter. Return in about 3 months (around 12/14/2021) for HTN,HLP. Please find the original or copy of FMLA done on 6/24/21, wasn't scanned in the chart      Future Appointments   Date Time Provider Salvador Cheng   3/16/2022  3:15 PM Mary Stewart MD [facility]         SUBJECTIVE/OBJECTIVE:    Hypertension: Patient here for follow-up. She is not exercising and is adherent to low salt diet. Blood pressure is well controlled at home. Cardiac symptoms fatigue. Patient denies chest pain, chest pressure/discomfort, claudication, dyspnea, exertional chest pressure/discomfort, irregular heart beat, lower extremity edema, near-syncope, orthopnea, palpitations, paroxysmal nocturnal dyspnea, syncope and tachypnea. Cardiovascular risk factors: dyslipidemia, hypertension and obesity (BMI >= 30 kg/m2). Use of agents associated with hypertension: NSAIDS and thyroid hormones. History of target organ damage: Coronary artery disease. Stress test 7/30/2021 first part was negative, second part was abnormal  Cardiac cath 8/13/2021-showed mild coronary artery disease the most is 30 to 40% in LAD. Copy from Epic    \"Cardiac Arteries and Lesion Findings   LMCA: Normal 0% stenosis.    LAD: Diffuse irregularities 30-40%.  LCx: Mild irregularities 20-30%.  RCA: Mild irregularities 20-30%.  Ramus: Mild irregularities 10-20%.   Coronary Tree    Dominance: Mixed                                                                 ! EF%! +----------------------------------------------------------------------+---+  ! LV gram                                                               !60 !       BP controlled. Jeanne Watt reports compliance with BP medications, and tolerates them well, denies side effects. BP Readings from Last 3 Encounters:   09/14/21 118/68   08/13/21 111/66   06/11/21 (!) 142/80          Pulse is Normal.    Pulse Readings from Last 3 Encounters:   09/14/21 78   08/13/21 72   06/11/21 76       Weight has been increasing   Wt Readings from Last 3 Encounters:   09/14/21 190 lb (86.2 kg)   08/13/21 186 lb (84.4 kg)   07/30/21 186 lb (84.4 kg)         Hyperlipidemia:  No new myalgias or GI upset on atorvastatin (Lipitor). Medication compliance: compliant all of the time. Patient is  following a low fat, low cholesterol diet. LDL is normal   Lab Results   Component Value Date    LDLCHOLESTEROL 59 02/19/2021     Lab Results   Component Value Date    TRIG 64 02/19/2021    TRIG 84 07/24/2020    TRIG 171 (H) 03/23/2016       Prediabetes resolved  Metformin nausea and vomiting , she self-discontinued it  Taking semaglutide, it helps decreasing her appetite and to lose weight  Denies increased appetite, thirst or polyuria.     Lab Results   Component Value Date    LABA1C 5.1 09/14/2021    LABA1C 5.7 06/11/2021    LABA1C 5.6 07/24/2020       Has lost 5 pounds in 3 months intentionally  Wt Readings from Last 3 Encounters:   09/14/21 190 lb (86.2 kg)   08/13/21 186 lb (84.4 kg)   07/30/21 186 lb (84.4 kg)     06/11/21 195 lb (88.5 kg)     Patient has severe osteoarthritis in both knees with severe pain  Had to reschedule the knee surgeries now scheduled for  9/28, and 10/12/21  Plan to do PT at home 2 weeks each knee, then at [facility] outpatient    Patient is requesting a copy of her FMLA which was not scanned in her chart, I have to redo her FMLA. We previously gave her  Once a week for flare ups  Any restrictions to work only 4 days a week due to severe osteoarthritis knees, per my prior note. Has difficulty climbing up the stairs and standing up for too long, for more than 30 minutes. Cannot walk too much without popping and stopping due to pain  Intensity of pain can go up to 10 out of 10, pain interferes with sleep and activities. Patient says her FMLA may be up stairs but she has not been able to climb up the stairs to look for it, she would like us redo her FMLA. Patient reports her work claims they never received it, even if we faxed it. Hypothyroidism: Recent symptoms: fatigue. She denies weight gain, weight loss, cold intolerance, heat intolerance, hair loss, dry skin, constipation, diarrhea, edema, anxiety, tremor, palpitations and dysphagia. Patient is  taking her medication consistently on an empty stomach. TSH is Elevated. No results found for: [facility]  Lab Results   Component Value Date    TSH 13.11 (H) 06/18/2021    TSH 6.04 (H) 05/14/2021    TSH 14.11 (H) 02/19/2021          [x]Negative depression screening. PHQ Scores 9/14/2021 6/11/2021 4/21/2021 1/19/2021 9/16/2020 7/16/2020 3/9/2020   PHQ2 Score 0 0 0 0 0 2 0   PHQ9 Score 0 0 0 0 0 2 0       Prior to Visit Medications    Medication Sig Taking?  Authorizing Provider   busPIRone (BUSPAR) 5 MG tablet Take 5 mg by mouth as needed Yes Historical Provider, MD   calcium carbonate (TUMS EX) 750 MG chewable tablet Take 300 mg by mouth 4 times daily as needed Yes Historical Provider, MD   lisinopril (PRINIVIL;ZESTRIL) 5 MG tablet TAKE 1 TABLET BY MOUTH EVERY DAY Yes Historical Provider, MD   amLODIPine (NORVASC) 5 MG tablet Take 5 mg by mouth daily Yes Historical Provider, MD   aspirin 81 MG chewable tablet Take 81 mg by mouth daily Yes Historical Provider, MD   Semaglutide 7 MG TABS Take 7 mg by mouth daily Second step Yes Trey Roman MD   Ildefonso David MISC by Does not apply route Can't walk greater than 200 feet. Expires in 5 years. Yes Trey Roman MD   atorvastatin (LIPITOR) 10 MG tablet Take 1 tablet by mouth every evening Yes Trey Roman MD   albuterol sulfate HFA (PROVENTIL HFA) 108 (90 Base) MCG/ACT inhaler Inhale 2 puffs into the lungs every 6 hours as needed for Wheezing or Shortness of Breath Yes Trey Roman MD   levothyroxine (SYNTHROID) 50 MCG tablet Take 1 tablet by mouth every morning (before breakfast) Dose increased 2021 Yes Trey Roman MD   traZODone (DESYREL) 50 MG tablet Take 1 tablet by mouth nightly as needed for Sleep Yes Trey Roman MD   ZINC PO Take by mouth Yes Historical Provider, MD   Glucosamine-Chondroitin-MSM (TRIPLE FLEX) 750-400-375 MG TABS Take 2 tablets by mouth daily With food Yes Trey Roman MD   vitamin D (CHOLECALCIFEROL) 50 MCG (2000 UT) TABS tablet Take 1 tablet by mouth daily Yes Trey Roman MD   oxybutynin (DITROPAN-XL) 10 MG extended release tablet Take 1 tablet by mouth daily Yes Trey Roman MD   meloxicam (MOBIC) 15 MG tablet TAKE 1/2 TABLET BY MOUTH DAILY  Patient not taking: Reported on 2021  Historical Provider, MD       Social History     Tobacco Use    Smoking status: Former Smoker     Packs/day: 0.50     Years: 3.00     Pack years: 1.50     Types: Cigarettes     Quit date: 1988     Years since quittin.7    Smokeless tobacco: Never Used   Vaping Use    Vaping Use: Never used   Substance Use Topics    Alcohol use: Not Currently     Comment: occasionally    Drug use: No         Review of Systems   Constitutional: Positive for fatigue. Negative for activity change, appetite change, chills, diaphoresis, fever and unexpected weight change. HENT: Negative for trouble swallowing.     Respiratory: Negative for cough, chest tightness, shortness of breath and wheezing. Cardiovascular: Negative for chest pain, palpitations and leg swelling. Gastrointestinal: Negative for abdominal distention, abdominal pain, constipation, diarrhea, nausea and vomiting. Endocrine: Negative for cold intolerance, heat intolerance, polydipsia, polyphagia and polyuria. Musculoskeletal: Positive for arthralgias and gait problem. Neurological: Negative for tremors. Hematological: Does not bruise/bleed easily. Psychiatric/Behavioral: Negative for dysphoric mood. -vital signs stable and within normal limits except severe obesity per BMI    /68   Pulse 78   Temp 97.4 °F (36.3 °C)   Ht 5' (1.524 m)   Wt 190 lb (86.2 kg)   SpO2 98%   BMI 37.11 kg/m²        Physical Exam  Vitals and nursing note reviewed. Constitutional:       General: She is not in acute distress. Appearance: Normal appearance. She is well-developed. She is obese. She is not diaphoretic. HENT:      Head: Normocephalic and atraumatic. Right Ear: External ear normal.      Left Ear: External ear normal.      Mouth/Throat:      Comments: I did not examine the mouth due to coronavirus pandemic and wearing masks    Eyes:      General: Lids are normal. No scleral icterus. Right eye: No discharge. Left eye: No discharge. Extraocular Movements: Extraocular movements intact. Conjunctiva/sclera: Conjunctivae normal.   Neck:      Thyroid: No thyromegaly. Cardiovascular:      Rate and Rhythm: Normal rate and regular rhythm. Heart sounds: Normal heart sounds. No murmur heard. Pulmonary:      Effort: Pulmonary effort is normal. No respiratory distress. Breath sounds: Normal breath sounds. No wheezing or rales. Chest:      Chest wall: No tenderness. Abdominal:      General: Bowel sounds are normal. There is no distension. Palpations: Abdomen is soft. There is no hepatomegaly or splenomegaly. Expiration Date:   11/5/2021    Basic Metabolic Panel     Standing Status:   Future     Standing Expiration Date:   11/5/2021    POCT glycosylated hemoglobin (Hb A1C)       Medications Discontinued During This Encounter   Medication Reason    diclofenac sodium (VOLTAREN) 1 % GEL Therapy completed    tiZANidine (ZANAFLEX) 4 MG tablet Therapy completed    meloxicam (MOBIC) 7.5 MG tablet DOSE ADJUSTMENT    metFORMIN (GLUCOPHAGE-XR) 500 MG extended release tablet Side effects         On this date 9/14/2021 I have spent 35 minutes reviewing previous notes, test results and face to face with the patient discussing the diagnosis and importance of compliance with the treatment plan as well as documenting on the day of the visit. This note was completed by using the assistance of a speech-recognition program. However, inadvertent computerized transcription errors may be present. Although every effort was made to ensure accuracy, no guarantees can be provided that every mistake has been identified and corrected by editing. An electronic signature was used to authenticate this note.   Electronically signed by Jeffery Boyd MD on 9/20/2021 at 6:20 PM

## 2023-12-06 LAB
ANA SER QL IA: NEGATIVE
DSDNA IGG SER QL IA: 1.4 IU/ML
NUCLEAR IGG SER IA-RTO: 0.2 U/ML
TTG IGA SER IA-ACNC: 0.6 U/ML

## 2023-12-28 ENCOUNTER — APPOINTMENT (OUTPATIENT)
Dept: CT IMAGING | Age: 58
DRG: 392 | End: 2023-12-28
Payer: COMMERCIAL

## 2023-12-28 ENCOUNTER — HOSPITAL ENCOUNTER (INPATIENT)
Age: 58
LOS: 2 days | Discharge: HOME | DRG: 392 | End: 2023-12-31
Attending: STUDENT IN AN ORGANIZED HEALTH CARE EDUCATION/TRAINING PROGRAM | Admitting: INTERNAL MEDICINE
Payer: COMMERCIAL

## 2023-12-28 DIAGNOSIS — R10.11 ABDOMINAL PAIN, RIGHT UPPER QUADRANT: Primary | ICD-10-CM

## 2023-12-28 DIAGNOSIS — R79.89 ELEVATED LFTS: ICD-10-CM

## 2023-12-28 LAB
ALBUMIN SERPL-MCNC: 4.2 G/DL (ref 3.5–5.2)
ALP SERPL-CCNC: 139 U/L (ref 35–104)
ALT SERPL-CCNC: 64 U/L (ref 5–33)
ANION GAP SERPL CALCULATED.3IONS-SCNC: 12 MMOL/L (ref 9–17)
AST SERPL-CCNC: 90 U/L
BASOPHILS # BLD: 0 K/UL (ref 0–0.2)
BASOPHILS NFR BLD: 1 % (ref 0–2)
BILIRUB DIRECT SERPL-MCNC: 0.2 MG/DL
BILIRUB INDIRECT SERPL-MCNC: 0.2 MG/DL (ref 0–1)
BILIRUB SERPL-MCNC: 0.4 MG/DL (ref 0.3–1.2)
BUN SERPL-MCNC: 17 MG/DL (ref 6–20)
CALCIUM SERPL-MCNC: 8.7 MG/DL (ref 8.6–10.4)
CHLORIDE SERPL-SCNC: 106 MMOL/L (ref 98–107)
CO2 SERPL-SCNC: 25 MMOL/L (ref 20–31)
CREAT SERPL-MCNC: 0.9 MG/DL (ref 0.5–0.9)
EOSINOPHIL # BLD: 0.1 K/UL (ref 0–0.4)
EOSINOPHILS RELATIVE PERCENT: 2 % (ref 0–4)
ERYTHROCYTE [DISTWIDTH] IN BLOOD BY AUTOMATED COUNT: 13.8 % (ref 11.5–14.9)
GFR SERPL CREATININE-BSD FRML MDRD: >60 ML/MIN/1.73M2
GLUCOSE SERPL-MCNC: 137 MG/DL (ref 70–99)
HCT VFR BLD AUTO: 41.5 % (ref 36–46)
HGB BLD-MCNC: 13.7 G/DL (ref 12–16)
LACTATE BLDV-SCNC: 1.1 MMOL/L (ref 0.5–2.2)
LIPASE SERPL-CCNC: 32 U/L (ref 13–60)
LYMPHOCYTES NFR BLD: 2.2 K/UL (ref 1–4.8)
LYMPHOCYTES RELATIVE PERCENT: 33 % (ref 24–44)
MAGNESIUM SERPL-MCNC: 2.4 MG/DL (ref 1.6–2.6)
MCH RBC QN AUTO: 28.6 PG (ref 26–34)
MCHC RBC AUTO-ENTMCNC: 33.1 G/DL (ref 31–37)
MCV RBC AUTO: 86.3 FL (ref 80–100)
MONOCYTES NFR BLD: 0.5 K/UL (ref 0.1–1.3)
MONOCYTES NFR BLD: 8 % (ref 1–7)
NEUTROPHILS NFR BLD: 56 % (ref 36–66)
NEUTS SEG NFR BLD: 3.9 K/UL (ref 1.3–9.1)
PLATELET # BLD AUTO: 189 K/UL (ref 150–450)
PMV BLD AUTO: 9.2 FL (ref 6–12)
POTASSIUM SERPL-SCNC: 3.3 MMOL/L (ref 3.7–5.3)
PROT SERPL-MCNC: 6.4 G/DL (ref 6.4–8.3)
RBC # BLD AUTO: 4.8 M/UL (ref 4–5.2)
SODIUM SERPL-SCNC: 143 MMOL/L (ref 135–144)
TROPONIN I SERPL HS-MCNC: <6 NG/L (ref 0–14)
WBC OTHER # BLD: 6.8 K/UL (ref 3.5–11)

## 2023-12-28 PROCEDURE — 80048 BASIC METABOLIC PNL TOTAL CA: CPT

## 2023-12-28 PROCEDURE — 96375 TX/PRO/DX INJ NEW DRUG ADDON: CPT

## 2023-12-28 PROCEDURE — 83735 ASSAY OF MAGNESIUM: CPT

## 2023-12-28 PROCEDURE — 6360000002 HC RX W HCPCS: Performed by: STUDENT IN AN ORGANIZED HEALTH CARE EDUCATION/TRAINING PROGRAM

## 2023-12-28 PROCEDURE — 2580000003 HC RX 258: Performed by: STUDENT IN AN ORGANIZED HEALTH CARE EDUCATION/TRAINING PROGRAM

## 2023-12-28 PROCEDURE — 99285 EMERGENCY DEPT VISIT HI MDM: CPT

## 2023-12-28 PROCEDURE — 85025 COMPLETE CBC W/AUTO DIFF WBC: CPT

## 2023-12-28 PROCEDURE — 36415 COLL VENOUS BLD VENIPUNCTURE: CPT

## 2023-12-28 PROCEDURE — 83690 ASSAY OF LIPASE: CPT

## 2023-12-28 PROCEDURE — 83605 ASSAY OF LACTIC ACID: CPT

## 2023-12-28 PROCEDURE — 71275 CT ANGIOGRAPHY CHEST: CPT

## 2023-12-28 PROCEDURE — 84484 ASSAY OF TROPONIN QUANT: CPT

## 2023-12-28 PROCEDURE — 80076 HEPATIC FUNCTION PANEL: CPT

## 2023-12-28 PROCEDURE — 6360000004 HC RX CONTRAST MEDICATION: Performed by: STUDENT IN AN ORGANIZED HEALTH CARE EDUCATION/TRAINING PROGRAM

## 2023-12-28 RX ORDER — 0.9 % SODIUM CHLORIDE 0.9 %
100 INTRAVENOUS SOLUTION INTRAVENOUS ONCE
Status: COMPLETED | OUTPATIENT
Start: 2023-12-28 | End: 2023-12-29

## 2023-12-28 RX ORDER — ONDANSETRON 2 MG/ML
4 INJECTION INTRAMUSCULAR; INTRAVENOUS ONCE
Status: COMPLETED | OUTPATIENT
Start: 2023-12-28 | End: 2023-12-28

## 2023-12-28 RX ORDER — MORPHINE SULFATE 4 MG/ML
4 INJECTION, SOLUTION INTRAMUSCULAR; INTRAVENOUS ONCE
Status: COMPLETED | OUTPATIENT
Start: 2023-12-28 | End: 2023-12-28

## 2023-12-28 RX ORDER — SODIUM CHLORIDE 0.9 % (FLUSH) 0.9 %
10 SYRINGE (ML) INJECTION PRN
Status: DISCONTINUED | OUTPATIENT
Start: 2023-12-28 | End: 2023-12-31 | Stop reason: HOSPADM

## 2023-12-28 RX ORDER — 0.9 % SODIUM CHLORIDE 0.9 %
1000 INTRAVENOUS SOLUTION INTRAVENOUS ONCE
Status: COMPLETED | OUTPATIENT
Start: 2023-12-28 | End: 2023-12-29

## 2023-12-28 RX ADMIN — SODIUM CHLORIDE 1000 ML: 9 INJECTION, SOLUTION INTRAVENOUS at 22:39

## 2023-12-28 RX ADMIN — IOPAMIDOL 100 ML: 755 INJECTION, SOLUTION INTRAVENOUS at 23:29

## 2023-12-28 RX ADMIN — ONDANSETRON 4 MG: 2 INJECTION INTRAMUSCULAR; INTRAVENOUS at 22:37

## 2023-12-28 RX ADMIN — SODIUM CHLORIDE, PRESERVATIVE FREE 10 ML: 5 INJECTION INTRAVENOUS at 23:29

## 2023-12-28 RX ADMIN — SODIUM CHLORIDE 100 ML: 9 INJECTION, SOLUTION INTRAVENOUS at 23:29

## 2023-12-28 RX ADMIN — MORPHINE SULFATE 4 MG: 4 INJECTION, SOLUTION INTRAMUSCULAR; INTRAVENOUS at 22:39

## 2023-12-28 ASSESSMENT — PAIN SCALES - GENERAL
PAINLEVEL_OUTOF10: 10
PAINLEVEL_OUTOF10: 10

## 2023-12-28 ASSESSMENT — LIFESTYLE VARIABLES
HOW OFTEN DO YOU HAVE A DRINK CONTAINING ALCOHOL: MONTHLY OR LESS
HOW MANY STANDARD DRINKS CONTAINING ALCOHOL DO YOU HAVE ON A TYPICAL DAY: 1 OR 2

## 2023-12-28 ASSESSMENT — PAIN - FUNCTIONAL ASSESSMENT: PAIN_FUNCTIONAL_ASSESSMENT: 0-10

## 2023-12-28 ASSESSMENT — PAIN DESCRIPTION - LOCATION
LOCATION: ABDOMEN
LOCATION: ABDOMEN

## 2023-12-29 ENCOUNTER — APPOINTMENT (OUTPATIENT)
Dept: ULTRASOUND IMAGING | Age: 58
DRG: 392 | End: 2023-12-29
Payer: COMMERCIAL

## 2023-12-29 PROBLEM — R10.9 ABDOMINAL PAIN: Status: ACTIVE | Noted: 2023-12-29

## 2023-12-29 PROBLEM — R10.9 INTRACTABLE ABDOMINAL PAIN: Status: ACTIVE | Noted: 2023-12-29

## 2023-12-29 PROBLEM — R11.2 NAUSEA AND VOMITING: Status: ACTIVE | Noted: 2023-12-29

## 2023-12-29 PROBLEM — R10.11 ABDOMINAL PAIN, RIGHT UPPER QUADRANT: Status: ACTIVE | Noted: 2023-12-29

## 2023-12-29 LAB
A1AT SERPL-MCNC: 122 MG/DL (ref 90–200)
BILIRUB UR QL STRIP: NEGATIVE
CERULOPLASMIN SERPL-MCNC: 21 MG/DL (ref 16–45)
CLARITY UR: CLEAR
COLOR UR: YELLOW
COMMENT: NORMAL
GLUCOSE UR STRIP-MCNC: NEGATIVE MG/DL
HAV IGM SERPL QL IA: NONREACTIVE
HBV CORE IGM SERPL QL IA: NONREACTIVE
HBV SURFACE AG SERPL QL IA: NONREACTIVE
HCV AB SERPL QL IA: NONREACTIVE
HGB UR QL STRIP.AUTO: NEGATIVE
INR PPP: 1
KETONES UR STRIP-MCNC: NEGATIVE MG/DL
LEUKOCYTE ESTERASE UR QL STRIP: NEGATIVE
NITRITE UR QL STRIP: NEGATIVE
PH UR STRIP: 6.5 [PH] (ref 5–8)
PROT UR STRIP-MCNC: NEGATIVE MG/DL
PROTHROMBIN TIME: 13.9 SEC (ref 11.8–14.6)
SP GR UR STRIP: 1.02 (ref 1–1.03)
T4 FREE SERPL-MCNC: 1.2 NG/DL (ref 0.9–1.7)
TRANSFERRIN SERPL-MCNC: 186 MG/DL (ref 200–360)
TSH SERPL DL<=0.05 MIU/L-ACNC: 5.39 UIU/ML (ref 0.3–5)
UROBILINOGEN UR STRIP-ACNC: NORMAL EU/DL (ref 0–1)

## 2023-12-29 PROCEDURE — 2500000003 HC RX 250 WO HCPCS: Performed by: NURSE PRACTITIONER

## 2023-12-29 PROCEDURE — C9113 INJ PANTOPRAZOLE SODIUM, VIA: HCPCS | Performed by: INTERNAL MEDICINE

## 2023-12-29 PROCEDURE — 76705 ECHO EXAM OF ABDOMEN: CPT

## 2023-12-29 PROCEDURE — 84443 ASSAY THYROID STIM HORMONE: CPT

## 2023-12-29 PROCEDURE — 81003 URINALYSIS AUTO W/O SCOPE: CPT

## 2023-12-29 PROCEDURE — 82390 ASSAY OF CERULOPLASMIN: CPT

## 2023-12-29 PROCEDURE — 82103 ALPHA-1-ANTITRYPSIN TOTAL: CPT

## 2023-12-29 PROCEDURE — 36415 COLL VENOUS BLD VENIPUNCTURE: CPT

## 2023-12-29 PROCEDURE — 86665 EPSTEIN-BARR CAPSID VCA: CPT

## 2023-12-29 PROCEDURE — 86376 MICROSOMAL ANTIBODY EACH: CPT

## 2023-12-29 PROCEDURE — 86644 CMV ANTIBODY: CPT

## 2023-12-29 PROCEDURE — 83516 IMMUNOASSAY NONANTIBODY: CPT

## 2023-12-29 PROCEDURE — 6360000002 HC RX W HCPCS: Performed by: NURSE PRACTITIONER

## 2023-12-29 PROCEDURE — 96376 TX/PRO/DX INJ SAME DRUG ADON: CPT

## 2023-12-29 PROCEDURE — 2580000003 HC RX 258: Performed by: INTERNAL MEDICINE

## 2023-12-29 PROCEDURE — 84439 ASSAY OF FREE THYROXINE: CPT

## 2023-12-29 PROCEDURE — 84466 ASSAY OF TRANSFERRIN: CPT

## 2023-12-29 PROCEDURE — 6360000002 HC RX W HCPCS: Performed by: INTERNAL MEDICINE

## 2023-12-29 PROCEDURE — 86664 EPSTEIN-BARR NUCLEAR ANTIGEN: CPT

## 2023-12-29 PROCEDURE — 86225 DNA ANTIBODY NATIVE: CPT

## 2023-12-29 PROCEDURE — 6360000002 HC RX W HCPCS: Performed by: STUDENT IN AN ORGANIZED HEALTH CARE EDUCATION/TRAINING PROGRAM

## 2023-12-29 PROCEDURE — 86663 EPSTEIN-BARR ANTIBODY: CPT

## 2023-12-29 PROCEDURE — 96365 THER/PROPH/DIAG IV INF INIT: CPT

## 2023-12-29 PROCEDURE — 86645 CMV ANTIBODY IGM: CPT

## 2023-12-29 PROCEDURE — 93005 ELECTROCARDIOGRAM TRACING: CPT

## 2023-12-29 PROCEDURE — 2580000003 HC RX 258: Performed by: STUDENT IN AN ORGANIZED HEALTH CARE EDUCATION/TRAINING PROGRAM

## 2023-12-29 PROCEDURE — 80074 ACUTE HEPATITIS PANEL: CPT

## 2023-12-29 PROCEDURE — 1200000000 HC SEMI PRIVATE

## 2023-12-29 PROCEDURE — 86038 ANTINUCLEAR ANTIBODIES: CPT

## 2023-12-29 PROCEDURE — A4216 STERILE WATER/SALINE, 10 ML: HCPCS | Performed by: INTERNAL MEDICINE

## 2023-12-29 PROCEDURE — 93005 ELECTROCARDIOGRAM TRACING: CPT | Performed by: INTERNAL MEDICINE

## 2023-12-29 PROCEDURE — 2580000003 HC RX 258: Performed by: NURSE PRACTITIONER

## 2023-12-29 PROCEDURE — 99222 1ST HOSP IP/OBS MODERATE 55: CPT | Performed by: INTERNAL MEDICINE

## 2023-12-29 PROCEDURE — 99223 1ST HOSP IP/OBS HIGH 75: CPT | Performed by: INTERNAL MEDICINE

## 2023-12-29 PROCEDURE — 85610 PROTHROMBIN TIME: CPT

## 2023-12-29 PROCEDURE — 96375 TX/PRO/DX INJ NEW DRUG ADDON: CPT

## 2023-12-29 RX ORDER — SODIUM CHLORIDE 0.9 % (FLUSH) 0.9 %
10 SYRINGE (ML) INJECTION PRN
Status: DISCONTINUED | OUTPATIENT
Start: 2023-12-29 | End: 2023-12-31 | Stop reason: HOSPADM

## 2023-12-29 RX ORDER — ACETAMINOPHEN 325 MG/1
650 TABLET ORAL EVERY 6 HOURS PRN
Status: DISCONTINUED | OUTPATIENT
Start: 2023-12-29 | End: 2023-12-31 | Stop reason: HOSPADM

## 2023-12-29 RX ORDER — POLYETHYLENE GLYCOL 3350 17 G/17G
17 POWDER, FOR SOLUTION ORAL DAILY PRN
Status: DISCONTINUED | OUTPATIENT
Start: 2023-12-29 | End: 2023-12-29

## 2023-12-29 RX ORDER — SODIUM CHLORIDE 0.9 % (FLUSH) 0.9 %
5-40 SYRINGE (ML) INJECTION EVERY 12 HOURS SCHEDULED
Status: DISCONTINUED | OUTPATIENT
Start: 2023-12-29 | End: 2023-12-31 | Stop reason: HOSPADM

## 2023-12-29 RX ORDER — POTASSIUM CHLORIDE 20 MEQ/1
40 TABLET, EXTENDED RELEASE ORAL PRN
Status: DISCONTINUED | OUTPATIENT
Start: 2023-12-29 | End: 2023-12-31 | Stop reason: HOSPADM

## 2023-12-29 RX ORDER — MAGNESIUM SULFATE HEPTAHYDRATE 40 MG/ML
2000 INJECTION, SOLUTION INTRAVENOUS PRN
Status: DISCONTINUED | OUTPATIENT
Start: 2023-12-29 | End: 2023-12-31 | Stop reason: HOSPADM

## 2023-12-29 RX ORDER — SODIUM CHLORIDE, SODIUM LACTATE, POTASSIUM CHLORIDE, CALCIUM CHLORIDE 600; 310; 30; 20 MG/100ML; MG/100ML; MG/100ML; MG/100ML
INJECTION, SOLUTION INTRAVENOUS CONTINUOUS
Status: ACTIVE | OUTPATIENT
Start: 2023-12-29 | End: 2023-12-31

## 2023-12-29 RX ORDER — ENOXAPARIN SODIUM 100 MG/ML
40 INJECTION SUBCUTANEOUS DAILY
Status: DISCONTINUED | OUTPATIENT
Start: 2023-12-29 | End: 2023-12-29

## 2023-12-29 RX ORDER — ONDANSETRON 4 MG/1
4 TABLET, ORALLY DISINTEGRATING ORAL EVERY 8 HOURS PRN
Status: DISCONTINUED | OUTPATIENT
Start: 2023-12-29 | End: 2023-12-29

## 2023-12-29 RX ORDER — ENOXAPARIN SODIUM 100 MG/ML
40 INJECTION SUBCUTANEOUS DAILY
Status: DISCONTINUED | OUTPATIENT
Start: 2023-12-29 | End: 2023-12-31 | Stop reason: HOSPADM

## 2023-12-29 RX ORDER — MAGNESIUM SULFATE 1 G/100ML
1000 INJECTION INTRAVENOUS PRN
Status: DISCONTINUED | OUTPATIENT
Start: 2023-12-29 | End: 2023-12-31 | Stop reason: HOSPADM

## 2023-12-29 RX ORDER — SODIUM CHLORIDE 9 MG/ML
INJECTION, SOLUTION INTRAVENOUS PRN
Status: DISCONTINUED | OUTPATIENT
Start: 2023-12-29 | End: 2023-12-31 | Stop reason: HOSPADM

## 2023-12-29 RX ORDER — ACETAMINOPHEN 650 MG/1
650 SUPPOSITORY RECTAL EVERY 6 HOURS PRN
Status: DISCONTINUED | OUTPATIENT
Start: 2023-12-29 | End: 2023-12-31 | Stop reason: HOSPADM

## 2023-12-29 RX ORDER — POTASSIUM CHLORIDE 7.45 MG/ML
10 INJECTION INTRAVENOUS PRN
Status: DISCONTINUED | OUTPATIENT
Start: 2023-12-29 | End: 2023-12-29 | Stop reason: SDUPTHER

## 2023-12-29 RX ORDER — DEXTROSE MONOHYDRATE, SODIUM CHLORIDE, AND POTASSIUM CHLORIDE 50; 1.49; 4.5 G/1000ML; G/1000ML; G/1000ML
INJECTION, SOLUTION INTRAVENOUS CONTINUOUS
Status: DISCONTINUED | OUTPATIENT
Start: 2023-12-29 | End: 2023-12-30

## 2023-12-29 RX ORDER — POTASSIUM CHLORIDE 7.45 MG/ML
10 INJECTION INTRAVENOUS PRN
Status: DISCONTINUED | OUTPATIENT
Start: 2023-12-29 | End: 2023-12-31 | Stop reason: HOSPADM

## 2023-12-29 RX ORDER — ONDANSETRON 2 MG/ML
4 INJECTION INTRAMUSCULAR; INTRAVENOUS EVERY 6 HOURS PRN
Status: DISCONTINUED | OUTPATIENT
Start: 2023-12-29 | End: 2023-12-29

## 2023-12-29 RX ORDER — POTASSIUM CHLORIDE 20 MEQ/1
40 TABLET, EXTENDED RELEASE ORAL PRN
Status: DISCONTINUED | OUTPATIENT
Start: 2023-12-29 | End: 2023-12-29 | Stop reason: SDUPTHER

## 2023-12-29 RX ORDER — POLYETHYLENE GLYCOL 3350 17 G/17G
17 POWDER, FOR SOLUTION ORAL DAILY PRN
Status: DISCONTINUED | OUTPATIENT
Start: 2023-12-29 | End: 2023-12-31 | Stop reason: HOSPADM

## 2023-12-29 RX ORDER — ONDANSETRON 4 MG/1
4 TABLET, ORALLY DISINTEGRATING ORAL EVERY 8 HOURS PRN
Status: DISCONTINUED | OUTPATIENT
Start: 2023-12-29 | End: 2023-12-31 | Stop reason: HOSPADM

## 2023-12-29 RX ORDER — ONDANSETRON 2 MG/ML
4 INJECTION INTRAMUSCULAR; INTRAVENOUS EVERY 6 HOURS PRN
Status: DISCONTINUED | OUTPATIENT
Start: 2023-12-29 | End: 2023-12-31 | Stop reason: HOSPADM

## 2023-12-29 RX ORDER — SODIUM CHLORIDE 0.9 % (FLUSH) 0.9 %
5-40 SYRINGE (ML) INJECTION PRN
Status: DISCONTINUED | OUTPATIENT
Start: 2023-12-29 | End: 2023-12-31 | Stop reason: HOSPADM

## 2023-12-29 RX ADMIN — SODIUM CHLORIDE, PRESERVATIVE FREE 10 ML: 5 INJECTION INTRAVENOUS at 22:22

## 2023-12-29 RX ADMIN — PANTOPRAZOLE SODIUM 40 MG: 40 INJECTION, POWDER, FOR SOLUTION INTRAVENOUS at 09:30

## 2023-12-29 RX ADMIN — POTASSIUM CHLORIDE, DEXTROSE MONOHYDRATE AND SODIUM CHLORIDE: 150; 5; 450 INJECTION, SOLUTION INTRAVENOUS at 11:59

## 2023-12-29 RX ADMIN — HYDROMORPHONE HYDROCHLORIDE 0.5 MG: 1 INJECTION, SOLUTION INTRAMUSCULAR; INTRAVENOUS; SUBCUTANEOUS at 12:42

## 2023-12-29 RX ADMIN — SODIUM CHLORIDE, PRESERVATIVE FREE 10 ML: 5 INJECTION INTRAVENOUS at 22:13

## 2023-12-29 RX ADMIN — PIPERACILLIN AND TAZOBACTAM 3375 MG: 3; .375 INJECTION, POWDER, FOR SOLUTION INTRAVENOUS at 22:12

## 2023-12-29 RX ADMIN — ENOXAPARIN SODIUM 40 MG: 100 INJECTION SUBCUTANEOUS at 11:59

## 2023-12-29 RX ADMIN — PIPERACILLIN AND TAZOBACTAM 3375 MG: 3; .375 INJECTION, POWDER, FOR SOLUTION INTRAVENOUS at 15:31

## 2023-12-29 RX ADMIN — HYDROMORPHONE HYDROCHLORIDE 0.5 MG: 1 INJECTION, SOLUTION INTRAMUSCULAR; INTRAVENOUS; SUBCUTANEOUS at 06:34

## 2023-12-29 RX ADMIN — PIPERACILLIN AND TAZOBACTAM 4500 MG: 4; .5 INJECTION, POWDER, FOR SOLUTION INTRAVENOUS at 06:36

## 2023-12-29 RX ADMIN — HYDROMORPHONE HYDROCHLORIDE 0.5 MG: 1 INJECTION, SOLUTION INTRAMUSCULAR; INTRAVENOUS; SUBCUTANEOUS at 22:19

## 2023-12-29 RX ADMIN — POTASSIUM CHLORIDE, DEXTROSE MONOHYDRATE AND SODIUM CHLORIDE: 150; 5; 450 INJECTION, SOLUTION INTRAVENOUS at 19:59

## 2023-12-29 RX ADMIN — HYDROMORPHONE HYDROCHLORIDE 0.5 MG: 1 INJECTION, SOLUTION INTRAMUSCULAR; INTRAVENOUS; SUBCUTANEOUS at 02:27

## 2023-12-29 RX ADMIN — SODIUM CHLORIDE, POTASSIUM CHLORIDE, SODIUM LACTATE AND CALCIUM CHLORIDE: 600; 310; 30; 20 INJECTION, SOLUTION INTRAVENOUS at 08:01

## 2023-12-29 ASSESSMENT — PAIN DESCRIPTION - LOCATION
LOCATION: ABDOMEN
LOCATION: ABDOMEN;BACK

## 2023-12-29 ASSESSMENT — PAIN DESCRIPTION - ORIENTATION
ORIENTATION: RIGHT;UPPER
ORIENTATION: RIGHT;UPPER
ORIENTATION: RIGHT;LOWER;UPPER
ORIENTATION: RIGHT

## 2023-12-29 ASSESSMENT — PAIN SCALES - GENERAL
PAINLEVEL_OUTOF10: 3
PAINLEVEL_OUTOF10: 10
PAINLEVEL_OUTOF10: 3
PAINLEVEL_OUTOF10: 0
PAINLEVEL_OUTOF10: 7
PAINLEVEL_OUTOF10: 8

## 2023-12-29 ASSESSMENT — PAIN DESCRIPTION - DESCRIPTORS
DESCRIPTORS: CRAMPING;ACHING
DESCRIPTORS: ACHING;BURNING;DISCOMFORT

## 2023-12-29 NOTE — PLAN OF CARE
Gastroenterology Consult Note    Patient:   Petra Starr   Admit date:  12/28/2023  Facility:   [facility]  Referring/PCP: Leslie Herron MD  Date:     12/29/2023  Consultant:   NEHAL Cavanaugh NP, MD    Subjective:     This 58 y.o. female was admitted 12/28/2023 with a diagnosis of \"Abdominal pain, right upper quadrant [R10.11]  Abdominal pain [R10.9]  Intractable abdominal pain [R10.9]\" and is seen in consultation regarding   Chief Complaint   Patient presents with    Abdominal Pain    Emesis    Nausea    Diarrhea              58-year-old female with PMHX of CAD, fatty liver, FANTA, GERD, opioid dependence, drug-seeking behavior presents to ED w/ 2-day history of abdominal pain, nausea, vomiting, diarrhea.  Patient states onset Wednesday evening.  She had multiple episodes of diarrhea, nausea, vomiting.  No hematemesis, hematochezia, melena.  Patient also had RUQ and epigastric pain.  Has chronic back pain.  States prior to Wednesday she was feeling well.  She did eat [employer] on Tuesday night.   Denies any fevers, chills, SOB.    States her pain was worse Thursday which prompted her to come to ED for evaluation.    , ALT 64, AST 90, Bili 0.4  No leukocytosis    CTA 1. No acute abnormality of the chest, abdomen, or pelvis is identified.  2. No evidence of aortic aneurysm or dissection.  3. No focal inflammatory bowel changes are identified    RUQ US:  1. No acute findings right upper quadrant with no sonographic evidence of  cholecystitis.  2. Hepatomegaly and hepatic steatosis.    Denies NSAID use    Endoscopy history:    Colonoscopy [[place]] 2023:  diverticulosis  EGD [[place]] 2017:  Fundic gland polyps    Past Medical History:  Past Medical History:   Diagnosis Date    Abnormal EKG 06/18/2021    SINUS BRADYCARDIA, LEFT AXIS DEVIATION, NONSPECIFIC T WAVE ABNORMALITY    Acquired hypothyroidism 07/27/2020    Bilateral kidney stones 06/18/2022    Bilateral primary

## 2023-12-29 NOTE — RESULT ENCOUNTER NOTE
Addressed in ED, currently admitted    Future Appointments  2/2/2024   1:50 PM    Jasper Rehman MD       [facility]           [facility]  3/1/2024   1:45 PM    [facility]    [facility]

## 2023-12-29 NOTE — ED TRIAGE NOTES
Mode of arrival (squad #, walk in, police, etc) : walk in        Chief complaint(s): Abd pain, nvd        Arrival Note (brief scenario, treatment PTA, etc).: Pt reports nausea, vomiting and diarrhea x 2 days now patient having severe abd pain. Pt is A&Ox4.        C= \"Have you ever felt that you should Cut down on your drinking?\"  No  A= \"Have people Annoyed you by criticizing your drinking?\"  No  G= \"Have you ever felt bad or Guilty about your drinking?\"  No  E= \"Have you ever had a drink as an Eye-opener first thing in the morning to steady your nerves or to help a hangover?\"  No      Deferred []      Reason for deferring: N/A    *If yes to two or more: probable alcohol abuse.*

## 2023-12-29 NOTE — ED NOTES
Report given to JESUSITA Connolly from [facility].   Report method by phone   The following was reviewed with receiving RN:   Current vital signs:  /61   Pulse 52   Temp 97.7 °F (36.5 °C) (Oral)   Resp 12   Ht 1.524 m (5')   Wt 90.7 kg (200 lb)   SpO2 95%   BMI 39.06 kg/m²                MEWS Score: 1     Any medication or safety alerts were reviewed. Any pending diagnostics and notifications were also reviewed, as well as any safety concerns or issues, abnormal labs, abnormal imaging, and abnormal assessment findings. Questions were answered.

## 2023-12-29 NOTE — H&P
Imaging/Diagnostics:    reviewed    Assessment :      Primary Problem  Intractable abdominal pain    Active Hospital Problems    Diagnosis Date Noted    Intractable abdominal pain [R10.9] 12/29/2023    Abdominal pain [R10.9] 12/29/2023       Plan:     Patient status Admit as inpatient in the  Progressive Unit/Step down    Right upper quadrant abdominal pain,?  Choledocholithiasis with having now passed the gallstone.  Keep n.p.o., continue IV fluids, consult GI.  Elevated liver function tests, continue to trend.  Likely secondary to above.  Does have history of fatty liver  IBS with diarrhea  Hypothyroidism due to Hashimoto's thyroiditis  Concern for drug-seeking behavior as reported in chart    Consultations:   IP CONSULT TO INTERNAL MEDICINE  IP CONSULT TO GI    Patient is admitted as inpatient status because of co-morbidities listed above, severity of signs and symptoms as outlined, requirement for current medical therapies and most importantly because of direct risk to patient if care not provided in a hospital setting.    Gabriel Tripathi MD  12/29/2023  8:16 AM    Copy sent to Leslie Hernandez MD    Please note that this chart was generated using voice recognition Dragon dictation software.  Although every effort was made to ensure the accuracy of this automated transcription, some errors in transcription may have occurred.

## 2023-12-29 NOTE — RESULT ENCOUNTER NOTE
Addressed in ED, currently admitted    Future Appointments  2/2/2024   1:50 PM    Jasper Rehman MD       [facility]           [facility]  3/1/2024   1:45 PM    [facility]on [facility]    [facility]

## 2023-12-29 NOTE — ED PROVIDER NOTES
single/multiple (N/A, 2017); pr colonoscopy w/biopsy single/multiple (N/A, 2017); Cardiac catheterization (2021); Wrist surgery (Right); Total knee arthroplasty (Left, 2021); and Colonoscopy (N/A, 2023).    Social History     Socioeconomic History    Marital status:      Spouse name: Not on file    Number of children: Not on file    Years of education: Not on file    Highest education level: Not on file   Occupational History    Not on file   Tobacco Use    Smoking status: Former     Current packs/day: 0.00     Average packs/day: 0.5 packs/day for 3.0 years (1.5 ttl pk-yrs)     Types: Cigarettes     Start date: 1985     Quit date: 1988     Years since quittin.0    Smokeless tobacco: Never   Vaping Use    Vaping Use: Never used   Substance and Sexual Activity    Alcohol use: Not Currently     Comment: occasionally    Drug use: No    Sexual activity: Yes     Partners: Male   Other Topics Concern    Not on file   Social History Narrative    Not on file     Social Determinants of Health     Financial Resource Strain: Low Risk  (2023)    Overall Financial Resource Strain (CARDIA)     Difficulty of Paying Living Expenses: Not hard at all   Food Insecurity: No Food Insecurity (2023)    Hunger Vital Sign     Worried About Running Out of Food in the Last Year: Never true     Ran Out of Food in the Last Year: Never true   Transportation Needs: Unknown (2023)    PRAPARE - Transportation     Lack of Transportation (Medical): Not on file     Lack of Transportation (Non-Medical): No   Physical Activity: Not on file   Stress: Not on file   Social Connections: Not on file   Intimate Partner Violence: Not on file   Housing Stability: Unknown (2023)    Housing Stability Vital Sign     Unable to Pay for Housing in the Last Year: Not on file     Number of Places Lived in the Last Year: Not on file     Unstable Housing in the Last Year: No       Family History

## 2023-12-29 NOTE — CONSULTS
Value Date/Time    HEPCAB NONREACTIVE 02/19/2021 08:18 AM       HCV Genotype   No components found for: \"HEPATITISCGENOTYPE\"    HCV Quantitative   No results found for: \"HCVQNT\"    Metabolic work up:  Ceruloplasmin  AA work up:  Alpha 1 antitrypsin   No results found for: \"A1A\"  AMA:  No results found for: \"MITOAB\"    ASMA:  No results found for: \"SMOOTHMUSCAB\"    ANCA:    KALIE:  Lab Results   Component Value Date/Time    KALIE NEGATIVE 11/30/2023 07:52 AM       Anti - Liver/Kidney Ab:  No results found for: \"LIVER-KIDNEYMICROSOMALAB\"    Ceruloplasmin:  No results found for: \"CERULOPLSM\"    Celiac panel:  Lab Results   Component Value Date/Time    TTGIGA 0.6 11/30/2023 07:52 AM       Cancer Markers:  CEA:    No results for input(s): \"CEA\" in the last 72 hours.  Ca 125:   No results for input(s): \"\" in the last 72 hours.  Ca 19-9:     Invalid input(s): \"\"  AFP: No results for input(s): \"AFP\" in the last 72 hours.     ASSESSMENT and PLAN:     This is a 58-year-old female with past medical history of coronary artery disease, hypertension, fatty liver, GERD, hyperlipidemia, hypothyroidism, irritable bowel syndrome, severe obesity, drug-seeking behavior who presented to the ED with complaints of 2 days history of abdominal pain, nausea vomiting and diarrhea.    # Abdominal pain  # Nausea/vomiting  # Diarrhea  # Abnormal LFTs  # Hepatic steatosis    Plan:  -Given clinical improvement, may initiate low-fat diet  -Repeat LFTs in morning  -Obtain complete liver disease workup, will likely need outpatient elastography and fibrosis panel  -If epigastric pain does not improve may need to consider EGD to rule out peptic ulcer disease  -Trend daily LFTs  -PPI daily      Thank you for allowing us to take part in the patients care  Contact GI for any remaining questions, we are available.     Electronically signed by:  Josee Urbina MD   Gastroenterology  12/29/2023    4:03 PM       This note is created with the assistance

## 2023-12-30 PROBLEM — R79.89 ELEVATED LFTS: Status: ACTIVE | Noted: 2023-12-30

## 2023-12-30 LAB
AFP SERPL-MCNC: 1.2 UG/L
ALBUMIN SERPL-MCNC: 3.8 G/DL (ref 3.5–5.2)
ALP SERPL-CCNC: 115 U/L (ref 35–104)
ALT SERPL-CCNC: 169 U/L (ref 5–33)
ANION GAP SERPL CALCULATED.3IONS-SCNC: 6 MMOL/L (ref 9–17)
AST SERPL-CCNC: 83 U/L
BASOPHILS # BLD: 0 K/UL (ref 0–0.2)
BASOPHILS NFR BLD: 0 % (ref 0–2)
BILIRUB SERPL-MCNC: 0.5 MG/DL (ref 0.3–1.2)
BILIRUB UR QL STRIP: NEGATIVE
BUN SERPL-MCNC: 8 MG/DL (ref 6–20)
CALCIUM SERPL-MCNC: 8.8 MG/DL (ref 8.6–10.4)
CHLORIDE SERPL-SCNC: 107 MMOL/L (ref 98–107)
CLARITY UR: CLEAR
CO2 SERPL-SCNC: 27 MMOL/L (ref 20–31)
COLOR UR: YELLOW
COMMENT: NORMAL
CREAT SERPL-MCNC: 0.8 MG/DL (ref 0.5–0.9)
EOSINOPHIL # BLD: 0.1 K/UL (ref 0–0.4)
EOSINOPHILS RELATIVE PERCENT: 2 % (ref 0–4)
ERYTHROCYTE [DISTWIDTH] IN BLOOD BY AUTOMATED COUNT: 13.4 % (ref 11.5–14.9)
GFR SERPL CREATININE-BSD FRML MDRD: >60 ML/MIN/1.73M2
GLUCOSE SERPL-MCNC: 106 MG/DL (ref 70–99)
GLUCOSE UR STRIP-MCNC: NEGATIVE MG/DL
HCT VFR BLD AUTO: 39.6 % (ref 36–46)
HGB BLD-MCNC: 13.1 G/DL (ref 12–16)
HGB UR QL STRIP.AUTO: NEGATIVE
INR PPP: 1.1
KETONES UR STRIP-MCNC: NEGATIVE MG/DL
LEUKOCYTE ESTERASE UR QL STRIP: NEGATIVE
LIPASE SERPL-CCNC: 19 U/L (ref 13–60)
LYMPHOCYTES NFR BLD: 1.3 K/UL (ref 1–4.8)
LYMPHOCYTES RELATIVE PERCENT: 31 % (ref 24–44)
MAGNESIUM SERPL-MCNC: 2.4 MG/DL (ref 1.6–2.6)
MCH RBC QN AUTO: 28.7 PG (ref 26–34)
MCHC RBC AUTO-ENTMCNC: 33 G/DL (ref 31–37)
MCV RBC AUTO: 87 FL (ref 80–100)
MONOCYTES NFR BLD: 0.4 K/UL (ref 0.1–1.3)
MONOCYTES NFR BLD: 8 % (ref 1–7)
NEUTROPHILS NFR BLD: 59 % (ref 36–66)
NEUTS SEG NFR BLD: 2.4 K/UL (ref 1.3–9.1)
NITRITE UR QL STRIP: NEGATIVE
PH UR STRIP: 7.5 [PH] (ref 5–8)
PHOSPHATE SERPL-MCNC: 2.6 MG/DL (ref 2.6–4.5)
PLATELET # BLD AUTO: 158 K/UL (ref 150–450)
PMV BLD AUTO: 9.7 FL (ref 6–12)
POTASSIUM SERPL-SCNC: 4 MMOL/L (ref 3.7–5.3)
PROT SERPL-MCNC: 6.3 G/DL (ref 6.4–8.3)
PROT UR STRIP-MCNC: NEGATIVE MG/DL
PROTHROMBIN TIME: 14.5 SEC (ref 11.8–14.6)
RBC # BLD AUTO: 4.55 M/UL (ref 4–5.2)
SODIUM SERPL-SCNC: 140 MMOL/L (ref 135–144)
SP GR UR STRIP: 1.01 (ref 1–1.03)
UROBILINOGEN UR STRIP-ACNC: NORMAL EU/DL (ref 0–1)
WBC OTHER # BLD: 4.2 K/UL (ref 3.5–11)

## 2023-12-30 PROCEDURE — 85025 COMPLETE CBC W/AUTO DIFF WBC: CPT

## 2023-12-30 PROCEDURE — APPSS30 APP SPLIT SHARED TIME 16-30 MINUTES: Performed by: NURSE PRACTITIONER

## 2023-12-30 PROCEDURE — 84100 ASSAY OF PHOSPHORUS: CPT

## 2023-12-30 PROCEDURE — 83690 ASSAY OF LIPASE: CPT

## 2023-12-30 PROCEDURE — 6370000000 HC RX 637 (ALT 250 FOR IP): Performed by: INTERNAL MEDICINE

## 2023-12-30 PROCEDURE — 1200000000 HC SEMI PRIVATE

## 2023-12-30 PROCEDURE — 83735 ASSAY OF MAGNESIUM: CPT

## 2023-12-30 PROCEDURE — C9113 INJ PANTOPRAZOLE SODIUM, VIA: HCPCS | Performed by: INTERNAL MEDICINE

## 2023-12-30 PROCEDURE — 6360000002 HC RX W HCPCS: Performed by: NURSE PRACTITIONER

## 2023-12-30 PROCEDURE — A4216 STERILE WATER/SALINE, 10 ML: HCPCS | Performed by: INTERNAL MEDICINE

## 2023-12-30 PROCEDURE — 36415 COLL VENOUS BLD VENIPUNCTURE: CPT

## 2023-12-30 PROCEDURE — 80053 COMPREHEN METABOLIC PANEL: CPT

## 2023-12-30 PROCEDURE — 99231 SBSQ HOSP IP/OBS SF/LOW 25: CPT | Performed by: INTERNAL MEDICINE

## 2023-12-30 PROCEDURE — 2580000003 HC RX 258: Performed by: INTERNAL MEDICINE

## 2023-12-30 PROCEDURE — 2580000003 HC RX 258: Performed by: NURSE PRACTITIONER

## 2023-12-30 PROCEDURE — 85610 PROTHROMBIN TIME: CPT

## 2023-12-30 PROCEDURE — 2500000003 HC RX 250 WO HCPCS: Performed by: NURSE PRACTITIONER

## 2023-12-30 PROCEDURE — 99232 SBSQ HOSP IP/OBS MODERATE 35: CPT | Performed by: INTERNAL MEDICINE

## 2023-12-30 PROCEDURE — 6360000002 HC RX W HCPCS: Performed by: INTERNAL MEDICINE

## 2023-12-30 PROCEDURE — 82105 ALPHA-FETOPROTEIN SERUM: CPT

## 2023-12-30 PROCEDURE — 81003 URINALYSIS AUTO W/O SCOPE: CPT

## 2023-12-30 RX ORDER — FLUOXETINE 10 MG/1
10 CAPSULE ORAL EVERY MORNING
Status: DISCONTINUED | OUTPATIENT
Start: 2023-12-30 | End: 2023-12-31 | Stop reason: HOSPADM

## 2023-12-30 RX ORDER — SENNA AND DOCUSATE SODIUM 50; 8.6 MG/1; MG/1
2 TABLET, FILM COATED ORAL DAILY PRN
Status: DISCONTINUED | OUTPATIENT
Start: 2023-12-30 | End: 2023-12-31 | Stop reason: HOSPADM

## 2023-12-30 RX ORDER — LEVOTHYROXINE SODIUM 0.07 MG/1
75 TABLET ORAL
Status: DISCONTINUED | OUTPATIENT
Start: 2023-12-31 | End: 2023-12-31 | Stop reason: HOSPADM

## 2023-12-30 RX ORDER — OXYBUTYNIN CHLORIDE 10 MG/1
10 TABLET, EXTENDED RELEASE ORAL DAILY
Status: DISCONTINUED | OUTPATIENT
Start: 2023-12-30 | End: 2023-12-31 | Stop reason: HOSPADM

## 2023-12-30 RX ORDER — SENNOSIDES A AND B 8.6 MG/1
1 TABLET, FILM COATED ORAL ONCE
Status: DISCONTINUED | OUTPATIENT
Start: 2023-12-30 | End: 2023-12-30

## 2023-12-30 RX ORDER — AMLODIPINE BESYLATE 5 MG/1
5 TABLET ORAL DAILY
Status: DISCONTINUED | OUTPATIENT
Start: 2023-12-30 | End: 2023-12-31 | Stop reason: HOSPADM

## 2023-12-30 RX ORDER — TIZANIDINE 2 MG/1
2 TABLET ORAL EVERY 6 HOURS PRN
Status: DISCONTINUED | OUTPATIENT
Start: 2023-12-30 | End: 2023-12-31 | Stop reason: HOSPADM

## 2023-12-30 RX ADMIN — ACETAMINOPHEN 650 MG: 325 TABLET ORAL at 19:34

## 2023-12-30 RX ADMIN — SODIUM CHLORIDE, PRESERVATIVE FREE 10 ML: 5 INJECTION INTRAVENOUS at 21:43

## 2023-12-30 RX ADMIN — ENOXAPARIN SODIUM 40 MG: 100 INJECTION SUBCUTANEOUS at 08:21

## 2023-12-30 RX ADMIN — POTASSIUM CHLORIDE, DEXTROSE MONOHYDRATE AND SODIUM CHLORIDE: 150; 5; 450 INJECTION, SOLUTION INTRAVENOUS at 04:25

## 2023-12-30 RX ADMIN — OXYBUTYNIN CHLORIDE 10 MG: 10 TABLET, EXTENDED RELEASE ORAL at 09:54

## 2023-12-30 RX ADMIN — AMLODIPINE BESYLATE 5 MG: 5 TABLET ORAL at 09:54

## 2023-12-30 RX ADMIN — PIPERACILLIN AND TAZOBACTAM 3375 MG: 3; .375 INJECTION, POWDER, FOR SOLUTION INTRAVENOUS at 06:04

## 2023-12-30 RX ADMIN — ACETAMINOPHEN 650 MG: 650 SUPPOSITORY RECTAL at 07:05

## 2023-12-30 RX ADMIN — FLUOXETINE 10 MG: 10 CAPSULE ORAL at 10:09

## 2023-12-30 RX ADMIN — SENNOSIDES AND DOCUSATE SODIUM 2 TABLET: 50; 8.6 TABLET ORAL at 16:20

## 2023-12-30 RX ADMIN — ACETAMINOPHEN 650 MG: 325 TABLET ORAL at 13:28

## 2023-12-30 RX ADMIN — TIZANIDINE 2 MG: 2 TABLET ORAL at 21:39

## 2023-12-30 RX ADMIN — SODIUM CHLORIDE, PRESERVATIVE FREE 10 ML: 5 INJECTION INTRAVENOUS at 21:39

## 2023-12-30 RX ADMIN — PANTOPRAZOLE SODIUM 40 MG: 40 INJECTION, POWDER, FOR SOLUTION INTRAVENOUS at 08:22

## 2023-12-30 ASSESSMENT — PAIN SCALES - GENERAL
PAINLEVEL_OUTOF10: 8
PAINLEVEL_OUTOF10: 7
PAINLEVEL_OUTOF10: 8
PAINLEVEL_OUTOF10: 6
PAINLEVEL_OUTOF10: 6
PAINLEVEL_OUTOF10: 7

## 2023-12-30 ASSESSMENT — PAIN DESCRIPTION - LOCATION
LOCATION: BACK

## 2023-12-30 ASSESSMENT — PAIN DESCRIPTION - ORIENTATION
ORIENTATION: RIGHT;LOWER
ORIENTATION: MID;LOWER

## 2023-12-30 ASSESSMENT — PAIN DESCRIPTION - DESCRIPTORS
DESCRIPTORS: ACHING;SHARP
DESCRIPTORS: ACHING;CRUSHING;DISCOMFORT

## 2023-12-30 ASSESSMENT — PAIN - FUNCTIONAL ASSESSMENT: PAIN_FUNCTIONAL_ASSESSMENT: ACTIVITIES ARE NOT PREVENTED

## 2023-12-30 NOTE — PLAN OF CARE
Problem: Discharge Planning  Goal: Discharge to home or other facility with appropriate resources  Outcome: Progressing  Flowsheets (Taken 12/30/2023 0343)  Discharge to home or other facility with appropriate resources:   Identify discharge learning needs (meds, wound care, etc)   Arrange for needed discharge resources and transportation as appropriate   Arrange for interpreters to assist at discharge as needed     Problem: Pain  Goal: Verbalizes/displays adequate comfort level or baseline comfort level  Outcome: Progressing  Note: Pt medicated with pain medication prn.  Assessed all pain characteristics including level, type, location, frequency, and onset.  Non-pharmacologic interventions offered to pt as well.  Pt states pain is tolerable at this time.        Problem: Safety - Adult  Goal: Free from fall injury  Outcome: Progressing

## 2023-12-30 NOTE — PLAN OF CARE
Problem: Pain  Goal: Verbalizes/displays adequate comfort level or baseline comfort level  12/30/2023 1629 by Lorraine Ruiz, RN  Outcome: Not Progressing  Flowsheets (Taken 12/30/2023 1629)  Verbalizes/displays adequate comfort level or baseline comfort level:   Assess pain using appropriate pain scale   Administer analgesics based on type and severity of pain and evaluate response   Implement non-pharmacological measures as appropriate and evaluate response  Note: Patients pain is in her back, asked for pain meds 2x during shift.      Problem: Discharge Planning  Goal: Discharge to home or other facility with appropriate resources  12/30/2023 1629 by Lorraine Ruiz, RN  Outcome: Progressing     Problem: Safety - Adult  Goal: Free from fall injury  12/30/2023 1629 by Lorraine Ruiz, RN  Outcome: Progressing

## 2023-12-31 VITALS
HEART RATE: 56 BPM | SYSTOLIC BLOOD PRESSURE: 131 MMHG | RESPIRATION RATE: 16 BRPM | BODY MASS INDEX: 41.46 KG/M2 | HEIGHT: 60 IN | WEIGHT: 211.2 LBS | OXYGEN SATURATION: 99 % | TEMPERATURE: 98.1 F | DIASTOLIC BLOOD PRESSURE: 71 MMHG

## 2023-12-31 LAB
ALBUMIN SERPL-MCNC: 4.2 G/DL (ref 3.5–5.2)
ALP SERPL-CCNC: 127 U/L (ref 35–104)
ALT SERPL-CCNC: 164 U/L (ref 5–33)
ANION GAP SERPL CALCULATED.3IONS-SCNC: 10 MMOL/L (ref 9–17)
AST SERPL-CCNC: 55 U/L
BASOPHILS # BLD: 0 K/UL (ref 0–0.2)
BASOPHILS NFR BLD: 0 % (ref 0–2)
BILIRUB DIRECT SERPL-MCNC: 0.1 MG/DL
BILIRUB INDIRECT SERPL-MCNC: 0.3 MG/DL (ref 0–1)
BILIRUB SERPL-MCNC: 0.4 MG/DL (ref 0.3–1.2)
BUN SERPL-MCNC: 13 MG/DL (ref 6–20)
CALCIUM SERPL-MCNC: 9.1 MG/DL (ref 8.6–10.4)
CHLORIDE SERPL-SCNC: 107 MMOL/L (ref 98–107)
CMV IGG SERPL QL IA: 292
CMV IGM SERPL QL IA: 0.2
CO2 SERPL-SCNC: 25 MMOL/L (ref 20–31)
CREAT SERPL-MCNC: 0.7 MG/DL (ref 0.5–0.9)
EOSINOPHIL # BLD: 0.1 K/UL (ref 0–0.4)
EOSINOPHILS RELATIVE PERCENT: 2 % (ref 0–4)
ERYTHROCYTE [DISTWIDTH] IN BLOOD BY AUTOMATED COUNT: 13.7 % (ref 11.5–14.9)
GFR SERPL CREATININE-BSD FRML MDRD: >60 ML/MIN/1.73M2
GLUCOSE SERPL-MCNC: 108 MG/DL (ref 70–99)
HCT VFR BLD AUTO: 41.3 % (ref 36–46)
HGB BLD-MCNC: 13.6 G/DL (ref 12–16)
LKM AB TITR SER IF: NORMAL {TITER}
LYMPHOCYTES NFR BLD: 1.4 K/UL (ref 1–4.8)
LYMPHOCYTES RELATIVE PERCENT: 33 % (ref 24–44)
MCH RBC QN AUTO: 28.6 PG (ref 26–34)
MCHC RBC AUTO-ENTMCNC: 32.9 G/DL (ref 31–37)
MCV RBC AUTO: 87.1 FL (ref 80–100)
MONOCYTES NFR BLD: 0.2 K/UL (ref 0.1–1.3)
MONOCYTES NFR BLD: 6 % (ref 1–7)
NEUTROPHILS NFR BLD: 59 % (ref 36–66)
NEUTS SEG NFR BLD: 2.5 K/UL (ref 1.3–9.1)
PLATELET # BLD AUTO: 171 K/UL (ref 150–450)
PMV BLD AUTO: 9.9 FL (ref 6–12)
POTASSIUM SERPL-SCNC: 3.7 MMOL/L (ref 3.7–5.3)
PROT SERPL-MCNC: 6.4 G/DL (ref 6.4–8.3)
RBC # BLD AUTO: 4.75 M/UL (ref 4–5.2)
SMOOTH MUSCLE ANTIBODY: 5 UNITS (ref 0–19)
SODIUM SERPL-SCNC: 142 MMOL/L (ref 135–144)
WBC OTHER # BLD: 4.2 K/UL (ref 3.5–11)

## 2023-12-31 PROCEDURE — APPSS30 APP SPLIT SHARED TIME 16-30 MINUTES: Performed by: NURSE PRACTITIONER

## 2023-12-31 PROCEDURE — C9113 INJ PANTOPRAZOLE SODIUM, VIA: HCPCS | Performed by: INTERNAL MEDICINE

## 2023-12-31 PROCEDURE — 2580000003 HC RX 258: Performed by: INTERNAL MEDICINE

## 2023-12-31 PROCEDURE — 6360000002 HC RX W HCPCS: Performed by: INTERNAL MEDICINE

## 2023-12-31 PROCEDURE — 6370000000 HC RX 637 (ALT 250 FOR IP): Performed by: INTERNAL MEDICINE

## 2023-12-31 PROCEDURE — 99231 SBSQ HOSP IP/OBS SF/LOW 25: CPT | Performed by: INTERNAL MEDICINE

## 2023-12-31 PROCEDURE — 80076 HEPATIC FUNCTION PANEL: CPT

## 2023-12-31 PROCEDURE — 6360000002 HC RX W HCPCS: Performed by: NURSE PRACTITIONER

## 2023-12-31 PROCEDURE — A4216 STERILE WATER/SALINE, 10 ML: HCPCS | Performed by: INTERNAL MEDICINE

## 2023-12-31 PROCEDURE — 85025 COMPLETE CBC W/AUTO DIFF WBC: CPT

## 2023-12-31 PROCEDURE — 36415 COLL VENOUS BLD VENIPUNCTURE: CPT

## 2023-12-31 PROCEDURE — 2580000003 HC RX 258: Performed by: NURSE PRACTITIONER

## 2023-12-31 PROCEDURE — 80048 BASIC METABOLIC PNL TOTAL CA: CPT

## 2023-12-31 RX ORDER — POLYETHYLENE GLYCOL 3350 17 G/17G
17 POWDER, FOR SOLUTION ORAL DAILY PRN
Qty: 116 G | Refills: 1 | Status: SHIPPED | OUTPATIENT
Start: 2023-12-31 | End: 2024-01-30

## 2023-12-31 RX ADMIN — ENOXAPARIN SODIUM 40 MG: 100 INJECTION SUBCUTANEOUS at 07:52

## 2023-12-31 RX ADMIN — ACETAMINOPHEN 650 MG: 325 TABLET ORAL at 07:36

## 2023-12-31 RX ADMIN — AMLODIPINE BESYLATE 5 MG: 5 TABLET ORAL at 07:52

## 2023-12-31 RX ADMIN — SODIUM CHLORIDE, PRESERVATIVE FREE 10 ML: 5 INJECTION INTRAVENOUS at 07:53

## 2023-12-31 RX ADMIN — LEVOTHYROXINE SODIUM 75 MCG: 0.07 TABLET ORAL at 05:09

## 2023-12-31 RX ADMIN — PANTOPRAZOLE SODIUM 40 MG: 40 INJECTION, POWDER, FOR SOLUTION INTRAVENOUS at 07:52

## 2023-12-31 RX ADMIN — OXYBUTYNIN CHLORIDE 10 MG: 10 TABLET, EXTENDED RELEASE ORAL at 07:52

## 2023-12-31 RX ADMIN — FLUOXETINE 10 MG: 10 CAPSULE ORAL at 12:14

## 2023-12-31 ASSESSMENT — PAIN SCALES - GENERAL
PAINLEVEL_OUTOF10: 0
PAINLEVEL_OUTOF10: 7

## 2023-12-31 ASSESSMENT — PAIN DESCRIPTION - DESCRIPTORS: DESCRIPTORS: ACHING

## 2023-12-31 ASSESSMENT — PAIN DESCRIPTION - LOCATION: LOCATION: BACK

## 2023-12-31 NOTE — PLAN OF CARE
Problem: Discharge Planning  Goal: Discharge to home or other facility with appropriate resources  12/31/2023 0334 by Anupama Cheng, RN  Flowsheets (Taken 12/31/2023 0334)  Discharge to home or other facility with appropriate resources:   Identify barriers to discharge with patient and caregiver   Refer to discharge planning if patient needs post-hospital services based on physician order or complex needs related to functional status, cognitive ability or social support system   Arrange for interpreters to assist at discharge as needed     Problem: Pain  Goal: Verbalizes/displays adequate comfort level or baseline comfort level  12/31/2023 0334 by Anupama Cheng, RN  Note: Pt medicated with pain medication prn.  Assessed all pain characteristics including level, type, location, frequency, and onset.  Non-pharmacologic interventions offered to pt as well.  Pt states pain is tolerable at this time.        Problem: Safety - Adult  Goal: Free from fall injury  12/31/2023 0334 by Anupama Cheng, RN  Outcome: Progressing

## 2023-12-31 NOTE — PROGRESS NOTES
[facility]    Gastroenterology Daily Progress Note      Patient:   Petra Starr   :    1965   Facility:   [facility]  Date:     2023  Consultant:   NEHAL Llanos - CNP, CNP      SUBJECTIVE  58 y.o. female admitted 2023 with Abdominal pain, right upper quadrant [R10.11]  Abdominal pain [R10.9]  Intractable abdominal pain [R10.9] and seen for abdominal pain with elevated lft's. The pt was seen and examined. Reports a poor appetite this am but denies nausea and abdominal pain. No BM overnight. Lft's from this am are still pending. .        OBJECTIVE  Scheduled Meds:   amLODIPine  5 mg Oral Daily    FLUoxetine  10 mg Oral QAM    levothyroxine  75 mcg Oral QAM AC    oxyBUTYnin  10 mg Oral Daily    sodium chloride flush  5-40 mL IntraVENous 2 times per day    enoxaparin  40 mg SubCUTAneous Daily    sodium chloride flush  5-40 mL IntraVENous 2 times per day    pantoprazole (PROTONIX) 40 mg in sodium chloride (PF) 0.9 % 10 mL injection  40 mg IntraVENous Daily       Vital Signs:  /71   Pulse 56   Temp 98.1 °F (36.7 °C) (Oral)   Resp 16   Ht 1.524 m (5')   Wt 95.8 kg (211 lb 3.2 oz)   SpO2 99%   BMI 41.25 kg/m²    Review of Systems - History obtained from chart review and the patient  General ROS: negative  Respiratory ROS: no cough, shortness of breath, or wheezing  Cardiovascular ROS: no chest pain or dyspnea on exertion  Gastrointestinal ROS negative  Physical Exam:     General Appearance: alert and oriented to person, place and time, well-developed and well-nourished, in no acute distress  Skin: warm and dry, no rash or erythema  Head: normocephalic and atraumatic  Eyes: pupils equal, round, and reactive to light, extraocular eye movements intact, conjunctivae normal  ENT: hearing grossly normal bilaterally  Neck: neck supple and non tender without mass, no thyromegaly or thyroid nodules, no cervical lymphadenopathy   Pulmonary/Chest: clear to 
   12/30/23 1351   Encounter Summary   Encounter Overview/Reason  Initial Encounter   Service Provided For: Patient   Referral/Consult From: Nurse   Support System Spouse;Children   Last Encounter  12/30/23   Complexity of Encounter Low   Spiritual/Emotional needs   Type Spiritual Support   Assessment/Intervention/Outcome   Assessment Anxious   Intervention Active listening;Discussed illness injury and it’s impact;Explored/Affirmed feelings, thoughts, concerns;Prayer (assurance of)/[place];Sustaining Presence/Ministry of presence   Outcome Engaged in conversation;Expressed feelings, needs, and concerns;Expressed Gratitude       
   12/31/23 1215   Encounter Summary   Encounter Overview/Reason   Encounter   Service Provided For: Patient   Referral/Consult From: Rounding   Last Encounter  12/31/23   Complexity of Encounter Low   Spiritual/Emotional needs   Type Spiritual Support   Rituals, Rites and Sacraments   Type Sacrament of Sick  (12/31/23)       
.Patient admitted to room 2080.   VS taken, telemetry placed and call light given/within reach. Patient A&O and given room orientation. Orders released/reviewed, initial assessment completed and navigator started. See chart for more detail.    
Carito Jeffrey NP notified of LFTs, no further work up while in hospital. GI signing off.   
[facility]  IN-PATIENT SERVICE  [facility]    PROGRESS NOTE             Date:   12/30/2023  Patient name:  Petra Starr  Date of admission:  12/28/2023 10:12 PM  MRN:   [id number]  YOB: 1965    INTERVAL HISTORY:    SUBJECTIVE:  Patient reports having right flank pain, has chronic back issues.  Reports sciatica.  Feels this is from laying in bed, no weakness, no loss of bowel bladder control.    Although tolerating oral diet, continues to have right upper quadrant pain.  She denies nausea vomiting.  No fevers, chills overnight.g    Objective   Vitals:    12/29/23 2331 12/30/23 0316 12/30/23 0500 12/30/23 0815   BP: 106/67 (!) 115/56  101/60   Pulse: 54 50  51   Resp: 16 16  16   Temp: 98.2 °F (36.8 °C) 98.4 °F (36.9 °C)  98.7 °F (37.1 °C)   TempSrc: Oral Oral  Oral   SpO2: 93% 98%  98%   Weight:   96.6 kg (212 lb 15.4 oz)    Height:         BP Readings from Last 6 Encounters:   12/30/23 101/60   12/21/23 138/75   11/29/23 132/78   10/10/23 132/84   09/18/23 108/72   05/09/23 130/84          Intake/Output Summary (Last 24 hours) at 12/30/2023 0913  Last data filed at 12/30/2023 0643  Gross per 24 hour   Intake 1834.68 ml   Output --   Net 1834.68 ml     General appearance: well nourished  HEENT: Normocephalic, no lesions, without obvious abnormality.pupils equal and reactive, EOM normal  Lungs: clear to auscultation bilaterally  Heart: regular rate and rhythm, S1, S2 normal, no murmur, click, rub or gallop  Abdomen: soft, tender in the right upper quadrant, no guarding no rebound tenderness  No right flank tenderness.  Extremities: extremities normal, atraumatic, no cyanosis or edema. Pulses 2+ and symmetrical in all 4 extremities  Neurological: Alert oriented x 3, moves all 4 extremities spontaneously.  Makes adequate conversation    CBC:   Recent Labs     12/28/23  2224 12/30/23  0711   WBC 6.8 4.2   HGB 13.7 13.1    158     BMP:    Recent Labs     
Patient discharged home with all personal belongings.  Discharge instructions and prescriptions given.  Patient taken down to front entrance via wheelchair.   
Pharmacy Medication History Note      List of current medications patient is taking is complete.     Source of information: dispense report, OARRS     Changes made to medication list:  Medications flagged for removal (include reason, ex. noncompliance):  Ozempic - not covered by patient's insurance     Medications removed (include reason, ex. therapy complete or physician discontinued):  None     Medications added/doses adjusted:  None     Other notes (ex. Recent course of antibiotics, Coumadin dosing):  OARRS negative    Denies use of other OTC or herbal medications.      Allergies clarified    Medication list provided to the patient: no   Medication education provided to the patient: none      Electronically signed by Silvia Reddy RPH on 12/29/2023 at 1:12 PM                                                     
Pt arrived to the floor and placed in room 2061. Bedside table and call light in reach. No s/s of distress noted at this time. Plan of care on going.   
Report called to [facility] on [facility]. All questions answered. Patient was in no pain or distress  
  BILIARY SYSTEM:  Gallbladder is unremarkable without evidence of  pericholecystic fluid, wall thickening or stones.  Negative sonographic  Wall's sign.     Common bile duct is within normal limits measuring 5 mm.     RIGHT KIDNEY: The right kidney is grossly unremarkable without evidence of  hydronephrosis.     PANCREAS:  Visualized portions of the pancreas are unremarkable.     OTHER: No evidence of right upper quadrant ascites.     IMPRESSION:  1. No acute findings right upper quadrant with no sonographic evidence of  cholecystitis.  2. Hepatomegaly and hepatic steatosis.      ASSESSMENT/plan  Abdominal pain, hx IBS  with elevated lft's, imaging showing steatosis and hepatomegaly  Liver autoimmune/viral results still pending  Advance diet as tolerated  ppi  Will likely need outpt fibroscan/panel    2.emesis and diarrhea, currently resolved      Time spent reviewing chart assessing pt and d/w md around 30 minutes    This plan was formulated in collaboration with Dr. cueto .    Electronically signed by: NEHAL Llanos CNP on 12/30/2023 at 10:09 AM         
Lovenox  GI PROPHYLAXIS: Pantoprazole  CODE STATUS: Full Code   DISPOSITION:    Gabriel Tripathi MD, MD  [facility] [place] [address].   Phone [phone]   Fax: [phone]  Answering Service: [phone]

## 2023-12-31 NOTE — DISCHARGE INSTR - DIET
Good nutrition is important when healing from an illness, injury, or surgery.  Follow any nutrition recommendations given to you during your hospital stay.   If you were given an oral nutrition supplement while in the hospital, continue to take this supplement at home.  You can take it with meals, in-between meals, and/or before bedtime. These supplements can be purchased at most local grocery stores, pharmacies, and chain [employer]-stores.   If you have any questions about your diet or nutrition, call the hospital and ask for the dietitian.  Regular, low fat diet

## 2023-12-31 NOTE — PLAN OF CARE
Problem: Discharge Planning  Goal: Discharge to home or other facility with appropriate resources  12/31/2023 1401 by Jasmin Carroll, JESUSITA  Outcome: Completed  12/31/2023 0334 by Anupama Cheng RN  Outcome: Progressing  12/31/2023 0334 by Anupama Cheng RN  Flowsheets (Taken 12/31/2023 0334)  Discharge to home or other facility with appropriate resources:   Identify barriers to discharge with patient and caregiver   Refer to discharge planning if patient needs post-hospital services based on physician order or complex needs related to functional status, cognitive ability or social support system   Arrange for interpreters to assist at discharge as needed     Problem: Pain  Goal: Verbalizes/displays adequate comfort level or baseline comfort level  12/31/2023 1401 by Jasmin Carroll, JESUSITA  Outcome: Completed  12/31/2023 0334 by Anupama Cheng RN  Outcome: Progressing  12/31/2023 0334 by Anupama Cheng RN  Note: Pt medicated with pain medication prn.  Assessed all pain characteristics including level, type, location, frequency, and onset.  Non-pharmacologic interventions offered to pt as well.  Pt states pain is tolerable at this time.        Problem: Safety - Adult  Goal: Free from fall injury  12/31/2023 1401 by Jasmin Carroll RN  Outcome: Completed  12/31/2023 0334 by Anupama Cheng RN  Outcome: Progressing

## 2024-01-01 LAB
EKG ATRIAL RATE: 46 BPM
EKG P AXIS: 46 DEGREES
EKG P-R INTERVAL: 142 MS
EKG Q-T INTERVAL: 462 MS
EKG QRS DURATION: 92 MS
EKG QTC CALCULATION (BAZETT): 404 MS
EKG R AXIS: -36 DEGREES
EKG T AXIS: -4 DEGREES
EKG VENTRICULAR RATE: 46 BPM

## 2024-01-02 LAB
ANA SER QL IA: NEGATIVE
DSDNA IGG SER QL IA: 0.8 IU/ML
EBV EA-D IGG SER-ACNC: 13 U/ML
EBV INTERPRETATION: ABNORMAL
EBV NA IGG SER IA-ACNC: 213 U/ML
EBV VCA IGG SER-ACNC: 834 U/ML
EBV VCA IGM SER-ACNC: 54 U/ML
MITOCHONDRIA M2 IGG SER-ACNC: 0.7 U/ML (ref 0–4)
NUCLEAR IGG SER IA-RTO: 0.1 U/ML

## 2024-01-02 NOTE — RESULT ENCOUNTER NOTE
Please schedule for hospital follow-up if not already done    FYI  Addressed in inpatient  Currently discharged   KALIE within normal limits  Mitochondrial antibodies normal  Liver kidney microsomal antibody normal  Smooth muscle antibody normal  Alpha 1 antitrypsin normal  Ceruloplasmin normal  Hepatitis A, B and C nonreactive for acute hepatitis  Mild hyperglycemia, mild prediabetes  Liver function test increased  CMV IgG very high, previous exposure to the virus, CMV IgM normal, no acute CMV infection  Transferrin level low  TSH borderline high but improved, free T4 normal  AFP normal  PT/INR within normal limits  Lipase within normal limits      Lab Results       Component                Value               Date                       LABA1C                   5.7                 11/30/2023                 LABA1C                   5.9                 05/27/2023                 LABA1C                   5.9                 06/17/2022              Ultrasound liver 12/28/2023 fatty liver  Patient needs hospital follow-up and referral to GI    Future Appointments  2/2/2024   1:50 PM    Jasper Rehman MD       [facility]  3/1/2024   1:45 PM    Leslie Herron MD    [facility]               [facility]

## 2024-01-03 ENCOUNTER — TELEPHONE (OUTPATIENT)
Dept: FAMILY MEDICINE CLINIC | Age: 59
End: 2024-01-03

## 2024-01-03 ENCOUNTER — TELEPHONE (OUTPATIENT)
Age: 59
End: 2024-01-03

## 2024-01-03 DIAGNOSIS — N32.81 OAB (OVERACTIVE BLADDER): ICD-10-CM

## 2024-01-03 RX ORDER — OXYBUTYNIN CHLORIDE 10 MG/1
10 TABLET, EXTENDED RELEASE ORAL DAILY
Qty: 90 TABLET | Refills: 3 | Status: SHIPPED | OUTPATIENT
Start: 2024-01-03

## 2024-01-03 NOTE — TELEPHONE ENCOUNTER
Patient states she was discharged from [facility] 12/31/2023 and needs to follow up with Dr. Urbina in 3 weeks, no openings, please call patient at [phone] [facility]/sc

## 2024-01-03 NOTE — TELEPHONE ENCOUNTER
Care Transitions Initial Follow Up Call    Outreach made within 2 business days of discharge: Yes    Patient: Petra Starr Patient : 1965   MRN: [id number]  Reason for Admission: There are no discharge diagnoses documented for the most recent discharge.  Discharge Date: 23       Spoke with: Petra     Discharge department/facility: [facility] Interactive Patient Contact:  Was patient able to fill all prescriptions: Yes  Was patient instructed to bring all medications to the follow-up visit: Yes  Is patient taking all medications as directed in the discharge summary? Yes  Does patient understand their discharge instructions: Yes  Does patient have questions or concerns that need addressed prior to 7-14 day follow up office visit: yes -     Scheduled appointment with PCP within 7-14 days [facility] appointment 2024. Do you have any days to place her or is this visit scheduled fine?  Please advise.    Follow Up  Future Appointments   Date Time Provider Department Center   2024 11:30 AM [facility]   2024  1:50 PM Jasper Rehman MD [facility]   3/1/2024  1:45 PM [facility]       Kathie Mullins

## 2024-01-03 NOTE — TELEPHONE ENCOUNTER
Noted. Thank you!    Future Appointments   Date Time Provider Department Center   1/17/2024 11:30 AM [facility]   2/2/2024  1:50 PM Jasper Rehman MD [facility]   3/1/2024  1:45 PM [facility]

## 2024-01-03 NOTE — TELEPHONE ENCOUNTER
Please Approve or Refuse.  Send to Pharmacy per Pt's Request:      Next Visit Date:  1/17/2024   Last Visit Date: 11/29/2023    Hemoglobin A1C (%)   Date Value   11/30/2023 5.7   05/27/2023 5.9   06/17/2022 5.9             ( goal A1C is < 7)   BP Readings from Last 3 Encounters:   12/31/23 131/71   12/21/23 138/75   11/29/23 132/78          (goal 120/80)  BUN   Date Value Ref Range Status   12/31/2023 13 6 - 20 mg/dL Final     Creatinine   Date Value Ref Range Status   12/31/2023 0.7 0.5 - 0.9 mg/dL Final     Potassium   Date Value Ref Range Status   12/31/2023 3.7 3.7 - 5.3 mmol/L Final

## 2024-01-04 NOTE — RESULT ENCOUNTER NOTE
Addressed in inpatient  Chronic EBV infection  Hospital follow-up was scheduled    Future Appointments  1/17/2024  11:30 AM   Leslie Herron MD    [facility]  2/2/2024   1:50 PM    Jasper Rehman MD       [facility]  3/1/2024   1:45 PM    Leslie Herron MD    [facility]

## 2024-01-17 ENCOUNTER — TELEPHONE (OUTPATIENT)
Dept: INTERNAL MEDICINE CLINIC | Age: 59
End: 2024-01-17

## 2024-01-17 ENCOUNTER — OFFICE VISIT (OUTPATIENT)
Dept: FAMILY MEDICINE CLINIC | Age: 59
End: 2024-01-17
Payer: COMMERCIAL

## 2024-01-17 ENCOUNTER — HOSPITAL ENCOUNTER (OUTPATIENT)
Age: 59
Discharge: HOME | End: 2024-01-17
Payer: COMMERCIAL

## 2024-01-17 VITALS
SYSTOLIC BLOOD PRESSURE: 124 MMHG | WEIGHT: 210.2 LBS | TEMPERATURE: 97.6 F | OXYGEN SATURATION: 97 % | HEIGHT: 60 IN | DIASTOLIC BLOOD PRESSURE: 80 MMHG | HEART RATE: 58 BPM | BODY MASS INDEX: 41.27 KG/M2

## 2024-01-17 DIAGNOSIS — K44.9 HH (HIATUS HERNIA): ICD-10-CM

## 2024-01-17 DIAGNOSIS — I10 PRIMARY HYPERTENSION: ICD-10-CM

## 2024-01-17 DIAGNOSIS — R10.13 DYSPEPSIA: ICD-10-CM

## 2024-01-17 DIAGNOSIS — Z09 HOSPITAL DISCHARGE FOLLOW-UP: ICD-10-CM

## 2024-01-17 DIAGNOSIS — K76.0 FATTY LIVER: ICD-10-CM

## 2024-01-17 DIAGNOSIS — Z12.31 ENCOUNTER FOR SCREENING MAMMOGRAM FOR BREAST CANCER: ICD-10-CM

## 2024-01-17 DIAGNOSIS — K76.0 STEATOSIS OF LIVER: ICD-10-CM

## 2024-01-17 DIAGNOSIS — R10.9 ABDOMINAL PAIN, UNSPECIFIED ABDOMINAL LOCATION: Primary | ICD-10-CM

## 2024-01-17 DIAGNOSIS — E06.3 HYPOTHYROIDISM DUE TO HASHIMOTO'S THYROIDITIS: ICD-10-CM

## 2024-01-17 DIAGNOSIS — R73.03 PREDIABETES: ICD-10-CM

## 2024-01-17 DIAGNOSIS — E03.8 HYPOTHYROIDISM DUE TO HASHIMOTO'S THYROIDITIS: ICD-10-CM

## 2024-01-17 LAB
ALBUMIN SERPL-MCNC: 4.5 G/DL (ref 3.5–5.2)
ALP SERPL-CCNC: 114 U/L (ref 35–104)
ALT SERPL-CCNC: 25 U/L (ref 5–33)
ANION GAP SERPL CALCULATED.3IONS-SCNC: 9 MMOL/L (ref 9–17)
AST SERPL-CCNC: 17 U/L
BASOPHILS # BLD: 0 K/UL (ref 0–0.2)
BASOPHILS NFR BLD: 0 % (ref 0–2)
BILIRUB SERPL-MCNC: 0.4 MG/DL (ref 0.3–1.2)
BUN SERPL-MCNC: 15 MG/DL (ref 6–20)
CALCIUM SERPL-MCNC: 9.7 MG/DL (ref 8.6–10.4)
CHLORIDE SERPL-SCNC: 107 MMOL/L (ref 98–107)
CO2 SERPL-SCNC: 27 MMOL/L (ref 20–31)
CREAT SERPL-MCNC: 0.7 MG/DL (ref 0.5–0.9)
EOSINOPHIL # BLD: 0.1 K/UL (ref 0–0.4)
EOSINOPHILS RELATIVE PERCENT: 2 % (ref 0–4)
ERYTHROCYTE [DISTWIDTH] IN BLOOD BY AUTOMATED COUNT: 13.6 % (ref 11.5–14.9)
FERRITIN SERPL-MCNC: 156 NG/ML (ref 13–150)
GFR SERPL CREATININE-BSD FRML MDRD: >60 ML/MIN/1.73M2
GLUCOSE SERPL-MCNC: 95 MG/DL (ref 70–99)
HCT VFR BLD AUTO: 41.2 % (ref 36–46)
HGB BLD-MCNC: 13.6 G/DL (ref 12–16)
LYMPHOCYTES NFR BLD: 2 K/UL (ref 1–4.8)
LYMPHOCYTES RELATIVE PERCENT: 34 % (ref 24–44)
MCH RBC QN AUTO: 28.7 PG (ref 26–34)
MCHC RBC AUTO-ENTMCNC: 33 G/DL (ref 31–37)
MCV RBC AUTO: 87.2 FL (ref 80–100)
MONOCYTES NFR BLD: 0.3 K/UL (ref 0.1–1.3)
MONOCYTES NFR BLD: 5 % (ref 1–7)
NEUTROPHILS NFR BLD: 59 % (ref 36–66)
NEUTS SEG NFR BLD: 3.4 K/UL (ref 1.3–9.1)
PLATELET # BLD AUTO: 192 K/UL (ref 150–450)
PMV BLD AUTO: 9.8 FL (ref 6–12)
POTASSIUM SERPL-SCNC: 4.1 MMOL/L (ref 3.7–5.3)
PROT SERPL-MCNC: 7.3 G/DL (ref 6.4–8.3)
RBC # BLD AUTO: 4.72 M/UL (ref 4–5.2)
SODIUM SERPL-SCNC: 143 MMOL/L (ref 135–144)
T4 FREE SERPL-MCNC: 1.3 NG/DL (ref 0.9–1.7)
TSH SERPL DL<=0.05 MIU/L-ACNC: 3.98 UIU/ML (ref 0.3–5)
URATE SERPL-MCNC: 3.7 MG/DL (ref 2.4–5.7)
WBC OTHER # BLD: 5.8 K/UL (ref 3.5–11)

## 2024-01-17 PROCEDURE — 84443 ASSAY THYROID STIM HORMONE: CPT

## 2024-01-17 PROCEDURE — 36415 COLL VENOUS BLD VENIPUNCTURE: CPT

## 2024-01-17 PROCEDURE — 3079F DIAST BP 80-89 MM HG: CPT | Performed by: FAMILY MEDICINE

## 2024-01-17 PROCEDURE — 82728 ASSAY OF FERRITIN: CPT

## 2024-01-17 PROCEDURE — 3074F SYST BP LT 130 MM HG: CPT | Performed by: FAMILY MEDICINE

## 2024-01-17 PROCEDURE — 80053 COMPREHEN METABOLIC PANEL: CPT

## 2024-01-17 PROCEDURE — 1111F DSCHRG MED/CURRENT MED MERGE: CPT | Performed by: FAMILY MEDICINE

## 2024-01-17 PROCEDURE — 99214 OFFICE O/P EST MOD 30 MIN: CPT | Performed by: FAMILY MEDICINE

## 2024-01-17 PROCEDURE — 84550 ASSAY OF BLOOD/URIC ACID: CPT

## 2024-01-17 PROCEDURE — 85025 COMPLETE CBC W/AUTO DIFF WBC: CPT

## 2024-01-17 PROCEDURE — 84439 ASSAY OF FREE THYROXINE: CPT

## 2024-01-17 RX ORDER — ONDANSETRON 4 MG/1
4 TABLET, FILM COATED ORAL EVERY 6 HOURS PRN
Qty: 24 TABLET | Refills: 0 | Status: SHIPPED | OUTPATIENT
Start: 2024-01-17 | End: 2024-01-23

## 2024-01-17 SDOH — ECONOMIC STABILITY: INCOME INSECURITY: HOW HARD IS IT FOR YOU TO PAY FOR THE VERY BASICS LIKE FOOD, HOUSING, MEDICAL CARE, AND HEATING?: HARD

## 2024-01-17 SDOH — ECONOMIC STABILITY: FOOD INSECURITY: WITHIN THE PAST 12 MONTHS, YOU WORRIED THAT YOUR FOOD WOULD RUN OUT BEFORE YOU GOT MONEY TO BUY MORE.: NEVER TRUE

## 2024-01-17 SDOH — ECONOMIC STABILITY: FOOD INSECURITY: WITHIN THE PAST 12 MONTHS, THE FOOD YOU BOUGHT JUST DIDN'T LAST AND YOU DIDN'T HAVE MONEY TO GET MORE.: NEVER TRUE

## 2024-01-17 ASSESSMENT — PATIENT HEALTH QUESTIONNAIRE - PHQ9
SUM OF ALL RESPONSES TO PHQ QUESTIONS 1-9: 7
5. POOR APPETITE OR OVEREATING: 3
SUM OF ALL RESPONSES TO PHQ9 QUESTIONS 1 & 2: 0
3. TROUBLE FALLING OR STAYING ASLEEP: 2
SUM OF ALL RESPONSES TO PHQ QUESTIONS 1-9: 7
1. LITTLE INTEREST OR PLEASURE IN DOING THINGS: 0
2. FEELING DOWN, DEPRESSED OR HOPELESS: 0
10. IF YOU CHECKED OFF ANY PROBLEMS, HOW DIFFICULT HAVE THESE PROBLEMS MADE IT FOR YOU TO DO YOUR WORK, TAKE CARE OF THINGS AT HOME, OR GET ALONG WITH OTHER PEOPLE: 0
SUM OF ALL RESPONSES TO PHQ QUESTIONS 1-9: 7
9. THOUGHTS THAT YOU WOULD BE BETTER OFF DEAD, OR OF HURTING YOURSELF: 0
6. FEELING BAD ABOUT YOURSELF - OR THAT YOU ARE A FAILURE OR HAVE LET YOURSELF OR YOUR FAMILY DOWN: 0
4. FEELING TIRED OR HAVING LITTLE ENERGY: 2
SUM OF ALL RESPONSES TO PHQ QUESTIONS 1-9: 7
8. MOVING OR SPEAKING SO SLOWLY THAT OTHER PEOPLE COULD HAVE NOTICED. OR THE OPPOSITE, BEING SO FIGETY OR RESTLESS THAT YOU HAVE BEEN MOVING AROUND A LOT MORE THAN USUAL: 0
7. TROUBLE CONCENTRATING ON THINGS, SUCH AS READING THE NEWSPAPER OR WATCHING TELEVISION: 0

## 2024-01-17 NOTE — RESULT ENCOUNTER NOTE
Please notify patient:  Liver test greatly improved  Thyroid function improved      Otherwise labs within normal limits  continue current treatment  Ferritin level is mildly high, which can be falsely high because it is an inflammatory marker as well, and she was recently sick  Most likely this level will need to be rechecked, will leave it up to the GI to discuss at the upcoming appointment    Future Appointments  2/1/2024   11:15 AM   [facility]  2/2/2024   1:50 PM    Jasper Rehman MD       [facility]           [facility]  2/29/2024  1:15 PM    [facility]donado [facility]           [facility]  3/1/2024   1:45 PM    Leslie Herron MD    [facility]

## 2024-01-17 NOTE — TELEPHONE ENCOUNTER
----- Message from Gabriel Tripathi MD sent at 1/17/2024  1:35 PM EST -----  Results forwarded to primary care provider.    ----- Message -----  From: Jossy Tiwari Incoming Lab Results From [facility] [place]  Sent: 1/17/2024   1:18 PM EST  To: Gabriel Tripathi MD

## 2024-01-17 NOTE — TELEPHONE ENCOUNTER
Noted. Thank you!  I placed the result note under the results    Future Appointments   Date Time Provider Department Center   2/1/2024 11:15 AM Leslie Herron MD fp [facility]   2/2/2024  1:50 PM Jasper Rehman MD [facility]   2/29/2024  1:15 PM Vamshi Maldonado MD [facility]   3/1/2024  1:45 PM Leslie Herron MD [facility]

## 2024-01-17 NOTE — RESULT ENCOUNTER NOTE
Please notify patient: Liver test greatly improved  Thyroid function improved      Otherwise labs within normal limits  continue current treatment    Only ferritin pending    Future Appointments  2/1/2024   11:15 AM   Leslie Herron MD    [facility]  2/2/2024   1:50 PM    Jasper Rehman MD       [facility]           [facility]  2/29/2024  1:15 PM    Vamshi Maldonado MD           [facility]        [facility]  3/1/2024   1:45 PM    Leslie Herron MD    [facility]

## 2024-01-17 NOTE — PROGRESS NOTES
Visit Information    Have you changed or started any medications since your last visit including any over-the-counter medicines, vitamins, or herbal medicines? yes -    Have you stopped taking any of your medications? Is so, why? -  yes -   Are you having any side effects from any of your medications? - no    Have you seen any other physician or provider since your last visit?  yes -    Have you had any other diagnostic tests since your last visit?  yes -    Have you been seen in the emergency room and/or had an admission in a hospital since we last saw you?  yes -    Have you had your routine dental cleaning in the past 6 months?  no     Do you have an active MyChart account? If no, what is the barrier?  Yes    Patient Care Team:  Leslie Herron MD as PCP - General (Family Medicine)  Leslie Herron MD as PCP - Empaneled Provider  Mercy Crews RN as Registered Nurse  Renan Grant MD as Consulting Physician (Orthopedic Surgery)    Medical History Review  Past Medical, Family, and Social History reviewed and does contribute to the patient presenting condition    Health Maintenance   Topic Date Due    Hepatitis B vaccine (1 of 3 - 3-dose series) Never done    Shingles vaccine (1 of 2) Never done    Breast cancer screen  06/12/2022    COVID-19 Vaccine (3 - 2023-24 season) 09/01/2023    Flu vaccine (1) 06/30/2024 (Originally 8/1/2023)    Depression Monitoring  11/29/2024    A1C test (Diabetic or Prediabetic)  11/30/2024    Colorectal Cancer Screen  09/18/2028    Lipids  11/30/2028    DTaP/Tdap/Td vaccine (2 - Td or Tdap) 01/19/2031    Hepatitis C screen  Completed    HIV screen  Completed    Hepatitis A vaccine  Aged Out    Hib vaccine  Aged Out    Polio vaccine  Aged Out    Meningococcal (ACWY) vaccine  Aged Out    Pneumococcal 0-64 years Vaccine  Aged Out    Diabetes screen  Discontinued    Cervical cancer screen  Discontinued

## 2024-01-17 NOTE — PROGRESS NOTES
Post-Discharge Transitional Care Follow Up      Petra Starr   YOB: 1965    Date of Office Visit:  1/17/2024  Date of Hospital Admission: 12/28/23  Date of Hospital Discharge: 12/31/23  Readmission Risk Score (high >=14%. Medium >=10%):Readmission Risk Score: 7.2      Care management risk score Rising risk (score 2-5) and Complex Care (Scores >=6): No Risk Score On File     Non face to face  following discharge, date last encounter closed (first attempt may have been earlier): 01/03/2024     Call initiated 2 business days of discharge: Yes     Abdominal pain, unspecified abdominal location  Improved  Follow-up with GI specialist   Avoid fatty foods and fried foods  -     NC DISCHARGE MEDS RECONCILED W/ CURRENT OUTPATIENT MED LIST  Fatty liver  Ongoing  Moderately increased LFTs  She will need elastogram  Avoid fatty foods, fried foods, sweets and pop  Recheck labs  Check ferritin rest of the labs were already checked by GI specialist  With negative smooth muscle antibody, mitochondrial antibody, hepatitis panel acute,  Ceruloplasmin, antiliver kidney antibodies, KALIE, alpha-1 antitrypsin  Prior infection Brad-Barr virus positive positive  -     NC DISCHARGE MEDS RECONCILED W/ CURRENT OUTPATIENT MED LIST  -     Comprehensive Metabolic Panel; Future  -     CBC with Auto Differential; Future  -     Ferritin; Future  Prediabetes  Improving  Absolutely avoid pop  Avoid sweets, exercise and attempt to lose weight  Lab Results   Component Value Date    LABA1C 5.7 11/30/2023    LABA1C 5.9 05/27/2023    LABA1C 5.9 06/17/2022       -     NC DISCHARGE MEDS RECONCILED W/ CURRENT OUTPATIENT MED LIST  HH (hiatus hernia)  Ongoing  Will give Zofran  Discussed if symptoms do not get better, we can refer her to general surgeon for hiatal hernia evaluation and repair  Her last EGD was in 2017  For now continue Pepcid 20 Mg twice a day  -     NC DISCHARGE MEDS RECONCILED W/ CURRENT OUTPATIENT MED

## 2024-01-22 PROBLEM — K44.9 HH (HIATUS HERNIA): Status: ACTIVE | Noted: 2024-01-22

## 2024-01-22 PROBLEM — R10.13 DYSPEPSIA: Status: ACTIVE | Noted: 2024-01-22

## 2024-01-22 ASSESSMENT — ENCOUNTER SYMPTOMS: BLOOD IN STOOL: 0

## 2024-01-31 ENCOUNTER — TELEPHONE (OUTPATIENT)
Dept: FAMILY MEDICINE CLINIC | Age: 59
End: 2024-01-31

## 2024-01-31 NOTE — TELEPHONE ENCOUNTER
Pt called in stating that she is applying for FMLA and she is being asked to provide a letter confirm pt is currently being treated by the provider.     Fax to patient @ [phone]

## 2024-02-02 ENCOUNTER — OFFICE VISIT (OUTPATIENT)
Dept: UROLOGY | Age: 59
End: 2024-02-02
Payer: COMMERCIAL

## 2024-02-02 VITALS
DIASTOLIC BLOOD PRESSURE: 90 MMHG | HEART RATE: 68 BPM | HEIGHT: 60 IN | OXYGEN SATURATION: 97 % | SYSTOLIC BLOOD PRESSURE: 157 MMHG | TEMPERATURE: 98.9 F | BODY MASS INDEX: 41.03 KG/M2 | WEIGHT: 209 LBS

## 2024-02-02 DIAGNOSIS — N32.81 OAB (OVERACTIVE BLADDER): Primary | ICD-10-CM

## 2024-02-02 DIAGNOSIS — R35.1 NOCTURIA: ICD-10-CM

## 2024-02-02 PROCEDURE — 3077F SYST BP >= 140 MM HG: CPT | Performed by: UROLOGY

## 2024-02-02 PROCEDURE — 3080F DIAST BP >= 90 MM HG: CPT | Performed by: UROLOGY

## 2024-02-02 PROCEDURE — 99214 OFFICE O/P EST MOD 30 MIN: CPT | Performed by: UROLOGY

## 2024-02-02 RX ORDER — SOLIFENACIN SUCCINATE 5 MG/1
5 TABLET, FILM COATED ORAL DAILY
Qty: 90 TABLET | Refills: 3 | Status: SHIPPED | OUTPATIENT
Start: 2024-02-02

## 2024-02-02 ASSESSMENT — ENCOUNTER SYMPTOMS
BACK PAIN: 0
VOMITING: 0
COUGH: 0
EYE PAIN: 0
EYE REDNESS: 0
NAUSEA: 0
CONSTIPATION: 0
ABDOMINAL PAIN: 0
WHEEZING: 0
SHORTNESS OF BREATH: 0
DIARRHEA: 0

## 2024-02-02 NOTE — PROGRESS NOTES
[facility]  [address]  [facility] [address]  Dept: [phone]    [facility] Office Note - New Patient    Patient:  Petra Starr  YOB: 1965  Date: 2/2/2024    The patient is a 58 y.o. female who presentstoday for evaluation of the following problems:   Chief Complaint   Patient presents with    Other     OAB    referred by Leslie Herron MD.    HPI  She is here for OAB.   She voids about 6-8 times during the day.   At night, she is up 3-4 times   She does cut back on her fluids before bed.   She does not drink any pop or coffee.   No constipation.   She has bad urgency as well.   She does not leak.   Oxybutynin did not help.   She has been on it for years.   She thinks it may have helped at first.     (Patient's old records have been requested, reviewed and summarized in today's note.)    Summary of old records: N/A    History: N/A    ProceduresToday: N/A    Urinalysis today:  No results found for this visit on 02/02/24.    AUA Symptom Score (2/2/2024):  INCOMPLETE EMPTYING: How often have you had the sensation of not emptying your bladder?: Almost always  FREQUENCY: How often do you have to urinate less than every two hours?: Less than Half the time  INTERMITTENCY: How often have you found you stopped and started again several times when you urinated?: More than Half the time  URGENCY: How often have you found it difficult to postpone urination?: Less than 1 to 5 times  WEAK STREAM: How often have you had a weak urinary stream?: Not at all  STRAINING: How often have you had to strain to start  urination?: Not at all  NOCTURIA: How many times did you typically get up at night to uriniate?: 3 Times  TOTAL I-PSS SCORE:: 15  How would you feel if you were to spend the rest of your life with your urinary condition?: Mixe    Last BUN andcreatinine:  Lab Results   Component Value Date    BUN 15 01/17/2024

## 2024-02-06 ENCOUNTER — OFFICE VISIT (OUTPATIENT)
Dept: GASTROENTEROLOGY | Age: 59
End: 2024-02-06
Payer: COMMERCIAL

## 2024-02-06 VITALS
HEART RATE: 60 BPM | WEIGHT: 209 LBS | BODY MASS INDEX: 40.82 KG/M2 | DIASTOLIC BLOOD PRESSURE: 85 MMHG | SYSTOLIC BLOOD PRESSURE: 151 MMHG | OXYGEN SATURATION: 96 %

## 2024-02-06 DIAGNOSIS — K44.9 HH (HIATUS HERNIA): ICD-10-CM

## 2024-02-06 DIAGNOSIS — K21.9 GASTROESOPHAGEAL REFLUX DISEASE WITHOUT ESOPHAGITIS: ICD-10-CM

## 2024-02-06 DIAGNOSIS — K58.0 IRRITABLE BOWEL SYNDROME WITH DIARRHEA: ICD-10-CM

## 2024-02-06 DIAGNOSIS — R11.14 BILIOUS VOMITING WITH NAUSEA: Primary | ICD-10-CM

## 2024-02-06 DIAGNOSIS — K76.0 FATTY LIVER: ICD-10-CM

## 2024-02-06 DIAGNOSIS — R79.89 ELEVATED LFTS: ICD-10-CM

## 2024-02-06 DIAGNOSIS — K57.90 DIVERTICULOSIS: ICD-10-CM

## 2024-02-06 PROCEDURE — 3077F SYST BP >= 140 MM HG: CPT | Performed by: INTERNAL MEDICINE

## 2024-02-06 PROCEDURE — 99214 OFFICE O/P EST MOD 30 MIN: CPT | Performed by: INTERNAL MEDICINE

## 2024-02-06 PROCEDURE — 3079F DIAST BP 80-89 MM HG: CPT | Performed by: INTERNAL MEDICINE

## 2024-02-06 ASSESSMENT — ENCOUNTER SYMPTOMS
RECTAL PAIN: 0
TROUBLE SWALLOWING: 0
ABDOMINAL DISTENTION: 1
BLOOD IN STOOL: 0
CONSTIPATION: 0
DIARRHEA: 0
ANAL BLEEDING: 0
VOMITING: 0
ABDOMINAL PAIN: 1
NAUSEA: 1

## 2024-02-06 NOTE — PROGRESS NOTES
GI CLINIC FOLLOW UP    INTERVAL HISTORY:   No referring provider defined for this encounter.    Chief Complaint   Patient presents with    Follow-up     Hospital fu Abd pain            HISTORY OF PRESENT ILLNESS: Ms.Tina BENJAMIN Starr is a 58 y.o. female , referred for evaluation of abd pain , fatty liver, n/v, diarrhea,       Here after been in the hospital with above    Was found to have elevated LFTS also   Improved now   Liver dx w/I negative : viral/auto immune         Us : fatty changes asa below  CT : negative also as below         CTA   IMPRESSION:  1. No acute abnormality of the chest, abdomen, or pelvis is identified.  2. No evidence of aortic aneurysm or dissection.  3. No focal inflammatory bowel changes are identified              Specimen Collected: 12/29/23 00:44 EST                 US   IMPRESSION:  1. No acute findings right upper quadrant with no sonographic evidence of  cholecystitis.  2. Hepatomegaly and hepatic steatosis.              Specimen Collected: 12/29/23 12:14 EST Last Resulted: 12/29/23 12:18 EST    Valdemar as an Unsuccessful Attempt           Past Medical,Family, and Social History reviewed and does contribute to the patient presentingcondition.    I did review all the labs results available for the labs which were ordered by the primary care physician, and the other consultants, we search on [employer] at [facility] and all the available care everywhere epic    I did review all the imaging studies of the abdomen available on EMR, ordered by the primary care physician and the other consultant    I did review all the pathology from the biopsies done on the previous endoscopies    Patient's PMH/PSH,SH,PSYCH Hx, MEDs, ALLERGIES, and ROS were all reviewed and updated in the appropriate sections.    PAST MEDICAL HISTORY:  Past Medical History:   Diagnosis Date    Abnormal EKG 06/18/2021    SINUS BRADYCARDIA, LEFT AXIS DEVIATION, NONSPECIFIC T WAVE ABNORMALITY    Acquired hypothyroidism 07/27/2020

## 2024-02-08 ENCOUNTER — HOSPITAL ENCOUNTER (OUTPATIENT)
Age: 59
Discharge: HOME | End: 2024-02-08
Payer: COMMERCIAL

## 2024-02-08 LAB
AFP SERPL-MCNC: <1.8 UG/L
IRON SATN MFR SERPL: 25 % (ref 20–55)
IRON SERPL-MCNC: 62 UG/DL (ref 37–145)
TIBC SERPL-MCNC: 249 UG/DL (ref 250–450)
UNSATURATED IRON BINDING CAPACITY: 187 UG/DL (ref 112–347)

## 2024-02-08 PROCEDURE — 36415 COLL VENOUS BLD VENIPUNCTURE: CPT

## 2024-02-08 PROCEDURE — 82104 ALPHA-1-ANTITRYPSIN PHENO: CPT

## 2024-02-08 PROCEDURE — 82103 ALPHA-1-ANTITRYPSIN TOTAL: CPT

## 2024-02-08 PROCEDURE — 83550 IRON BINDING TEST: CPT

## 2024-02-08 PROCEDURE — 82105 ALPHA-FETOPROTEIN SERUM: CPT

## 2024-02-08 PROCEDURE — 83540 ASSAY OF IRON: CPT

## 2024-02-12 ENCOUNTER — HOSPITAL ENCOUNTER (OUTPATIENT)
Dept: NUCLEAR MEDICINE | Age: 59
Discharge: HOME | End: 2024-02-14
Attending: INTERNAL MEDICINE
Payer: COMMERCIAL

## 2024-02-12 DIAGNOSIS — R11.14 BILIOUS VOMITING WITH NAUSEA: ICD-10-CM

## 2024-02-12 DIAGNOSIS — R79.89 ELEVATED LFTS: ICD-10-CM

## 2024-02-12 PROCEDURE — 2580000003 HC RX 258: Performed by: INTERNAL MEDICINE

## 2024-02-12 PROCEDURE — A9537 TC99M MEBROFENIN: HCPCS | Performed by: INTERNAL MEDICINE

## 2024-02-12 PROCEDURE — 78227 HEPATOBIL SYST IMAGE W/DRUG: CPT

## 2024-02-12 PROCEDURE — 3430000000 HC RX DIAGNOSTIC RADIOPHARMACEUTICAL: Performed by: INTERNAL MEDICINE

## 2024-02-12 RX ORDER — SODIUM CHLORIDE 0.9 % (FLUSH) 0.9 %
10 SYRINGE (ML) INJECTION PRN
Status: DISCONTINUED | OUTPATIENT
Start: 2024-02-12 | End: 2024-02-15 | Stop reason: HOSPADM

## 2024-02-12 RX ADMIN — Medication 5.4 MILLICURIE: at 07:37

## 2024-02-12 RX ADMIN — SODIUM CHLORIDE, PRESERVATIVE FREE 10 ML: 5 INJECTION INTRAVENOUS at 07:37

## 2024-02-13 LAB
ALPHA-1 ANTITRYPSIN PHENOTYPE: NORMAL
ALPHA-1 ANTITRYPSIN: 139 MG/DL (ref 90–200)

## 2024-02-21 ENCOUNTER — TELEPHONE (OUTPATIENT)
Dept: FAMILY MEDICINE CLINIC | Age: 59
End: 2024-02-21

## 2024-02-29 ENCOUNTER — OFFICE VISIT (OUTPATIENT)
Dept: FAMILY MEDICINE CLINIC | Age: 59
End: 2024-02-29
Payer: COMMERCIAL

## 2024-02-29 ENCOUNTER — HOSPITAL ENCOUNTER (OUTPATIENT)
Dept: PREADMISSION TESTING | Age: 59
Discharge: HOME | End: 2024-03-04

## 2024-02-29 VITALS
DIASTOLIC BLOOD PRESSURE: 80 MMHG | HEART RATE: 68 BPM | BODY MASS INDEX: 40.64 KG/M2 | HEIGHT: 60 IN | OXYGEN SATURATION: 98 % | WEIGHT: 207 LBS | SYSTOLIC BLOOD PRESSURE: 126 MMHG

## 2024-02-29 VITALS — BODY MASS INDEX: 41.03 KG/M2 | HEIGHT: 60 IN | WEIGHT: 209 LBS

## 2024-02-29 DIAGNOSIS — E78.5 HYPERLIPIDEMIA WITH TARGET LDL LESS THAN 100: ICD-10-CM

## 2024-02-29 DIAGNOSIS — M51.36 BULGE OF LUMBAR DISC WITHOUT MYELOPATHY: Primary | ICD-10-CM

## 2024-02-29 DIAGNOSIS — E55.9 VITAMIN D DEFICIENCY: ICD-10-CM

## 2024-02-29 DIAGNOSIS — E03.8 HYPOTHYROIDISM DUE TO HASHIMOTO'S THYROIDITIS: ICD-10-CM

## 2024-02-29 DIAGNOSIS — F41.1 GAD (GENERALIZED ANXIETY DISORDER): ICD-10-CM

## 2024-02-29 DIAGNOSIS — Z96.652 HISTORY OF LEFT KNEE REPLACEMENT: ICD-10-CM

## 2024-02-29 DIAGNOSIS — E06.3 HYPOTHYROIDISM DUE TO HASHIMOTO'S THYROIDITIS: ICD-10-CM

## 2024-02-29 DIAGNOSIS — M17.11 PRIMARY OSTEOARTHRITIS OF RIGHT KNEE: ICD-10-CM

## 2024-02-29 PROBLEM — R11.2 NAUSEA AND VOMITING: Status: RESOLVED | Noted: 2023-12-29 | Resolved: 2024-02-29

## 2024-02-29 PROBLEM — R10.9 ABDOMINAL PAIN: Status: RESOLVED | Noted: 2023-12-29 | Resolved: 2024-02-29

## 2024-02-29 PROBLEM — R10.11 ABDOMINAL PAIN, RIGHT UPPER QUADRANT: Status: RESOLVED | Noted: 2023-12-29 | Resolved: 2024-02-29

## 2024-02-29 PROBLEM — R10.9 INTRACTABLE ABDOMINAL PAIN: Status: RESOLVED | Noted: 2023-12-29 | Resolved: 2024-02-29

## 2024-02-29 PROCEDURE — 3079F DIAST BP 80-89 MM HG: CPT | Performed by: FAMILY MEDICINE

## 2024-02-29 PROCEDURE — 99214 OFFICE O/P EST MOD 30 MIN: CPT | Performed by: FAMILY MEDICINE

## 2024-02-29 PROCEDURE — 3074F SYST BP LT 130 MM HG: CPT | Performed by: FAMILY MEDICINE

## 2024-02-29 SDOH — ECONOMIC STABILITY: FOOD INSECURITY: WITHIN THE PAST 12 MONTHS, THE FOOD YOU BOUGHT JUST DIDN'T LAST AND YOU DIDN'T HAVE MONEY TO GET MORE.: NEVER TRUE

## 2024-02-29 SDOH — ECONOMIC STABILITY: FOOD INSECURITY: WITHIN THE PAST 12 MONTHS, YOU WORRIED THAT YOUR FOOD WOULD RUN OUT BEFORE YOU GOT MONEY TO BUY MORE.: NEVER TRUE

## 2024-02-29 SDOH — ECONOMIC STABILITY: INCOME INSECURITY: HOW HARD IS IT FOR YOU TO PAY FOR THE VERY BASICS LIKE FOOD, HOUSING, MEDICAL CARE, AND HEATING?: NOT HARD AT ALL

## 2024-02-29 ASSESSMENT — PATIENT HEALTH QUESTIONNAIRE - PHQ9
9. THOUGHTS THAT YOU WOULD BE BETTER OFF DEAD, OR OF HURTING YOURSELF: 0
5. POOR APPETITE OR OVEREATING: 0
SUM OF ALL RESPONSES TO PHQ QUESTIONS 1-9: 0
7. TROUBLE CONCENTRATING ON THINGS, SUCH AS READING THE NEWSPAPER OR WATCHING TELEVISION: 0
2. FEELING DOWN, DEPRESSED OR HOPELESS: 0
SUM OF ALL RESPONSES TO PHQ QUESTIONS 1-9: 0
SUM OF ALL RESPONSES TO PHQ QUESTIONS 1-9: 0
3. TROUBLE FALLING OR STAYING ASLEEP: 0
1. LITTLE INTEREST OR PLEASURE IN DOING THINGS: 0
SUM OF ALL RESPONSES TO PHQ QUESTIONS 1-9: 0
4. FEELING TIRED OR HAVING LITTLE ENERGY: 0
8. MOVING OR SPEAKING SO SLOWLY THAT OTHER PEOPLE COULD HAVE NOTICED. OR THE OPPOSITE, BEING SO FIGETY OR RESTLESS THAT YOU HAVE BEEN MOVING AROUND A LOT MORE THAN USUAL: 0
10. IF YOU CHECKED OFF ANY PROBLEMS, HOW DIFFICULT HAVE THESE PROBLEMS MADE IT FOR YOU TO DO YOUR WORK, TAKE CARE OF THINGS AT HOME, OR GET ALONG WITH OTHER PEOPLE: 0
6. FEELING BAD ABOUT YOURSELF - OR THAT YOU ARE A FAILURE OR HAVE LET YOURSELF OR YOUR FAMILY DOWN: 0
SUM OF ALL RESPONSES TO PHQ9 QUESTIONS 1 & 2: 0

## 2024-02-29 ASSESSMENT — ENCOUNTER SYMPTOMS
TROUBLE SWALLOWING: 0
RHINORRHEA: 0
RECTAL PAIN: 0
STRIDOR: 0
CONSTIPATION: 0
SHORTNESS OF BREATH: 0
EYE REDNESS: 0
NAUSEA: 0
CHEST TIGHTNESS: 0
ABDOMINAL DISTENTION: 0
BLOOD IN STOOL: 0
COLOR CHANGE: 0
VOMITING: 0
DIARRHEA: 0
WHEEZING: 0
SORE THROAT: 0
COUGH: 0
BACK PAIN: 1
SINUS PRESSURE: 0

## 2024-02-29 NOTE — PATIENT INSTRUCTIONS
Dear valued patient,    We hope this message finds you in good health. At [ ], we are committed to providing you with the best possible healthcare experience. To further enhance your convenience and streamline your access to our services, we would like to introduce you to the benefits of utilizing direct scheduling through the [email or web address] Patient Portal.    Direct scheduling is a feature within the [email or web address] Patient Portal that allows you to schedule appointments with your healthcare provider directly, without the need to make a phone call or wait for a callback. We understand that your time is monica, and we want to ensure that you have quick and easy access to our services whenever you need them.  Here are some reasons why you should consider utilizing direct scheduling through the [email or web address] Patient Portal:    1. Convenience: With direct scheduling, you can book appointments at any time that suits you best, 24 hours a day, 7 days a week. No more waiting on hold or playing phone tag with our office staff. It puts you in control of managing your healthcare appointments effortlessly.    2. Accessibility: The [email or web address] Patient Portal is accessible through your computer, smartphone, or tablet, allowing you to schedule appointments from the comfort of your home, office, or on the go. You can access your medical records, review test results, and communicate with your healthcare provider all in one secure and user-friendly platform.    3. Timesaving: By utilizing direct scheduling, you can avoid unnecessary delays and save monica time. You can easily browse the available appointment slots and select the one that works best for you, eliminating the back-and-forth communication typically required when scheduling via phone.    4. Reminders and notifications: [email or web address] Patient Portal sends automatic reminders for upcoming appointments, ensuring that you never miss an important visit. You can also receive notifications about test

## 2024-02-29 NOTE — PROGRESS NOTES
Visit Information    Have you changed or started any medications since your last visit including any over-the-counter medicines, vitamins, or herbal medicines? no   Are you having any side effects from any of your medications? -  no  Have you stopped taking any of your medications? Is so, why? -  no    Have you seen any other physician or provider since your last visit? No  Have you had any other diagnostic tests since your last visit? No  Have you been seen in the emergency room and/or had an admission to a hospital since we last saw you? No  Have you had your routine dental cleaning in the past 6 months?     Have you activated your [employer] account? If not, what are your barriers? Yes     Patient Care Team:  Leslie Herron MD as PCP - General (Family Medicine)  Leslie Herron MD as PCP - Empaneled Provider  Mercy Crews RN as Registered Nurse  Renan Grant MD as Consulting Physician (Orthopedic Surgery)  Jasper Rehman MD as Consulting Physician (Urology)  Vamshi Maldonado MD as Consulting Physician (Gastroenterology)    Medical History Review  Past Medical, Family, and Social History reviewed and does contribute to the patient presenting condition    Health Maintenance   Topic Date Due    Hepatitis B vaccine (1 of 3 - 3-dose series) Never done    Shingles vaccine (1 of 2) Never done    Breast cancer screen  06/12/2022    COVID-19 Vaccine (3 - 2023-24 season) 09/01/2023    Flu vaccine (1) 06/30/2024 (Originally 8/1/2023)    A1C test (Diabetic or Prediabetic)  11/30/2024    Depression Monitoring  01/17/2025    Colorectal Cancer Screen  09/18/2028    Lipids  11/30/2028    DTaP/Tdap/Td vaccine (2 - Td or Tdap) 01/19/2031    Hepatitis C screen  Completed    HIV screen  Completed    Hepatitis A vaccine  Aged Out    Hib vaccine  Aged Out    Polio vaccine  Aged Out    Meningococcal (ACWY) vaccine  Aged Out    Pneumococcal 0-64 years Vaccine  Aged Out    Diabetes screen  Discontinued    Cervical

## 2024-02-29 NOTE — PROGRESS NOTES
Chief Complaint   Patient presents with    Hypertension    Forms     Fmla          Petra Starr  here today for follow up on chronic medical problems, go over labs and/or diagnostic studies, and medication refills. Hypertension and Forms (Fmla )      HPI: Patient is scheduled to fill out to FMLA papers.    Patient reports she has to fill out FMLA papers and she has failed that in the past also.  Patient reports her job was not asking for FMLA but recently they want her to renew FMLA every 6 months.  Last FMLA was 2021.  Patient reports she has that FMLA and she was using that till now.  Patient reports she has history of lumbar degenerative disc disease, knee arthritis, and multiple joint arthritis.  Patient has left knee replacement done and reports is planning to have right knee osteoarthritis and is planning for replacement.    Patient used to get 1 day in a month off on previous FMLA papers.      Patient reports she has been taking care of her mom, and Dr. Herron  is her PCP as well.  She wants to fill out the FMLA papers for her also.  Patient reports she misses work because she is taking care of her.  She is a dementia and is bedridden.  She has to take her to the appointments and she gets sick more often.      Hypothyroidism stable, patient is currently on Synthroid 25 mcg up-to-date on blood work.      Generalized anxiety disorder stable on Prozac.  Patient denies any recent flareups.        /80   Pulse 68   Ht 1.524 m (5')   Wt 93.9 kg (207 lb)   SpO2 98%   BMI 40.43 kg/m²    Body mass index is 40.43 kg/m².  Wt Readings from Last 3 Encounters:   02/29/24 93.9 kg (207 lb)   02/29/24 94.8 kg (209 lb)   02/06/24 94.8 kg (209 lb)        [x]Negative depression screening.      2/29/2024     2:12 PM 1/17/2024    11:24 AM 11/29/2023     3:00 PM 3/16/2022     3:06 PM 3/7/2022     9:23 AM 12/23/2021     2:46 PM 9/14/2021    10:56 AM   PHQ Scores   PHQ2 Score 0 0 2 2 6 4 0   PHQ9 Score 0 7 6 2 22 19 0

## 2024-02-29 NOTE — PROGRESS NOTES
Pre-op Instructions For Out-Patient Endoscopy Surgery    Medication Instructions:  Please stop herbs and any supplements now (includes vitamins and minerals).    Please contact your surgeon and prescribing physician for pre-op instructions for any blood thinners.    If you have inhalers/aerosol treatments at home, please use them the morning of your surgery and bring the inhalers with you to the hospital.    Please take the following medications the morning of your surgery with a sip of water:    Amlodipine, Levothyroxine    Surgery Instructions:  After midnight before surgery:  Do not eat or drink anything, including water, mints, gum, and hard candy.  You may brush your teeth without swallowing.  No smoking, chewing tobacco, or street drugs.    Please shower or bathe before surgery.       Please do not wear any cologne, lotion, powder, jewelry, piercings, perfume, makeup, nail polish, hair accessories, or hair spray on the day of surgery.  Wear loose comfortable clothing.    Leave your valuables at home but bring a payment source for any after-surgery prescriptions you plan to fill at [facility].  Bring a storage case for any glasses/contacts.    An adult who is responsible for you MUST drive you home and should be with you for the first 24 hours after surgery.     The Day of Surgery:  Arrive at [facility] Surgery Entrance at the time directed by your surgeon and check in at the desk.     If you have a living will or healthcare power of , please bring a copy.    You will be taken to the pre-op holding area where you will be prepared for surgery.  A physical assessment will be performed by a nurse practitioner or house officer.  Your IV will be started and you will meet your anesthesiologist.    When you go to surgery, your family will be directed to the surgical waiting room, where the doctor should speak with them after your surgery.    After surgery, you will be taken to the

## 2024-03-05 NOTE — PRE-PROCEDURE INSTRUCTIONS
No answer, left message ?                             Unable to leave message ?    When were you told to arrive at hospital ? -0845     Do you have a  ?-YES    Are you on any blood thinners ?  -NONE                   If yes when did you stop taking ?    Do you have your prep Rx filled and instruction ? -N/A     Nothing to eat the day before , only clear liquids.-N/A    Are you experiencing any covid symptoms ? -NONE    Do you have any infections or rash we should be aware of ?-NONE      Do you have the Hibiclens soap to use the night before and the morning of surgery ?-N/A    Nothing to eat or drink after midnight, only a sip of water to take any medication instructed to take the night before.-INSTRUCTED    Wear comfortable clothing, leave any valuables at home, remove any jewelry and body piercing .-INSTRUCTED

## 2024-03-06 ENCOUNTER — ANESTHESIA EVENT (OUTPATIENT)
Dept: ENDOSCOPY | Age: 59
End: 2024-03-06
Payer: COMMERCIAL

## 2024-03-07 ENCOUNTER — HOSPITAL ENCOUNTER (OUTPATIENT)
Age: 59
Setting detail: OUTPATIENT SURGERY
Discharge: HOME | End: 2024-03-07
Attending: INTERNAL MEDICINE | Admitting: INTERNAL MEDICINE
Payer: COMMERCIAL

## 2024-03-07 ENCOUNTER — ANESTHESIA (OUTPATIENT)
Dept: ENDOSCOPY | Age: 59
End: 2024-03-07
Payer: COMMERCIAL

## 2024-03-07 VITALS
RESPIRATION RATE: 18 BRPM | SYSTOLIC BLOOD PRESSURE: 150 MMHG | DIASTOLIC BLOOD PRESSURE: 82 MMHG | OXYGEN SATURATION: 95 % | WEIGHT: 207 LBS | HEART RATE: 60 BPM | TEMPERATURE: 98.1 F | HEIGHT: 60 IN | BODY MASS INDEX: 40.64 KG/M2

## 2024-03-07 DIAGNOSIS — R11.14 BILIOUS VOMITING WITH NAUSEA: ICD-10-CM

## 2024-03-07 LAB
GLUCOSE BLD-MCNC: 79 MG/DL (ref 65–105)
GLUCOSE BLD-MCNC: 88 MG/DL (ref 65–105)

## 2024-03-07 PROCEDURE — 2580000003 HC RX 258: Performed by: ANESTHESIOLOGY

## 2024-03-07 PROCEDURE — 3609012400 HC EGD TRANSORAL BIOPSY SINGLE/MULTIPLE: Performed by: INTERNAL MEDICINE

## 2024-03-07 PROCEDURE — 7100000010 HC PHASE II RECOVERY - FIRST 15 MIN: Performed by: INTERNAL MEDICINE

## 2024-03-07 PROCEDURE — 6360000002 HC RX W HCPCS: Performed by: NURSE ANESTHETIST, CERTIFIED REGISTERED

## 2024-03-07 PROCEDURE — 88305 TISSUE EXAM BY PATHOLOGIST: CPT

## 2024-03-07 PROCEDURE — 3700000000 HC ANESTHESIA ATTENDED CARE: Performed by: INTERNAL MEDICINE

## 2024-03-07 PROCEDURE — 43239 EGD BIOPSY SINGLE/MULTIPLE: CPT | Performed by: INTERNAL MEDICINE

## 2024-03-07 PROCEDURE — 3700000001 HC ADD 15 MINUTES (ANESTHESIA): Performed by: INTERNAL MEDICINE

## 2024-03-07 PROCEDURE — 82947 ASSAY GLUCOSE BLOOD QUANT: CPT

## 2024-03-07 PROCEDURE — 2709999900 HC NON-CHARGEABLE SUPPLY: Performed by: INTERNAL MEDICINE

## 2024-03-07 PROCEDURE — 2500000003 HC RX 250 WO HCPCS: Performed by: NURSE ANESTHETIST, CERTIFIED REGISTERED

## 2024-03-07 PROCEDURE — 7100000011 HC PHASE II RECOVERY - ADDTL 15 MIN: Performed by: INTERNAL MEDICINE

## 2024-03-07 RX ORDER — SODIUM CHLORIDE, SODIUM LACTATE, POTASSIUM CHLORIDE, CALCIUM CHLORIDE 600; 310; 30; 20 MG/100ML; MG/100ML; MG/100ML; MG/100ML
INJECTION, SOLUTION INTRAVENOUS CONTINUOUS
Status: DISCONTINUED | OUTPATIENT
Start: 2024-03-07 | End: 2024-03-07 | Stop reason: HOSPADM

## 2024-03-07 RX ORDER — LIDOCAINE HYDROCHLORIDE 10 MG/ML
1 INJECTION, SOLUTION EPIDURAL; INFILTRATION; INTRACAUDAL; PERINEURAL
Status: DISCONTINUED | OUTPATIENT
Start: 2024-03-07 | End: 2024-03-07 | Stop reason: HOSPADM

## 2024-03-07 RX ORDER — SODIUM CHLORIDE 0.9 % (FLUSH) 0.9 %
5-40 SYRINGE (ML) INJECTION EVERY 12 HOURS SCHEDULED
Status: DISCONTINUED | OUTPATIENT
Start: 2024-03-07 | End: 2024-03-07 | Stop reason: HOSPADM

## 2024-03-07 RX ORDER — LIDOCAINE HYDROCHLORIDE 20 MG/ML
INJECTION, SOLUTION INFILTRATION; PERINEURAL PRN
Status: DISCONTINUED | OUTPATIENT
Start: 2024-03-07 | End: 2024-03-07 | Stop reason: SDUPTHER

## 2024-03-07 RX ORDER — SODIUM CHLORIDE 9 MG/ML
INJECTION, SOLUTION INTRAVENOUS PRN
Status: DISCONTINUED | OUTPATIENT
Start: 2024-03-07 | End: 2024-03-07 | Stop reason: HOSPADM

## 2024-03-07 RX ORDER — PROPOFOL 10 MG/ML
INJECTION, EMULSION INTRAVENOUS PRN
Status: DISCONTINUED | OUTPATIENT
Start: 2024-03-07 | End: 2024-03-07 | Stop reason: SDUPTHER

## 2024-03-07 RX ORDER — SODIUM CHLORIDE 0.9 % (FLUSH) 0.9 %
5-40 SYRINGE (ML) INJECTION PRN
Status: DISCONTINUED | OUTPATIENT
Start: 2024-03-07 | End: 2024-03-07 | Stop reason: HOSPADM

## 2024-03-07 RX ADMIN — PROPOFOL 20 MG: 10 INJECTION, EMULSION INTRAVENOUS at 10:48

## 2024-03-07 RX ADMIN — PROPOFOL 20 MG: 10 INJECTION, EMULSION INTRAVENOUS at 10:54

## 2024-03-07 RX ADMIN — PROPOFOL 50 MG: 10 INJECTION, EMULSION INTRAVENOUS at 10:45

## 2024-03-07 RX ADMIN — PROPOFOL 20 MG: 10 INJECTION, EMULSION INTRAVENOUS at 10:51

## 2024-03-07 RX ADMIN — LIDOCAINE HYDROCHLORIDE 100 MG: 20 INJECTION, SOLUTION INFILTRATION; PERINEURAL at 10:44

## 2024-03-07 RX ADMIN — PROPOFOL 20 MG: 10 INJECTION, EMULSION INTRAVENOUS at 10:49

## 2024-03-07 RX ADMIN — PROPOFOL 30 MG: 10 INJECTION, EMULSION INTRAVENOUS at 10:47

## 2024-03-07 RX ADMIN — SODIUM CHLORIDE, POTASSIUM CHLORIDE, SODIUM LACTATE AND CALCIUM CHLORIDE: 600; 310; 30; 20 INJECTION, SOLUTION INTRAVENOUS at 09:39

## 2024-03-07 RX ADMIN — PROPOFOL 40 MG: 10 INJECTION, EMULSION INTRAVENOUS at 10:50

## 2024-03-07 RX ADMIN — PROPOFOL 20 MG: 10 INJECTION, EMULSION INTRAVENOUS at 10:52

## 2024-03-07 RX ADMIN — PROPOFOL 50 MG: 10 INJECTION, EMULSION INTRAVENOUS at 10:44

## 2024-03-07 RX ADMIN — PROPOFOL 30 MG: 10 INJECTION, EMULSION INTRAVENOUS at 10:46

## 2024-03-07 ASSESSMENT — ENCOUNTER SYMPTOMS
COUGH: 0
SHORTNESS OF BREATH: 0
BACK PAIN: 1
SORE THROAT: 0
GASTROINTESTINAL NEGATIVE: 1

## 2024-03-07 ASSESSMENT — PAIN - FUNCTIONAL ASSESSMENT: PAIN_FUNCTIONAL_ASSESSMENT: 0-10

## 2024-03-07 NOTE — ANESTHESIA PRE PROCEDURE
Department of Anesthesiology  Preprocedure Note       Name:  Petra Starr   Age:  58 y.o.  :  1965                                          MRN:  [id number]         Date:  3/7/2024      Surgeon: Surgeon(s):  Vamshi Maldonado MD    Procedure: Procedure(s):  ESOPHAGOGASTRODUODENOSCOPY BIOPSY    Medications prior to admission:   Prior to Admission medications    Medication Sig Start Date End Date Taking? Authorizing Provider   solifenacin (VESICARE) 5 MG tablet Take 1 tablet by mouth daily 24   Jasper Rehman MD   levothyroxine (SYNTHROID) 75 MCG tablet Take 1 tablet by mouth every morning (before breakfast) 23   Leslie Herron MD   famotidine (PEPCID) 20 MG tablet Take 1 tablet by mouth 2 times daily Replacing Omeprazole 23   Leslie Herron MD   FLUoxetine (PROZAC) 10 MG capsule Take 1 capsule by mouth every morning With food. Call for refills or dose adjustment. 23   Leslie Herron MD   amLODIPine (NORVASC) 5 MG tablet Take 1 tablet by mouth daily 10/25/22   Leslie Herron MD       Current medications:    No current facility-administered medications for this encounter.       Allergies:    Allergies   Allergen Reactions   • Metformin      Sick to stomach    • Metformin And Related      nausea and vomiting , severe stomach pain       Problem List:    Patient Active Problem List   Diagnosis Code   • Chronic pain of both knees M25.561, M25.562, G89.29   • Chronic back pain greater than 3 months duration M54.9, G89.29   • Gastroesophageal reflux disease without esophagitis K21.9   • FANTA (generalized anxiety disorder) F41.1   • Bulge of lumbar disc without myelopathy M51.36   • Diarrhea R19.7   • Vitamin D deficiency E55.9   • Morbid obesity with BMI of 40.0-44.9, adult (HCC) E66.01, Z68.41   • Hyperlipidemia with target LDL less than 100 E78.5   • Chronic fatigue R53.82   • OAB (overactive bladder) N32.81   • Insomnia due to medical condition G47.01   • Mild episode of

## 2024-03-07 NOTE — DISCHARGE INSTRUCTIONS
EGD DISCHARGE INSTRUCTIONS    Activity:  Rest today. No driving, operating machinery, or making any important decisions today. May resume normal activity tomorrow.    Diet:  Following EGD eat slowly, chew food well, cut food into small pieces-Avoid greasy, spicy, \"crunchy\" foods X 48 hours.     Call your Doctor if you have any of the following:  -Passing blood rectally or vomiting blood (it may be red or black).  -Persistent nausea/vomiting  -Severe abdominal or chest pain not relieved with passing gas  -Fever of 100 degrees or more  -Redness or swelling at the IV site  -SEVERE CHEST PAIN OR SHORTNESS OF BREATH-CALL 911  -Severe sore throat or neck pain    Sedation or General Anesthesia, Adult  Care After  Refer to this sheet in the next 24 hours. These instructions provide you with information on caring for yourself after your procedure. Your caregiver may also give you more specific instructions. Your treatment has been planned according to current medical practices, but problems sometimes occur. Call your caregiver if you have any problems or questions after your procedure.   HOME CARE INSTRUCTIONS   Do not participate in any activities that require you to be alert or coordinated. Do not:  Drive.  Swim.  Ride a bicycle.  Operate heavy machinery.  Cook.  Use power tools.  Climb ladders.  Work at heights.  Take a bath.  Do not drink alcohol.  Do not make any important decisions or sign legal documents.  Stay with an adult.  The first meal following your procedure should be light and small. Avoid solid foods if you feel sick to your stomach (nauseous) or if you throw up (vomit).  Drink enough fluids to keep your urine clear or pale yellow.  Only take your usual medicines or new medicines if your caregiver approves them.  Only take over-the-counter or prescription medicines for pain, discomfort, or fever as directed by your caregiver.  Keep all follow-up appointments as directed by your caregiver.  SEEK IMMEDIATE  You need to take the full course of antibiotics.  Be safe with medicines. If your doctor prescribed medicine to decrease stomach acid, take it as directed. Call your doctor if you think you are having a problem with your medicine.  Do not take any other medicine, including over-the-counter pain relievers, without talking to your doctor first.  If your doctor recommends over-the-counter medicine to reduce stomach acid, such as Pepcid AC (famotidine) or Prilosec (omeprazole), follow the directions on the label.  Drink plenty of fluids to prevent dehydration. Choose water and other clear liquids. If you have kidney, heart, or liver disease and have to limit fluids, talk with your doctor before you increase the amount of fluids you drink.  Avoid foods that make your symptoms worse. These may include chocolate, mint, alcohol, pepper, spicy foods, high-fat foods, or drinks with caffeine in them, such as tea, coffee, elder, or energy drinks. If your symptoms are worse after you eat a certain food, you may want to stop eating it to see if your symptoms get better.  When should you call for help?   Call 911 anytime you think you may need emergency care. For example, call if:    You passed out (lost consciousness).     You have severe belly pain.     You vomit blood or what looks like coffee grounds.     Your stools are maroon or very bloody.   Call your doctor now or seek immediate medical care if:    You are dizzy or lightheaded, or feel like you may faint.     You have trouble breathing or are breathing faster and passing only a little urine.     You have new or worse belly pain.     You have a new or higher fever.     You have signs of dehydration, such as:  Dry eyes and a dry mouth.  Passing only a little urine.  Feeling thirstier than usual.     You have nausea or vomiting and can't keep fluids down.     You cannot pass stools or gas.     You have new or more blood in your stools or your stools are black and tarlike.

## 2024-03-07 NOTE — ANESTHESIA POSTPROCEDURE EVALUATION
Department of Anesthesiology  Postprocedure Note    Patient: Petra Starr  MRN: [id number]  YOB: 1965  Date of evaluation: 3/7/2024    Procedure Summary       Date: 03/07/24 Room / Location: [place] [address] / [facility]    Anesthesia Start: 1032 Anesthesia Stop: 1059    Procedure: ESOPHAGOGASTRODUODENOSCOPY BIOPSY (Esophagus) Diagnosis:       Bilious vomiting with nausea      (Bilious vomiting with nausea [R11.14])    Surgeons: Vamshi Maldonado MD Responsible Provider: Julien Grimes MD    Anesthesia Type: general ASA Status: 3            Anesthesia Type: No value filed.    Neftali Phase I: Neftali Score: 10    Neftali Phase II: Neftali Score: 10    Anesthesia Post Evaluation    Comments: POST- ANESTHESIA EVALUATION       Pt Name: Petra Starr  MRN: [id number]  YOB: 1965  Date of evaluation: 3/7/2024  Time:  2:56 PM      BP (!) 150/82   Pulse 60   Temp 98.1 °F (36.7 °C)   Resp 18   Ht 1.524 m (5')   Wt 93.9 kg (207 lb)   SpO2 95%   BMI 40.43 kg/m²      Consciousness Level  Awake  Cardiopulmonary Status  Stable  Pain Adequately Treated YES  Nausea / Vomiting  NO  Adequate Hydration  YES  Anesthesia Related Complications NONE      Electronically signed by Julien Grimes MD on 3/7/2024 at 2:56 PM           No notable events documented.

## 2024-03-07 NOTE — H&P
HISTORY and PHYSICAL  [facility]       NAME:  Petra Starr  MRN: [id number]   YOB: 1965   Date: 3/7/2024   Age: 58 y.o.  Gender: female       COMPLAINT AND PRESENT HISTORY:     Petra Starr is 58 y.o.,  female, presents for ESOPHAGOGASTRODUODENOSCOPY BIOPSY   Primary dx: Bilious vomiting with nausea [R11.14].    Office visit note per Dr Maldonado on 2/6/2024  HISTORY OF PRESENT ILLNESS: Ms.Tina BENJAMIN Starr is a 58 y.o. female , referred for evaluation of abd pain , fatty liver, n/v, diarrhea,      Here after been in the hospital with above    Was found to have elevated LFTS also   Improved now   Liver dx w/I negative : viral/auto immune   Us : fatty changes asa below  CT : negative also as below   CTA   IMPRESSION:  1. No acute abnormality of the chest, abdomen, or pelvis is identified.  2. No evidence of aortic aneurysm or dissection.  3. No focal inflammatory bowel changes are identified   Specimen Collected: 12/29/23 00:44 EST  US   IMPRESSION:  1. No acute findings right upper quadrant with no sonographic evidence of  cholecystitis.  2. Hepatomegaly and hepatic steatosis.     UPDATE HPI 3/7/2024    Petra Starr is 58 y.o.,  female, undergoing for EGD.     Patient has had previous endoscopies done 2017.       Patient denies any FH of Esophogeal Cancer.     Patient complains of frequent heartburn.  Pt has hx of GERD and is taking Pepcid.      Patient denies epigastric pains.  Pt reports occasional nausea and vomiting .   Pt admits to frequent diarrhea.  Denies constipation, no changes in the color, caliber or consistency of the stools.  No fever or chills. Pt denies recent or current chest pain/pressure, palpitations, SOB, headaches, dizziness, lower extremity edema.       Review of additional significant medical hx: Bradycardia, HTN, HLD,  Pt denies reported cardiac catheterization in history.  Associated medications  Norvasc  Follows with PCP for HTN.  Last appt 2/29/2024 (  Food in the Last Year: Never true     Ran Out of Food in the Last Year: Never true   Transportation Needs: No Transportation Needs (2/29/2024)    PRAPARE - Transportation     Lack of Transportation (Medical): No     Lack of Transportation (Non-Medical): No   Housing Stability: Low Risk  (2/29/2024)    Housing Stability Vital Sign     Unable to Pay for Housing in the Last Year: No     Number of Places Lived in the Last Year: 1     Unstable Housing in the Last Year: No           REVIEW OF SYSTEMS      Allergies   Allergen Reactions    Metformin      Sick to stomach     Metformin And Related      nausea and vomiting , severe stomach pain       No current facility-administered medications on file prior to encounter.     Current Outpatient Medications on File Prior to Encounter   Medication Sig Dispense Refill    solifenacin (VESICARE) 5 MG tablet Take 1 tablet by mouth daily 90 tablet 3    levothyroxine (SYNTHROID) 75 MCG tablet Take 1 tablet by mouth every morning (before breakfast) 90 tablet 3    famotidine (PEPCID) 20 MG tablet Take 1 tablet by mouth 2 times daily Replacing Omeprazole 180 tablet 1    FLUoxetine (PROZAC) 10 MG capsule Take 1 capsule by mouth every morning With food. Call for refills or dose adjustment. 30 capsule 3    amLODIPine (NORVASC) 5 MG tablet Take 1 tablet by mouth daily 90 tablet 3       Review of Systems   Constitutional:  Positive for appetite change. Negative for chills and fever.   HENT:  Negative for dental problem, ear pain, hearing loss and sore throat.    Eyes:  Positive for visual disturbance (wearing glasses).   Respiratory:  Negative for cough and shortness of breath.    Cardiovascular:  Negative for chest pain, palpitations and leg swelling.   Gastrointestinal: Negative.         See HPI   Genitourinary:  Negative for dysuria and hematuria.   Musculoskeletal:  Positive for back pain (chronic lower back pain). Negative for arthralgias and neck pain.   Skin: Negative.

## 2024-03-07 NOTE — OP NOTE
PROCEDURE NOTE    DATE OF PROCEDURE: 3/7/2024     SURGEON: Vamshi Maldonado MD  Facility: \"[facility]  ASSISTANT: None  Anesthesia: MAC  PREOPERATIVE DIAGNOSIS:   Bilious vomiting    Diagnosis:  Irregular Z-line with edema and almost polypoidal protrusion most likely just from inflammation biopsies were taken    Gastritis biopsies were taken  Gastric polyps 3 samples 2 of them too many to remove most likely fundic gland polyps they are in the body and the fundus of the stomach  Random small bowel biopsies were taken      POSTOPERATIVE DIAGNOSIS: As described below    OPERATION: Upper GI endoscopy with Biopsy    ANESTHESIA: Moderate Sedation     ESTIMATED BLOOD LOSS: Less than 50 ml    COMPLICATIONS: None.     SPECIMENS:  Was Obtained:     As above     HISTORY: The patient is a 58 y.o. year old female with history of above preop diagnosis.  I recommended esophagogastroduodenoscopy with possible biopsy and I explained the risk, benefits, expected outcome, and alternatives to the procedure.  Risks included but are not limited to bleeding, infection, respiratory distress, hypotension, and perforation of the esophagus, stomach, or duodenum.  Patient understands and is in agreement.      The patient was counseled at length about the risks of ana Covid-19 during their perioperative period and any recovery window from their procedure.  The patient was made aware that ana Covid-19  may worsen their prognosis for recovering from their procedure  and lend to a higher morbidity and/or mortality risk.  All material risks, benefits, and reasonable alternatives including postponing the procedure were discussed. The patient does wish to proceed with the procedure at this time.         PROCEDURE: The patient was given IV conscious sedation.  The patient's SPO2 remained above 90% throughout the procedure.The gastroscope was inserted orally and advanced under direct vision through the esophagus, through the  stomach, through the pylorus, and into the descending duodenum.      Post sedation note :The patient's SPO2 remained above 90% throughout the procedure.the vital signs remained stable , and no immediate complication form the procedure noted, patient will be ready for d/c when criteria is met .      Findings:    Retropharyngeal area was grossly normal appearing    Esophagus: abnormal: Irregular Z-line with edema and almost polypoidal protrusion most likely just from inflammation biopsies were taken    Stomach:    Gastritis biopsies were taken  Gastric polyps 3 samples 2 of them too many to remove most likely fundic gland polyps they are in the body and the fundus of the stomach      Duodenum:     Descending: normal    Bulb: normal  Random small bowel biopsies were taken      The scope was removed and the patient tolerated the procedure well.     Recommendations/Plan:   F/U Biopsies  F/U In Office in 8-12 weeks   Discussed with the family    Electronically signed by Vamshi Maldonado MD  on 3/7/2024 at 10:55 AM

## 2024-03-11 LAB — SURGICAL PATHOLOGY REPORT: NORMAL

## 2024-03-11 NOTE — RESULT ENCOUNTER NOTE
Management per GI  Benign     Future Appointments  3/28/2024  2:15 PM    Vamshi Maldonado MD           [facility]           [facility]  5/14/2024  1:20 PM    Jasper Rehman MD       .  [facility]           [facility]  5/15/2024  1:30 PM    Leslie Herron MD    [facility]

## 2024-03-28 ENCOUNTER — OFFICE VISIT (OUTPATIENT)
Dept: GASTROENTEROLOGY | Age: 59
End: 2024-03-28
Payer: COMMERCIAL

## 2024-03-28 VITALS
SYSTOLIC BLOOD PRESSURE: 133 MMHG | DIASTOLIC BLOOD PRESSURE: 88 MMHG | WEIGHT: 202 LBS | BODY MASS INDEX: 39.45 KG/M2 | OXYGEN SATURATION: 97 % | HEART RATE: 62 BPM

## 2024-03-28 DIAGNOSIS — R11.14 BILIOUS VOMITING WITH NAUSEA: ICD-10-CM

## 2024-03-28 DIAGNOSIS — K58.0 IRRITABLE BOWEL SYNDROME WITH DIARRHEA: ICD-10-CM

## 2024-03-28 DIAGNOSIS — Z86.010 HISTORY OF COLON POLYPS: ICD-10-CM

## 2024-03-28 DIAGNOSIS — K76.0 FATTY LIVER: ICD-10-CM

## 2024-03-28 DIAGNOSIS — K57.90 DIVERTICULOSIS: ICD-10-CM

## 2024-03-28 DIAGNOSIS — R10.11 ABDOMINAL PAIN, RIGHT UPPER QUADRANT: Primary | ICD-10-CM

## 2024-03-28 DIAGNOSIS — K21.9 GASTROESOPHAGEAL REFLUX DISEASE WITHOUT ESOPHAGITIS: ICD-10-CM

## 2024-03-28 PROCEDURE — 3075F SYST BP GE 130 - 139MM HG: CPT | Performed by: INTERNAL MEDICINE

## 2024-03-28 PROCEDURE — 99214 OFFICE O/P EST MOD 30 MIN: CPT | Performed by: INTERNAL MEDICINE

## 2024-03-28 PROCEDURE — 3079F DIAST BP 80-89 MM HG: CPT | Performed by: INTERNAL MEDICINE

## 2024-03-28 RX ORDER — METRONIDAZOLE 500 MG/1
500 TABLET ORAL 3 TIMES DAILY
Qty: 30 TABLET | Refills: 0 | Status: SHIPPED | OUTPATIENT
Start: 2024-03-28 | End: 2024-04-07

## 2024-03-28 RX ORDER — CEPHALEXIN 500 MG/1
500 CAPSULE ORAL 3 TIMES DAILY
Qty: 30 CAPSULE | Refills: 0 | Status: SHIPPED | OUTPATIENT
Start: 2024-03-28 | End: 2024-04-07

## 2024-03-28 RX ORDER — DICYCLOMINE HYDROCHLORIDE 10 MG/1
10 CAPSULE ORAL
Qty: 360 CAPSULE | Refills: 0 | Status: SHIPPED | OUTPATIENT
Start: 2024-03-28

## 2024-03-28 ASSESSMENT — ENCOUNTER SYMPTOMS
VOMITING: 0
BLOOD IN STOOL: 0
ABDOMINAL PAIN: 1
CONSTIPATION: 0
ANAL BLEEDING: 0
DIARRHEA: 0
ABDOMINAL DISTENTION: 0
NAUSEA: 1
RECTAL PAIN: 0
TROUBLE SWALLOWING: 0

## 2024-03-28 NOTE — PROGRESS NOTES
/88   Pulse 62   Wt 91.6 kg (202 lb)   SpO2 97%   BMI 39.45 kg/m²   Body mass index is 39.45 kg/m².   Physical Exam  Vitals and nursing note reviewed.   Constitutional:       General: She is not in acute distress.     Appearance: She is well-developed. She is not diaphoretic.   HENT:      Head: Normocephalic.      Mouth/Throat:      Pharynx: No oropharyngeal exudate.   Eyes:      General: No scleral icterus.     Pupils: Pupils are equal, round, and reactive to light.   Neck:      Thyroid: No thyromegaly.      Vascular: No JVD.      Trachea: No tracheal deviation.   Cardiovascular:      Rate and Rhythm: Normal rate and regular rhythm.      Heart sounds: Normal heart sounds. No murmur heard.  Pulmonary:      Effort: Pulmonary effort is normal. No respiratory distress.      Breath sounds: Normal breath sounds. No wheezing.   Abdominal:      General: Bowel sounds are normal. There is no distension.      Palpations: Abdomen is soft.      Tenderness: There is no abdominal tenderness. There is no guarding or rebound.      Comments: No ascites   Musculoskeletal:         General: Normal range of motion.      Cervical back: Normal range of motion and neck supple.   Skin:     General: Skin is warm.      Coloration: Skin is not pale.      Findings: No erythema or rash.      Comments: She is not diaphoretic   Neurological:      Mental Status: She is alert and oriented to person, place, and time.      Deep Tendon Reflexes: Reflexes are normal and symmetric.   Psychiatric:         Behavior: Behavior normal.         Thought Content: Thought content normal.         Judgment: Judgment normal.           IMPRESSION: Ms. Starr is a 58 y.o. female with      Diagnosis Orders   1. Abdominal pain, right upper quadrant  Pancreatic Elastase, Fecal      2. Fatty liver        3. Bilious vomiting with nausea  Pancreatic Elastase, Fecal      4. Irritable bowel syndrome with diarrhea        5. Diverticulosis        6. History of

## 2024-04-01 ENCOUNTER — TELEPHONE (OUTPATIENT)
Dept: UROLOGY | Age: 59
End: 2024-04-01

## 2024-04-01 DIAGNOSIS — N32.81 OAB (OVERACTIVE BLADDER): Primary | ICD-10-CM

## 2024-04-01 RX ORDER — SOLIFENACIN SUCCINATE 10 MG/1
10 TABLET, FILM COATED ORAL DAILY
Qty: 90 TABLET | Refills: 3 | Status: SHIPPED | OUTPATIENT
Start: 2024-04-01

## 2024-04-01 NOTE — TELEPHONE ENCOUNTER
Patient called in-would like to know if she could take Vesicare BID, and if so can another script be called in for it?

## 2024-04-01 NOTE — TELEPHONE ENCOUNTER
Increased vesicare dosage to 10mg. Take 10mg once daily. Please notify. She needs to keep f/u with Dr. Rehman as scheduled.

## 2024-04-18 DIAGNOSIS — F41.9 ANXIETY: ICD-10-CM

## 2024-04-18 DIAGNOSIS — I10 ESSENTIAL HYPERTENSION: ICD-10-CM

## 2024-04-18 DIAGNOSIS — F33.0 MILD EPISODE OF RECURRENT MAJOR DEPRESSIVE DISORDER (HCC): ICD-10-CM

## 2024-04-18 RX ORDER — AMLODIPINE BESYLATE 5 MG/1
5 TABLET ORAL DAILY
Qty: 90 TABLET | Refills: 3 | Status: SHIPPED | OUTPATIENT
Start: 2024-04-18

## 2024-04-18 RX ORDER — FLUOXETINE 10 MG/1
10 CAPSULE ORAL EVERY MORNING
Qty: 30 CAPSULE | Refills: 3 | Status: SHIPPED | OUTPATIENT
Start: 2024-04-18

## 2024-04-21 ENCOUNTER — TELEPHONE (OUTPATIENT)
Dept: FAMILY MEDICINE CLINIC | Age: 59
End: 2024-04-21

## 2024-04-30 ENCOUNTER — HOSPITAL ENCOUNTER (OUTPATIENT)
Age: 59
Setting detail: SPECIMEN
Discharge: HOME | End: 2024-04-30

## 2024-04-30 ENCOUNTER — OFFICE VISIT (OUTPATIENT)
Dept: FAMILY MEDICINE CLINIC | Age: 59
End: 2024-04-30
Payer: COMMERCIAL

## 2024-04-30 VITALS
HEART RATE: 75 BPM | SYSTOLIC BLOOD PRESSURE: 135 MMHG | OXYGEN SATURATION: 97 % | DIASTOLIC BLOOD PRESSURE: 85 MMHG | TEMPERATURE: 98.2 F

## 2024-04-30 DIAGNOSIS — N30.01 ACUTE CYSTITIS WITH HEMATURIA: Primary | ICD-10-CM

## 2024-04-30 DIAGNOSIS — N89.8 VAGINAL ITCHING: ICD-10-CM

## 2024-04-30 DIAGNOSIS — R35.0 URINARY FREQUENCY: ICD-10-CM

## 2024-04-30 DIAGNOSIS — N76.0 BACTERIAL VAGINOSIS: ICD-10-CM

## 2024-04-30 DIAGNOSIS — B96.89 BACTERIAL VAGINOSIS: ICD-10-CM

## 2024-04-30 LAB
APPEARANCE FLUID: ABNORMAL
BILIRUBIN, POC: ABNORMAL
BLOOD URINE, POC: ABNORMAL
CANDIDA SPECIES: NEGATIVE
CLARITY, POC: ABNORMAL
COLOR, POC: YELLOW
GARDNERELLA VAGINALIS: POSITIVE
GLUCOSE URINE, POC: ABNORMAL
KETONES, POC: ABNORMAL
LEUKOCYTE EST, POC: ABNORMAL
NITRITE, POC: POSITIVE
PH, POC: 6.5
PROTEIN, POC: 100
SOURCE: ABNORMAL
SPECIFIC GRAVITY, POC: 1.01
TRICHOMONAS: NEGATIVE
UROBILINOGEN, POC: 0.2

## 2024-04-30 PROCEDURE — 3079F DIAST BP 80-89 MM HG: CPT

## 2024-04-30 PROCEDURE — 3075F SYST BP GE 130 - 139MM HG: CPT

## 2024-04-30 PROCEDURE — 81002 URINALYSIS NONAUTO W/O SCOPE: CPT

## 2024-04-30 PROCEDURE — 99214 OFFICE O/P EST MOD 30 MIN: CPT

## 2024-04-30 RX ORDER — SULFAMETHOXAZOLE AND TRIMETHOPRIM 800; 160 MG/1; MG/1
1 TABLET ORAL 2 TIMES DAILY
Qty: 14 TABLET | Refills: 0 | Status: SHIPPED | OUTPATIENT
Start: 2024-04-30 | End: 2024-05-07

## 2024-04-30 RX ORDER — FLUCONAZOLE 150 MG/1
150 TABLET ORAL
Qty: 2 TABLET | Refills: 0 | Status: SHIPPED | OUTPATIENT
Start: 2024-04-30 | End: 2024-05-06

## 2024-04-30 ASSESSMENT — ENCOUNTER SYMPTOMS
EYE PAIN: 0
CONSTIPATION: 0
ABDOMINAL PAIN: 1
SHORTNESS OF BREATH: 0
BACK PAIN: 1
DIARRHEA: 0
EYE REDNESS: 0
CHEST TIGHTNESS: 0
EYE ITCHING: 0
NAUSEA: 0
SORE THROAT: 0
VOMITING: 0

## 2024-04-30 NOTE — PROGRESS NOTES
rhinorrhea.      Mouth/Throat:      Lips: Pink.   Eyes:      General:         Right eye: No discharge.         Left eye: No discharge.      Conjunctiva/sclera: Conjunctivae normal.   Cardiovascular:      Rate and Rhythm: Normal rate.   Pulmonary:      Effort: Pulmonary effort is normal.   Abdominal:      General: Bowel sounds are normal. There is no distension.      Palpations: Abdomen is soft.      Tenderness: There is abdominal tenderness in the suprapubic area. There is no right CVA tenderness, left CVA tenderness, guarding or rebound.   Musculoskeletal:         General: No swelling or tenderness.      Cervical back: Neck supple. No rigidity or tenderness.      Right lower leg: No edema.      Left lower leg: No edema.   Lymphadenopathy:      Cervical: No cervical adenopathy.   Skin:     General: Skin is warm and dry.      Findings: No erythema or rash.   Neurological:      Mental Status: She is alert and oriented to person, place, and time.      Motor: No weakness.      Coordination: Coordination normal.      Gait: Gait normal.   Psychiatric:         Mood and Affect: Mood normal.         Behavior: Behavior normal. Behavior is cooperative.         Thought Content: Thought content normal.         Judgment: Judgment normal.       /85 (Site: Left Upper Arm, Position: Sitting, Cuff Size: Large Adult)   Pulse 75   Temp 98.2 °F (36.8 °C) (Infrared)   SpO2 97%     Assessment:   Assessment & Plan    Diagnosis Orders   1. Acute cystitis with hematuria  sulfamethoxazole-trimethoprim (BACTRIM DS;SEPTRA DS) 800-160 MG per tablet      2. Urinary frequency  POCT Urinalysis no Micro    Culture, Urine    Vaginitis DNA Probe    sulfamethoxazole-trimethoprim (BACTRIM DS;SEPTRA DS) 800-160 MG per tablet      3. Vaginal itching  Vaginitis DNA Probe    fluconazole (DIFLUCAN) 150 MG tablet        Lab Results   Component Value Date     01/17/2024    K 4.1 01/17/2024     01/17/2024    CO2 27 01/17/2024    BUN 15

## 2024-04-30 NOTE — RESULT ENCOUNTER NOTE
Management per urgent care, prescription given for Bactrim for UTI    Future Appointments  5/14/2024  1:20 PM    Jasper Rehman MD       [facility]           [facility]  5/15/2024  1:30 PM    Leslie Herron MD    [facility]  9/26/2024  2:30 PM    Vamshi Maldonado MD           [facility]

## 2024-05-01 LAB
MICROORGANISM SPEC CULT: ABNORMAL
SPECIMEN DESCRIPTION: ABNORMAL

## 2024-05-01 RX ORDER — METRONIDAZOLE 500 MG/1
500 TABLET ORAL 2 TIMES DAILY
Qty: 14 TABLET | Refills: 0 | Status: SHIPPED | OUTPATIENT
Start: 2024-05-01 | End: 2024-05-08

## 2024-05-02 NOTE — RESULT ENCOUNTER NOTE
Noted, UTI E. coli, on Bactrim, sensitive to Bactrim given by urgent care, continue Bactrim      Future Appointments  5/14/2024  1:20 PM    Jasper Rehman MD       [facility]  5/15/2024  1:30 PM    [facility]  9/26/2024  2:30 PM    [facility] Maldonado MD           [facility]

## 2024-05-14 ENCOUNTER — OFFICE VISIT (OUTPATIENT)
Dept: UROLOGY | Age: 59
End: 2024-05-14
Payer: COMMERCIAL

## 2024-05-14 VITALS
RESPIRATION RATE: 16 BRPM | SYSTOLIC BLOOD PRESSURE: 128 MMHG | BODY MASS INDEX: 40.13 KG/M2 | TEMPERATURE: 97.5 F | HEART RATE: 67 BPM | DIASTOLIC BLOOD PRESSURE: 70 MMHG | OXYGEN SATURATION: 97 % | HEIGHT: 60 IN | WEIGHT: 204.4 LBS

## 2024-05-14 DIAGNOSIS — N32.81 OAB (OVERACTIVE BLADDER): Primary | ICD-10-CM

## 2024-05-14 DIAGNOSIS — R35.1 NOCTURIA: ICD-10-CM

## 2024-05-14 PROCEDURE — 99214 OFFICE O/P EST MOD 30 MIN: CPT | Performed by: UROLOGY

## 2024-05-14 PROCEDURE — 3078F DIAST BP <80 MM HG: CPT | Performed by: UROLOGY

## 2024-05-14 PROCEDURE — 3074F SYST BP LT 130 MM HG: CPT | Performed by: UROLOGY

## 2024-05-14 ASSESSMENT — ENCOUNTER SYMPTOMS
EYE REDNESS: 0
SHORTNESS OF BREATH: 0
CONSTIPATION: 0
WHEEZING: 0
BACK PAIN: 0
NAUSEA: 0
COUGH: 0
EYE PAIN: 0
ABDOMINAL PAIN: 0
VOMITING: 0

## 2024-05-14 NOTE — PROGRESS NOTES
Review of Systems   Constitutional:  Negative for chills, fatigue and fever.   Eyes:  Negative for pain, redness and visual disturbance.   Respiratory:  Negative for cough, shortness of breath and wheezing.    Cardiovascular:  Negative for chest pain and leg swelling.   Gastrointestinal:  Negative for abdominal pain, constipation, nausea and vomiting.   Genitourinary:  Positive for frequency (3-4 times a night). Negative for difficulty urinating, dysuria, flank pain, hematuria and urgency.   Musculoskeletal:  Negative for back pain, joint swelling and myalgias.   Skin:  Negative for rash and wound.   Neurological:  Negative for dizziness, tremors and numbness.   Hematological:  Does not bruise/bleed easily.

## 2024-05-14 NOTE — PROGRESS NOTES
[facility]  [address]  [facility] [address]  Dept: [phone]    [facility] Urology Office Note - Established    Patient:  Petra Starr  YOB: 1965  Date: 5/14/2024    The patient is a 58 y.o. female whopresents today for evaluation of the following problems:   Chief Complaint   Patient presents with    Medication Check     Vesicare \"not working still getting up 3-4 times a night to go to the bathroom.  Discuss myrbetriq\"       HPI  She is here for OAB and nocturia.   No leaking.   She is up 3-4 times per night.   She goes about 7 times during the day.   Vesicare and Oxybutynin did not work.       Summary of old records: N/A    Additional History: N/A    Procedures Today: N/A    Urinalysis today:  No results found for this visit on 05/14/24.    Imaging Reviewed during this Office Visit: none  (results were independently reviewed by physician and radiology report verified)    AUA Symptom Score (5/14/2024):                               Last BUN and creatinine:  Lab Results   Component Value Date    BUN 15 01/17/2024     Lab Results   Component Value Date    CREATININE 0.7 01/17/2024       Additional Lab/Culture results: none    PAST MEDICAL, FAMILY AND SOCIAL HISTORY UPDATE:  Past Medical History:   Diagnosis Date    Abnormal EKG 06/18/2021    SINUS BRADYCARDIA, LEFT AXIS DEVIATION, NONSPECIFIC T WAVE ABNORMALITY    Acquired hypothyroidism 07/27/2020    Bilateral kidney stones 06/18/2022    Bilateral primary osteoarthritis of knee 04/25/2021    Chronic back pain     Coronary artery disease involving native coronary artery of native heart without angina pectoris 03/21/2022    PT. DENIES.    Diarrhea     Distal radius fracture 01/28/2013    Drug-seeking behavior 10/16/2014    Essential hypertension 09/14/2021    Fatty liver 06/18/2022    FANTA (generalized anxiety disorder)     GERD (gastroesophageal reflux disease)

## 2024-05-15 ENCOUNTER — OFFICE VISIT (OUTPATIENT)
Dept: FAMILY MEDICINE CLINIC | Age: 59
End: 2024-05-15
Payer: COMMERCIAL

## 2024-05-15 VITALS
WEIGHT: 208.8 LBS | SYSTOLIC BLOOD PRESSURE: 128 MMHG | DIASTOLIC BLOOD PRESSURE: 78 MMHG | HEART RATE: 67 BPM | TEMPERATURE: 97.6 F | BODY MASS INDEX: 40.99 KG/M2 | HEIGHT: 60 IN | OXYGEN SATURATION: 98 %

## 2024-05-15 DIAGNOSIS — F33.0 MILD EPISODE OF RECURRENT MAJOR DEPRESSIVE DISORDER (HCC): ICD-10-CM

## 2024-05-15 DIAGNOSIS — M79.7 FIBROMYALGIA: ICD-10-CM

## 2024-05-15 DIAGNOSIS — G89.29 CHRONIC MIDLINE LOW BACK PAIN WITH RIGHT-SIDED SCIATICA: ICD-10-CM

## 2024-05-15 DIAGNOSIS — M54.41 CHRONIC MIDLINE LOW BACK PAIN WITH RIGHT-SIDED SCIATICA: ICD-10-CM

## 2024-05-15 DIAGNOSIS — M17.0 PRIMARY OSTEOARTHRITIS OF BOTH KNEES: ICD-10-CM

## 2024-05-15 DIAGNOSIS — I10 ESSENTIAL HYPERTENSION: ICD-10-CM

## 2024-05-15 DIAGNOSIS — R73.9 HYPERGLYCEMIA: ICD-10-CM

## 2024-05-15 DIAGNOSIS — M51.36 DDD (DEGENERATIVE DISC DISEASE), LUMBAR: ICD-10-CM

## 2024-05-15 DIAGNOSIS — E78.2 MIXED HYPERLIPIDEMIA: ICD-10-CM

## 2024-05-15 DIAGNOSIS — H61.21 IMPACTED CERUMEN OF RIGHT EAR: ICD-10-CM

## 2024-05-15 DIAGNOSIS — Z00.01 ENCOUNTER FOR WELL ADULT EXAM WITH ABNORMAL FINDINGS: Primary | ICD-10-CM

## 2024-05-15 DIAGNOSIS — Z12.31 ENCOUNTER FOR SCREENING MAMMOGRAM FOR BREAST CANCER: ICD-10-CM

## 2024-05-15 DIAGNOSIS — E06.3 HYPOTHYROIDISM DUE TO HASHIMOTO'S THYROIDITIS: ICD-10-CM

## 2024-05-15 DIAGNOSIS — E03.8 HYPOTHYROIDISM DUE TO HASHIMOTO'S THYROIDITIS: ICD-10-CM

## 2024-05-15 DIAGNOSIS — K76.0 FATTY LIVER: ICD-10-CM

## 2024-05-15 PROBLEM — R19.7 DIARRHEA: Status: RESOLVED | Noted: 2017-11-13 | Resolved: 2024-05-15

## 2024-05-15 PROBLEM — N20.0 BILATERAL KIDNEY STONES: Status: RESOLVED | Noted: 2022-06-18 | Resolved: 2024-05-15

## 2024-05-15 PROBLEM — R79.89 ELEVATED LFTS: Status: RESOLVED | Noted: 2023-12-30 | Resolved: 2024-05-15

## 2024-05-15 PROCEDURE — 3078F DIAST BP <80 MM HG: CPT | Performed by: FAMILY MEDICINE

## 2024-05-15 PROCEDURE — 3074F SYST BP LT 130 MM HG: CPT | Performed by: FAMILY MEDICINE

## 2024-05-15 PROCEDURE — 99396 PREV VISIT EST AGE 40-64: CPT | Performed by: FAMILY MEDICINE

## 2024-05-15 PROCEDURE — 99214 OFFICE O/P EST MOD 30 MIN: CPT | Performed by: FAMILY MEDICINE

## 2024-05-15 RX ORDER — YEAST,DRIED (S. CEREVISIAE)
1 POWDER (GRAM) ORAL DAILY
Qty: 90 TABLET | Refills: 0
Start: 2024-05-15

## 2024-05-15 RX ORDER — POTASSIUM CHLORIDE 20 MEQ/1
20 TABLET, EXTENDED RELEASE ORAL DAILY PRN
Qty: 90 TABLET | Refills: 0 | Status: SHIPPED | OUTPATIENT
Start: 2024-05-15

## 2024-05-15 RX ORDER — DULOXETIN HYDROCHLORIDE 30 MG/1
30 CAPSULE, DELAYED RELEASE ORAL DAILY
Qty: 30 CAPSULE | Refills: 0 | Status: SHIPPED | OUTPATIENT
Start: 2024-05-15

## 2024-05-15 RX ORDER — FUROSEMIDE 20 MG/1
20 TABLET ORAL DAILY PRN
Qty: 90 TABLET | Refills: 0 | Status: SHIPPED | OUTPATIENT
Start: 2024-05-15

## 2024-05-15 RX ORDER — TIZANIDINE 4 MG/1
4 TABLET ORAL EVERY 8 HOURS PRN
Qty: 90 TABLET | Refills: 0 | Status: SHIPPED | OUTPATIENT
Start: 2024-05-15

## 2024-05-15 SDOH — ECONOMIC STABILITY: FOOD INSECURITY: WITHIN THE PAST 12 MONTHS, YOU WORRIED THAT YOUR FOOD WOULD RUN OUT BEFORE YOU GOT MONEY TO BUY MORE.: NEVER TRUE

## 2024-05-15 SDOH — ECONOMIC STABILITY: INCOME INSECURITY: HOW HARD IS IT FOR YOU TO PAY FOR THE VERY BASICS LIKE FOOD, HOUSING, MEDICAL CARE, AND HEATING?: NOT HARD AT ALL

## 2024-05-15 SDOH — ECONOMIC STABILITY: FOOD INSECURITY: WITHIN THE PAST 12 MONTHS, THE FOOD YOU BOUGHT JUST DIDN'T LAST AND YOU DIDN'T HAVE MONEY TO GET MORE.: NEVER TRUE

## 2024-05-15 ASSESSMENT — PATIENT HEALTH QUESTIONNAIRE - PHQ9
SUM OF ALL RESPONSES TO PHQ9 QUESTIONS 1 & 2: 0
10. IF YOU CHECKED OFF ANY PROBLEMS, HOW DIFFICULT HAVE THESE PROBLEMS MADE IT FOR YOU TO DO YOUR WORK, TAKE CARE OF THINGS AT HOME, OR GET ALONG WITH OTHER PEOPLE: NOT DIFFICULT AT ALL
7. TROUBLE CONCENTRATING ON THINGS, SUCH AS READING THE NEWSPAPER OR WATCHING TELEVISION: NOT AT ALL
SUM OF ALL RESPONSES TO PHQ QUESTIONS 1-9: 6
6. FEELING BAD ABOUT YOURSELF - OR THAT YOU ARE A FAILURE OR HAVE LET YOURSELF OR YOUR FAMILY DOWN: NOT AT ALL
2. FEELING DOWN, DEPRESSED OR HOPELESS: NOT AT ALL
8. MOVING OR SPEAKING SO SLOWLY THAT OTHER PEOPLE COULD HAVE NOTICED. OR THE OPPOSITE, BEING SO FIGETY OR RESTLESS THAT YOU HAVE BEEN MOVING AROUND A LOT MORE THAN USUAL: NOT AT ALL
4. FEELING TIRED OR HAVING LITTLE ENERGY: NEARLY EVERY DAY
9. THOUGHTS THAT YOU WOULD BE BETTER OFF DEAD, OR OF HURTING YOURSELF: NOT AT ALL
SUM OF ALL RESPONSES TO PHQ QUESTIONS 1-9: 6
5. POOR APPETITE OR OVEREATING: NOT AT ALL
3. TROUBLE FALLING OR STAYING ASLEEP: NEARLY EVERY DAY
1. LITTLE INTEREST OR PLEASURE IN DOING THINGS: NOT AT ALL

## 2024-05-15 NOTE — PROGRESS NOTES
Visit Information    Have you changed or started any medications since your last visit including any over-the-counter medicines, vitamins, or herbal medicines? yes -    Have you stopped taking any of your medications? Is so, why? -  yes -   Are you having any side effects from any of your medications? - no    Have you seen any other physician or provider since your last visit?  yes -    Have you had any other diagnostic tests since your last visit?  no   Have you been seen in the emergency room and/or had an admission in a hospital since we last saw you?  no   Have you had your routine dental cleaning in the past 6 months?  no     Do you have an active MyChart account? If no, what is the barrier?  Yes    Patient Care Team:  Leslie Herron MD as PCP - General (Family Medicine)  Leslie Herron MD as PCP - Empaneled Provider  Mercy Crews RN as Registered Nurse  Renan Grant MD as Consulting Physician (Orthopedic Surgery)  Jasper Rehman MD as Consulting Physician (Urology)  Vamshi Maldonado MD as Consulting Physician (Gastroenterology)    Medical History Review  Past Medical, Family, and Social History reviewed and does contribute to the patient presenting condition    Health Maintenance   Topic Date Due    Hepatitis B vaccine (1 of 3 - 3-dose series) Never done    Shingles vaccine (1 of 2) Never done    Breast cancer screen  06/12/2022    COVID-19 Vaccine (3 - 2023-24 season) 09/01/2023    Flu vaccine (Season Ended) 08/01/2024    A1C test (Diabetic or Prediabetic)  11/30/2024    Depression Monitoring  02/28/2025    Colorectal Cancer Screen  03/07/2028    Lipids  11/30/2028    DTaP/Tdap/Td vaccine (2 - Td or Tdap) 01/19/2031    Hepatitis C screen  Completed    HIV screen  Completed    Hepatitis A vaccine  Aged Out    Hib vaccine  Aged Out    Polio vaccine  Aged Out    Meningococcal (ACWY) vaccine  Aged Out    Pneumococcal 0-64 years Vaccine  Aged Out    Diabetes screen  Discontinued    
Expiration Date:   5/15/2025    Vitamin B12 & Folate     Standing Status:   Future     Standing Expiration Date:   5/15/2025    Uric Acid     Standing Status:   Future     Standing Expiration Date:   5/15/2025    TSH     Standing Status:   Future     Standing Expiration Date:   5/15/2025    T4, Free     Standing Status:   Future     Standing Expiration Date:   5/15/2025    Magnesium     Standing Status:   Future     Standing Expiration Date:   5/15/2025    Lipid Panel     Standing Status:   Future     Standing Expiration Date:   5/15/2025     Order Specific Question:   Is Patient Fasting?/# of Hours     Answer:   8-10 Hours, water ok to drink    Dayron Cervantes DO, Pain Management, [place]     Referral Priority:   Routine     Referral Type:   Eval and Treat     Referral Reason:   Specialty Services Required     Referred to Provider:   Dayron Curtis DO     Requested Specialty:   Physical Medicine and Rehab     Number of Visits Requested:   1    [facility] OFFICE/OUTPATIENT ESTABLISHED MOD MDM 30-39 MIN         This note was completed by using the assistance of a speech-recognition program. However, inadvertent computerized transcription errors may be present. Although every effort was made to ensure accuracy, no guarantees can be provided that every mistake has been identified and corrected by editing.      An electronic signature was used to authenticate this note.    Electronically signed by Leslie Herron MD on 5/21/2024 at 8:25 AM

## 2024-05-18 ENCOUNTER — HOSPITAL ENCOUNTER (OUTPATIENT)
Age: 59
End: 2024-05-18
Payer: COMMERCIAL

## 2024-05-18 ENCOUNTER — HOSPITAL ENCOUNTER (OUTPATIENT)
Dept: GENERAL RADIOLOGY | Age: 59
End: 2024-05-18
Payer: COMMERCIAL

## 2024-05-18 ENCOUNTER — HOSPITAL ENCOUNTER (OUTPATIENT)
Age: 59
Discharge: HOME | End: 2024-05-18
Payer: COMMERCIAL

## 2024-05-18 DIAGNOSIS — E03.8 HYPOTHYROIDISM DUE TO HASHIMOTO'S THYROIDITIS: ICD-10-CM

## 2024-05-18 DIAGNOSIS — M54.41 CHRONIC MIDLINE LOW BACK PAIN WITH RIGHT-SIDED SCIATICA: ICD-10-CM

## 2024-05-18 DIAGNOSIS — G89.29 CHRONIC MIDLINE LOW BACK PAIN WITH RIGHT-SIDED SCIATICA: ICD-10-CM

## 2024-05-18 DIAGNOSIS — I10 ESSENTIAL HYPERTENSION: ICD-10-CM

## 2024-05-18 DIAGNOSIS — E06.3 HYPOTHYROIDISM DUE TO HASHIMOTO'S THYROIDITIS: ICD-10-CM

## 2024-05-18 DIAGNOSIS — E78.2 MIXED HYPERLIPIDEMIA: ICD-10-CM

## 2024-05-18 DIAGNOSIS — K76.0 FATTY LIVER: ICD-10-CM

## 2024-05-18 DIAGNOSIS — M51.36 DDD (DEGENERATIVE DISC DISEASE), LUMBAR: ICD-10-CM

## 2024-05-18 DIAGNOSIS — R73.9 HYPERGLYCEMIA: ICD-10-CM

## 2024-05-18 DIAGNOSIS — E55.9 VITAMIN D DEFICIENCY: Primary | ICD-10-CM

## 2024-05-18 DIAGNOSIS — M79.7 FIBROMYALGIA: ICD-10-CM

## 2024-05-18 LAB
25(OH)D3 SERPL-MCNC: 17.4 NG/ML (ref 30–100)
ALBUMIN SERPL-MCNC: 4.3 G/DL (ref 3.5–5.2)
ALP SERPL-CCNC: 97 U/L (ref 35–104)
ALT SERPL-CCNC: 17 U/L (ref 5–33)
ANION GAP SERPL CALCULATED.3IONS-SCNC: 11 MMOL/L (ref 9–17)
AST SERPL-CCNC: 14 U/L
BILIRUB SERPL-MCNC: 0.5 MG/DL (ref 0.3–1.2)
BUN SERPL-MCNC: 11 MG/DL (ref 6–20)
CALCIUM SERPL-MCNC: 9.5 MG/DL (ref 8.6–10.4)
CHLORIDE SERPL-SCNC: 107 MMOL/L (ref 98–107)
CHOLEST SERPL-MCNC: 208 MG/DL
CHOLESTEROL/HDL RATIO: 4.3
CK SERPL-CCNC: 45 U/L (ref 26–192)
CO2 SERPL-SCNC: 25 MMOL/L (ref 20–31)
CREAT SERPL-MCNC: 0.6 MG/DL (ref 0.5–0.9)
EST. AVERAGE GLUCOSE BLD GHB EST-MCNC: 114 MG/DL
FOLATE SERPL-MCNC: 7.9 NG/ML (ref 4.8–24.2)
GFR, ESTIMATED: >90 ML/MIN/1.73M2
GLUCOSE SERPL-MCNC: 104 MG/DL (ref 70–99)
HBA1C MFR BLD: 5.6 % (ref 4–6)
HDLC SERPL-MCNC: 48 MG/DL
LDLC SERPL CALC-MCNC: 140 MG/DL (ref 0–130)
MAGNESIUM SERPL-MCNC: 2.2 MG/DL (ref 1.6–2.6)
POTASSIUM SERPL-SCNC: 3.8 MMOL/L (ref 3.7–5.3)
PROT SERPL-MCNC: 6.9 G/DL (ref 6.4–8.3)
SODIUM SERPL-SCNC: 143 MMOL/L (ref 135–144)
T4 FREE SERPL-MCNC: 1.1 NG/DL (ref 0.9–1.7)
TRIGL SERPL-MCNC: 101 MG/DL
TSH SERPL DL<=0.05 MIU/L-ACNC: 3.63 UIU/ML (ref 0.3–5)
URATE SERPL-MCNC: 3.9 MG/DL (ref 2.4–5.7)
VIT B12 SERPL-MCNC: 390 PG/ML (ref 232–1245)

## 2024-05-18 PROCEDURE — 84550 ASSAY OF BLOOD/URIC ACID: CPT

## 2024-05-18 PROCEDURE — 84443 ASSAY THYROID STIM HORMONE: CPT

## 2024-05-18 PROCEDURE — 82607 VITAMIN B-12: CPT

## 2024-05-18 PROCEDURE — 84439 ASSAY OF FREE THYROXINE: CPT

## 2024-05-18 PROCEDURE — 82550 ASSAY OF CK (CPK): CPT

## 2024-05-18 PROCEDURE — 80061 LIPID PANEL: CPT

## 2024-05-18 PROCEDURE — 36415 COLL VENOUS BLD VENIPUNCTURE: CPT

## 2024-05-18 PROCEDURE — 83036 HEMOGLOBIN GLYCOSYLATED A1C: CPT

## 2024-05-18 PROCEDURE — 72100 X-RAY EXAM L-S SPINE 2/3 VWS: CPT

## 2024-05-18 PROCEDURE — 82306 VITAMIN D 25 HYDROXY: CPT

## 2024-05-18 PROCEDURE — 82746 ASSAY OF FOLIC ACID SERUM: CPT

## 2024-05-18 PROCEDURE — 83735 ASSAY OF MAGNESIUM: CPT

## 2024-05-18 PROCEDURE — 80053 COMPREHEN METABOLIC PANEL: CPT

## 2024-05-18 RX ORDER — ERGOCALCIFEROL 1.25 MG/1
50000 CAPSULE ORAL WEEKLY
Qty: 12 CAPSULE | Refills: 0 | Status: SHIPPED | OUTPATIENT
Start: 2024-05-18

## 2024-05-18 NOTE — RESULT ENCOUNTER NOTE
Please notify patient: Very low vitamin D, I will send high dosage vitamin D to the pharmacy  Blood glucose very mildly increased increase proteins in diet cut down sweets  Very high cholesterol, I suggest to start pravastatin which is a mild statin to improve cholesterol and to decrease the risk of strokes and heart attacks  Otherwise labs within normal limits  continue current treatment    Future Appointments  6/11/2024  12:50 PM   Maty Nieves APRN * [facility]           [facility]  9/26/2024  2:30 PM    Vamshi Maldonado MD           [facility]           [facility]  11/15/2024 2:00 PM    Les[facility] Herron [facility]    [facility]  5/16/2025  1:30 PM    Leslie Herron MD    [facility]

## 2024-05-18 NOTE — RESULT ENCOUNTER NOTE
Please notify patient: X-rays show moderate  degenerative changes and mild scoliosis, we can refer her to pain management if she would like or start physical therapy let me know what she wants.  Also there are calcifications in the blood vessels/aorta, I suggest to start statin, let me know if she agrees and will do that     Future Appointments  6/11/2024  12:50 PM   Maty Nieves APRN * [facility]           [facility]  9/26/2024  2:30 PM    Vamshi Maldonado MD           [facility]           [facility]  11/15/2024 2:00 PM    [facility]  5/16/2025  1:30 PM    Leslie Herron MD    [facility]

## 2024-05-20 DIAGNOSIS — E78.2 MIXED HYPERLIPIDEMIA: Primary | ICD-10-CM

## 2024-05-20 RX ORDER — PRAVASTATIN SODIUM 20 MG
20 TABLET ORAL EVERY EVENING
Qty: 90 TABLET | Refills: 3 | Status: SHIPPED | OUTPATIENT
Start: 2024-05-20

## 2024-05-20 NOTE — RESULT ENCOUNTER NOTE
Noted  Future Appointments  6/11/2024  12:50 PM   Maty Nieves APRN * [facility]           [facility]  9/26/2024  2:30 PM    Vamshi Maldonado MD           [facility]           [facility]  11/15/2024 2:00 PM    [facility]  5/16/2025  1:30 PM    Leslie Herron MD    [facility]

## 2024-05-20 NOTE — PROGRESS NOTES
Lab Results   Component Value Date     (H) 05/18/2024          Diagnosis Orders   1. Mixed hyperlipidemia  pravastatin (PRAVACHOL) 20 MG tablet           Future Appointments   Date Time Provider Department Center   6/11/2024 12:50 PM Maty [facility]ieves, NEHAL [facility]   9/26/2024  2:30 PM Vamshi Maldonado MD [facility]   11/15/2024  2:00 PM Leslie Herron MD [facility]   5/16/2025  1:30 PM Leslie Herron MD [facility]

## 2024-05-21 PROBLEM — M79.7 FIBROMYALGIA: Status: ACTIVE | Noted: 2024-05-21

## 2024-05-21 PROBLEM — M54.41 CHRONIC MIDLINE LOW BACK PAIN WITH RIGHT-SIDED SCIATICA: Status: ACTIVE | Noted: 2021-01-19

## 2024-05-21 PROBLEM — E78.2 MIXED HYPERLIPIDEMIA: Status: ACTIVE | Noted: 2020-07-19

## 2024-05-21 ASSESSMENT — ENCOUNTER SYMPTOMS: TROUBLE SWALLOWING: 0

## 2024-06-03 ENCOUNTER — HOSPITAL ENCOUNTER (OUTPATIENT)
Dept: PAIN MANAGEMENT | Age: 59
Discharge: HOME | End: 2024-06-03
Payer: COMMERCIAL

## 2024-06-03 VITALS — HEIGHT: 60 IN | BODY MASS INDEX: 39.07 KG/M2 | WEIGHT: 199 LBS

## 2024-06-03 DIAGNOSIS — E66.9 CLASS 2 OBESITY WITH BODY MASS INDEX (BMI) OF 38.0 TO 38.9 IN ADULT, UNSPECIFIED OBESITY TYPE, UNSPECIFIED WHETHER SERIOUS COMORBIDITY PRESENT: ICD-10-CM

## 2024-06-03 DIAGNOSIS — M47.817 LUMBOSACRAL SPONDYLOSIS WITHOUT MYELOPATHY: ICD-10-CM

## 2024-06-03 DIAGNOSIS — M51.36 LUMBAR DEGENERATIVE DISC DISEASE: ICD-10-CM

## 2024-06-03 DIAGNOSIS — K21.9 GASTROESOPHAGEAL REFLUX DISEASE WITHOUT ESOPHAGITIS: ICD-10-CM

## 2024-06-03 DIAGNOSIS — M54.16 LUMBAR RADICULOPATHY: Primary | ICD-10-CM

## 2024-06-03 PROBLEM — E66.812 CLASS 2 OBESITY WITH BODY MASS INDEX (BMI) OF 38.0 TO 38.9 IN ADULT: Status: ACTIVE | Noted: 2024-06-03

## 2024-06-03 PROCEDURE — 99204 OFFICE O/P NEW MOD 45 MIN: CPT | Performed by: STUDENT IN AN ORGANIZED HEALTH CARE EDUCATION/TRAINING PROGRAM

## 2024-06-03 PROCEDURE — 99203 OFFICE O/P NEW LOW 30 MIN: CPT

## 2024-06-03 RX ORDER — FAMOTIDINE 20 MG/1
20 TABLET, FILM COATED ORAL 2 TIMES DAILY
Qty: 180 TABLET | Refills: 3 | Status: SHIPPED | OUTPATIENT
Start: 2024-06-03

## 2024-06-03 ASSESSMENT — PAIN SCALES - GENERAL: PAINLEVEL_OUTOF10: 7

## 2024-06-03 NOTE — PROGRESS NOTES
levothyroxine (SYNTHROID) 75 mcg, Oral, DAILY BEFORE BREAKFAST    mirabegron (MYRBETRIQ) 50 mg, Oral, DAILY    potassium chloride (KLOR-CON M) 20 MEQ extended release tablet 20 mEq, Oral, DAILY PRN, Take with lasix    pravastatin (PRAVACHOL) 20 mg, Oral, EVERY EVENING, For high cholesterol, to prevent strokes and heart attacks    tiZANidine (ZANAFLEX) 4 mg, Oral, EVERY 8 HOURS PRN    vitamin D (ERGOCALCIFEROL) 50,000 Units, Oral, WEEKLY       Allergies   Allergen Reactions    Metformin      Sick to stomach     Metformin And Related      nausea and vomiting , severe stomach pain       Review of Systems:   Constitutional: negative for weight changes or fevers  Cardiovascular: negative for chest pain, palpitations, irregular heart beat  Respiratory: negative for dyspnea, cough, wheezing  Gastrointestinal: negative for constipation, diarrhea, nausea  Genitourinary: negative for bowel/bladder incontinence   Musculoskeletal: positive for low back pain  Neurological: negative for radicular leg pain, leg weakness or numbness/tingling  Behavioral/Psych: negative for anxiety/depression   Hematological: negative for abnormal bleeding, anticoagulation use or antiplatelet use  All other systems reviewed and are negative    OBJECTIVE:    There were no vitals filed for this visit.    PHYSICAL EXAM    GENERAL: No acute distress, pleasant, well-appearing  HEENT: Normocephalic, atraumatic, Pupils equal and round  CARDIOVASCULAR: Well perfused, No peripheral cyanosis  PULMONARY: Good chest wall excursion, breathing unlabored  PSYCH: Appropriate affect and insight, non-pressured speech  SKIN: No rashes or lesions  MUSCULOSKELETAL:  Inspection: The back and extremities are symmetric and aligned. Muscle bulk is normal in appearance.  Palpation: There is tenderness to palpation along the lumbar paraspinal musculature bilaterally  Lumbar range of motion is full  NEUROMUSCULAR:  Patient ambulates unassisted  Gait is

## 2024-06-09 DIAGNOSIS — F33.0 MILD EPISODE OF RECURRENT MAJOR DEPRESSIVE DISORDER (HCC): ICD-10-CM

## 2024-06-09 DIAGNOSIS — M79.7 FIBROMYALGIA: ICD-10-CM

## 2024-06-10 ENCOUNTER — HOSPITAL ENCOUNTER (OUTPATIENT)
Dept: MRI IMAGING | Facility: CLINIC | Age: 59
Discharge: HOME | End: 2024-06-12
Payer: COMMERCIAL

## 2024-06-10 DIAGNOSIS — M48.061 SPINAL STENOSIS OF LUMBAR REGION WITHOUT NEUROGENIC CLAUDICATION: Primary | ICD-10-CM

## 2024-06-10 DIAGNOSIS — M54.16 LUMBAR RADICULOPATHY: ICD-10-CM

## 2024-06-10 PROCEDURE — 72148 MRI LUMBAR SPINE W/O DYE: CPT

## 2024-06-10 RX ORDER — DULOXETIN HYDROCHLORIDE 30 MG/1
CAPSULE, DELAYED RELEASE ORAL
Qty: 90 CAPSULE | Refills: 3 | Status: SHIPPED | OUTPATIENT
Start: 2024-06-10

## 2024-06-10 NOTE — TELEPHONE ENCOUNTER
Please Approve or Refuse.  Send to Pharmacy per Pt's Request:      Next Visit Date:  11/15/2024   Last Visit Date: 5/15/2024    Hemoglobin A1C (%)   Date Value   05/18/2024 5.6   11/30/2023 5.7   05/27/2023 5.9             ( goal A1C is < 7)   BP Readings from Last 3 Encounters:   05/15/24 128/78   05/14/24 128/70   04/30/24 135/85          (goal 120/80)  BUN   Date Value Ref Range Status   05/18/2024 11 6 - 20 mg/dL Final     Creatinine   Date Value Ref Range Status   05/18/2024 0.6 0.5 - 0.9 mg/dL Final     Potassium   Date Value Ref Range Status   05/18/2024 3.8 3.7 - 5.3 mmol/L Final

## 2024-06-11 ENCOUNTER — OFFICE VISIT (OUTPATIENT)
Dept: UROLOGY | Age: 59
End: 2024-06-11
Payer: COMMERCIAL

## 2024-06-11 VITALS
DIASTOLIC BLOOD PRESSURE: 74 MMHG | SYSTOLIC BLOOD PRESSURE: 126 MMHG | WEIGHT: 199 LBS | OXYGEN SATURATION: 97 % | BODY MASS INDEX: 39.07 KG/M2 | TEMPERATURE: 98 F | HEIGHT: 60 IN | HEART RATE: 68 BPM

## 2024-06-11 DIAGNOSIS — R35.1 NOCTURIA: ICD-10-CM

## 2024-06-11 DIAGNOSIS — N32.81 OAB (OVERACTIVE BLADDER): ICD-10-CM

## 2024-06-11 PROCEDURE — 99212 OFFICE O/P EST SF 10 MIN: CPT | Performed by: NURSE PRACTITIONER

## 2024-06-11 PROCEDURE — 3074F SYST BP LT 130 MM HG: CPT | Performed by: NURSE PRACTITIONER

## 2024-06-11 PROCEDURE — 3078F DIAST BP <80 MM HG: CPT | Performed by: NURSE PRACTITIONER

## 2024-06-11 RX ORDER — MIRABEGRON 50 MG/1
50 TABLET, EXTENDED RELEASE ORAL DAILY
Qty: 30 TABLET | Refills: 11 | Status: SHIPPED | OUTPATIENT
Start: 2024-06-11

## 2024-06-11 ASSESSMENT — ENCOUNTER SYMPTOMS
ABDOMINAL PAIN: 0
BACK PAIN: 0
SHORTNESS OF BREATH: 0
EYE PAIN: 0
EYE REDNESS: 0
WHEEZING: 0
RESPIRATORY NEGATIVE: 1
GASTROINTESTINAL NEGATIVE: 1
ALLERGIC/IMMUNOLOGIC NEGATIVE: 1
COUGH: 0
EYES NEGATIVE: 1
NAUSEA: 0
VOMITING: 0

## 2024-06-11 NOTE — PROGRESS NOTES
Review of Systems   Constitutional: Negative.  Negative for activity change, chills and fever.   HENT: Negative.     Eyes: Negative.  Negative for pain, redness and visual disturbance.   Respiratory: Negative.  Negative for cough, shortness of breath and wheezing.    Cardiovascular: Negative.  Negative for chest pain and leg swelling.   Gastrointestinal: Negative.  Negative for abdominal pain, nausea and vomiting.   Endocrine: Negative.    Genitourinary: Negative.  Negative for difficulty urinating, dysuria, enuresis, flank pain, frequency, hematuria and urgency.   Musculoskeletal: Negative.  Negative for back pain, joint swelling and myalgias.   Skin: Negative.  Negative for rash and wound.   Allergic/Immunologic: Negative.    Neurological: Negative.  Negative for dizziness, tremors and numbness.   Hematological: Negative.  Does not bruise/bleed easily.   Psychiatric/Behavioral: Negative.       
History of depression     Hyperlipidemia with target LDL less than 100 07/19/2020    Hypothyroidism due to Hashimoto's thyroiditis 07/27/2020    Irritable bowel syndrome with diarrhea 03/21/2022    Mild episode of recurrent major depressive disorder (HCC) 07/19/2020    Opioid dependence (HCC) 10/16/2014    PONV (postoperative nausea and vomiting)     \"THEY PUT SOMETHING BEHIND MY EAR FOR THE N&V\"    Severe obesity (BMI 35.0-39.9) with comorbidity (HCC) 07/19/2020    Urge incontinence 03/09/2020    Wears glasses      Past Surgical History:   Procedure Laterality Date    BLADDER SUSPENSION      CARDIAC CATHETERIZATION  08/13/2021    LAD: Diffuse irregularities 30-40%., EF 60%    COLONOSCOPY N/A 9/18/2023    COLONOSCOPY DIAGNOSTIC performed by Vamshi Maldonado MD at [facility] ENDO    FRACTURE SURGERY  02/07/2013    right distial radius orif    HYSTERECTOMY (CERVIX STATUS UNKNOWN)      has ovaries    KNEE ARTHROSCOPY      right    TN COLONOSCOPY W/BIOPSY SINGLE/MULTIPLE N/A 11/16/2017    COLONOSCOPY WITH BIOPSY OF POLYP performed by Vamshi Maldonado MD at [facility] OR    TN EGD TRANSORAL BIOPSY SINGLE/MULTIPLE N/A 11/16/2017    EGD BIOPSY performed by Vamshi Maldonado MD at [facility] OR    TONSILLECTOMY      TOTAL KNEE ARTHROPLASTY Left 09/2021    Dr. Grant    UPPER GASTROINTESTINAL ENDOSCOPY N/A 3/7/2024    ESOPHAGOGASTRODUODENOSCOPY BIOPSY performed by Vamshi Maldonado MD at [facility] ENDO    WRIST SURGERY Right     PINS, PLATE & SCREWS INTACT.     Family History   Problem Relation Age of Onset    High Blood Pressure Mother     Depression Mother     Heart Surgery Father     Other Brother         scoliosis    Colon Cancer Maternal Grandfather     COPD Maternal Grandfather     Lung Cancer Maternal Grandfather         lung    Breast Cancer Paternal Aunt 50    Cancer Maternal Cousin         brain     Outpatient Medications Marked as Taking for the 6/11/24 encounter (Office Visit) with Maty Nieves APRN - CNP   Medication Sig Dispense Refill

## 2024-06-15 PROBLEM — M48.061 SPINAL STENOSIS OF LUMBAR REGION WITHOUT NEUROGENIC CLAUDICATION: Status: ACTIVE | Noted: 2024-06-15

## 2024-06-19 ENCOUNTER — HOSPITAL ENCOUNTER (OUTPATIENT)
Dept: PAIN MANAGEMENT | Age: 59
Discharge: HOME | End: 2024-06-19
Payer: COMMERCIAL

## 2024-06-19 ENCOUNTER — TELEPHONE (OUTPATIENT)
Dept: GASTROENTEROLOGY | Age: 59
End: 2024-06-19

## 2024-06-19 VITALS — HEIGHT: 60 IN | BODY MASS INDEX: 39.07 KG/M2 | WEIGHT: 199 LBS

## 2024-06-19 DIAGNOSIS — M47.817 LUMBOSACRAL SPONDYLOSIS WITHOUT MYELOPATHY: ICD-10-CM

## 2024-06-19 DIAGNOSIS — E66.9 CLASS 2 OBESITY WITH BODY MASS INDEX (BMI) OF 38.0 TO 38.9 IN ADULT, UNSPECIFIED OBESITY TYPE, UNSPECIFIED WHETHER SERIOUS COMORBIDITY PRESENT: ICD-10-CM

## 2024-06-19 DIAGNOSIS — M51.36 LUMBAR DEGENERATIVE DISC DISEASE: ICD-10-CM

## 2024-06-19 DIAGNOSIS — M54.16 LUMBAR RADICULOPATHY: Primary | ICD-10-CM

## 2024-06-19 PROCEDURE — 99213 OFFICE O/P EST LOW 20 MIN: CPT

## 2024-06-19 PROCEDURE — 99214 OFFICE O/P EST MOD 30 MIN: CPT | Performed by: STUDENT IN AN ORGANIZED HEALTH CARE EDUCATION/TRAINING PROGRAM

## 2024-06-19 RX ORDER — NAPROXEN 250 MG/1
250 TABLET ORAL 2 TIMES DAILY WITH MEALS
Qty: 60 TABLET | Refills: 0 | Status: SHIPPED | OUTPATIENT
Start: 2024-06-19

## 2024-06-19 ASSESSMENT — PAIN SCALES - GENERAL: PAINLEVEL_OUTOF10: 7

## 2024-06-19 NOTE — PROGRESS NOTES
Chronic Pain Clinic Note     Encounter Date: 6/19/2024     SUBJECTIVE:  Chief Complaint   Patient presents with    Back Pain     F/u MRI       History of Present Illness:   Petra Starr is a 58 y.o. female who presents with back pain    Medication Refill: n/a    Current Complaints of Pain:   Location: back   Radiation: Right leg  Severity: Moderate  Pain Numerical Score - 7 today   Average: 6     Highest: 10  Lowest: 6  Character/Quality: Complains of pain that is aching, burning, cramping, shooting and stabbing  Timing: Constant  Associated symptoms: none  Numbness: sometimes  Weakness: yes  Exacerbating factors:  ADLs  Alleviating factors:  sitting   Length of time pain has been present: Started about 17 years  Inciting event/injury:  work injury  Bowel/Bladder incontinence: no  Falls: none in the past month  Physical Therapy: no    History of Interventions:   Surgery: No previous lumbar/cervical surgeries  Injections: 2011 & 2014    Imaging:    MRI Lumbar spine wo contrast 6/14/24    Past Medical History:   Diagnosis Date    Abnormal EKG 06/18/2021    SINUS BRADYCARDIA, LEFT AXIS DEVIATION, NONSPECIFIC T WAVE ABNORMALITY    Acquired hypothyroidism 07/27/2020    Bilateral kidney stones 06/18/2022    Bilateral primary osteoarthritis of knee 04/25/2021    Chronic back pain     Coronary artery disease involving native coronary artery of native heart without angina pectoris 03/21/2022    PT. DENIES.    Diarrhea     Distal radius fracture 01/28/2013    Drug-seeking behavior 10/16/2014    Essential hypertension 09/14/2021    Fatty liver 06/18/2022    Fibromyalgia 05/21/2024    FANTA (generalized anxiety disorder)     GERD (gastroesophageal reflux disease)     H/O: hysterectomy 08/19/2020    History of depression     Hyperlipidemia with target LDL less than 100 07/19/2020    Hypothyroidism due to Hashimoto's thyroiditis 07/27/2020    Irritable bowel syndrome with diarrhea 03/21/2022    Mild episode of recurrent major

## 2024-06-19 NOTE — TELEPHONE ENCOUNTER
Writer called left message for patient to make a soon appt. With andie instead of Erica in sept if she would like too.

## 2024-06-24 ENCOUNTER — PATIENT MESSAGE (OUTPATIENT)
Dept: FAMILY MEDICINE CLINIC | Age: 59
End: 2024-06-24

## 2024-06-24 DIAGNOSIS — R73.9 HYPERGLYCEMIA: Primary | ICD-10-CM

## 2024-06-24 RX ORDER — SEMAGLUTIDE 0.25 MG/.5ML
0.25 INJECTION, SOLUTION SUBCUTANEOUS
Qty: 2 ML | Refills: 0 | Status: SHIPPED | OUTPATIENT
Start: 2024-06-24 | End: 2024-06-26

## 2024-06-24 NOTE — TELEPHONE ENCOUNTER
From: Petra Starr  To: Dr. Leslie Herron  Sent: 2024 11:31 AM EDT  Subject: Wegovy    You gave me a prescription last November and none of the pharmacies had it in stock. I went to [facility] over the weekend they have it in stock now but my prescription is . Could I please get a new prescription

## 2024-06-24 NOTE — TELEPHONE ENCOUNTER
Lab Results   Component Value Date    LABA1C 5.6 05/18/2024    LABA1C 5.7 11/30/2023    LABA1C 5.9 05/27/2023

## 2024-06-26 ENCOUNTER — PATIENT MESSAGE (OUTPATIENT)
Dept: FAMILY MEDICINE CLINIC | Age: 59
End: 2024-06-26

## 2024-06-26 DIAGNOSIS — R73.03 PREDIABETES: Primary | ICD-10-CM

## 2024-06-26 DIAGNOSIS — R73.9 HYPERGLYCEMIA: ICD-10-CM

## 2024-06-26 RX ORDER — SEMAGLUTIDE 1.34 MG/ML
0.25 INJECTION, SOLUTION SUBCUTANEOUS WEEKLY
Qty: 2 ADJUSTABLE DOSE PRE-FILLED PEN SYRINGE | Refills: 5 | Status: SHIPPED | OUTPATIENT
Start: 2024-06-26 | End: 2024-06-28 | Stop reason: RX

## 2024-06-26 NOTE — TELEPHONE ENCOUNTER
Please do prior authorization for Ozempic     Diagnosis Orders   1. Prediabetes  Semaglutide,0.25 or 0.5MG/DOS, (OZEMPIC, 0.25 OR 0.5 MG/DOSE,) 2 MG/1.5ML SOPN      2. Hyperglycemia  Semaglutide,0.25 or 0.5MG/DOS, (OZEMPIC, 0.25 OR 0.5 MG/DOSE,) 2 MG/1.5ML SOPN

## 2024-06-26 NOTE — TELEPHONE ENCOUNTER
From: Petra Starr  To: Dr. Leslie Herron  Sent: 6/26/2024 10:52 AM EDT  Subject: Ozempic     My insurance will not cover wegovy but they will cover ozempic they said to have my dr send a pre authorization and the prescription to pharmacy

## 2024-06-28 ENCOUNTER — TELEPHONE (OUTPATIENT)
Dept: FAMILY MEDICINE CLINIC | Age: 59
End: 2024-06-28

## 2024-06-28 DIAGNOSIS — R73.03 PREDIABETES: Primary | ICD-10-CM

## 2024-06-28 RX ORDER — SEMAGLUTIDE 2.68 MG/ML
2 INJECTION, SOLUTION SUBCUTANEOUS
Qty: 3 ML | Refills: 0 | Status: SHIPPED | OUTPATIENT
Start: 2024-06-28

## 2024-06-28 NOTE — TELEPHONE ENCOUNTER
Please let the patient know to  prescription from the pharmacy.  Orders Placed This Encounter   Medications    semaglutide, 2 MG/DOSE, (OZEMPIC, 2 MG/DOSE,) 8 MG/3ML SOPN sc injection     Sig: Inject 2 mg into the skin every 7 days     Dispense:  3 mL     Refill:  0         [id number] - [place], [place] - [address] [phone] - F [phone]  [address] [address]  Phone: [phone] Fax: [phone]      No orders of the defined types were placed in this encounter.      Future Appointments   Date Time Provider Department Center   8/6/2024  9:15 AM Dayron Curtis DO [facility] [place]   8/26/2024  1:00 PM Dayron Curtis DO [facility] [place]   9/26/2024  2:30 PM [facility]   11/15/2024  2:00 PM [facility]   5/16/2025  1:30 PM [facility]       Thank you!

## 2024-06-28 NOTE — TELEPHONE ENCOUNTER
Please call [facility], pharmacist told pt needs clarification for Ozempic, what exactly do they need  Please call pharmacy

## 2024-06-28 NOTE — TELEPHONE ENCOUNTER
Call back received from pharmacy advised that the pen only comes in 2 mg per 3 ML, new order requested.

## 2024-07-01 NOTE — TELEPHONE ENCOUNTER
Pt aware, also sent message via [employer] that now a PA is to be done and awaited on medication.

## 2024-07-05 RX ORDER — DICYCLOMINE HYDROCHLORIDE 10 MG/1
CAPSULE ORAL
Qty: 360 CAPSULE | Refills: 0 | Status: SHIPPED | OUTPATIENT
Start: 2024-07-05

## 2024-08-06 ENCOUNTER — HOSPITAL ENCOUNTER (OUTPATIENT)
Dept: PAIN MANAGEMENT | Facility: CLINIC | Age: 59
Discharge: HOME | End: 2024-08-06
Payer: COMMERCIAL

## 2024-08-06 VITALS
RESPIRATION RATE: 13 BRPM | WEIGHT: 199 LBS | BODY MASS INDEX: 39.07 KG/M2 | OXYGEN SATURATION: 97 % | HEIGHT: 60 IN | DIASTOLIC BLOOD PRESSURE: 82 MMHG | HEART RATE: 65 BPM | TEMPERATURE: 98 F | SYSTOLIC BLOOD PRESSURE: 126 MMHG

## 2024-08-06 DIAGNOSIS — I10 ESSENTIAL HYPERTENSION: ICD-10-CM

## 2024-08-06 DIAGNOSIS — M54.40 CHRONIC LOW BACK PAIN WITH SCIATICA, SCIATICA LATERALITY UNSPECIFIED, UNSPECIFIED BACK PAIN LATERALITY: ICD-10-CM

## 2024-08-06 DIAGNOSIS — E55.9 VITAMIN D DEFICIENCY: ICD-10-CM

## 2024-08-06 DIAGNOSIS — G89.29 CHRONIC LOW BACK PAIN WITH SCIATICA, SCIATICA LATERALITY UNSPECIFIED, UNSPECIFIED BACK PAIN LATERALITY: ICD-10-CM

## 2024-08-06 PROCEDURE — 2580000003 HC RX 258: Performed by: STUDENT IN AN ORGANIZED HEALTH CARE EDUCATION/TRAINING PROGRAM

## 2024-08-06 PROCEDURE — 2500000003 HC RX 250 WO HCPCS: Performed by: STUDENT IN AN ORGANIZED HEALTH CARE EDUCATION/TRAINING PROGRAM

## 2024-08-06 PROCEDURE — 64484 NJX AA&/STRD TFRM EPI L/S EA: CPT | Performed by: STUDENT IN AN ORGANIZED HEALTH CARE EDUCATION/TRAINING PROGRAM

## 2024-08-06 PROCEDURE — 64483 NJX AA&/STRD TFRM EPI L/S 1: CPT | Performed by: STUDENT IN AN ORGANIZED HEALTH CARE EDUCATION/TRAINING PROGRAM

## 2024-08-06 PROCEDURE — 64483 NJX AA&/STRD TFRM EPI L/S 1: CPT

## 2024-08-06 PROCEDURE — 99152 MOD SED SAME PHYS/QHP 5/>YRS: CPT | Performed by: STUDENT IN AN ORGANIZED HEALTH CARE EDUCATION/TRAINING PROGRAM

## 2024-08-06 PROCEDURE — 6360000004 HC RX CONTRAST MEDICATION: Performed by: STUDENT IN AN ORGANIZED HEALTH CARE EDUCATION/TRAINING PROGRAM

## 2024-08-06 PROCEDURE — 64484 NJX AA&/STRD TFRM EPI L/S EA: CPT

## 2024-08-06 PROCEDURE — 6360000002 HC RX W HCPCS: Performed by: STUDENT IN AN ORGANIZED HEALTH CARE EDUCATION/TRAINING PROGRAM

## 2024-08-06 RX ORDER — DEXAMETHASONE SODIUM PHOSPHATE 10 MG/ML
INJECTION, SOLUTION INTRAMUSCULAR; INTRAVENOUS
Status: COMPLETED | OUTPATIENT
Start: 2024-08-06 | End: 2024-08-06

## 2024-08-06 RX ORDER — POTASSIUM CHLORIDE 20 MEQ/1
TABLET, EXTENDED RELEASE ORAL
Qty: 90 TABLET | Refills: 0 | Status: SHIPPED | OUTPATIENT
Start: 2024-08-06

## 2024-08-06 RX ORDER — LIDOCAINE HYDROCHLORIDE 10 MG/ML
INJECTION, SOLUTION EPIDURAL; INFILTRATION; INTRACAUDAL; PERINEURAL
Status: COMPLETED | OUTPATIENT
Start: 2024-08-06 | End: 2024-08-06

## 2024-08-06 RX ORDER — ERGOCALCIFEROL 1.25 MG/1
50000 CAPSULE ORAL WEEKLY
Qty: 12 CAPSULE | Refills: 0 | Status: SHIPPED | OUTPATIENT
Start: 2024-08-06

## 2024-08-06 RX ORDER — FUROSEMIDE 20 MG/1
TABLET ORAL
Qty: 90 TABLET | Refills: 0 | Status: SHIPPED | OUTPATIENT
Start: 2024-08-06

## 2024-08-06 RX ORDER — SODIUM CHLORIDE 0.9 % (FLUSH) 0.9 %
SYRINGE (ML) INJECTION
Status: COMPLETED | OUTPATIENT
Start: 2024-08-06 | End: 2024-08-06

## 2024-08-06 RX ORDER — MIDAZOLAM HYDROCHLORIDE 2 MG/2ML
INJECTION, SOLUTION INTRAMUSCULAR; INTRAVENOUS
Status: COMPLETED | OUTPATIENT
Start: 2024-08-06 | End: 2024-08-06

## 2024-08-06 RX ADMIN — DEXAMETHASONE SODIUM PHOSPHATE 20 MG: 10 INJECTION, SOLUTION INTRAMUSCULAR; INTRAVENOUS at 09:44

## 2024-08-06 RX ADMIN — LIDOCAINE HYDROCHLORIDE 4 ML: 10 INJECTION, SOLUTION EPIDURAL; INFILTRATION; INTRACAUDAL at 09:46

## 2024-08-06 RX ADMIN — Medication 4 ML: at 09:45

## 2024-08-06 RX ADMIN — MIDAZOLAM HYDROCHLORIDE 2 MG: 1 INJECTION, SOLUTION INTRAMUSCULAR; INTRAVENOUS at 09:42

## 2024-08-06 RX ADMIN — IOHEXOL 3 ML: 180 INJECTION INTRAVENOUS at 09:44

## 2024-08-06 ASSESSMENT — PAIN DESCRIPTION - DESCRIPTORS: DESCRIPTORS: STABBING

## 2024-08-06 ASSESSMENT — PAIN - FUNCTIONAL ASSESSMENT: PAIN_FUNCTIONAL_ASSESSMENT: 0-10

## 2024-08-06 NOTE — OP NOTE
site.  The same procedure was performed at the level above at L4.  The patient did not experience any hemodynamic or neurological sequelae.    The patient was transferred to the postoperative care unit in stable condition.  Written discharge instructions were given to the patient.    COMPLICATIONS:  There were no apparent complications.  The patient tolerated the procedure well.      SEDATION NOTE:     ASA CLASSIFICATION  2  MP   CLASSIFICATION  2     Moderate intravenous conscious sedation was supervised by Dr. Curtis  The patient was independently monitored by a Registered Nurse assigned to the Procedure Room  Monitoring included automated blood pressure, continuous EKG, Capnography and continuous pulse oximetry.   The detailed Conscious Record is permanently stored in the Hospital Information System.      The following is the conscious sedation record:  Start Time:  940  End Time:  950  Duration:  10 minutes  MEDS GIVEN Versed 2 mg

## 2024-08-06 NOTE — H&P
Review of Systems:   Focused review of systems was performed, and negative as pertinent to diagnosis, except as stated in HPI.      Physical Exam  Constitutional:       Appearance: Normal appearance.   Pulmonary:      Effort: Pulmonary effort is normal.   Neurological:      Mental Status: alert.   Psychiatric:         Attention and Perception: Attention and perception normal.         Mood and Affect: Mood and affect normal.   Cardiovascular:      Rate: Normal rate.       Patient's current physical status, medications, medical history, and HPI have been reviewed and updated as appropriate on this date: 08/06/24    Risk/Benefit(s): The risks, benefits, alternatives, and potential complications have been discussed with the patient/family and informed consent has been obtained for the procedure/sedation.    Diagnosis:   Lumbar radiculopathy      Plan: Right lumbar L4 and L5 TFESI      ASA CLASSIFICATION  2  MP   CLASSIFICATION  2    Dayron Curtis DO

## 2024-08-06 NOTE — DISCHARGE INSTRUCTIONS

## 2024-08-06 NOTE — TELEPHONE ENCOUNTER
Please Approve or Refuse.  Send to Pharmacy per Pt's Request: [facility]     Next Visit Date:  11/15/2024   Last Visit Date: 5/15/2024    Hemoglobin A1C (%)   Date Value   05/18/2024 5.6   11/30/2023 5.7   05/27/2023 5.9             ( goal A1C is < 7)   BP Readings from Last 3 Encounters:   08/06/24 126/82   06/11/24 126/74   05/15/24 128/78          (goal 120/80)  BUN   Date Value Ref Range Status   05/18/2024 11 6 - 20 mg/dL Final     Creatinine   Date Value Ref Range Status   05/18/2024 0.6 0.5 - 0.9 mg/dL Final     Potassium   Date Value Ref Range Status   05/18/2024 3.8 3.7 - 5.3 mmol/L Final

## 2024-08-26 ENCOUNTER — HOSPITAL ENCOUNTER (OUTPATIENT)
Dept: PAIN MANAGEMENT | Age: 59
Discharge: HOME | End: 2024-08-26
Payer: COMMERCIAL

## 2024-08-26 VITALS — HEIGHT: 61 IN | WEIGHT: 199 LBS | BODY MASS INDEX: 37.57 KG/M2

## 2024-08-26 DIAGNOSIS — E66.9 CLASS 2 OBESITY WITH BODY MASS INDEX (BMI) OF 38.0 TO 38.9 IN ADULT, UNSPECIFIED OBESITY TYPE, UNSPECIFIED WHETHER SERIOUS COMORBIDITY PRESENT: ICD-10-CM

## 2024-08-26 DIAGNOSIS — M47.817 LUMBOSACRAL SPONDYLOSIS WITHOUT MYELOPATHY: Primary | ICD-10-CM

## 2024-08-26 DIAGNOSIS — M51.36 LUMBAR DEGENERATIVE DISC DISEASE: ICD-10-CM

## 2024-08-26 PROCEDURE — 99213 OFFICE O/P EST LOW 20 MIN: CPT

## 2024-08-26 PROCEDURE — 99214 OFFICE O/P EST MOD 30 MIN: CPT | Performed by: STUDENT IN AN ORGANIZED HEALTH CARE EDUCATION/TRAINING PROGRAM

## 2024-08-26 ASSESSMENT — PAIN SCALES - GENERAL: PAINLEVEL_OUTOF10: 5

## 2024-08-26 NOTE — PROGRESS NOTES
rashes or lesions  MUSCULOSKELETAL:  Inspection: The back and extremities are symmetric and aligned. Muscle bulk is normal in appearance.  Palpation: There is tenderness to palpation along the lumbar paraspinal musculature bilaterally  Lumbar range of motion is full  NEUROMUSCULAR:  Patient ambulates unassisted  Gait is nonantalgic  Sensation to light touch is grossly intact in lower extremities  Strength is grossly intact in lower extremities  No ankle clonus    Special Tests:  Lumbar facet loading is positive bilaterally  Seated straight leg raise is negative bilaterally      DIAGNOSIS:    ICD-10-CM    1. Lumbosacral spondylosis without myelopathy  M47.817 FACET JOINT L/S     FACET JOINT L/S 2ND LEVEL      2. Lumbar degenerative disc disease  M51.36       3. Class 2 obesity with body mass index (BMI) of 38.0 to 38.9 in adult, unspecified obesity type, unspecified whether serious comorbidity present  E66.9     Z68.38           ASSESSMENT:    ePtra Starr is a 59 y.o.female who presents with chronic low back pain and right leg pain. To review, patient had a work-related injury several years ago.  She has not had lumbar spine surgery.  She has had worsening low back and right leg pain for the last year.    The patient's history and physical examination are consistent with lumbosacral spondylosis as the patient has axial low back pain that is mechanical in nature. There is tenderness to palpation along the lumbar paraspinal musculature with positive lumbar facet loading bilaterally.  The lumbar MRI on 6/14/2024 reveals multilevel degenerative changes with facet hypertrophy throughout the lumbar spine specifically at L4-5 and L5-S1 facet joints. I will plan for bilateral lumbar L3, L4, L5 medial branch blocks using fluoroscopy with progression to radiofrequency ablation if successful.     She underwent a right lumbar L4 and L5 transforaminal epidural steroid injection on 8/6/2024 with 100% improvement in pain and  function of her right leg.  Therefore, her radicular symptoms have been optimized.    Neurologically, it appears the patient has full strength and normal sensation. There is no evidence of radiculopathy or myelopathy on examination. There are no red flags in the patient's history. The patient has failed conservative measures including outpatient physical therapy, greater than 3 medications for pain relief, a self-directed therapy program, as well as activity modification all within the last 6 weeks over 3 months. The patient's pain is moderate to severe that causes functional deficit measured on pain and disability scale. The patient reports worsening quality of life.    PLAN:  Medications:    For nonopioid therapy, the following medications were prescribed:    -None    Opioid therapy:  -Not indicated    Interventions:  -Plan for bilateral lumbar L3, L4, L5 medial branch blocks  -Status post right lumbar L4 and L5 transforaminal epidural steroid injection on 8/6/2024 with 100% improvement in pain and function of her right leg  -Moderate sedation will be utilized as patient has a history of severe anxiety which will cause the patient to move during the procedure which will not allow for a safe and effective procedure to be performed.  We discussed the risk of moderate sedation including neurological events, cardiopulmonary events and even death.  The patient still consented to proceed with moderate sedation to allow for a safe and effective procedure.    Imaging:  -No new imaging    Behavioral Therapies:  -Continue daily stretching and home exercise program    Referrals:  -None    Follow-up Plan:  -After procedure    Patient was offered intervention where appropriate.     Multi-modal Pain Therapy:  The patient was explicitly considered for multimodal and interdisciplinary therapy. Non-opioid and non-pharmacological opportunities to enhance analgesia and quality of life have been and will continue to be

## 2024-09-10 ENCOUNTER — TELEPHONE (OUTPATIENT)
Dept: FAMILY MEDICINE CLINIC | Age: 59
End: 2024-09-10

## 2024-09-18 ENCOUNTER — TELEPHONE (OUTPATIENT)
Dept: UROLOGY | Age: 59
End: 2024-09-18

## 2024-09-21 ENCOUNTER — TELEPHONE (OUTPATIENT)
Dept: FAMILY MEDICINE CLINIC | Age: 59
End: 2024-09-21

## 2024-10-02 ENCOUNTER — OFFICE VISIT (OUTPATIENT)
Dept: FAMILY MEDICINE CLINIC | Age: 59
End: 2024-10-02
Payer: COMMERCIAL

## 2024-10-02 VITALS
TEMPERATURE: 97.9 F | SYSTOLIC BLOOD PRESSURE: 128 MMHG | OXYGEN SATURATION: 97 % | HEART RATE: 79 BPM | DIASTOLIC BLOOD PRESSURE: 75 MMHG

## 2024-10-02 DIAGNOSIS — S30.861A INFECTED INSECT BITE OF ABDOMEN, INITIAL ENCOUNTER: Primary | ICD-10-CM

## 2024-10-02 DIAGNOSIS — L08.9 INFECTED INSECT BITE OF ABDOMEN, INITIAL ENCOUNTER: Primary | ICD-10-CM

## 2024-10-02 PROCEDURE — 99213 OFFICE O/P EST LOW 20 MIN: CPT

## 2024-10-02 PROCEDURE — 3074F SYST BP LT 130 MM HG: CPT

## 2024-10-02 PROCEDURE — 3078F DIAST BP <80 MM HG: CPT

## 2024-10-02 RX ORDER — MUPIROCIN 20 MG/G
OINTMENT TOPICAL
Qty: 30 G | Refills: 0 | Status: SHIPPED | OUTPATIENT
Start: 2024-10-02 | End: 2024-10-09

## 2024-10-02 RX ORDER — MELOXICAM 15 MG/1
TABLET ORAL
COMMUNITY
Start: 2024-08-07

## 2024-10-02 RX ORDER — DOXYCYCLINE HYCLATE 100 MG
100 TABLET ORAL 2 TIMES DAILY
Qty: 14 TABLET | Refills: 0 | Status: SHIPPED | OUTPATIENT
Start: 2024-10-02 | End: 2024-10-09

## 2024-10-02 ASSESSMENT — ENCOUNTER SYMPTOMS
NAIL CHANGES: 0
RHINORRHEA: 0
EYE PAIN: 0
VOMITING: 0
SHORTNESS OF BREATH: 0
DIARRHEA: 0
SORE THROAT: 0
COUGH: 0
COLOR CHANGE: 1

## 2024-10-02 NOTE — PROGRESS NOTES
Adult)   Pulse 79   Temp 97.9 °F (36.6 °C) (Oral)   SpO2 97%     Assessment:   Assessment & Plan    Diagnosis Orders   1. Infected insect bite of abdomen, initial encounter  doxycycline hyclate (VIBRA-TABS) 100 MG tablet    mupirocin (BACTROBAN) 2 % ointment          Lab Results   Component Value Date     05/18/2024    K 3.8 05/18/2024     05/18/2024    CO2 25 05/18/2024    BUN 11 05/18/2024    CREATININE 0.6 05/18/2024    GLUCOSE 104 (H) 05/18/2024    CALCIUM 9.5 05/18/2024    BILITOT 0.5 05/18/2024    ALKPHOS 97 05/18/2024    AST 14 05/18/2024    ALT 17 05/18/2024    LABGLOM >90 05/18/2024    GFRAA >60 06/17/2022    GLOB NOT REPORTED 03/23/2016     Hemoglobin A1C   Date Value Ref Range Status   05/18/2024 5.6 4.0 - 6.0 % Final       Plan:   -Patient nontoxic, afebrile, VSS  -Patient instructed to complete antibiotic prescription fully.  -Warm compresses TID for 15 minutes at a time.  -May alternate ice packs to relieve itching  -Tylenol as needed for the discomfort/fever.  -Patient agreeable to treatment plan.  -Educational materials provided on AVS.    Return if symptoms worsen or fail to improve.    Orders Placed This Encounter   Medications    doxycycline hyclate (VIBRA-TABS) 100 MG tablet     Sig: Take 1 tablet by mouth 2 times daily for 7 days     Dispense:  14 tablet     Refill:  0    mupirocin (BACTROBAN) 2 % ointment     Sig: Apply topically 3 times daily.     Dispense:  30 g     Refill:  0         Patient given educational materials - see patient instructions.  Discussed use, benefit, and side effects of prescribed medications.  All patient questions answered.  Pt voiced understanding.    Electronically signed by NEHAL Baez NP on 10/2/2024 at 9:11 AM

## 2024-10-29 ENCOUNTER — HOSPITAL ENCOUNTER (OUTPATIENT)
Dept: PAIN MANAGEMENT | Facility: CLINIC | Age: 59
Discharge: HOME | End: 2024-10-29
Payer: COMMERCIAL

## 2024-10-29 VITALS
BODY MASS INDEX: 38.09 KG/M2 | HEIGHT: 60 IN | SYSTOLIC BLOOD PRESSURE: 125 MMHG | WEIGHT: 194 LBS | OXYGEN SATURATION: 99 % | HEART RATE: 79 BPM | DIASTOLIC BLOOD PRESSURE: 103 MMHG | RESPIRATION RATE: 13 BRPM | TEMPERATURE: 98.8 F

## 2024-10-29 DIAGNOSIS — R52 PAIN MANAGEMENT: ICD-10-CM

## 2024-10-29 PROCEDURE — 64494 INJ PARAVERT F JNT L/S 2 LEV: CPT

## 2024-10-29 PROCEDURE — 64493 INJ PARAVERT F JNT L/S 1 LEV: CPT | Performed by: STUDENT IN AN ORGANIZED HEALTH CARE EDUCATION/TRAINING PROGRAM

## 2024-10-29 PROCEDURE — 64494 INJ PARAVERT F JNT L/S 2 LEV: CPT | Performed by: STUDENT IN AN ORGANIZED HEALTH CARE EDUCATION/TRAINING PROGRAM

## 2024-10-29 PROCEDURE — 2500000003 HC RX 250 WO HCPCS: Performed by: STUDENT IN AN ORGANIZED HEALTH CARE EDUCATION/TRAINING PROGRAM

## 2024-10-29 PROCEDURE — 64493 INJ PARAVERT F JNT L/S 1 LEV: CPT

## 2024-10-29 RX ORDER — LIDOCAINE HYDROCHLORIDE 20 MG/ML
INJECTION, SOLUTION EPIDURAL; INFILTRATION; INTRACAUDAL; PERINEURAL
Status: COMPLETED | OUTPATIENT
Start: 2024-10-29 | End: 2024-10-29

## 2024-10-29 RX ADMIN — LIDOCAINE HYDROCHLORIDE 5 ML: 20 INJECTION, SOLUTION EPIDURAL; INFILTRATION; INTRACAUDAL; PERINEURAL at 11:00

## 2024-10-29 ASSESSMENT — PAIN DESCRIPTION - DESCRIPTORS: DESCRIPTORS: SHOOTING

## 2024-10-29 ASSESSMENT — PAIN - FUNCTIONAL ASSESSMENT
PAIN_FUNCTIONAL_ASSESSMENT: PREVENTS OR INTERFERES SOME ACTIVE ACTIVITIES AND ADLS
PAIN_FUNCTIONAL_ASSESSMENT: NONE - DENIES PAIN
PAIN_FUNCTIONAL_ASSESSMENT: 0-10

## 2024-10-29 NOTE — DISCHARGE INSTRUCTIONS
You have received a sedative/anesthetic therefore you should not consume any alcoholic beverages for 24 hours.  Do not drive or operate machinery for 24 hours.    Do not take a tub bath for 72 hours after procedure (this includes hot tubs).  You may shower, but avoid hot water to injection site.   Avoid strenuous activity TODAY especially if you experience dizziness.   Remove band-aid the next day.    Wash off any residual iodine 24 hours from today.   Do not use heat, heating pad, or any other heating device over the injection site for 3 days after the procedure.    If you experience pain after your procedure, you may continue with your current pain medication as prescribed.  (DO NOT INCREASE YOUR PAIN MEDICATION WITHOUT TALKING TO DOCTOR)  Soreness and pain at injection site is common, may use ice to reduce soreness.    Please complete pain diary as instructed. Office staff will contact you to review results.    Call [facility] at [phone] if you experience:   Fever, chills or temperature over 100    Vomiting, headache, persistent stiff neck, nausea or blurred vision   Difficulty urinating or unable to urinate within 8 hours   Increase in weakness, numbness or loss of function of limbs  Increased redness, swelling or drainage at the injection site

## 2024-10-29 NOTE — OP NOTE
PROCEDURE PERFORMED: Bilateral Lumbar medial branch block    PREOPERATIVE DIAGNOSIS: Lumbosacral spondylosis    INDICATIONS: Chronic low back pain    The patient's history and physical exam were reviewed.  The risk, benefits, and alternatives of the procedure were discussed and all questions were answered to the patient's satisfaction.  The patient agreed to proceed and written informed consent was obtained.    POSTOPERATIVE DIAGNOSIS: Lumbar spondylosis    PHYSICIAN:  Dr. Dayron Curtis DO    ANESTHESIA:  LOCAL    ASSISTANT:  NONE    PATHOLOGY:  NONE    ESTIMATED BLOOD LOSS:  N/A    IMPLANTS:  NONE    PROCEDURE DESCRIPTION: Diagnostic bilateral lumbar medial branch block using fluoroscopy    The patient was placed on the operative bed in prone position.  The area was prepped with  Chlorhexidine.  The area was then draped in a sterile fashion.    Targeted levels: Bilateral lumbar L3, L4, L5 medial branch block    An AP  fluoroscopy image was used to identify and matt Cardoso's point at the L3, L4 levels on the targeted side.  Additionally, the junction of the SAP and sacral ala was also marked on the same side.   A 25-gauge 3-1/2 inch Quincke spinal needle was then advanced toward each of these points under fluoroscopic guidance.  Once bone was contacted, negative aspiration was confirmed and 0.5 mL of lidocaine 2% was injected each level.  The needles were removed and the needle sites were dressed appropriately. The same procedure was performed on the opposite side.    The patient was transferred to the postoperative care unit in stable condition.  Written discharge instructions were given to the patient.  The patient was given a pain diary upon discharge.    COMPLICATIONS:  There were no apparent complications.  The patient tolerated the procedure well.

## 2024-10-29 NOTE — H&P
Pain Pre-Op H&P Note    SUBJECTIVE:  No chief complaint on file.      History of Present Illness:   Petra Starr is a 59 y.o. female who presents with chronic low back pain.    Past Medical History:   Diagnosis Date    Abnormal EKG 06/18/2021    SINUS BRADYCARDIA, LEFT AXIS DEVIATION, NONSPECIFIC T WAVE ABNORMALITY    Acquired hypothyroidism 07/27/2020    Bilateral kidney stones 06/18/2022    Bilateral primary osteoarthritis of knee 04/25/2021    Chronic back pain     Coronary artery disease involving native coronary artery of native heart without angina pectoris 03/21/2022    PT. DENIES.    Diarrhea     Distal radius fracture 01/28/2013    Drug-seeking behavior 10/16/2014    Essential hypertension 09/14/2021    Fatty liver 06/18/2022    Fibromyalgia 05/21/2024    FANTA (generalized anxiety disorder)     GERD (gastroesophageal reflux disease)     H/O: hysterectomy 08/19/2020    History of depression     Hyperlipidemia with target LDL less than 100 07/19/2020    Hypothyroidism due to Hashimoto's thyroiditis 07/27/2020    Irritable bowel syndrome with diarrhea 03/21/2022    Mild episode of recurrent major depressive disorder (HCC) 07/19/2020    Opioid dependence (HCC) 10/16/2014    PONV (postoperative nausea and vomiting)     \"THEY PUT SOMETHING BEHIND MY EAR FOR THE N&V\"    Severe obesity (BMI 35.0-39.9) with comorbidity 07/19/2020    Spinal stenosis of lumbar region without neurogenic claudication 06/15/2024    Urge incontinence 03/09/2020    Wears glasses        Past Surgical History:   Procedure Laterality Date    BLADDER SUSPENSION      CARDIAC CATHETERIZATION  08/13/2021    LAD: Diffuse irregularities 30-40%., EF 60%    COLONOSCOPY N/A 9/18/2023    COLONOSCOPY DIAGNOSTIC performed by Vamshi Maldonado MD at [facility] END[facility]    FRACTURE SURGERY  02/07/2013    right distial radius orif    HYSTERECTOMY (CERVIX STATUS UNKNOWN)      has ovaries    KNEE ARTHROSCOPY      right    IA COLONOSCOPY W/BIOPSY SINGLE/MULTIPLE N/A

## 2024-10-30 ENCOUNTER — TELEPHONE (OUTPATIENT)
Dept: PAIN MANAGEMENT | Age: 59
End: 2024-10-30

## 2024-10-30 NOTE — TELEPHONE ENCOUNTER
S/P: Bilateral lumbar L3, L4, L5 medial branch block     DOS: 10/29/2024    Pain  before procedure with activity : 4/5    Pain after procedure with activity: 0    Activities following procedure include: cut grass, yard work    % of pain relief: 100%    Procedure successful: Yes    OV or OR scheduled: OR 11/12/2024

## 2024-11-12 ENCOUNTER — HOSPITAL ENCOUNTER (OUTPATIENT)
Dept: PAIN MANAGEMENT | Facility: CLINIC | Age: 59
Discharge: HOME | End: 2024-11-12
Payer: COMMERCIAL

## 2024-11-12 VITALS
DIASTOLIC BLOOD PRESSURE: 86 MMHG | HEART RATE: 66 BPM | RESPIRATION RATE: 12 BRPM | HEIGHT: 60 IN | OXYGEN SATURATION: 98 % | WEIGHT: 194 LBS | BODY MASS INDEX: 38.09 KG/M2 | TEMPERATURE: 97.8 F | SYSTOLIC BLOOD PRESSURE: 155 MMHG

## 2024-11-12 DIAGNOSIS — R52 PAIN MANAGEMENT: ICD-10-CM

## 2024-11-12 PROCEDURE — 64493 INJ PARAVERT F JNT L/S 1 LEV: CPT | Performed by: STUDENT IN AN ORGANIZED HEALTH CARE EDUCATION/TRAINING PROGRAM

## 2024-11-12 PROCEDURE — 64494 INJ PARAVERT F JNT L/S 2 LEV: CPT | Performed by: STUDENT IN AN ORGANIZED HEALTH CARE EDUCATION/TRAINING PROGRAM

## 2024-11-12 PROCEDURE — 64493 INJ PARAVERT F JNT L/S 1 LEV: CPT

## 2024-11-12 PROCEDURE — 2500000003 HC RX 250 WO HCPCS: Performed by: STUDENT IN AN ORGANIZED HEALTH CARE EDUCATION/TRAINING PROGRAM

## 2024-11-12 PROCEDURE — 64494 INJ PARAVERT F JNT L/S 2 LEV: CPT

## 2024-11-12 RX ORDER — LIDOCAINE HYDROCHLORIDE 20 MG/ML
INJECTION, SOLUTION EPIDURAL; INFILTRATION; INTRACAUDAL; PERINEURAL
Status: COMPLETED | OUTPATIENT
Start: 2024-11-12 | End: 2024-11-12

## 2024-11-12 RX ADMIN — LIDOCAINE HYDROCHLORIDE 5 ML: 20 INJECTION, SOLUTION EPIDURAL; INFILTRATION; INTRACAUDAL; PERINEURAL at 11:42

## 2024-11-12 ASSESSMENT — PAIN - FUNCTIONAL ASSESSMENT
PAIN_FUNCTIONAL_ASSESSMENT: 0-10
PAIN_FUNCTIONAL_ASSESSMENT: PREVENTS OR INTERFERES SOME ACTIVE ACTIVITIES AND ADLS
PAIN_FUNCTIONAL_ASSESSMENT: NONE - DENIES PAIN

## 2024-11-12 ASSESSMENT — PAIN DESCRIPTION - DESCRIPTORS: DESCRIPTORS: ACHING

## 2024-11-12 NOTE — H&P
Pain Pre-Op H&P Note    SUBJECTIVE:  No chief complaint on file.      History of Present Illness:   Petra Starr is a 59 y.o. female who presents with chronic low back pain.    Past Medical History:   Diagnosis Date    Abnormal EKG 06/18/2021    SINUS BRADYCARDIA, LEFT AXIS DEVIATION, NONSPECIFIC T WAVE ABNORMALITY    Acquired hypothyroidism 07/27/2020    Bilateral kidney stones 06/18/2022    Bilateral primary osteoarthritis of knee 04/25/2021    Chronic back pain     Coronary artery disease involving native coronary artery of native heart without angina pectoris 03/21/2022    PT. DENIES.    Diarrhea     Distal radius fracture 01/28/2013    Drug-seeking behavior 10/16/2014    Essential hypertension 09/14/2021    Fatty liver 06/18/2022    Fibromyalgia 05/21/2024    FANTA (generalized anxiety disorder)     GERD (gastroesophageal reflux disease)     H/O: hysterectomy 08/19/2020    History of depression     Hyperlipidemia with target LDL less than 100 07/19/2020    Hypothyroidism due to Hashimoto's thyroiditis 07/27/2020    Irritable bowel syndrome with diarrhea 03/21/2022    Mild episode of recurrent major depressive disorder (HCC) 07/19/2020    Opioid dependence (HCC) 10/16/2014    PONV (postoperative nausea and vomiting)     \"THEY PUT SOMETHING BEHIND MY EAR FOR THE N&V\"    Severe obesity (BMI 35.0-39.9) with comorbidity 07/19/2020    Spinal stenosis of lumbar region without neurogenic claudication 06/15/2024    Urge incontinence 03/09/2020    Wears glasses        Past Surgical History:   Procedure Laterality Date    BLADDER SUSPENSION      CARDIAC CATHETERIZATION  08/13/2021    LAD: Diffuse irregularities 30-40%., EF 60%    COLONOSCOPY N/A 09/18/2023    COLONOSCOPY DIAGNOSTIC performed by Vamshi Maldonado MD at [facility] ENDO    FRACTURE SURGERY  02/07/2013    right distial radius orif    HYSTERECTOMY (CERVIX STATUS UNKNOWN)      has ovaries    JOINT REPLACEMENT Right 09/05/2024    KNEE ARTHROSCOPY      right    [place]

## 2024-11-12 NOTE — DISCHARGE INSTRUCTIONS
You have received a sedative/anesthetic therefore you should not consume any alcoholic beverages for 24 hours.  Do not drive or operate machinery for 24 hours.    Do not take a tub bath for 72 hours after procedure (this includes hot tubs).  You may shower, but avoid hot water to injection site.   Avoid strenuous activity TODAY especially if you experience dizziness.   Remove band-aid the next day.    Wash off any residual iodine 24 hours from today.   Do not use heat, heating pad, or any other heating device over the injection site for 3 days after the procedure.    If you experience pain after your procedure, you may continue with your current pain medication as prescribed.  (DO NOT INCREASE YOUR PAIN MEDICATION WITHOUT TALKING TO DOCTOR)  Soreness and pain at injection site is common, may use ice to reduce soreness.    Please complete pain diary as instructed. Office staff will contact you to review results.    Call [facility] at [phone] if you experience:   Fever, chills or temperature over 100    Vomiting, headache, persistent stiff neck, nausea or blurred vision   Difficulty urinating or unable to urinate within 8 hours   Increase in weakness, numbness or loss of function of limbs  Increased redness, swelling or drainage at the injection site

## 2024-11-13 ENCOUNTER — TELEPHONE (OUTPATIENT)
Dept: PAIN MANAGEMENT | Age: 59
End: 2024-11-13

## 2024-11-13 DIAGNOSIS — M54.50 CHRONIC BILATERAL LOW BACK PAIN, UNSPECIFIED WHETHER SCIATICA PRESENT: ICD-10-CM

## 2024-11-13 DIAGNOSIS — M47.817 LUMBOSACRAL SPONDYLOSIS WITHOUT MYELOPATHY: Primary | ICD-10-CM

## 2024-11-13 DIAGNOSIS — G89.29 CHRONIC BILATERAL LOW BACK PAIN, UNSPECIFIED WHETHER SCIATICA PRESENT: ICD-10-CM

## 2024-11-13 NOTE — TELEPHONE ENCOUNTER
S/P: Bilateral lumbar L3, L4, L5 medial branch block     DOS: 11/12/2024    Pain  before procedure with activity : 8    Pain after procedure with activity: 1    Activities following procedure include: walked, yard work     % of pain relief: 100%    Procedure successful: Yes    OV or OR scheduled: OR 12/10/2024 & 12/31/2024

## 2024-11-15 ENCOUNTER — OFFICE VISIT (OUTPATIENT)
Dept: FAMILY MEDICINE CLINIC | Age: 59
End: 2024-11-15
Payer: COMMERCIAL

## 2024-11-15 VITALS
BODY MASS INDEX: 40.25 KG/M2 | HEART RATE: 72 BPM | WEIGHT: 205 LBS | OXYGEN SATURATION: 98 % | DIASTOLIC BLOOD PRESSURE: 70 MMHG | SYSTOLIC BLOOD PRESSURE: 134 MMHG | HEIGHT: 60 IN | TEMPERATURE: 97 F

## 2024-11-15 DIAGNOSIS — Z96.651 STATUS POST RIGHT KNEE REPLACEMENT: ICD-10-CM

## 2024-11-15 DIAGNOSIS — G89.29 CHRONIC MIDLINE LOW BACK PAIN WITH RIGHT-SIDED SCIATICA: ICD-10-CM

## 2024-11-15 DIAGNOSIS — M79.7 FIBROMYALGIA: ICD-10-CM

## 2024-11-15 DIAGNOSIS — E55.9 VITAMIN D DEFICIENCY: ICD-10-CM

## 2024-11-15 DIAGNOSIS — N18.30 BENIGN HYPERTENSION WITH CKD (CHRONIC KIDNEY DISEASE) STAGE III (HCC): ICD-10-CM

## 2024-11-15 DIAGNOSIS — E06.3 HYPOTHYROIDISM DUE TO HASHIMOTO'S THYROIDITIS: ICD-10-CM

## 2024-11-15 DIAGNOSIS — G89.29 CHRONIC PAIN OF RIGHT KNEE: Primary | ICD-10-CM

## 2024-11-15 DIAGNOSIS — M25.561 CHRONIC PAIN OF RIGHT KNEE: Primary | ICD-10-CM

## 2024-11-15 DIAGNOSIS — R73.03 PREDIABETES: ICD-10-CM

## 2024-11-15 DIAGNOSIS — I12.9 BENIGN HYPERTENSION WITH CKD (CHRONIC KIDNEY DISEASE) STAGE III (HCC): ICD-10-CM

## 2024-11-15 DIAGNOSIS — M54.41 CHRONIC MIDLINE LOW BACK PAIN WITH RIGHT-SIDED SCIATICA: ICD-10-CM

## 2024-11-15 DIAGNOSIS — E78.2 MIXED HYPERLIPIDEMIA: ICD-10-CM

## 2024-11-15 PROCEDURE — 3075F SYST BP GE 130 - 139MM HG: CPT | Performed by: FAMILY MEDICINE

## 2024-11-15 PROCEDURE — 3078F DIAST BP <80 MM HG: CPT | Performed by: FAMILY MEDICINE

## 2024-11-15 PROCEDURE — 99214 OFFICE O/P EST MOD 30 MIN: CPT | Performed by: FAMILY MEDICINE

## 2024-11-15 RX ORDER — LEVOTHYROXINE SODIUM 75 UG/1
75 TABLET ORAL
Qty: 90 TABLET | Refills: 3 | Status: SHIPPED | OUTPATIENT
Start: 2024-11-15

## 2024-11-15 RX ORDER — YEAST,DRIED (S. CEREVISIAE)
1 POWDER (GRAM) ORAL DAILY
Qty: 90 TABLET | Refills: 0
Start: 2024-11-15

## 2024-11-15 RX ORDER — PRAVASTATIN SODIUM 20 MG
20 TABLET ORAL EVERY EVENING
Qty: 90 TABLET | Refills: 3 | Status: SHIPPED | OUTPATIENT
Start: 2024-11-15

## 2024-11-15 RX ORDER — GABAPENTIN 100 MG/1
100 CAPSULE ORAL 3 TIMES DAILY
Qty: 90 CAPSULE | Refills: 0 | Status: SHIPPED | OUTPATIENT
Start: 2024-11-15 | End: 2024-12-15

## 2024-11-15 RX ORDER — FUROSEMIDE 20 MG/1
20 TABLET ORAL DAILY PRN
Qty: 90 TABLET | Refills: 0 | Status: SHIPPED | OUTPATIENT
Start: 2024-11-15

## 2024-11-15 RX ORDER — ERGOCALCIFEROL 1.25 MG/1
50000 CAPSULE, LIQUID FILLED ORAL WEEKLY
Qty: 12 CAPSULE | Refills: 0 | Status: SHIPPED | OUTPATIENT
Start: 2024-11-15

## 2024-11-15 RX ORDER — POTASSIUM CHLORIDE 1500 MG/1
20 TABLET, EXTENDED RELEASE ORAL DAILY PRN
Qty: 90 TABLET | Refills: 0 | Status: SHIPPED | OUTPATIENT
Start: 2024-11-15

## 2024-11-15 SDOH — ECONOMIC STABILITY: FOOD INSECURITY: WITHIN THE PAST 12 MONTHS, THE FOOD YOU BOUGHT JUST DIDN'T LAST AND YOU DIDN'T HAVE MONEY TO GET MORE.: NEVER TRUE

## 2024-11-15 SDOH — ECONOMIC STABILITY: FOOD INSECURITY: WITHIN THE PAST 12 MONTHS, YOU WORRIED THAT YOUR FOOD WOULD RUN OUT BEFORE YOU GOT MONEY TO BUY MORE.: NEVER TRUE

## 2024-11-15 SDOH — ECONOMIC STABILITY: INCOME INSECURITY: HOW HARD IS IT FOR YOU TO PAY FOR THE VERY BASICS LIKE FOOD, HOUSING, MEDICAL CARE, AND HEATING?: NOT HARD AT ALL

## 2024-11-15 ASSESSMENT — PATIENT HEALTH QUESTIONNAIRE - PHQ9
10. IF YOU CHECKED OFF ANY PROBLEMS, HOW DIFFICULT HAVE THESE PROBLEMS MADE IT FOR YOU TO DO YOUR WORK, TAKE CARE OF THINGS AT HOME, OR GET ALONG WITH OTHER PEOPLE: NOT DIFFICULT AT ALL
7. TROUBLE CONCENTRATING ON THINGS, SUCH AS READING THE NEWSPAPER OR WATCHING TELEVISION: NOT AT ALL
8. MOVING OR SPEAKING SO SLOWLY THAT OTHER PEOPLE COULD HAVE NOTICED. OR THE OPPOSITE, BEING SO FIGETY OR RESTLESS THAT YOU HAVE BEEN MOVING AROUND A LOT MORE THAN USUAL: NOT AT ALL
9. THOUGHTS THAT YOU WOULD BE BETTER OFF DEAD, OR OF HURTING YOURSELF: NOT AT ALL
SUM OF ALL RESPONSES TO PHQ QUESTIONS 1-9: 0
SUM OF ALL RESPONSES TO PHQ QUESTIONS 1-9: 0
4. FEELING TIRED OR HAVING LITTLE ENERGY: NOT AT ALL
1. LITTLE INTEREST OR PLEASURE IN DOING THINGS: NOT AT ALL
SUM OF ALL RESPONSES TO PHQ QUESTIONS 1-9: 0
5. POOR APPETITE OR OVEREATING: NOT AT ALL
SUM OF ALL RESPONSES TO PHQ9 QUESTIONS 1 & 2: 0
6. FEELING BAD ABOUT YOURSELF - OR THAT YOU ARE A FAILURE OR HAVE LET YOURSELF OR YOUR FAMILY DOWN: NOT AT ALL
3. TROUBLE FALLING OR STAYING ASLEEP: NOT AT ALL
SUM OF ALL RESPONSES TO PHQ QUESTIONS 1-9: 0
2. FEELING DOWN, DEPRESSED OR HOPELESS: NOT AT ALL

## 2024-11-15 NOTE — ASSESSMENT & PLAN NOTE
Chronic, with intermittent flareups, muscle relaxant, will start gabapentin, stretching, repositioning, continue [id number] Mg daily    Orders:    tiZANidine (ZANAFLEX) 4 MG tablet; Take 1 tablet by mouth every 8 hours as needed (back pain)    gabapentin (NEURONTIN) 100 MG capsule; Take 1 capsule by mouth 3 times daily for 30 days. Intended supply: 90 days

## 2024-11-15 NOTE — ASSESSMENT & PLAN NOTE
Chronic, stable, well-controlled  Worsening kidney disease, not taking NSAIDs anymore  Chronic kidney disease stage IIIa, GFR 47 on 8/22/2024 in [facility]  Will add furosemide due to leg swelling  Continue amlodipine 5 Mg daily  Discussed low salt diet and BP and pulse monitoring.  Check labs     Orders:    potassium chloride (KLOR-CON M) 20 MEQ extended release tablet; Take 1 tablet by mouth daily as needed (when takign lasix) Take with furosemide    furosemide (LASIX) 20 MG tablet; Take 1 tablet by mouth daily as needed (for leg swelling)    CBC with Auto Differential; Future    Comprehensive Metabolic Panel; Future    Uric Acid; Future    Magnesium; Future    Phosphorus; Future    Urinalysis with Reflex to Culture; Future

## 2024-11-15 NOTE — ASSESSMENT & PLAN NOTE
Worsening, based on prior hemoglobin A1c, continue low-carb diet, low-fat diet, exercise, attempt to lose weight.  In the past she did try metformin but gave her severe GI symptoms nausea, abdominal pain and diarrhea  We will try Trulicity if covered by the insurance  Orders:    Hemoglobin A1C; Future    Vitamin B12 & Folate; Future    dulaglutide (TRULICITY) 0.75 MG/0.5ML SOAJ SC injection; Inject 0.5 mLs into the skin once a week

## 2024-11-15 NOTE — PROGRESS NOTES
Discontinued     
packs/day: 0.00     Average packs/day: 0.5 packs/day for 3.0 years (1.5 ttl pk-yrs)     Types: Cigarettes     Start date: 1985     Quit date: 1988     Years since quittin.8     Passive exposure: Past    Smokeless tobacco: Never   Vaping Use    Vaping status: Never Used   Substance Use Topics    Alcohol use: Not Currently     Comment: occasionally    Drug use: No       Review of Systems   Constitutional:  Positive for fatigue and unexpected weight change. Negative for activity change, appetite change, chills, diaphoresis and fever.   Eyes:  Positive for visual disturbance.   Respiratory:  Negative for cough, chest tightness, shortness of breath and wheezing.    Cardiovascular:  Positive for leg swelling (right leg). Negative for chest pain and palpitations.   Gastrointestinal:  Negative for abdominal distention, abdominal pain, constipation, diarrhea, nausea and vomiting.   Endocrine: Negative for cold intolerance, heat intolerance, polydipsia, polyphagia and polyuria.   Genitourinary:  Positive for frequency and urgency.        Overactive bladder    Musculoskeletal:  Positive for arthralgias (right knee), back pain (Chronic, on the right side, will have procedure with pain management), gait problem (Due to right knee pain), joint swelling (right knee) and myalgias (due to fibromyalgia).   Neurological:  Negative for weakness.   Hematological:  Does not bruise/bleed easily.   Psychiatric/Behavioral:  Positive for sleep disturbance. Negative for dysphoric mood. The patient is nervous/anxious.          -vital signs stable and within normal limits except morbid obesity    /70   Pulse 72   Temp 97 °F (36.1 °C) (Temporal)   Ht 1.524 m (5')   Wt 93 kg (205 lb)   SpO2 98%   BMI 40.04 kg/m²        Physical Exam  Vitals and nursing note reviewed.   Constitutional:       General: She is not in acute distress.     Appearance: Normal appearance. She is well-developed. She is obese. She is not

## 2024-11-15 NOTE — ASSESSMENT & PLAN NOTE
Chronic, improved with medication  Continue pravastatin, check lipids  Orders:    pravastatin (PRAVACHOL) 20 MG tablet; Take 1 tablet by mouth every evening For high cholesterol, to prevent strokes and heart attacks    Lipid Panel; Future

## 2024-11-15 NOTE — ASSESSMENT & PLAN NOTE
Chronic, failing to improve, has appointment with pain management for procedures  Stretching, repositioning, continue tizanidine as needed, will start gabapentin  Orders:    tiZANidine (ZANAFLEX) 4 MG tablet; Take 1 tablet by mouth every 8 hours as needed (back pain)    gabapentin (NEURONTIN) 100 MG capsule; Take 1 capsule by mouth 3 times daily for 30 days. Intended supply: 90 days  FMLA completed

## 2024-11-15 NOTE — ASSESSMENT & PLAN NOTE
Chronic, improved with medication  Recheck labs  Advised to take Synthroid in the morning, on empty stomach, without any other meds and with a full glass of water.    Orders:    levothyroxine (SYNTHROID) 75 MCG tablet; Take 1 tablet by mouth every morning (before breakfast)    TSH; Future    T4, Free; Future

## 2024-11-16 ENCOUNTER — HOSPITAL ENCOUNTER (OUTPATIENT)
Age: 59
Discharge: HOME | End: 2024-11-16
Payer: COMMERCIAL

## 2024-11-16 DIAGNOSIS — N18.30 BENIGN HYPERTENSION WITH CKD (CHRONIC KIDNEY DISEASE) STAGE III (HCC): ICD-10-CM

## 2024-11-16 DIAGNOSIS — E78.2 MIXED HYPERLIPIDEMIA: ICD-10-CM

## 2024-11-16 DIAGNOSIS — R73.03 PREDIABETES: ICD-10-CM

## 2024-11-16 DIAGNOSIS — E06.3 HYPOTHYROIDISM DUE TO HASHIMOTO'S THYROIDITIS: ICD-10-CM

## 2024-11-16 DIAGNOSIS — E55.9 VITAMIN D DEFICIENCY: ICD-10-CM

## 2024-11-16 DIAGNOSIS — I12.9 BENIGN HYPERTENSION WITH CKD (CHRONIC KIDNEY DISEASE) STAGE III (HCC): ICD-10-CM

## 2024-11-16 LAB
25(OH)D3 SERPL-MCNC: 34.3 NG/ML (ref 30–100)
ALBUMIN SERPL-MCNC: 4.1 G/DL (ref 3.5–5.2)
ALP SERPL-CCNC: 114 U/L (ref 35–104)
ALT SERPL-CCNC: 13 U/L (ref 10–35)
ANION GAP SERPL CALCULATED.3IONS-SCNC: 11 MMOL/L (ref 9–16)
AST SERPL-CCNC: 14 U/L (ref 10–35)
BASOPHILS # BLD: 0.1 K/UL (ref 0–0.2)
BASOPHILS NFR BLD: 1 % (ref 0–2)
BILIRUB SERPL-MCNC: 0.4 MG/DL (ref 0–1.2)
BILIRUB UR QL STRIP: NEGATIVE
BUN SERPL-MCNC: 11 MG/DL (ref 6–20)
CALCIUM SERPL-MCNC: 9.4 MG/DL (ref 8.6–10.4)
CHLORIDE SERPL-SCNC: 108 MMOL/L (ref 98–107)
CHOLEST SERPL-MCNC: 158 MG/DL (ref 0–199)
CHOLESTEROL/HDL RATIO: 3.2
CLARITY UR: CLEAR
CO2 SERPL-SCNC: 25 MMOL/L (ref 20–31)
COLOR UR: YELLOW
COMMENT: NORMAL
CREAT SERPL-MCNC: 0.7 MG/DL (ref 0.7–1.2)
EOSINOPHIL # BLD: 0.1 K/UL (ref 0–0.4)
EOSINOPHILS RELATIVE PERCENT: 1 % (ref 0–4)
ERYTHROCYTE [DISTWIDTH] IN BLOOD BY AUTOMATED COUNT: 13 % (ref 11.5–14.9)
EST. AVERAGE GLUCOSE BLD GHB EST-MCNC: 111 MG/DL
FOLATE SERPL-MCNC: 8.3 NG/ML (ref 4.8–24.2)
GFR, ESTIMATED: >90 ML/MIN/1.73M2
GLUCOSE SERPL-MCNC: 111 MG/DL (ref 74–99)
GLUCOSE UR STRIP-MCNC: NEGATIVE MG/DL
HBA1C MFR BLD: 5.5 % (ref 4–6)
HCT VFR BLD AUTO: 41.1 % (ref 36–46)
HDLC SERPL-MCNC: 49 MG/DL
HGB BLD-MCNC: 13.9 G/DL (ref 12–16)
HGB UR QL STRIP.AUTO: NEGATIVE
KETONES UR STRIP-MCNC: NEGATIVE MG/DL
LDLC SERPL CALC-MCNC: 90 MG/DL (ref 0–100)
LEUKOCYTE ESTERASE UR QL STRIP: NEGATIVE
LYMPHOCYTES NFR BLD: 1.5 K/UL (ref 1–4.8)
LYMPHOCYTES RELATIVE PERCENT: 30 % (ref 24–44)
MAGNESIUM SERPL-MCNC: 2.4 MG/DL (ref 1.6–2.6)
MCH RBC QN AUTO: 28.7 PG (ref 26–34)
MCHC RBC AUTO-ENTMCNC: 33.8 G/DL (ref 31–37)
MCV RBC AUTO: 85.2 FL (ref 80–100)
MONOCYTES NFR BLD: 0.3 K/UL (ref 0.1–1.3)
MONOCYTES NFR BLD: 5 % (ref 1–7)
NEUTROPHILS NFR BLD: 63 % (ref 36–66)
NEUTS SEG NFR BLD: 3.2 K/UL (ref 1.3–9.1)
NITRITE UR QL STRIP: NEGATIVE
PH UR STRIP: 6.5 [PH] (ref 5–8)
PHOSPHATE SERPL-MCNC: 3.5 MG/DL (ref 2.5–4.5)
PLATELET # BLD AUTO: 204 K/UL (ref 150–450)
PMV BLD AUTO: 9.2 FL (ref 6–12)
POTASSIUM SERPL-SCNC: 3.7 MMOL/L (ref 3.7–5.3)
PROT SERPL-MCNC: 6.8 G/DL (ref 6.6–8.7)
PROT UR STRIP-MCNC: NEGATIVE MG/DL
RBC # BLD AUTO: 4.83 M/UL (ref 4–5.2)
SODIUM SERPL-SCNC: 144 MMOL/L (ref 136–145)
SP GR UR STRIP: 1.01 (ref 1–1.03)
T4 FREE SERPL-MCNC: 1.1 NG/DL (ref 0.9–1.7)
TRIGL SERPL-MCNC: 93 MG/DL (ref 0–149)
TSH SERPL DL<=0.05 MIU/L-ACNC: 5.71 UIU/ML (ref 0.27–4.2)
URATE SERPL-MCNC: 4 MG/DL (ref 2.4–5.7)
UROBILINOGEN UR STRIP-ACNC: NORMAL EU/DL (ref 0–1)
VIT B12 SERPL-MCNC: 398 PG/ML (ref 232–1245)
WBC OTHER # BLD: 5.1 K/UL (ref 3.5–11)

## 2024-11-16 PROCEDURE — 82306 VITAMIN D 25 HYDROXY: CPT

## 2024-11-16 PROCEDURE — 82607 VITAMIN B-12: CPT

## 2024-11-16 PROCEDURE — 36415 COLL VENOUS BLD VENIPUNCTURE: CPT

## 2024-11-16 PROCEDURE — 83036 HEMOGLOBIN GLYCOSYLATED A1C: CPT

## 2024-11-16 PROCEDURE — 80061 LIPID PANEL: CPT

## 2024-11-16 PROCEDURE — 85025 COMPLETE CBC W/AUTO DIFF WBC: CPT

## 2024-11-16 PROCEDURE — 84439 ASSAY OF FREE THYROXINE: CPT

## 2024-11-16 PROCEDURE — 83735 ASSAY OF MAGNESIUM: CPT

## 2024-11-16 PROCEDURE — 81003 URINALYSIS AUTO W/O SCOPE: CPT

## 2024-11-16 PROCEDURE — 80053 COMPREHEN METABOLIC PANEL: CPT

## 2024-11-16 PROCEDURE — 82746 ASSAY OF FOLIC ACID SERUM: CPT

## 2024-11-16 PROCEDURE — 84443 ASSAY THYROID STIM HORMONE: CPT

## 2024-11-16 PROCEDURE — 84550 ASSAY OF BLOOD/URIC ACID: CPT

## 2024-11-16 PROCEDURE — 84100 ASSAY OF PHOSPHORUS: CPT

## 2024-11-16 NOTE — RESULT ENCOUNTER NOTE
Please notify patient: Mild dehydration, chloride high, to drink 64 ounce water daily  Borderline low potassium, to take furosemide WITH potassium or eat a banana  Blood glucose 111 increased, prediabetes improved, hemoglobin A1c 5.5 now  Alkaline phosphatase is increased same as before related to recent knee surgery  Vitamin D borderline low but improved, continue vitamin D high dosage  Cholesterol improved now, continue pravastatin  Free T4 is normal TSH is borderline, continue same dosage of levothyroxine  Otherwise labs within normal limits  continue current treatment    Future Appointments  12/10/2024 12:15 PM   Dayron Curtis DO        [facility]         [place]  12/31/2024 12:15 PM   Dayron Curtis DO        [facility]         [place]  1/29/2025  2:30 PM    Dayron Curtis DO [facility]        [place]  5/16/2025  1:30 PM    Leslie Herron MD    fp sc               [facility] DEP

## 2024-11-23 PROBLEM — Z96.651 STATUS POST RIGHT KNEE REPLACEMENT: Status: ACTIVE | Noted: 2024-11-23

## 2024-12-06 DIAGNOSIS — M54.41 CHRONIC MIDLINE LOW BACK PAIN WITH RIGHT-SIDED SCIATICA: ICD-10-CM

## 2024-12-06 DIAGNOSIS — Z96.651 STATUS POST RIGHT KNEE REPLACEMENT: ICD-10-CM

## 2024-12-06 DIAGNOSIS — G89.29 CHRONIC PAIN OF RIGHT KNEE: ICD-10-CM

## 2024-12-06 DIAGNOSIS — G89.29 CHRONIC MIDLINE LOW BACK PAIN WITH RIGHT-SIDED SCIATICA: ICD-10-CM

## 2024-12-06 DIAGNOSIS — M25.561 CHRONIC PAIN OF RIGHT KNEE: ICD-10-CM

## 2024-12-06 DIAGNOSIS — M79.7 FIBROMYALGIA: ICD-10-CM

## 2024-12-06 RX ORDER — GABAPENTIN 100 MG/1
100 CAPSULE ORAL 3 TIMES DAILY
Qty: 90 CAPSULE | Refills: 0 | Status: SHIPPED | OUTPATIENT
Start: 2024-12-06 | End: 2025-01-05

## 2024-12-06 NOTE — TELEPHONE ENCOUNTER
Please Approve or Refuse.  Send to Pharmacy per Pt's Request:      Next Visit Date:  5/16/2025   Last Visit Date: 11/15/2024    Hemoglobin A1C (%)   Date Value   11/16/2024 5.5   08/22/2024 6.0   05/18/2024 5.6             ( goal A1C is < 7)   BP Readings from Last 3 Encounters:   11/15/24 134/70   11/12/24 (!) 155/86   10/29/24 (!) 125/103          (goal 120/80)  BUN   Date Value Ref Range Status   11/16/2024 11 6 - 20 mg/dL Final     Creatinine   Date Value Ref Range Status   11/16/2024 0.7 0.7 - 1.2 mg/dL Final     Potassium   Date Value Ref Range Status   11/16/2024 3.7 3.7 - 5.3 mmol/L Final

## 2024-12-10 ENCOUNTER — HOSPITAL ENCOUNTER (OUTPATIENT)
Dept: PAIN MANAGEMENT | Facility: CLINIC | Age: 59
Discharge: HOME | End: 2024-12-10
Payer: COMMERCIAL

## 2024-12-10 VITALS
SYSTOLIC BLOOD PRESSURE: 134 MMHG | BODY MASS INDEX: 40.25 KG/M2 | TEMPERATURE: 98 F | WEIGHT: 205 LBS | HEART RATE: 66 BPM | RESPIRATION RATE: 17 BRPM | OXYGEN SATURATION: 98 % | HEIGHT: 60 IN | DIASTOLIC BLOOD PRESSURE: 78 MMHG

## 2024-12-10 DIAGNOSIS — R52 PAIN MANAGEMENT: ICD-10-CM

## 2024-12-10 PROCEDURE — 64635 DESTROY LUMB/SAC FACET JNT: CPT | Performed by: STUDENT IN AN ORGANIZED HEALTH CARE EDUCATION/TRAINING PROGRAM

## 2024-12-10 PROCEDURE — 64636 DESTROY L/S FACET JNT ADDL: CPT | Performed by: STUDENT IN AN ORGANIZED HEALTH CARE EDUCATION/TRAINING PROGRAM

## 2024-12-10 PROCEDURE — 99152 MOD SED SAME PHYS/QHP 5/>YRS: CPT | Performed by: STUDENT IN AN ORGANIZED HEALTH CARE EDUCATION/TRAINING PROGRAM

## 2024-12-10 PROCEDURE — 64636 DESTROY L/S FACET JNT ADDL: CPT

## 2024-12-10 PROCEDURE — 6360000002 HC RX W HCPCS: Performed by: STUDENT IN AN ORGANIZED HEALTH CARE EDUCATION/TRAINING PROGRAM

## 2024-12-10 PROCEDURE — 64635 DESTROY LUMB/SAC FACET JNT: CPT

## 2024-12-10 RX ORDER — TRIAMCINOLONE ACETONIDE 40 MG/ML
INJECTION, SUSPENSION INTRA-ARTICULAR; INTRAMUSCULAR
Status: COMPLETED | OUTPATIENT
Start: 2024-12-10 | End: 2024-12-10

## 2024-12-10 RX ORDER — LIDOCAINE HYDROCHLORIDE 20 MG/ML
INJECTION, SOLUTION EPIDURAL; INFILTRATION; INTRACAUDAL; PERINEURAL
Status: COMPLETED | OUTPATIENT
Start: 2024-12-10 | End: 2024-12-10

## 2024-12-10 RX ORDER — LIDOCAINE HYDROCHLORIDE 10 MG/ML
INJECTION, SOLUTION EPIDURAL; INFILTRATION; INTRACAUDAL; PERINEURAL
Status: COMPLETED | OUTPATIENT
Start: 2024-12-10 | End: 2024-12-10

## 2024-12-10 RX ORDER — MIDAZOLAM HYDROCHLORIDE 2 MG/2ML
INJECTION, SOLUTION INTRAMUSCULAR; INTRAVENOUS
Status: COMPLETED | OUTPATIENT
Start: 2024-12-10 | End: 2024-12-10

## 2024-12-10 RX ADMIN — TRIAMCINOLONE ACETONIDE 40 MG: 40 INJECTION, SUSPENSION INTRA-ARTICULAR; INTRAMUSCULAR at 12:43

## 2024-12-10 RX ADMIN — LIDOCAINE HYDROCHLORIDE 2 ML: 10 INJECTION, SOLUTION EPIDURAL; INFILTRATION; INTRACAUDAL at 12:43

## 2024-12-10 RX ADMIN — LIDOCAINE HYDROCHLORIDE 5 ML: 20 INJECTION, SOLUTION EPIDURAL; INFILTRATION; INTRACAUDAL; PERINEURAL at 12:38

## 2024-12-10 RX ADMIN — MIDAZOLAM HYDROCHLORIDE 2 MG: 1 INJECTION, SOLUTION INTRAMUSCULAR; INTRAVENOUS at 12:37

## 2024-12-10 ASSESSMENT — PAIN SCALES - GENERAL: PAINLEVEL_OUTOF10: 0

## 2024-12-10 ASSESSMENT — PAIN DESCRIPTION - DESCRIPTORS: DESCRIPTORS: SHOOTING

## 2024-12-10 ASSESSMENT — PAIN - FUNCTIONAL ASSESSMENT
PAIN_FUNCTIONAL_ASSESSMENT: 0-10
PAIN_FUNCTIONAL_ASSESSMENT: PREVENTS OR INTERFERES SOME ACTIVE ACTIVITIES AND ADLS

## 2024-12-10 NOTE — OP NOTE
PROCEDURE PERFORMED: Right Lumbar Medial Branch Radiofrequency Ablation using Fluoroscopy    PREOPERATIVE DIAGNOSIS: Lumbosacral spondylosis    INDICATIONS: Chronic low back pain    The patient's history and physical exam were reviewed.  The risk, benefits, and alternatives of the procedure were discussed and all questions were answered to the patient's satisfaction.  The patient agreed to proceed and written informed consent was obtained.    POSTOPERATIVE DIAGNOSIS: Lumbosacral spondylosis    PHYSICIAN:  Dr. Dayron Curtis DO    ANESTHESIA:  LOCAL    ASSISTANT:  NONE    PATHOLOGY:  NONE    ESTIMATED BLOOD LOSS:  N/A    IMPLANTS:  NONE    PROCEDURE DESCRIPTION: Right lumbar medial branch radiofrequency ablation using fluoroscopy    The patient was placed on the operative bed in prone position.  The area was prepped with  Chlorhexidine.  The area was then draped in a sterile fashion.    Targeted levels: Right lumbar L3, L4, L5 medial branch radiofrequency ablation    An AP  fluoroscopy image was used to identify and matt Cardoso's point at the L3, L4 levels on the targeted side.  Additionally, the junction of the SAP and sacral ala was also marked on the same side.  The skin and subcutaneous tissues were then anesthetized with 1% lidocaine. At each lumbar medial branch, a 20-gauge, 100 mm curved with 10 mm active tip probe was advanced under AP fluoroscopic projections until bone was contacted along the medial aspect of the transverse process.  Aspiration of each needle was negative for blood, CSF and paresthesia prior to injection.   Motor stimulation at 2 Hz and 1.2 V was negative.  Then, after negative aspiration, 1 mL lidocaine 2% was injected at each level prior to lesioning which was performed at 80°C for 90 seconds.  Once the lesion was complete, injectate solution containing Kenalog 40 mg and 2 mL lidocaine 1% was injected at each site with a total of 1 mL.  The probes were then removed.  The needle

## 2024-12-10 NOTE — DISCHARGE INSTRUCTIONS

## 2024-12-10 NOTE — H&P
Pain Pre-Op H&P Note    SUBJECTIVE:  No chief complaint on file.      History of Present Illness:   Petra Starr is a 59 y.o. female who presents with chronic low back pain.    Past Medical History:   Diagnosis Date    Abnormal EKG 06/18/2021    SINUS BRADYCARDIA, LEFT AXIS DEVIATION, NONSPECIFIC T WAVE ABNORMALITY    Acquired hypothyroidism 07/27/2020    Bilateral kidney stones 06/18/2022    Bilateral primary osteoarthritis of knee 04/25/2021    Chronic back pain     Coronary artery disease involving native coronary artery of native heart without angina pectoris 03/21/2022    PT. DENIES.    Diarrhea     Distal radius fracture 01/28/2013    Drug-seeking behavior 10/16/2014    Essential hypertension 09/14/2021    Fatty liver 06/18/2022    Fibromyalgia 05/21/2024    FANTA (generalized anxiety disorder)     GERD (gastroesophageal reflux disease)     H/O: hysterectomy 08/19/2020    History of depression     Hyperlipidemia with target LDL less than 100 07/19/2020    Hypothyroidism due to Hashimoto's thyroiditis 07/27/2020    Irritable bowel syndrome with diarrhea 03/21/2022    Mild episode of recurrent major depressive disorder (HCC) 07/19/2020    Opioid dependence (HCC) 10/16/2014    PONV (postoperative nausea and vomiting)     \"THEY PUT SOMETHING BEHIND MY EAR FOR THE N&V\"    Severe obesity (BMI 35.0-39.9) with comorbidity 07/19/2020    Spinal stenosis of lumbar region without neurogenic claudication 06/15/2024    Urge incontinence 03/09/2020    Wears glasses        Past Surgical History:   Procedure Laterality Date    BLADDER SUSPENSION      CARDIAC CATHETERIZATION  08/13/2021    LAD: Diffuse irregularities 30-40%., EF 60%    COLONOSCOPY N/A 09/18/2023    COLONOSCOPY DIAGNOSTIC performed by Vamshi Maldonado MD at [facility] ENDO    FRACTURE SURGERY  02/07/2013    right distial radius orif    HYSTERECTOMY (CERVIX STATUS UNKNOWN)      has ovaries    JOINT REPLACEMENT Right 09/05/2024    KNEE ARTHROSCOPY      right    [place]

## 2024-12-13 DIAGNOSIS — M25.561 CHRONIC PAIN OF RIGHT KNEE: ICD-10-CM

## 2024-12-13 DIAGNOSIS — G89.29 CHRONIC MIDLINE LOW BACK PAIN WITH RIGHT-SIDED SCIATICA: ICD-10-CM

## 2024-12-13 DIAGNOSIS — M54.41 CHRONIC MIDLINE LOW BACK PAIN WITH RIGHT-SIDED SCIATICA: ICD-10-CM

## 2024-12-13 DIAGNOSIS — G89.29 CHRONIC PAIN OF RIGHT KNEE: ICD-10-CM

## 2024-12-13 DIAGNOSIS — M79.7 FIBROMYALGIA: ICD-10-CM

## 2024-12-13 DIAGNOSIS — Z96.651 STATUS POST RIGHT KNEE REPLACEMENT: ICD-10-CM

## 2024-12-13 RX ORDER — GABAPENTIN 100 MG/1
100 CAPSULE ORAL 3 TIMES DAILY
Qty: 90 CAPSULE | Refills: 0 | Status: SHIPPED | OUTPATIENT
Start: 2024-12-13 | End: 2025-01-12

## 2024-12-13 NOTE — TELEPHONE ENCOUNTER
Please Approve or Refuse.  Send to Pharmacy per Pt's Request: [facility]     Next Visit Date:  5/16/2025   Last Visit Date: 11/15/2024    Hemoglobin A1C (%)   Date Value   11/16/2024 5.5   08/22/2024 6.0   05/18/2024 5.6             ( goal A1C is < 7)   BP Readings from Last 3 Encounters:   12/10/24 134/78   11/15/24 134/70   11/12/24 (!) 155/86          (goal 120/80)  BUN   Date Value Ref Range Status   11/16/2024 11 6 - 20 mg/dL Final     Creatinine   Date Value Ref Range Status   11/16/2024 0.7 0.7 - 1.2 mg/dL Final     Potassium   Date Value Ref Range Status   11/16/2024 3.7 3.7 - 5.3 mmol/L Final

## 2024-12-16 RX ORDER — DICYCLOMINE HYDROCHLORIDE 10 MG/1
CAPSULE ORAL
Qty: 360 CAPSULE | Refills: 0 | Status: SHIPPED | OUTPATIENT
Start: 2024-12-16 | End: 2024-12-17

## 2024-12-17 RX ORDER — DICYCLOMINE HYDROCHLORIDE 10 MG/1
CAPSULE ORAL
Qty: 360 CAPSULE | Refills: 0 | Status: SHIPPED | OUTPATIENT
Start: 2024-12-17

## 2024-12-28 ENCOUNTER — OFFICE VISIT (OUTPATIENT)
Dept: FAMILY MEDICINE CLINIC | Age: 59
End: 2024-12-28
Payer: COMMERCIAL

## 2024-12-28 VITALS
SYSTOLIC BLOOD PRESSURE: 136 MMHG | HEART RATE: 93 BPM | OXYGEN SATURATION: 97 % | DIASTOLIC BLOOD PRESSURE: 88 MMHG | TEMPERATURE: 99.1 F

## 2024-12-28 DIAGNOSIS — J30.89 ALLERGIC RHINITIS DUE TO OTHER ALLERGIC TRIGGER, UNSPECIFIED SEASONALITY: ICD-10-CM

## 2024-12-28 DIAGNOSIS — R05.9 COUGH, UNSPECIFIED TYPE: Primary | ICD-10-CM

## 2024-12-28 PROBLEM — J30.9 ALLERGIC RHINITIS: Status: ACTIVE | Noted: 2024-12-28

## 2024-12-28 LAB
INFLUENZA A ANTIGEN, POC: NEGATIVE
INFLUENZA B ANTIGEN, POC: NEGATIVE
LOT EXPIRE DATE: NORMAL
LOT KIT NUMBER: NORMAL
SARS-COV-2, POC: NORMAL
VALID INTERNAL CONTROL: NORMAL
VENDOR AND KIT NAME POC: NORMAL

## 2024-12-28 PROCEDURE — 3075F SYST BP GE 130 - 139MM HG: CPT | Performed by: NURSE PRACTITIONER

## 2024-12-28 PROCEDURE — 99213 OFFICE O/P EST LOW 20 MIN: CPT | Performed by: NURSE PRACTITIONER

## 2024-12-28 PROCEDURE — 87428 SARSCOV & INF VIR A&B AG IA: CPT | Performed by: NURSE PRACTITIONER

## 2024-12-28 PROCEDURE — 3079F DIAST BP 80-89 MM HG: CPT | Performed by: NURSE PRACTITIONER

## 2024-12-28 ASSESSMENT — ENCOUNTER SYMPTOMS
WHEEZING: 0
HEMOPTYSIS: 0
NAUSEA: 0
COUGH: 1
COLOR CHANGE: 0
EYE DISCHARGE: 0
EYE ITCHING: 1
SHORTNESS OF BREATH: 0
VOMITING: 0
SORE THROAT: 1
RHINORRHEA: 1

## 2024-12-28 NOTE — PROGRESS NOTES
TONSILLECTOMY      TOTAL KNEE ARTHROPLASTY Left 2021    Dr. Grant    UPPER GASTROINTESTINAL ENDOSCOPY N/A 2024    ESOPHAGOGASTRODUODENOSCOPY BIOPSY performed by Vamshi Maldonado MD at [facility] ENDO    WRIST SURGERY Right     PINS, PLATE & SCREWS INTACT.        Social History     Socioeconomic History    Marital status:    Tobacco Use    Smoking status: Former     Current packs/day: 0.00     Average packs/day: 0.5 packs/day for 3.0 years (1.5 ttl pk-yrs)     Types: Cigarettes     Start date: 1985     Quit date: 1988     Years since quittin.0     Passive exposure: Past    Smokeless tobacco: Never   Vaping Use    Vaping status: Never Used   Substance and Sexual Activity    Alcohol use: Not Currently     Comment: occasionally    Drug use: No    Sexual activity: Yes     Partners: Male     Social Determinants of Health     Financial Resource Strain: Low Risk  (11/15/2024)    Overall Financial Resource Strain (CARDIA)     Difficulty of Paying Living Expenses: Not hard at all   Food Insecurity: No Food Insecurity (11/15/2024)    Hunger Vital Sign     Worried About Running Out of Food in the Last Year: Never true     Ran Out of Food in the Last Year: Never true   Transportation Needs: No Transportation Needs (11/15/2024)    PRAPARE - Transportation     Lack of Transportation (Medical): No     Lack of Transportation (Non-Medical): No   Housing Stability: Unknown (11/15/2024)    Housing Stability Vital Sign     Unable to Pay for Housing in the Last Year: No     Homeless in the Last Year: No        Family History   Problem Relation Age of Onset    High Blood Pressure Mother     Depression Mother     Heart Surgery Father     Other Brother         scoliosis    Colon Cancer Maternal Grandfather     COPD Maternal Grandfather     Lung Cancer Maternal Grandfather         lung    Breast Cancer Paternal Aunt 50    Cancer Maternal Cousin         brain        PHYSICAL EXAM:     /88 (Site: Left Upper Arm,

## 2024-12-28 NOTE — PATIENT INSTRUCTIONS
Increase Liquids  Alkaseltzer day/night has decongestant and anithistamine in it, it also has tylenol or motrin equivalents.    If not better in 3-5 days return or see PCP    Mupirocin did not populate, sorry no RX sent, use neosporin OTC

## 2024-12-31 ENCOUNTER — HOSPITAL ENCOUNTER (OUTPATIENT)
Dept: PAIN MANAGEMENT | Facility: CLINIC | Age: 59
Discharge: HOME | End: 2024-12-31
Payer: COMMERCIAL

## 2024-12-31 VITALS
BODY MASS INDEX: 38.09 KG/M2 | DIASTOLIC BLOOD PRESSURE: 78 MMHG | WEIGHT: 194 LBS | SYSTOLIC BLOOD PRESSURE: 138 MMHG | TEMPERATURE: 97.9 F | HEART RATE: 68 BPM | HEIGHT: 60 IN | RESPIRATION RATE: 14 BRPM | OXYGEN SATURATION: 97 %

## 2024-12-31 DIAGNOSIS — R52 PAIN MANAGEMENT: ICD-10-CM

## 2024-12-31 PROCEDURE — 64635 DESTROY LUMB/SAC FACET JNT: CPT | Performed by: STUDENT IN AN ORGANIZED HEALTH CARE EDUCATION/TRAINING PROGRAM

## 2024-12-31 PROCEDURE — 64636 DESTROY L/S FACET JNT ADDL: CPT | Performed by: STUDENT IN AN ORGANIZED HEALTH CARE EDUCATION/TRAINING PROGRAM

## 2024-12-31 PROCEDURE — 99152 MOD SED SAME PHYS/QHP 5/>YRS: CPT | Performed by: STUDENT IN AN ORGANIZED HEALTH CARE EDUCATION/TRAINING PROGRAM

## 2024-12-31 PROCEDURE — 64635 DESTROY LUMB/SAC FACET JNT: CPT

## 2024-12-31 PROCEDURE — 6360000002 HC RX W HCPCS: Performed by: STUDENT IN AN ORGANIZED HEALTH CARE EDUCATION/TRAINING PROGRAM

## 2024-12-31 PROCEDURE — 64636 DESTROY L/S FACET JNT ADDL: CPT

## 2024-12-31 RX ORDER — TRIAMCINOLONE ACETONIDE 40 MG/ML
INJECTION, SUSPENSION INTRA-ARTICULAR; INTRAMUSCULAR
Status: COMPLETED | OUTPATIENT
Start: 2024-12-31 | End: 2024-12-31

## 2024-12-31 RX ORDER — MIDAZOLAM HYDROCHLORIDE 2 MG/2ML
INJECTION, SOLUTION INTRAMUSCULAR; INTRAVENOUS
Status: COMPLETED | OUTPATIENT
Start: 2024-12-31 | End: 2024-12-31

## 2024-12-31 RX ORDER — LIDOCAINE HYDROCHLORIDE 10 MG/ML
INJECTION, SOLUTION EPIDURAL; INFILTRATION; INTRACAUDAL; PERINEURAL
Status: COMPLETED | OUTPATIENT
Start: 2024-12-31 | End: 2024-12-31

## 2024-12-31 RX ORDER — LIDOCAINE HYDROCHLORIDE 20 MG/ML
INJECTION, SOLUTION EPIDURAL; INFILTRATION; INTRACAUDAL; PERINEURAL
Status: COMPLETED | OUTPATIENT
Start: 2024-12-31 | End: 2024-12-31

## 2024-12-31 RX ADMIN — LIDOCAINE HYDROCHLORIDE 5 ML: 20 INJECTION, SOLUTION EPIDURAL; INFILTRATION; INTRACAUDAL; PERINEURAL at 12:35

## 2024-12-31 RX ADMIN — MIDAZOLAM HYDROCHLORIDE 2 MG: 1 INJECTION, SOLUTION INTRAMUSCULAR; INTRAVENOUS at 12:32

## 2024-12-31 RX ADMIN — TRIAMCINOLONE ACETONIDE 40 MG: 40 INJECTION, SUSPENSION INTRA-ARTICULAR; INTRAMUSCULAR at 12:39

## 2024-12-31 RX ADMIN — LIDOCAINE HYDROCHLORIDE 2 ML: 10 INJECTION, SOLUTION EPIDURAL; INFILTRATION; INTRACAUDAL at 12:38

## 2024-12-31 ASSESSMENT — PAIN - FUNCTIONAL ASSESSMENT
PAIN_FUNCTIONAL_ASSESSMENT: NONE - DENIES PAIN
PAIN_FUNCTIONAL_ASSESSMENT: PREVENTS OR INTERFERES SOME ACTIVE ACTIVITIES AND ADLS

## 2024-12-31 ASSESSMENT — PAIN DESCRIPTION - DESCRIPTORS: DESCRIPTORS: THROBBING

## 2024-12-31 ASSESSMENT — PAIN DESCRIPTION - ORIENTATION: ORIENTATION: LOWER

## 2024-12-31 ASSESSMENT — PAIN DESCRIPTION - FREQUENCY: FREQUENCY: INTERMITTENT

## 2024-12-31 ASSESSMENT — PAIN DESCRIPTION - PAIN TYPE: TYPE: CHRONIC PAIN

## 2024-12-31 ASSESSMENT — PAIN SCALES - GENERAL: PAINLEVEL_OUTOF10: 4

## 2024-12-31 ASSESSMENT — PAIN DESCRIPTION - ONSET: ONSET: ON-GOING

## 2024-12-31 ASSESSMENT — PAIN DESCRIPTION - LOCATION: LOCATION: BACK

## 2024-12-31 NOTE — OP NOTE
PROCEDURE PERFORMED: Left Lumbar Medial Branch Radiofrequency Ablation using Fluoroscopy    PREOPERATIVE DIAGNOSIS: Lumbosacral spondylosis    INDICATIONS: Chronic low back pain    The patient's history and physical exam were reviewed.  The risk, benefits, and alternatives of the procedure were discussed and all questions were answered to the patient's satisfaction.  The patient agreed to proceed and written informed consent was obtained.    POSTOPERATIVE DIAGNOSIS: Lumbosacral spondylosis    PHYSICIAN:  Dr. Dayron Curtis DO    ANESTHESIA:  LOCAL    ASSISTANT:  NONE    PATHOLOGY:  NONE    ESTIMATED BLOOD LOSS:  N/A    IMPLANTS:  NONE    PROCEDURE DESCRIPTION: Left lumbar medial branch radiofrequency ablation using fluoroscopy    The patient was placed on the operative bed in prone position.  The area was prepped with  Chlorhexidine.  The area was then draped in a sterile fashion.    Targeted levels: Left lumbar L3, L4, L5 medial branch radiofrequency ablation    An AP  fluoroscopy image was used to identify and matt Cardoso's point at the L3, L4 levels on the targeted side.  Additionally, the junction of the SAP and sacral ala was also marked on the same side.  The skin and subcutaneous tissues were then anesthetized with 1% lidocaine. At each lumbar medial branch, a 20-gauge, 100 mm curved with 10 mm active tip probe was advanced under AP fluoroscopic projections until bone was contacted along the medial aspect of the transverse process.  Aspiration of each needle was negative for blood, CSF and paresthesia prior to injection.   Motor stimulation at 2 Hz and 1.2 V was negative.  Then, after negative aspiration, 1 mL lidocaine 2% was injected at each level prior to lesioning which was performed at 80°C for 90 seconds.  Once the lesion was complete, injectate solution containing Kenalog 40 mg and 2 mL lidocaine 1% was injected at each site with a total of 1 mL.  The probes were then removed.  The needle sites

## 2024-12-31 NOTE — H&P
Pain Pre-Op H&P Note    SUBJECTIVE:  No chief complaint on file.      History of Present Illness:   Petra Starr is a 59 y.o. female who presents with chronic low back pain.    Past Medical History:   Diagnosis Date    Abnormal EKG 06/18/2021    SINUS BRADYCARDIA, LEFT AXIS DEVIATION, NONSPECIFIC T WAVE ABNORMALITY    Acquired hypothyroidism 07/27/2020    Allergic rhinitis 12/28/2024    Bilateral kidney stones 06/18/2022    Bilateral primary osteoarthritis of knee 04/25/2021    Chronic back pain     Coronary artery disease involving native coronary artery of native heart without angina pectoris 03/21/2022    PT. DENIES.    Diarrhea     Distal radius fracture 01/28/2013    Drug-seeking behavior 10/16/2014    Essential hypertension 09/14/2021    Fatty liver 06/18/2022    Fibromyalgia 05/21/2024    FANTA (generalized anxiety disorder)     GERD (gastroesophageal reflux disease)     H/O: hysterectomy 08/19/2020    History of depression     Hyperlipidemia with target LDL less than 100 07/19/2020    Hypothyroidism due to Hashimoto's thyroiditis 07/27/2020    Irritable bowel syndrome with diarrhea 03/21/2022    Mild episode of recurrent major depressive disorder (HCC) 07/19/2020    Opioid dependence (HCC) 10/16/2014    PONV (postoperative nausea and vomiting)     \"THEY PUT SOMETHING BEHIND MY EAR FOR THE N&V\"    Severe obesity (BMI 35.0-39.9) with comorbidity 07/19/2020    Spinal stenosis of lumbar region without neurogenic claudication 06/15/2024    Urge incontinence 03/09/2020    Wears glasses        Past Surgical History:   Procedure Laterality Date    BLADDER SUSPENSION      CARDIAC CATHETERIZATION  08/13/2021    LAD: Diffuse irregularities 30-40%., EF 60%    COLONOSCOPY N/A 09/18/2023    COLONOSCOPY DIAGNOSTIC performed by Vamshi Maldonaod MD at [facility]    FRACTURE SURGERY  02/07/2013    right distial radius orif    HYSTERECTOMY (CERVIX STATUS UNKNOWN)      has ovaries    JOINT REPLACEMENT Right 09/05/2024    KNEE

## 2024-12-31 NOTE — DISCHARGE INSTRUCTIONS

## 2025-01-02 ENCOUNTER — E-VISIT (OUTPATIENT)
Dept: FAMILY MEDICINE CLINIC | Age: 60
End: 2025-01-02
Payer: COMMERCIAL

## 2025-01-02 DIAGNOSIS — J01.80 ACUTE NON-RECURRENT SINUSITIS OF OTHER SINUS: Primary | ICD-10-CM

## 2025-01-02 DIAGNOSIS — J20.9 ACUTE BRONCHITIS DUE TO INFECTION: ICD-10-CM

## 2025-01-02 PROCEDURE — 99423 OL DIG E/M SVC 21+ MIN: CPT | Performed by: FAMILY MEDICINE

## 2025-01-02 RX ORDER — BENZONATATE 100 MG/1
100 CAPSULE ORAL 3 TIMES DAILY PRN
Qty: 21 CAPSULE | Refills: 0 | Status: SHIPPED | OUTPATIENT
Start: 2025-01-02 | End: 2025-01-09

## 2025-01-02 RX ORDER — NEOMYCIN SULFATE, POLYMYXIN B SULFATE AND HYDROCORTISONE 10; 3.5; 1 MG/ML; MG/ML; [USP'U]/ML
3 SUSPENSION/ DROPS AURICULAR (OTIC) 4 TIMES DAILY
Qty: 10 ML | Refills: 0 | Status: SHIPPED | OUTPATIENT
Start: 2025-01-02

## 2025-01-02 RX ORDER — ALBUTEROL SULFATE 90 UG/1
2 INHALANT RESPIRATORY (INHALATION) EVERY 6 HOURS PRN
Qty: 8 G | Refills: 0 | Status: SHIPPED | OUTPATIENT
Start: 2025-01-02

## 2025-01-02 RX ORDER — AZITHROMYCIN 250 MG/1
TABLET, FILM COATED ORAL
Qty: 6 TABLET | Refills: 0 | Status: SHIPPED | OUTPATIENT
Start: 2025-01-02 | End: 2025-01-07

## 2025-01-02 ASSESSMENT — LIFESTYLE VARIABLES
SMOKING_YEARS: 2
SMOKING_STATUS: NO, I'M A FORMER SMOKER
PACKS_PER_DAY: 1

## 2025-01-02 NOTE — PROGRESS NOTES
Petra Starr (1965) initiated an asynchronous digital communication through [employer].  Date of service: 1/2/2025     HPI: per patient's questionnaire    EXAM: not applicable    Diagnoses and all orders for this visit:  Assessment & Plan  Acute non-recurrent sinusitis of other sinus  Acute, not recurrent, ongoing, will treat with the following:    Orders:    azithromycin (ZITHROMAX) 250 MG tablet; 500 mg orally on day one followed by 250 mg daily on days two through five    benzonatate (TESSALON PERLES) 100 MG capsule; Take 1 capsule by mouth 3 times daily as needed for Cough    neomycin-polymyxin-hydrocortisone (CORTISPORIN) 3.5-20080-1 otic suspension; Place 3 drops into the right ear 4 times daily OK to substitute.  For 10 days    Acute bronchitis due to infection    Acute, ongoing, due to the duration of symptoms since [place], 12/24/2024, will treat with the following    Orders:    azithromycin (ZITHROMAX) 250 MG tablet; 500 mg orally on day one followed by 250 mg daily on days two through five    benzonatate (TESSALON PERLES) 100 MG capsule; Take 1 capsule by mouth 3 times daily as needed for Cough    albuterol sulfate HFA (PROVENTIL HFA) 108 (90 Base) MCG/ACT inhaler; Inhale 2 puffs into the lungs every 6 hours as needed for Wheezing or Shortness of Breath (And coughing spells) Okay to substitute        Patient was advised to contact PCP if symptoms worsen or failing to change as expected      Time: EV3 - 21 or more minutes were spent on the digital evaluation and management of this patient.    Electronically signed by Leslie Herron MD on 1/2/25 at 9:31 AM.

## 2025-01-13 DIAGNOSIS — Z96.651 STATUS POST RIGHT KNEE REPLACEMENT: ICD-10-CM

## 2025-01-13 DIAGNOSIS — G89.29 CHRONIC PAIN OF RIGHT KNEE: ICD-10-CM

## 2025-01-13 DIAGNOSIS — G89.29 CHRONIC MIDLINE LOW BACK PAIN WITH RIGHT-SIDED SCIATICA: ICD-10-CM

## 2025-01-13 DIAGNOSIS — M25.561 CHRONIC PAIN OF RIGHT KNEE: ICD-10-CM

## 2025-01-13 DIAGNOSIS — M54.41 CHRONIC MIDLINE LOW BACK PAIN WITH RIGHT-SIDED SCIATICA: ICD-10-CM

## 2025-01-13 DIAGNOSIS — M79.7 FIBROMYALGIA: ICD-10-CM

## 2025-01-14 DIAGNOSIS — M54.41 CHRONIC MIDLINE LOW BACK PAIN WITH RIGHT-SIDED SCIATICA: ICD-10-CM

## 2025-01-14 DIAGNOSIS — G89.29 CHRONIC MIDLINE LOW BACK PAIN WITH RIGHT-SIDED SCIATICA: ICD-10-CM

## 2025-01-14 DIAGNOSIS — Z96.651 STATUS POST RIGHT KNEE REPLACEMENT: ICD-10-CM

## 2025-01-14 DIAGNOSIS — M79.7 FIBROMYALGIA: ICD-10-CM

## 2025-01-14 DIAGNOSIS — M25.561 CHRONIC PAIN OF RIGHT KNEE: ICD-10-CM

## 2025-01-14 DIAGNOSIS — G89.29 CHRONIC PAIN OF RIGHT KNEE: ICD-10-CM

## 2025-01-14 RX ORDER — GABAPENTIN 100 MG/1
100 CAPSULE ORAL 3 TIMES DAILY
Qty: 90 CAPSULE | Refills: 0 | OUTPATIENT
Start: 2025-01-14

## 2025-01-14 RX ORDER — GABAPENTIN 100 MG/1
100 CAPSULE ORAL 3 TIMES DAILY
Qty: 90 CAPSULE | Refills: 0 | Status: SHIPPED | OUTPATIENT
Start: 2025-01-14 | End: 2025-02-13

## 2025-01-14 NOTE — TELEPHONE ENCOUNTER
Please Approve or Refuse.  Send to Pharmacy per Pt's Request:      Next Visit Date:  5/16/2025   Last Visit Date: 1/2/2025    Hemoglobin A1C (%)   Date Value   11/16/2024 5.5   08/22/2024 6.0   05/18/2024 5.6             ( goal A1C is < 7)   BP Readings from Last 3 Encounters:   12/31/24 138/78   12/28/24 136/88   12/10/24 134/78          (goal 120/80)  BUN   Date Value Ref Range Status   11/16/2024 11 6 - 20 mg/dL Final     Creatinine   Date Value Ref Range Status   11/16/2024 0.7 0.7 - 1.2 mg/dL Final     Potassium   Date Value Ref Range Status   11/16/2024 3.7 3.7 - 5.3 mmol/L Final

## 2025-01-27 PROBLEM — R05.9 COUGH: Status: RESOLVED | Noted: 2024-12-28 | Resolved: 2025-01-27

## 2025-01-28 NOTE — LETTER
[facility]   [address] [facility] [address] [place] [id number]  Phone: [phone]  Fax: [phone]    Marlin Nunn MD        February 20, 2020     Patient: Renay Lopez   YOB: 1965   Date of Visit: 2/20/2020       To Whom it May Concern:      Al Gan was seen in my clinic on 2/20/2020. She should be excused from     work for 02/20/20. If you have any questions or concerns, please don't hesitate to call.           Sincerely,         Marlin Nunn MD [facility]

## 2025-01-29 ENCOUNTER — HOSPITAL ENCOUNTER (OUTPATIENT)
Dept: PAIN MANAGEMENT | Age: 60
Discharge: HOME | End: 2025-01-29
Payer: COMMERCIAL

## 2025-01-29 VITALS — BODY MASS INDEX: 38.09 KG/M2 | HEIGHT: 60 IN | WEIGHT: 194 LBS

## 2025-01-29 DIAGNOSIS — M47.817 LUMBOSACRAL SPONDYLOSIS WITHOUT MYELOPATHY: Primary | ICD-10-CM

## 2025-01-29 DIAGNOSIS — M51.369 DEGENERATION OF INTERVERTEBRAL DISC OF LUMBAR REGION, UNSPECIFIED WHETHER PAIN PRESENT: ICD-10-CM

## 2025-01-29 DIAGNOSIS — E66.812 CLASS 2 OBESITY WITH BODY MASS INDEX (BMI) OF 38.0 TO 38.9 IN ADULT, UNSPECIFIED OBESITY TYPE, UNSPECIFIED WHETHER SERIOUS COMORBIDITY PRESENT: ICD-10-CM

## 2025-01-29 PROCEDURE — 99213 OFFICE O/P EST LOW 20 MIN: CPT

## 2025-01-29 PROCEDURE — 99213 OFFICE O/P EST LOW 20 MIN: CPT | Performed by: STUDENT IN AN ORGANIZED HEALTH CARE EDUCATION/TRAINING PROGRAM

## 2025-01-29 NOTE — PROGRESS NOTES
Chronic Pain Clinic Note     Encounter Date: 1/29/2025     SUBJECTIVE:  Chief Complaint   Patient presents with    Back Pain     RFA f/u       History of Present Illness:   Petra Starr is a 59 y.o. female who presents with back pain    Medication Refill: n/a    Current Complaints of Pain:   Location: low back   Radiation: None  Severity: Moderate  Pain Numerical Score - 5 today   Average: 6     Highest: 10  Lowest: 6  Character/Quality: Complains of pain that is aching, burning, cramping, shooting and stabbing  Timing: Constant  Associated symptoms: none  Numbness: No  Weakness: No  Exacerbating factors:  ADLs  Alleviating factors:  sitting   Length of time pain has been present: Started about 17 years  Inciting event/injury:  work injury  Bowel/Bladder incontinence: no  Falls: none in the past month  Physical Therapy: yes    History of Interventions:   Surgery: No previous lumbar/cervical surgeries  Injections: RFA 12/31/24 received 75% relief       Imaging:    MRI Lumbar spine wo contrast 6/14/24    Past Medical History:   Diagnosis Date    Abnormal EKG 06/18/2021    SINUS BRADYCARDIA, LEFT AXIS DEVIATION, NONSPECIFIC T WAVE ABNORMALITY    Acquired hypothyroidism 07/27/2020    Allergic rhinitis 12/28/2024    Bilateral kidney stones 06/18/2022    Bilateral primary osteoarthritis of knee 04/25/2021    Chronic back pain     Coronary artery disease involving native coronary artery of native heart without angina pectoris 03/21/2022    PT. DENIES.    Diarrhea     Distal radius fracture 01/28/2013    Drug-seeking behavior 10/16/2014    Essential hypertension 09/14/2021    Fatty liver 06/18/2022    Fibromyalgia 05/21/2024    FANTA (generalized anxiety disorder)     GERD (gastroesophageal reflux disease)     H/O: hysterectomy 08/19/2020    History of depression     Hyperlipidemia with target LDL less than 100 07/19/2020    Hypothyroidism due to Hashimoto's thyroiditis 07/27/2020    Irritable bowel syndrome with diarrhea

## 2025-02-06 DIAGNOSIS — N18.30 BENIGN HYPERTENSION WITH CKD (CHRONIC KIDNEY DISEASE) STAGE III (HCC): ICD-10-CM

## 2025-02-06 DIAGNOSIS — I12.9 BENIGN HYPERTENSION WITH CKD (CHRONIC KIDNEY DISEASE) STAGE III (HCC): ICD-10-CM

## 2025-02-06 RX ORDER — POTASSIUM CHLORIDE 1500 MG/1
20 TABLET, EXTENDED RELEASE ORAL DAILY PRN
Qty: 90 TABLET | Refills: 3 | Status: SHIPPED | OUTPATIENT
Start: 2025-02-06

## 2025-02-06 RX ORDER — FUROSEMIDE 20 MG/1
TABLET ORAL
Qty: 90 TABLET | Refills: 3 | Status: SHIPPED | OUTPATIENT
Start: 2025-02-06

## 2025-02-08 DIAGNOSIS — M54.41 CHRONIC MIDLINE LOW BACK PAIN WITH RIGHT-SIDED SCIATICA: ICD-10-CM

## 2025-02-08 DIAGNOSIS — G89.29 CHRONIC PAIN OF RIGHT KNEE: ICD-10-CM

## 2025-02-08 DIAGNOSIS — G89.29 CHRONIC MIDLINE LOW BACK PAIN WITH RIGHT-SIDED SCIATICA: ICD-10-CM

## 2025-02-08 DIAGNOSIS — Z96.651 STATUS POST RIGHT KNEE REPLACEMENT: ICD-10-CM

## 2025-02-08 DIAGNOSIS — M25.561 CHRONIC PAIN OF RIGHT KNEE: ICD-10-CM

## 2025-02-08 DIAGNOSIS — M79.7 FIBROMYALGIA: ICD-10-CM

## 2025-02-09 RX ORDER — GABAPENTIN 100 MG/1
100 CAPSULE ORAL 3 TIMES DAILY
Qty: 90 CAPSULE | Refills: 0 | Status: SHIPPED | OUTPATIENT
Start: 2025-02-09 | End: 2025-03-11

## 2025-02-15 DIAGNOSIS — E55.9 VITAMIN D DEFICIENCY: ICD-10-CM

## 2025-02-16 DIAGNOSIS — J20.9 ACUTE BRONCHITIS DUE TO INFECTION: ICD-10-CM

## 2025-02-16 DIAGNOSIS — J01.80 ACUTE NON-RECURRENT SINUSITIS OF OTHER SINUS: ICD-10-CM

## 2025-02-17 RX ORDER — ERGOCALCIFEROL 1.25 MG/1
CAPSULE, LIQUID FILLED ORAL
Qty: 12 CAPSULE | Refills: 0 | Status: SHIPPED | OUTPATIENT
Start: 2025-02-17

## 2025-02-17 RX ORDER — AZITHROMYCIN 250 MG/1
TABLET, FILM COATED ORAL
Qty: 6 TABLET | Refills: 0 | OUTPATIENT
Start: 2025-02-17

## 2025-02-26 ENCOUNTER — OFFICE VISIT (OUTPATIENT)
Dept: FAMILY MEDICINE CLINIC | Age: 60
End: 2025-02-26
Payer: COMMERCIAL

## 2025-02-26 ENCOUNTER — HOSPITAL ENCOUNTER (OUTPATIENT)
Age: 60
Discharge: HOME | End: 2025-02-26
Payer: COMMERCIAL

## 2025-02-26 VITALS
DIASTOLIC BLOOD PRESSURE: 78 MMHG | BODY MASS INDEX: 41.03 KG/M2 | WEIGHT: 209 LBS | HEART RATE: 70 BPM | TEMPERATURE: 98.5 F | SYSTOLIC BLOOD PRESSURE: 134 MMHG | OXYGEN SATURATION: 95 % | HEIGHT: 60 IN

## 2025-02-26 DIAGNOSIS — E06.3 HYPOTHYROIDISM DUE TO HASHIMOTO'S THYROIDITIS: ICD-10-CM

## 2025-02-26 DIAGNOSIS — M25.561 CHRONIC PAIN OF BOTH KNEES: ICD-10-CM

## 2025-02-26 DIAGNOSIS — R73.03 PREDIABETES: ICD-10-CM

## 2025-02-26 DIAGNOSIS — I10 ESSENTIAL HYPERTENSION: ICD-10-CM

## 2025-02-26 DIAGNOSIS — Z51.81 MEDICATION MONITORING ENCOUNTER: ICD-10-CM

## 2025-02-26 DIAGNOSIS — I25.10 CORONARY ARTERY DISEASE INVOLVING NATIVE CORONARY ARTERY OF NATIVE HEART WITHOUT ANGINA PECTORIS: ICD-10-CM

## 2025-02-26 DIAGNOSIS — I10 ESSENTIAL HYPERTENSION: Primary | ICD-10-CM

## 2025-02-26 DIAGNOSIS — R01.1 HEART MURMUR: ICD-10-CM

## 2025-02-26 DIAGNOSIS — E55.9 VITAMIN D DEFICIENCY: ICD-10-CM

## 2025-02-26 DIAGNOSIS — F41.9 ANXIETY: ICD-10-CM

## 2025-02-26 DIAGNOSIS — E53.8 VITAMIN B 12 DEFICIENCY: ICD-10-CM

## 2025-02-26 DIAGNOSIS — M48.061 SPINAL STENOSIS OF LUMBAR REGION WITHOUT NEUROGENIC CLAUDICATION: ICD-10-CM

## 2025-02-26 DIAGNOSIS — G89.29 CHRONIC PAIN OF BOTH KNEES: ICD-10-CM

## 2025-02-26 DIAGNOSIS — M25.562 CHRONIC PAIN OF BOTH KNEES: ICD-10-CM

## 2025-02-26 DIAGNOSIS — M79.7 FIBROMYALGIA: ICD-10-CM

## 2025-02-26 LAB
25(OH)D3 SERPL-MCNC: 36.7 NG/ML (ref 30–100)
ALBUMIN SERPL-MCNC: 4.2 G/DL (ref 3.5–5.2)
ALP SERPL-CCNC: 94 U/L (ref 35–104)
ALT SERPL-CCNC: 18 U/L (ref 10–35)
ANION GAP SERPL CALCULATED.3IONS-SCNC: 9 MMOL/L (ref 9–16)
AST SERPL-CCNC: 15 U/L (ref 10–35)
BACTERIA URNS QL MICRO: ABNORMAL
BASOPHILS # BLD: 0 K/UL (ref 0–0.2)
BASOPHILS NFR BLD: 0 % (ref 0–2)
BILIRUB SERPL-MCNC: 0.3 MG/DL (ref 0–1.2)
BILIRUB UR QL STRIP: NEGATIVE
BUN SERPL-MCNC: 13 MG/DL (ref 6–20)
CALCIUM SERPL-MCNC: 9.3 MG/DL (ref 8.6–10.4)
CASTS #/AREA URNS LPF: ABNORMAL /LPF
CHLORIDE SERPL-SCNC: 106 MMOL/L (ref 98–107)
CLARITY UR: CLEAR
CO2 SERPL-SCNC: 27 MMOL/L (ref 20–31)
COLOR UR: YELLOW
CREAT SERPL-MCNC: 0.8 MG/DL (ref 0.7–1.2)
EOSINOPHIL # BLD: 0.1 K/UL (ref 0–0.4)
EOSINOPHILS RELATIVE PERCENT: 1 % (ref 0–4)
EPI CELLS #/AREA URNS HPF: ABNORMAL /HPF
ERYTHROCYTE [DISTWIDTH] IN BLOOD BY AUTOMATED COUNT: 15.4 % (ref 11.5–14.9)
FOLATE SERPL-MCNC: 8.9 NG/ML (ref 4.8–24.2)
GFR, ESTIMATED: 85 ML/MIN/1.73M2
GLUCOSE SERPL-MCNC: 127 MG/DL (ref 74–99)
GLUCOSE UR STRIP-MCNC: NEGATIVE MG/DL
HBA1C MFR BLD: 5.9 %
HCT VFR BLD AUTO: 41.5 % (ref 36–46)
HGB BLD-MCNC: 13.4 G/DL (ref 12–16)
HGB UR QL STRIP.AUTO: NEGATIVE
KETONES UR STRIP-MCNC: NEGATIVE MG/DL
LEUKOCYTE ESTERASE UR QL STRIP: NEGATIVE
LYMPHOCYTES NFR BLD: 1.6 K/UL (ref 1–4.8)
LYMPHOCYTES RELATIVE PERCENT: 31 % (ref 24–44)
MAGNESIUM SERPL-MCNC: 2.2 MG/DL (ref 1.6–2.6)
MCH RBC QN AUTO: 28.4 PG (ref 26–34)
MCHC RBC AUTO-ENTMCNC: 32.3 G/DL (ref 31–37)
MCV RBC AUTO: 88 FL (ref 80–100)
MONOCYTES NFR BLD: 0.4 K/UL (ref 0.1–1.3)
MONOCYTES NFR BLD: 7 % (ref 1–7)
NEUTROPHILS NFR BLD: 61 % (ref 36–66)
NEUTS SEG NFR BLD: 3.2 K/UL (ref 1.3–9.1)
NITRITE UR QL STRIP: NEGATIVE
PH UR STRIP: 5.5 [PH] (ref 5–8)
PLATELET # BLD AUTO: 172 K/UL (ref 150–450)
PMV BLD AUTO: 9.4 FL (ref 6–12)
POTASSIUM SERPL-SCNC: 4.3 MMOL/L (ref 3.7–5.3)
PROT SERPL-MCNC: 6.7 G/DL (ref 6.6–8.7)
PROT UR STRIP-MCNC: NEGATIVE MG/DL
RBC # BLD AUTO: 4.72 M/UL (ref 4–5.2)
RBC #/AREA URNS HPF: ABNORMAL /HPF
SODIUM SERPL-SCNC: 142 MMOL/L (ref 136–145)
SP GR UR STRIP: 1.01 (ref 1–1.03)
T4 FREE SERPL-MCNC: 1.2 NG/DL (ref 0.9–1.7)
TSH SERPL DL<=0.05 MIU/L-ACNC: 2.16 UIU/ML (ref 0.27–4.2)
URATE SERPL-MCNC: 3.4 MG/DL (ref 2.4–5.7)
UROBILINOGEN UR STRIP-ACNC: NORMAL EU/DL (ref 0–1)
VIT B12 SERPL-MCNC: 296 PG/ML (ref 232–1245)
WBC #/AREA URNS HPF: ABNORMAL /HPF
WBC OTHER # BLD: 5.2 K/UL (ref 3.5–11)

## 2025-02-26 PROCEDURE — 36415 COLL VENOUS BLD VENIPUNCTURE: CPT

## 2025-02-26 PROCEDURE — 80307 DRUG TEST PRSMV CHEM ANLYZR: CPT

## 2025-02-26 PROCEDURE — 82607 VITAMIN B-12: CPT

## 2025-02-26 PROCEDURE — 81001 URINALYSIS AUTO W/SCOPE: CPT

## 2025-02-26 PROCEDURE — 80053 COMPREHEN METABOLIC PANEL: CPT

## 2025-02-26 PROCEDURE — 83735 ASSAY OF MAGNESIUM: CPT

## 2025-02-26 PROCEDURE — 82306 VITAMIN D 25 HYDROXY: CPT

## 2025-02-26 PROCEDURE — 84550 ASSAY OF BLOOD/URIC ACID: CPT

## 2025-02-26 PROCEDURE — G0480 DRUG TEST DEF 1-7 CLASSES: HCPCS

## 2025-02-26 PROCEDURE — 3078F DIAST BP <80 MM HG: CPT | Performed by: FAMILY MEDICINE

## 2025-02-26 PROCEDURE — 82746 ASSAY OF FOLIC ACID SERUM: CPT

## 2025-02-26 PROCEDURE — 84443 ASSAY THYROID STIM HORMONE: CPT

## 2025-02-26 PROCEDURE — 83036 HEMOGLOBIN GLYCOSYLATED A1C: CPT | Performed by: FAMILY MEDICINE

## 2025-02-26 PROCEDURE — 85025 COMPLETE CBC W/AUTO DIFF WBC: CPT

## 2025-02-26 PROCEDURE — 99214 OFFICE O/P EST MOD 30 MIN: CPT | Performed by: FAMILY MEDICINE

## 2025-02-26 PROCEDURE — 3075F SYST BP GE 130 - 139MM HG: CPT | Performed by: FAMILY MEDICINE

## 2025-02-26 PROCEDURE — 84439 ASSAY OF FREE THYROXINE: CPT

## 2025-02-26 RX ORDER — BUSPIRONE HYDROCHLORIDE 10 MG/1
10 TABLET ORAL 3 TIMES DAILY PRN
Qty: 90 TABLET | Refills: 0 | Status: SHIPPED | OUTPATIENT
Start: 2025-02-26 | End: 2025-03-28

## 2025-02-26 RX ORDER — CYCLOBENZAPRINE HCL 10 MG
10 TABLET ORAL 2 TIMES DAILY PRN
COMMUNITY
Start: 2025-02-04 | End: 2025-02-26 | Stop reason: ALTCHOICE

## 2025-02-26 RX ORDER — AMLODIPINE BESYLATE 5 MG/1
5 TABLET ORAL DAILY
Qty: 90 TABLET | Refills: 3 | Status: SHIPPED | OUTPATIENT
Start: 2025-02-26

## 2025-02-26 SDOH — ECONOMIC STABILITY: FOOD INSECURITY: WITHIN THE PAST 12 MONTHS, YOU WORRIED THAT YOUR FOOD WOULD RUN OUT BEFORE YOU GOT MONEY TO BUY MORE.: PATIENT DECLINED

## 2025-02-26 SDOH — ECONOMIC STABILITY: FOOD INSECURITY: WITHIN THE PAST 12 MONTHS, THE FOOD YOU BOUGHT JUST DIDN'T LAST AND YOU DIDN'T HAVE MONEY TO GET MORE.: PATIENT DECLINED

## 2025-02-26 ASSESSMENT — PATIENT HEALTH QUESTIONNAIRE - PHQ9
8. MOVING OR SPEAKING SO SLOWLY THAT OTHER PEOPLE COULD HAVE NOTICED. OR THE OPPOSITE, BEING SO FIGETY OR RESTLESS THAT YOU HAVE BEEN MOVING AROUND A LOT MORE THAN USUAL: NOT AT ALL
3. TROUBLE FALLING OR STAYING ASLEEP: SEVERAL DAYS
SUM OF ALL RESPONSES TO PHQ QUESTIONS 1-9: 4
1. LITTLE INTEREST OR PLEASURE IN DOING THINGS: NOT AT ALL
7. TROUBLE CONCENTRATING ON THINGS, SUCH AS READING THE NEWSPAPER OR WATCHING TELEVISION: NOT AT ALL
4. FEELING TIRED OR HAVING LITTLE ENERGY: SEVERAL DAYS
10. IF YOU CHECKED OFF ANY PROBLEMS, HOW DIFFICULT HAVE THESE PROBLEMS MADE IT FOR YOU TO DO YOUR WORK, TAKE CARE OF THINGS AT HOME, OR GET ALONG WITH OTHER PEOPLE: SOMEWHAT DIFFICULT
SUM OF ALL RESPONSES TO PHQ QUESTIONS 1-9: 4
6. FEELING BAD ABOUT YOURSELF - OR THAT YOU ARE A FAILURE OR HAVE LET YOURSELF OR YOUR FAMILY DOWN: NOT AT ALL
9. THOUGHTS THAT YOU WOULD BE BETTER OFF DEAD, OR OF HURTING YOURSELF: NOT AT ALL
5. POOR APPETITE OR OVEREATING: MORE THAN HALF THE DAYS
2. FEELING DOWN, DEPRESSED OR HOPELESS: NOT AT ALL
SUM OF ALL RESPONSES TO PHQ QUESTIONS 1-9: 4
SUM OF ALL RESPONSES TO PHQ QUESTIONS 1-9: 4

## 2025-02-26 ASSESSMENT — ANXIETY QUESTIONNAIRES
7. FEELING AFRAID AS IF SOMETHING AWFUL MIGHT HAPPEN: SEVERAL DAYS
4. TROUBLE RELAXING: SEVERAL DAYS
GAD7 TOTAL SCORE: 7
6. BECOMING EASILY ANNOYED OR IRRITABLE: SEVERAL DAYS
5. BEING SO RESTLESS THAT IT IS HARD TO SIT STILL: SEVERAL DAYS
IF YOU CHECKED OFF ANY PROBLEMS ON THIS QUESTIONNAIRE, HOW DIFFICULT HAVE THESE PROBLEMS MADE IT FOR YOU TO DO YOUR WORK, TAKE CARE OF THINGS AT HOME, OR GET ALONG WITH OTHER PEOPLE: EXTREMELY DIFFICULT
3. WORRYING TOO MUCH ABOUT DIFFERENT THINGS: SEVERAL DAYS
1. FEELING NERVOUS, ANXIOUS, OR ON EDGE: SEVERAL DAYS
2. NOT BEING ABLE TO STOP OR CONTROL WORRYING: SEVERAL DAYS

## 2025-02-26 NOTE — ASSESSMENT & PLAN NOTE
Chronic, improved post bilateral knee replacements but not well-controlled    She is status post bilateral knee replacements. She is advised to apply ice to the right knee to alleviate pain.  To continue with collagen supplement like move free, gabapentin 100 Mg 3 times a day, tizanidine 4 Mg every 8 hours as needed  If pain worsens, to follow-up with orthopedics

## 2025-02-26 NOTE — PROGRESS NOTES
Hepatitis C screen  Completed    HIV screen  Completed    Hepatitis A vaccine  Aged Out    Hib vaccine  Aged Out    Polio vaccine  Aged Out    Meningococcal (ACWY) vaccine  Aged Out    Diabetes screen  Discontinued    Cervical cancer screen  Discontinued               
with Auto Differential     Standing Status:   Future     Standing Expiration Date:   2/26/2026    Comprehensive Metabolic Panel     Standing Status:   Future     Standing Expiration Date:   2/26/2026    Vitamin D 25 Hydroxy     Please verify insurance coverage before drawing blood for this test.  If not covered by insurance, please cancel the order.  Thank you!     Standing Status:   Future     Standing Expiration Date:   2/26/2026    Vitamin B12 & Folate     Standing Status:   Future     Standing Expiration Date:   2/26/2026    Uric Acid     Standing Status:   Future     Standing Expiration Date:   2/26/2026    Urinalysis with Microscopic     Standing Status:   Future     Standing Expiration Date:   2/26/2026     Order Specific Question:   SPECIFY(EX-CATH,MIDSTREAM,CYSTO,ETC)?     Answer:   midstream    TSH     Standing Status:   Future     Standing Expiration Date:   2/26/2026    T4, Free     Standing Status:   Future     Standing Expiration Date:   2/26/2026    Magnesium     Standing Status:   Future     Standing Expiration Date:   2/26/2026    DRUG SCREEN, PAIN     Standing Status:   Future     Standing Expiration Date:   4/26/2025     Scheduling Instructions:      gabapentin    AFL (Epic) - Dillon Reeves MD, Cardiology, [place]     Referral Priority:   Routine     Referral Type:   Eval and Treat     Referral Reason:   Specialty Services Required     Referred to Provider:   Dillon Reeves MD     Requested Specialty:   Interventional Cardiology     Number of Visits Requested:   1    POCT glycosylated hemoglobin (Hb A1C)       Most recent labs reviewed with the patient and all questions fully answered.    Results  Laboratory Studies  Hemoglobin A1c is 5.9, prediabetes. Potassium was borderline low at 3.7. Alkaline phosphatase was slightly high. TSH was slightly high at 5.71, free T4 was normal at 1.1. Cholesterol levels were improved. Vitamin B12 was towards the lower side at 398. Vitamin D was

## 2025-02-26 NOTE — ASSESSMENT & PLAN NOTE
Chronic, well-controlled  Check labs, low-salt diet  Continue current medications  Amlodipine 5 mg will be refilled. Furosemide 20 mEq and potassium will be refilled concurrently. She is advised to use compression stockings if leg swelling occurs.  Orders:    amLODIPine (NORVASC) 5 MG tablet; Take 1 tablet by mouth daily    CBC with Auto Differential; Future    Comprehensive Metabolic Panel; Future    Uric Acid; Future    Urinalysis with Microscopic; Future    Magnesium; Future

## 2025-02-26 NOTE — RESULT ENCOUNTER NOTE
Please notify patient: Blood glucose increased 127, a bit too high but not fasting, to work on the diet, cut down carbs and sweet    Mild decreased kidney function, to avoid ibuprofen, naproxen, Aleve Motrin and dehydration  Vitamin B12 towards lower side, to start taking B12 1000 mcg daily    Vitamin D borderline low, to continue with high doses vitamin D  Otherwise labs within normal limits  continue current treatment    Future Appointments  5/16/2025  1:30 PM    Leslie Herron MD    [facility]               [facility]  7/28/2025  2:30 PM    Dayron Curtis DO [facility]

## 2025-02-26 NOTE — ASSESSMENT & PLAN NOTE
Chronic improved with medication  A thyroid ultrasound will be scheduled in 3 weeks to rule out thyroid nodules which can be commonly associated with Hashimoto thyroiditis and have an increased risk of thyroid cancer, to await for prior authorization to be approved by her insurance after scheduling the ultrasound.   A thyroid panel will be rechecked.  To continue Synthroid 75 mcg daily  Advised to take Synthroid in the morning, on empty stomach, without any other meds and with a full glass of water.    Orders:    TSH; Future    T4, Free; Future    US THYROID; Future

## 2025-02-26 NOTE — RESULT ENCOUNTER NOTE
Addressed during office visit today, POC A1c 5.9, worsening prediabetes, lifestyle changes discussed

## 2025-02-26 NOTE — ASSESSMENT & PLAN NOTE
Chronic improved with medications but not very well-controlled  Continue tizanidine 4 Mg every 8 hours as needed, Cymbalta 30 Mg daily, gabapentin 100 Mg 3 times a day with intention to increase the dosage

## 2025-02-26 NOTE — ASSESSMENT & PLAN NOTE
Chronic likely worsening due to wear-and-tear nature of the disease  Pain is not well-controlled  Gabapentin will be increased to 200 mg, to be taken three times daily at the next refill.  If memory issues arise, the dosage can be reduced to 100 mg three times daily or adjusted to be taken at night only.  Continue collagen based supplement like move free, muscle relaxant tizanidine 4 Mg every 8 hours as needed  Follow-up with pain management as referred  MRI lumbar spine 6/14/2024 showing degenerative changes worst at L4-L5 and mild spinal canal stenosis

## 2025-02-26 NOTE — ASSESSMENT & PLAN NOTE
Chronic likely worsening due to aging, cardiac cath 8/13/2021 showed coronary artery disease 30 to 40%  A referral to cardiology will be made for further evaluation. An echocardiogram will be ordered due to coronary artery disease and murmur.  Orders:    AFL (Epic) - Dillon Reeves MD, Cardiology, [place]    Echo (TTE) complete (PRN contrast/bubble/strain/3D); Future

## 2025-02-26 NOTE — ASSESSMENT & PLAN NOTE
Unsure if improving or not.  Very low prior vitamin D, 17.4 on 5/18/2024, still borderline low 34.3 on 11/16/2024  Will recheck level  Continue supplementation.      Orders:    Vitamin D 25 Hydroxy; Future

## 2025-02-26 NOTE — ASSESSMENT & PLAN NOTE
Chronic and worsening  Check labs    Lab Results   Component Value Date    LABA1C 5.9 02/26/2025    LABA1C 5.5 11/16/2024    LABA1C 6.0 08/22/2024     Trulicity not covered  She could not tolerate metformin in the past  Orders:    POCT glycosylated hemoglobin (Hb A1C)    Vitamin B12 & Folate; Future

## 2025-03-02 LAB
6-ACETYLMORPHINE, UR: NOT DETECTED
7-AMINOCLONAZEPAM, URINE: NOT DETECTED
ALPHA-OH-ALPRAZ, URINE: NOT DETECTED
ALPHA-OH-MIDAZOLAM, URINE: NOT DETECTED
ALPRAZOLAM, URINE: NOT DETECTED
AMPHETAMINE, URINE: NOT DETECTED
BARBITURATES, URINE: NEGATIVE
BENZOYLECGONINE, UR: NEGATIVE
BUPRENORPHINE URINE: NOT DETECTED
CARISOPRODOL, UR: NEGATIVE
CLONAZEPAM, URINE: NOT DETECTED
CODEINE, URINE: NOT DETECTED
CREAT UR-MCNC: 75.5 MG/DL (ref 20–400)
DIAZEPAM, URINE: NOT DETECTED
DRUGS EXPECTED, UR: NORMAL
EER HI RES INTERP UR: NORMAL
ETHYL GLUCURONIDE UR: NEGATIVE
FENTANYL URINE: NOT DETECTED
GABAPENTIN: PRESENT
HYDROCODONE, URINE: NOT DETECTED
HYDROMORPHONE, URINE: NOT DETECTED
LORAZEPAM, URINE: NOT DETECTED
MARIJUANA METAB, UR: NEGATIVE
MDA, URINE: NOT DETECTED
MDEA, EVE, UR: NOT DETECTED
MDMA, URINE: NOT DETECTED
MEPERIDINE METAB, UR: NOT DETECTED
METHADONE, URINE: NEGATIVE
METHAMPHETAMINE, URINE: NOT DETECTED
METHYLPHENIDATE: NOT DETECTED
MIDAZOLAM, URINE: NOT DETECTED
MORPHINE, OPI1M: NOT DETECTED
NALOXONE URINE: NOT DETECTED
NORBUPRENORPHINE, URINE: NOT DETECTED
NORDIAZEPAM, URINE: NOT DETECTED
NORFENTANYL, URINE: NOT DETECTED
NORHYDROCODONE, URINE: NOT DETECTED
NOROXYCODONE, URINE: NOT DETECTED
NOROXYMORPHONE, URINE: NOT DETECTED
OXAZEPAM, URINE: NOT DETECTED
OXYCODONE URINE: NOT DETECTED
OXYMORPHONE, URINE: NOT DETECTED
PAIN MANAGEMENT DRUG PANEL INTERP, URINE: NORMAL
PAIN MGT DRUG PANEL, HI RES, UR: NORMAL
PCP,URINE: NEGATIVE
PHENTERMINE, UR: NOT DETECTED
PREGABALIN: NOT DETECTED
TAPENTADOL, URINE: NOT DETECTED
TAPENTADOL-O-SULFATE, URINE: NOT DETECTED
TEMAZEPAM, URINE: NOT DETECTED
TRAMADOL, URINE: NEGATIVE
ZOLPIDEM METABOLITE (ZCA), URINE: NOT DETECTED
ZOLPIDEM, URINE: NOT DETECTED

## 2025-03-03 PROBLEM — E53.8 VITAMIN B 12 DEFICIENCY: Status: ACTIVE | Noted: 2025-03-03

## 2025-03-03 NOTE — ASSESSMENT & PLAN NOTE
Ongoing  Vitamin B12 296 on 2/26/2025, will start vitamin B12 supplementation    If the vitamin B12 persist despite oral supplementation then will switch to B12 injections  Orders:    cyanocobalamin ([facility] VITAMIN B12) 1000 MCG tablet; Take 1 tablet by mouth daily

## 2025-03-03 NOTE — ASSESSMENT & PLAN NOTE
Chronic, ongoing we will start buspirone  Buspirone will be prescribed as needed for anxiety, to be taken with food. She is advised to take it at night initially to assess tolerance and then adjust the timing as needed.  To continue with Cymbalta 30 Mg daily      Orders:    busPIRone (BUSPAR) 10 MG tablet; Take 1 tablet by mouth 3 times daily as needed (anxiety) With food

## 2025-03-10 ENCOUNTER — PATIENT MESSAGE (OUTPATIENT)
Dept: FAMILY MEDICINE CLINIC | Age: 60
End: 2025-03-10

## 2025-03-10 DIAGNOSIS — Z96.651 STATUS POST RIGHT KNEE REPLACEMENT: ICD-10-CM

## 2025-03-10 DIAGNOSIS — G89.29 CHRONIC PAIN OF RIGHT KNEE: ICD-10-CM

## 2025-03-10 DIAGNOSIS — M79.7 FIBROMYALGIA: ICD-10-CM

## 2025-03-10 DIAGNOSIS — M54.41 CHRONIC MIDLINE LOW BACK PAIN WITH RIGHT-SIDED SCIATICA: ICD-10-CM

## 2025-03-10 DIAGNOSIS — G89.29 CHRONIC MIDLINE LOW BACK PAIN WITH RIGHT-SIDED SCIATICA: ICD-10-CM

## 2025-03-10 DIAGNOSIS — M25.561 CHRONIC PAIN OF RIGHT KNEE: ICD-10-CM

## 2025-03-10 RX ORDER — GABAPENTIN 100 MG/1
100 CAPSULE ORAL 3 TIMES DAILY
Qty: 270 CAPSULE | Refills: 0 | Status: SHIPPED | OUTPATIENT
Start: 2025-03-10 | End: 2025-06-08

## 2025-03-21 DIAGNOSIS — F41.9 ANXIETY: ICD-10-CM

## 2025-03-21 RX ORDER — BUSPIRONE HYDROCHLORIDE 10 MG/1
TABLET ORAL
Qty: 90 TABLET | Refills: 0 | Status: SHIPPED | OUTPATIENT
Start: 2025-03-21

## 2025-03-21 NOTE — TELEPHONE ENCOUNTER
Please Approve or Refuse.  Send to Pharmacy per Pt's Request: [facility]      Next Visit Date:  5/16/2025   Last Visit Date: 2/26/2025    Hemoglobin A1C (%)   Date Value   02/26/2025 5.9   11/16/2024 5.5   08/22/2024 6.0             ( goal A1C is < 7)   BP Readings from Last 3 Encounters:   02/26/25 134/78   12/31/24 138/78   12/28/24 136/88          (goal 120/80)  BUN   Date Value Ref Range Status   02/26/2025 13 6 - 20 mg/dL Final     Creatinine   Date Value Ref Range Status   02/26/2025 0.8 0.7 - 1.2 mg/dL Final     Potassium   Date Value Ref Range Status   02/26/2025 4.3 3.7 - 5.3 mmol/L Final

## 2025-03-26 ENCOUNTER — HOSPITAL ENCOUNTER (OUTPATIENT)
Dept: PAIN MANAGEMENT | Age: 60
Discharge: HOME | End: 2025-03-26
Payer: COMMERCIAL

## 2025-03-26 VITALS — BODY MASS INDEX: 41.03 KG/M2 | WEIGHT: 209 LBS | HEIGHT: 60 IN

## 2025-03-26 DIAGNOSIS — M54.16 LUMBAR RADICULOPATHY: Primary | ICD-10-CM

## 2025-03-26 DIAGNOSIS — M51.369 DEGENERATION OF INTERVERTEBRAL DISC OF LUMBAR REGION, UNSPECIFIED WHETHER PAIN PRESENT: ICD-10-CM

## 2025-03-26 DIAGNOSIS — M47.817 LUMBOSACRAL SPONDYLOSIS WITHOUT MYELOPATHY: ICD-10-CM

## 2025-03-26 DIAGNOSIS — E66.812 CLASS 2 OBESITY WITH BODY MASS INDEX (BMI) OF 38.0 TO 38.9 IN ADULT, UNSPECIFIED OBESITY TYPE, UNSPECIFIED WHETHER SERIOUS COMORBIDITY PRESENT: ICD-10-CM

## 2025-03-26 PROCEDURE — 99213 OFFICE O/P EST LOW 20 MIN: CPT

## 2025-03-26 PROCEDURE — 99214 OFFICE O/P EST MOD 30 MIN: CPT | Performed by: STUDENT IN AN ORGANIZED HEALTH CARE EDUCATION/TRAINING PROGRAM

## 2025-03-26 NOTE — H&P (VIEW-ONLY)
bilaterally  Lumbar range of motion is full  NEUROMUSCULAR:  Patient ambulates unassisted  Gait is nonantalgic  Sensation to light touch is grossly intact in lower extremities  Strength is grossly intact in lower extremities  No ankle clonus    Special Tests:  Lumbar facet loading is positive bilaterally  Seated straight leg raise is positive on right      DIAGNOSIS:    ICD-10-CM    1. Lumbar radiculopathy  M54.16 TRANSFORMINAL LUMBAR SINGLE     TRANFORMINAL LUMBAR EACH ADDITIONAL      2. Lumbosacral spondylosis without myelopathy  M47.817       3. Degeneration of intervertebral disc of lumbar region, unspecified whether pain present  M51.369       4. Class 2 obesity with body mass index (BMI) of 38.0 to 38.9 in adult, unspecified obesity type, unspecified whether serious comorbidity present  E66.812     Z68.38         ASSESSMENT:    Petra Starr is a 59 y.o.female who presents with chronic low back pain. To review, patient had a work-related injury several years ago.  She has not had lumbar spine surgery.  She has had worsening low back and right leg pain for the last year.    Since last visit, the patient reports worsening right leg pain.  The patient's history and physical examination are consistent with lumbar radiculopathy as the patient has pain starting in the low back radiating down into the right leg.  Patient has positive neural tension signs on examination with a positive seated straight leg raise.  Additionally the lumbar MRI on 6/14/2024 reveals neuroforaminal stenosis at L4-5 and L5-S1 on the right.  She underwent a previous right lumbar L4 and L5 transforaminal epidural steroid injection on 8/6/2024 with 100% improvement in pain and function of the right leg for 7 months.  Therefore, I will plan for a right lumbar L4 and L5 transforaminal epidural steroid injection.     Neurologically, it appears the patient has full strength and normal sensation. There is no evidence of myelopathy on examination.

## 2025-03-26 NOTE — PROGRESS NOTES
Chronic Pain Clinic Note     Encounter Date: 3/26/2025     SUBJECTIVE:  Chief Complaint   Patient presents with    Back Pain     Increased pain       History of Present Illness:   Petra Starr is a 59 y.o. female who presents with back pain    Medication Refill: n/a    Current Complaints of Pain:   Location: low back   Radiation: Right leg  Severity: Moderate  Pain Numerical Score -7   Average: 6     Highest: 10  Lowest: 6  Character/Quality: Complains of pain that is aching, burning, cramping, shooting and stabbing  Timing: Constant  Associated symptoms: none  Numbness: No  Weakness: No  Exacerbating factors:  ADLs  Alleviating factors:  sitting   Length of time pain has been present: Started about 17 years  Inciting event/injury:  work injury  Bowel/Bladder incontinence: no  Falls: none in the past month  Physical Therapy: yes    History of Interventions:   Surgery: No previous lumbar/cervical surgeries  Injections: Yes    Imaging:    MRI Lumbar spine wo contrast 6/14/24    Past Medical History:   Diagnosis Date    Abnormal EKG 06/18/2021    SINUS BRADYCARDIA, LEFT AXIS DEVIATION, NONSPECIFIC T WAVE ABNORMALITY    Acquired hypothyroidism 07/27/2020    Allergic rhinitis 12/28/2024    Bilateral kidney stones 06/18/2022    Bilateral primary osteoarthritis of knee 04/25/2021    Chronic back pain     Coronary artery disease involving native coronary artery of native heart without angina pectoris 03/21/2022    PT. DENIES.    Diarrhea     Distal radius fracture 01/28/2013    Drug-seeking behavior 10/16/2014    Essential hypertension 09/14/2021    Fatty liver 06/18/2022    Fibromyalgia 05/21/2024    FANTA (generalized anxiety disorder)     GERD (gastroesophageal reflux disease)     H/O: hysterectomy 08/19/2020    History of depression     Hyperlipidemia with target LDL less than 100 07/19/2020    Hypothyroidism due to Hashimoto's thyroiditis 07/27/2020    Irritable bowel syndrome with diarrhea 03/21/2022    Mild episode of

## 2025-04-08 ENCOUNTER — HOSPITAL ENCOUNTER (OUTPATIENT)
Dept: PAIN MANAGEMENT | Facility: CLINIC | Age: 60
Discharge: HOME | End: 2025-04-08
Payer: COMMERCIAL

## 2025-04-08 VITALS
WEIGHT: 209 LBS | HEART RATE: 80 BPM | HEIGHT: 60 IN | DIASTOLIC BLOOD PRESSURE: 87 MMHG | TEMPERATURE: 97.8 F | BODY MASS INDEX: 41.03 KG/M2 | SYSTOLIC BLOOD PRESSURE: 154 MMHG | RESPIRATION RATE: 20 BRPM | OXYGEN SATURATION: 95 %

## 2025-04-08 DIAGNOSIS — R52 PAIN MANAGEMENT: ICD-10-CM

## 2025-04-08 PROCEDURE — 64484 NJX AA&/STRD TFRM EPI L/S EA: CPT | Performed by: STUDENT IN AN ORGANIZED HEALTH CARE EDUCATION/TRAINING PROGRAM

## 2025-04-08 PROCEDURE — 64483 NJX AA&/STRD TFRM EPI L/S 1: CPT | Performed by: STUDENT IN AN ORGANIZED HEALTH CARE EDUCATION/TRAINING PROGRAM

## 2025-04-08 PROCEDURE — 64483 NJX AA&/STRD TFRM EPI L/S 1: CPT

## 2025-04-08 PROCEDURE — 6360000002 HC RX W HCPCS: Performed by: STUDENT IN AN ORGANIZED HEALTH CARE EDUCATION/TRAINING PROGRAM

## 2025-04-08 PROCEDURE — 6360000004 HC RX CONTRAST MEDICATION: Performed by: STUDENT IN AN ORGANIZED HEALTH CARE EDUCATION/TRAINING PROGRAM

## 2025-04-08 PROCEDURE — 64484 NJX AA&/STRD TFRM EPI L/S EA: CPT

## 2025-04-08 RX ORDER — SODIUM CHLORIDE 0.9 % (FLUSH) 0.9 %
SYRINGE (ML) INJECTION
Status: COMPLETED | OUTPATIENT
Start: 2025-04-08 | End: 2025-04-08

## 2025-04-08 RX ORDER — DEXAMETHASONE SODIUM PHOSPHATE 10 MG/ML
INJECTION, SOLUTION INTRAMUSCULAR; INTRAVENOUS
Status: COMPLETED | OUTPATIENT
Start: 2025-04-08 | End: 2025-04-08

## 2025-04-08 RX ORDER — LIDOCAINE HYDROCHLORIDE 10 MG/ML
INJECTION, SOLUTION EPIDURAL; INFILTRATION; INTRACAUDAL; PERINEURAL
Status: COMPLETED | OUTPATIENT
Start: 2025-04-08 | End: 2025-04-08

## 2025-04-08 RX ADMIN — LIDOCAINE HYDROCHLORIDE 5 ML: 10 INJECTION, SOLUTION EPIDURAL; INFILTRATION; INTRACAUDAL; PERINEURAL at 15:37

## 2025-04-08 RX ADMIN — Medication 4 ML: at 15:37

## 2025-04-08 RX ADMIN — IOHEXOL 3 ML: 180 INJECTION INTRAVENOUS at 15:36

## 2025-04-08 RX ADMIN — DEXAMETHASONE SODIUM PHOSPHATE 20 MG: 10 INJECTION INTRAMUSCULAR; INTRAVENOUS at 15:37

## 2025-04-08 ASSESSMENT — PAIN DESCRIPTION - ORIENTATION: ORIENTATION: LOWER

## 2025-04-08 ASSESSMENT — PAIN DESCRIPTION - DESCRIPTORS: DESCRIPTORS: ACHING

## 2025-04-08 ASSESSMENT — PAIN - FUNCTIONAL ASSESSMENT: PAIN_FUNCTIONAL_ASSESSMENT: PREVENTS OR INTERFERES SOME ACTIVE ACTIVITIES AND ADLS

## 2025-04-08 ASSESSMENT — PAIN SCALES - GENERAL
PAINLEVEL_OUTOF10: 0
PAINLEVEL_OUTOF10: 6

## 2025-04-08 ASSESSMENT — PAIN DESCRIPTION - ONSET: ONSET: ON-GOING

## 2025-04-08 ASSESSMENT — PAIN DESCRIPTION - FREQUENCY: FREQUENCY: INTERMITTENT

## 2025-04-08 ASSESSMENT — PAIN DESCRIPTION - LOCATION: LOCATION: BACK

## 2025-04-08 ASSESSMENT — PAIN DESCRIPTION - PAIN TYPE: TYPE: CHRONIC PAIN

## 2025-04-08 NOTE — OP NOTE
site.  The same procedure was performed at the level above at L4.  The patient did not experience any hemodynamic or neurological sequelae.    The patient was transferred to the postoperative care unit in stable condition.  Written discharge instructions were given to the patient.    COMPLICATIONS:  There were no apparent complications.  The patient tolerated the procedure well.

## 2025-04-08 NOTE — INTERVAL H&P NOTE
Update History & Physical    The patient's History and Physical of March 26, 2025 was reviewed with the patient and I examined the patient. There was no change. The surgical site was confirmed by the patient and me.     Plan: The risks, benefits, expected outcome, and alternative to the recommended procedure have been discussed with the patient. Patient understands and wants to proceed with the procedure.     ASA CLASSIFICATION  2  MP   CLASSIFICATION  2    Electronically signed by Dayron Curtis DO on 4/8/2025 at 3:07 PM

## 2025-04-18 DIAGNOSIS — F41.9 ANXIETY: ICD-10-CM

## 2025-04-18 RX ORDER — BUSPIRONE HYDROCHLORIDE 10 MG/1
TABLET ORAL
Qty: 90 TABLET | Refills: 3 | Status: SHIPPED | OUTPATIENT
Start: 2025-04-18

## 2025-04-27 DIAGNOSIS — Z96.651 STATUS POST RIGHT KNEE REPLACEMENT: ICD-10-CM

## 2025-04-27 DIAGNOSIS — M79.7 FIBROMYALGIA: ICD-10-CM

## 2025-04-27 DIAGNOSIS — G89.29 CHRONIC PAIN OF RIGHT KNEE: ICD-10-CM

## 2025-04-27 DIAGNOSIS — M54.41 CHRONIC MIDLINE LOW BACK PAIN WITH RIGHT-SIDED SCIATICA: ICD-10-CM

## 2025-04-27 DIAGNOSIS — M25.561 CHRONIC PAIN OF RIGHT KNEE: ICD-10-CM

## 2025-04-27 DIAGNOSIS — G89.29 CHRONIC MIDLINE LOW BACK PAIN WITH RIGHT-SIDED SCIATICA: ICD-10-CM

## 2025-04-28 NOTE — TELEPHONE ENCOUNTER
Please Approve or Refuse.  Send to Pharmacy per Pt's Request:      Next Visit Date:  5/16/2025   Last Visit Date: 2/26/2025    Hemoglobin A1C (%)   Date Value   02/26/2025 5.9   11/16/2024 5.5   08/22/2024 6.0             ( goal A1C is < 7)   BP Readings from Last 3 Encounters:   04/08/25 (!) 154/87   02/26/25 134/78   12/31/24 138/78          (goal 120/80)  BUN   Date Value Ref Range Status   02/26/2025 13 6 - 20 mg/dL Final     Creatinine   Date Value Ref Range Status   02/26/2025 0.8 0.7 - 1.2 mg/dL Final     Potassium   Date Value Ref Range Status   02/26/2025 4.3 3.7 - 5.3 mmol/L Final

## 2025-05-16 ENCOUNTER — OFFICE VISIT (OUTPATIENT)
Dept: FAMILY MEDICINE CLINIC | Age: 60
End: 2025-05-16

## 2025-05-16 VITALS
DIASTOLIC BLOOD PRESSURE: 82 MMHG | SYSTOLIC BLOOD PRESSURE: 130 MMHG | BODY MASS INDEX: 41.66 KG/M2 | OXYGEN SATURATION: 96 % | HEIGHT: 60 IN | HEART RATE: 90 BPM | TEMPERATURE: 98.7 F | WEIGHT: 212.2 LBS

## 2025-05-16 DIAGNOSIS — M79.7 FIBROMYALGIA: ICD-10-CM

## 2025-05-16 DIAGNOSIS — Z96.653 STATUS POST BILATERAL KNEE REPLACEMENTS: ICD-10-CM

## 2025-05-16 DIAGNOSIS — M54.42 CHRONIC MIDLINE LOW BACK PAIN WITH BILATERAL SCIATICA: Primary | ICD-10-CM

## 2025-05-16 DIAGNOSIS — F33.0 MILD EPISODE OF RECURRENT MAJOR DEPRESSIVE DISORDER: ICD-10-CM

## 2025-05-16 DIAGNOSIS — M54.41 CHRONIC MIDLINE LOW BACK PAIN WITH BILATERAL SCIATICA: Primary | ICD-10-CM

## 2025-05-16 DIAGNOSIS — G89.29 CHRONIC MIDLINE LOW BACK PAIN WITH BILATERAL SCIATICA: Primary | ICD-10-CM

## 2025-05-16 PROCEDURE — 99214 OFFICE O/P EST MOD 30 MIN: CPT | Performed by: FAMILY MEDICINE

## 2025-05-16 PROCEDURE — 3075F SYST BP GE 130 - 139MM HG: CPT | Performed by: FAMILY MEDICINE

## 2025-05-16 PROCEDURE — 3079F DIAST BP 80-89 MM HG: CPT | Performed by: FAMILY MEDICINE

## 2025-05-16 RX ORDER — CYCLOBENZAPRINE HCL 10 MG
10 TABLET ORAL NIGHTLY PRN
Qty: 90 TABLET | Refills: 0 | Status: SHIPPED | OUTPATIENT
Start: 2025-05-16

## 2025-05-16 RX ORDER — METHYLPREDNISOLONE 4 MG/1
TABLET ORAL
Qty: 1 KIT | Refills: 0 | Status: SHIPPED | OUTPATIENT
Start: 2025-05-16

## 2025-05-16 SDOH — ECONOMIC STABILITY: FOOD INSECURITY: WITHIN THE PAST 12 MONTHS, THE FOOD YOU BOUGHT JUST DIDN'T LAST AND YOU DIDN'T HAVE MONEY TO GET MORE.: NEVER TRUE

## 2025-05-16 SDOH — ECONOMIC STABILITY: FOOD INSECURITY: WITHIN THE PAST 12 MONTHS, YOU WORRIED THAT YOUR FOOD WOULD RUN OUT BEFORE YOU GOT MONEY TO BUY MORE.: NEVER TRUE

## 2025-05-16 ASSESSMENT — PATIENT HEALTH QUESTIONNAIRE - PHQ9
4. FEELING TIRED OR HAVING LITTLE ENERGY: NEARLY EVERY DAY
5. POOR APPETITE OR OVEREATING: NOT AT ALL
9. THOUGHTS THAT YOU WOULD BE BETTER OFF DEAD, OR OF HURTING YOURSELF: NOT AT ALL
1. LITTLE INTEREST OR PLEASURE IN DOING THINGS: NEARLY EVERY DAY
SUM OF ALL RESPONSES TO PHQ QUESTIONS 1-9: 9
SUM OF ALL RESPONSES TO PHQ QUESTIONS 1-9: 9
3. TROUBLE FALLING OR STAYING ASLEEP: MORE THAN HALF THE DAYS
SUM OF ALL RESPONSES TO PHQ QUESTIONS 1-9: 9
8. MOVING OR SPEAKING SO SLOWLY THAT OTHER PEOPLE COULD HAVE NOTICED. OR THE OPPOSITE, BEING SO FIGETY OR RESTLESS THAT YOU HAVE BEEN MOVING AROUND A LOT MORE THAN USUAL: NOT AT ALL
7. TROUBLE CONCENTRATING ON THINGS, SUCH AS READING THE NEWSPAPER OR WATCHING TELEVISION: SEVERAL DAYS
10. IF YOU CHECKED OFF ANY PROBLEMS, HOW DIFFICULT HAVE THESE PROBLEMS MADE IT FOR YOU TO DO YOUR WORK, TAKE CARE OF THINGS AT HOME, OR GET ALONG WITH OTHER PEOPLE: NOT DIFFICULT AT ALL
SUM OF ALL RESPONSES TO PHQ QUESTIONS 1-9: 9
6. FEELING BAD ABOUT YOURSELF - OR THAT YOU ARE A FAILURE OR HAVE LET YOURSELF OR YOUR FAMILY DOWN: NOT AT ALL
2. FEELING DOWN, DEPRESSED OR HOPELESS: NOT AT ALL

## 2025-05-16 NOTE — ASSESSMENT & PLAN NOTE
Chronic, with intermittent flareups  - A prescription for Flexeril 10 mg has been issued, with instructions to take half a tablet at night. If the half tablet proves insufficient, she may take a full tablet.  - A Medrol pack has also been prescribed to manage her back pain. She is advised to avoid sweets while on steroids.  - She reports that the pain in her back shoots down her legs. If the pain worsens, she should seek immediate medical attention at an urgent care facility.  - The patient is advised to avoid activities that exacerbate the pain, such as pulling weeds or cutting grass.  Had FIORELLA , steroid injections follow-up with pain management  Continue gabapentin 100 Mg 3 times a day  Continue topical creams from over-the-counter  Updated her FMLA    Orders:    cyclobenzaprine (FLEXERIL) 10 MG tablet; Take 1 tablet by mouth nightly as needed for Muscle spasms Stop tizanidine    methylPREDNISolone (MEDROL, LEONIDAS,) 4 MG tablet; Take by mouth, with food. Keep low carb, low salt diet while taking it

## 2025-05-16 NOTE — ASSESSMENT & PLAN NOTE
S/p bilateral knee pain, still having a lot of pain in her knees  - She reports persistent knee pain post bilateral knee replacement, which limits her ability to sit or stand for prolonged periods.  - Physical exam findings indicate pain across the right lower and midline areas.  - She is advised to avoid activities that exacerbate the pain, such as pulling weeds or cutting grass.  - Compression stockings are recommended to manage leg swelling, especially during the summer.  I updated her FMLA  Orders:    cyclobenzaprine (FLEXERIL) 10 MG tablet; Take 1 tablet by mouth nightly as needed for Muscle spasms Stop tizanidine    methylPREDNISolone (MEDROL, LEONIDAS,) 4 MG tablet; Take by mouth, with food. Keep low carb, low salt diet while taking it

## 2025-05-16 NOTE — PROGRESS NOTES
01/19/2031    Hepatitis C screen  Completed    HIV screen  Completed    Hepatitis A vaccine  Aged Out    Hib vaccine  Aged Out    Polio vaccine  Aged Out    Meningococcal (ACWY) vaccine  Aged Out    Meningococcal B vaccine  Aged Out    Diabetes screen  Discontinued    Cervical cancer screen  Discontinued             
potassium chloride (KLOR-CON M) 20 MEQ extended release tablet Take 1 tablet by mouth daily as needed (when takign lasix.) Take with furosemide.  Do not take when taking Bentyl, take separately, with food, increases risk of ulcers 90 tablet 3    furosemide (LASIX) 20 MG tablet TAKE 1 TABLET BY MOUTH DAILY AS NEEDED FOR LEG SWELLING 90 tablet 3    albuterol sulfate HFA (PROVENTIL HFA) 108 (90 Base) MCG/ACT inhaler Inhale 2 puffs into the lungs every 6 hours as needed for Wheezing or Shortness of Breath (And coughing spells) Okay to substitute 8 g 0    dicyclomine (BENTYL) 10 MG capsule Take 1 capsule by mouth 4 times daily before meals and nightly 360 capsule 0    Collagen-Boron-Hyaluronic Acid (MOVE Africasana) 40-5-3.3 MG TABS Take 1 tablet by mouth daily 90 tablet 0    levothyroxine (SYNTHROID) 75 MCG tablet Take 1 tablet by mouth every morning (before breakfast) 90 tablet 3    pravastatin (PRAVACHOL) 20 MG tablet Take 1 tablet by mouth every evening For high cholesterol, to prevent strokes and heart attacks 90 tablet 3    mirabegron (MYRBETRIQ) 50 MG TB24 Take 50 mg by mouth daily 30 tablet 11    DULoxetine (CYMBALTA) 30 MG extended release capsule TAKE 1 CAPSULE BY MOUTH EVERY DAY IN THE MORNING WITH FOOD 90 capsule 3    famotidine (PEPCID) 20 MG tablet TAKE 1 TABLET BY MOUTH 2 TIMES A  tablet 3    tiZANidine (ZANAFLEX) 4 MG tablet TAKE 1 TABLET BY MOUTH EVERY 8 HOURS AS NEEDED FOR BACK PAIN (Patient not taking: Reported on 5/16/2025) 90 tablet 0     No current facility-administered medications for this visit.        Objective     Vital signs within normal limits except morbidly obese  Blood pressure 130/82, pulse 90, temperature 98.7 °F (37.1 °C), height 1.524 m (5'), weight 96.3 kg (212 lb 3.2 oz), SpO2 96%, not currently breastfeeding.  Body mass index is 41.44 kg/m².    Physical Exam  Vitals and nursing note reviewed.   Constitutional:       General: She is not in acute distress.

## 2025-05-16 NOTE — ASSESSMENT & PLAN NOTE
Orders:    cyclobenzaprine (FLEXERIL) 10 MG tablet; Take 1 tablet by mouth nightly as needed for Muscle spasms Stop tizanidine    methylPREDNISolone (MEDROL, LEONIDAS,) 4 MG tablet; Take by mouth, with food. Keep low carb, low salt diet while taking it     C/O of coughing up bright red blood for the past hours.  +throat pain.  Denies blood thinners, chest pain or SOB. Tested positive for COVID on Sunday.

## 2025-05-16 NOTE — ASSESSMENT & PLAN NOTE
Chronic with intermittent flareups, currently having a flareup  Stretching, repositioning, will change tizanidine to Flexeril, will give a Medrol pack due to severity of the pain at this time  Continue duloxetine 30 Mg daily, gabapentin 100 Mg 3 times a day, FMLA completed  Orders:    cyclobenzaprine (FLEXERIL) 10 MG tablet; Take 1 tablet by mouth nightly as needed for Muscle spasms Stop tizanidine    methylPREDNISolone (MEDROLLEONIDAS,) 4 MG tablet; Take by mouth, with food. Keep low carb, low salt diet while taking it

## 2025-05-20 PROBLEM — R26.2 DIFFICULTY WALKING: Status: RESOLVED | Noted: 2021-06-13 | Resolved: 2025-05-20

## 2025-05-20 PROBLEM — Z96.651 STATUS POST RIGHT KNEE REPLACEMENT: Status: RESOLVED | Noted: 2024-11-23 | Resolved: 2025-05-20

## 2025-05-20 PROBLEM — Z96.652 PRESENCE OF LEFT ARTIFICIAL KNEE JOINT: Status: RESOLVED | Noted: 2021-09-29 | Resolved: 2025-05-20

## 2025-05-20 PROBLEM — M17.0 PRIMARY OSTEOARTHRITIS OF BOTH KNEES: Status: RESOLVED | Noted: 2021-04-25 | Resolved: 2025-05-20

## 2025-05-20 NOTE — ASSESSMENT & PLAN NOTE
Chronic but improved  - Excessive melatonin intake may be affecting her memory.  - She reports taking five melatonin gummies (5 mg each) at night.  - She is advised not to exceed 5 mg of melatonin.  - Counseling provided on the potential impact of high melatonin doses on memory.  Continue to monitor, continue Cymbalta 30 mg daily

## 2025-06-09 ENCOUNTER — HOSPITAL ENCOUNTER (OUTPATIENT)
Dept: GENERAL RADIOLOGY | Age: 60
Discharge: HOME | End: 2025-06-11
Attending: PREVENTIVE MEDICINE
Payer: COMMERCIAL

## 2025-06-09 ENCOUNTER — HOSPITAL ENCOUNTER (OUTPATIENT)
Age: 60
Discharge: HOME | End: 2025-06-09

## 2025-06-09 DIAGNOSIS — T14.90XA INJURY: ICD-10-CM

## 2025-06-09 PROCEDURE — 73030 X-RAY EXAM OF SHOULDER: CPT

## 2025-06-13 DIAGNOSIS — M79.7 FIBROMYALGIA: ICD-10-CM

## 2025-06-13 DIAGNOSIS — F33.0 MILD EPISODE OF RECURRENT MAJOR DEPRESSIVE DISORDER: ICD-10-CM

## 2025-06-14 RX ORDER — DULOXETIN HYDROCHLORIDE 30 MG/1
CAPSULE, DELAYED RELEASE ORAL
Qty: 90 CAPSULE | Refills: 3 | Status: SHIPPED | OUTPATIENT
Start: 2025-06-14

## 2025-06-14 NOTE — TELEPHONE ENCOUNTER
Please Approve or Refuse.  Send to Pharmacy per Pt's Request:      Next Visit Date:  11/14/2025   Last Visit Date: 5/16/2025    Hemoglobin A1C (%)   Date Value   02/26/2025 5.9   11/16/2024 5.5   08/22/2024 6.0             ( goal A1C is < 7)   BP Readings from Last 3 Encounters:   05/16/25 130/82   04/08/25 (!) 154/87   02/26/25 134/78          (goal 120/80)  BUN   Date Value Ref Range Status   02/26/2025 13 6 - 20 mg/dL Final     Creatinine   Date Value Ref Range Status   02/26/2025 0.8 0.7 - 1.2 mg/dL Final     Potassium   Date Value Ref Range Status   02/26/2025 4.3 3.7 - 5.3 mmol/L Final

## 2025-06-17 ENCOUNTER — HOSPITAL ENCOUNTER (OUTPATIENT)
Dept: PHYSICAL THERAPY | Age: 60
Setting detail: THERAPIES SERIES
Discharge: HOME | End: 2025-06-17
Payer: COMMERCIAL

## 2025-06-17 PROCEDURE — 97162 PT EVAL MOD COMPLEX 30 MIN: CPT

## 2025-06-17 PROCEDURE — 97110 THERAPEUTIC EXERCISES: CPT

## 2025-06-17 NOTE — CONSULTS
[x] [facility]   Outpatient Rehabilitation & Therapy  [address]  P: [phone]   F: [phone]     Physical Therapy Upper Extremity / Cervical Evaluation    Date:  2025  Patient: Petra Starr  : 1965  MRN: [id number]  Physician: Mars Bates MD      Insurance: [facility] Approved w/ disclaimer - 3 x week for 4 weeks. 12 visits, valid 6/10 -    Claim # [id number]   Medical Diagnosis:   S43.81XS (ICD-10-CM) - Sprain of other specified parts of right shoulder girdle, sequela   S43.421A (ICD-10-CM) - Sprain of right rotator cuff capsule, initial encounter   S16.1XXA (ICD-10-CM) - Strain of muscle, fascia and tendon at neck level, initial encounter   Rehab Codes: M62.81 , M25.511 , M54.2 , M25. 60   Onset Date: 4/3/2025 DOI   Next 's appt: 2025 - Dr. Bates    Precautions: fibromyalgia, depression     Subjective:   CC: R Shoulder / Cervical   HPI: Patient comes in reporting that her R shoulder keeps popping as well as numbness at the anterior forearm. 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 Symptoms have been getting worse since the injury. Wakes up every morning with increase pain throughout the R shoulder and cervical. Work restrictions are no lifting more than 20#. Is doing her best to follow the work restrictions. Is on muscle relaxors and gabapentin and does not feel like this is doing anything for her symptoms. Notes that she does try to move her R shoulder around and when it pops it is a mixture of relief and pain. Reports no prior history for shoulder and cervical pain.    PMHx: [] Unremarkable [] Diabetes [x] HTN  [] Pacemaker   [x] MI/Heart Problems [] Cancer [x] Arthritis [x] Other: depression              [x] Refer to full medical chart  In EPIC     Comorbidities:   [x] Obesity

## 2025-06-23 NOTE — FLOWSHEET NOTE
[facility] Outpatient Physical Therapy   [address]   Phone: [phone]   Fax: [phone]    Physical Therapy Daily Treatment Note      Date:  2025  Patient Name:  Petra Starr    :  1965  MRN: [id number]  Physician: Mars Bates MD                                   Insurance: [employer] [facility] Approved w/ disclaimer - 3 x week for 4 weeks. 12 visits, valid 6/10 -    Claim # [id number]   Medical Diagnosis:   S43.81XS (ICD-10-CM) - Sprain of other specified parts of right shoulder girdle, sequela   S43.421A (ICD-10-CM) - Sprain of right rotator cuff capsule, initial encounter   S16.1XXA (ICD-10-CM) - Strain of muscle, fascia and tendon at neck level, initial encounter   Rehab Codes: M62.81 , M25.511 , M54.2 , M25. 60   Onset Date: 4/3/2025 DOI                 Next 's appt: 2025 - Dr. Bates  Visit# / total visits: 2  Cancels/No Shows: 0/0    Precautions: fibromyalgia, depression      Subjective:  Patient comes in feeling okay. No new complaints, did not feel sore after eval.        Pain:  [x] Yes  [] No Location: R Shoulder Pain Rating: (0-10 scale) 3/10  Pain altered Tx:  [x] No  [] Yes  Action:  Comments:    Objective:  INTERVENTIONS  INTERVENTIONS  Reps/ Time Weight/ Level Completed  Today Comments          MODALITIES                      MANUAL                             EXERCISES        Mat Level:       AAROM Cane R Shoulder Flexion / ABD 10 x 3-5\"  x    Rhythmic Stabilization R Shoulder 3 x 30\"  x    Supine R Shoulder Flexion 10 x 2 1# x Weight during second set   Scapular Punches 10 x 2 1# x    ABC 1 x 1# x    Side lying ABD 10 x  x    Side lying ER 10 x  x                  Seated:       Scapular Retraction 10 x 3\"  x    R Levator Stretch 3 x 20\"  x           Standing:       Isometrics ER / IR / Flexion 10 x 3-5\" eac  x    Pull Downs 10 x yellow x           Other:    Patient Education/Home Program :   Access Code: [email or web address]  URL:

## 2025-06-24 ENCOUNTER — HOSPITAL ENCOUNTER (OUTPATIENT)
Dept: PHYSICAL THERAPY | Age: 60
Setting detail: THERAPIES SERIES
Discharge: HOME | End: 2025-06-24
Payer: COMMERCIAL

## 2025-06-24 PROCEDURE — 97110 THERAPEUTIC EXERCISES: CPT

## 2025-06-27 ENCOUNTER — HOSPITAL ENCOUNTER (OUTPATIENT)
Dept: PHYSICAL THERAPY | Age: 60
Setting detail: THERAPIES SERIES
Discharge: HOME | End: 2025-06-27
Payer: COMMERCIAL

## 2025-06-27 NOTE — FLOWSHEET NOTE
[facility]   Outpatient Rehabilitation & Therapy  [address] [facility] [address]  P: [phone]   F: [phone]     Physical Therapy Cancel/No Show note    Date: 2025  Patient: Petra Starr  : 1965  MRN: [id number]    Visit Count: 2  Cancels/No Shows to date:     For today's appointment patient:    [x]  Cancelled    [] Rescheduled appointment    [] No-show     Reason given by patient:    [x]  Patient ill    []  Conflicting appointment    [] No transportation      [] Conflict with work    [] No reason given    [] Weather related    [] COVID-19    [] Other:      Comments:        [x] Next appointment was confirmed    Electronically signed by: TIEN CHUA PTA

## 2025-06-30 ENCOUNTER — HOSPITAL ENCOUNTER (OUTPATIENT)
Dept: PHYSICAL THERAPY | Age: 60
Setting detail: THERAPIES SERIES
Discharge: HOME | End: 2025-06-30
Payer: COMMERCIAL

## 2025-06-30 NOTE — FLOWSHEET NOTE
[facility]   Outpatient Rehabilitation & Therapy  [address] [facility] [address]  P: [phone]   F: [phone]     Physical Therapy Cancel/No Show note    Date: 2025  Patient: Petra Starr  : 1965  MRN: [id number]    Visit Count:   Cancels/No Shows to date:     For today's appointment patient:    [x]  Cancelled    [] Rescheduled appointment    [] No-show     Reason given by patient:    []  Patient ill    []  Conflicting appointment    [] No transportation      [] Conflict with work    [x] No reason given    [] Weather related    [] COVID-19    [] Other:      Comments:        [x] Next appointment was confirmed    Electronically signed by: Thalia Marroquin PT

## 2025-07-02 ENCOUNTER — HOSPITAL ENCOUNTER (OUTPATIENT)
Dept: PHYSICAL THERAPY | Age: 60
Setting detail: THERAPIES SERIES
Discharge: HOME | End: 2025-07-02
Payer: COMMERCIAL

## 2025-07-02 PROCEDURE — 97016 VASOPNEUMATIC DEVICE THERAPY: CPT

## 2025-07-02 PROCEDURE — 97110 THERAPEUTIC EXERCISES: CPT

## 2025-07-02 NOTE — FLOWSHEET NOTE
[facility] Outpatient Physical Therapy   [address]   Phone: [phone]   Fax: [phone]    Physical Therapy Daily Treatment Note      Date:  2025  Patient Name:  Petra Starr    :  1965  MRN: [id number]  Physician: Mars Bates MD                                   Insurance: [facility] Approved w/ disclaimer - 3 x week for 4 weeks. 12 visits, valid 6/10 -    Claim # [id number]   Medical Diagnosis:   S43.81XS (ICD-10-CM) - Sprain of other specified parts of right shoulder girdle, sequela   S43.421A (ICD-10-CM) - Sprain of right rotator cuff capsule, initial encounter   S16.1XXA (ICD-10-CM) - Strain of muscle, fascia and tendon at neck level, initial encounter   Rehab Codes: M62.81 , M25.511 , M54.2 , M25. 60   Onset Date: 4/3/2025 DOI                 Next 's appt: 2025 - Dr. Bates  Visit# / total visits: 3  Cancels/No Shows: 2/0    Precautions: fibromyalgia, depression      Subjective:    Pt arrives with 4/10 pain in R shoulder. Pt reports pain is provoked due to pushing up though R arm while ascending from chair. Pt saw Dr. Bates on Monday and has follow up appt on . Pt reported minimal soreness following previous visit. Reports compliance to HEP.     Pain:  [x] Yes  [] No Location: R Shoulder Pain Rating: (0-10 scale) 4/10  Pain altered Tx:  [x] No  [] Yes  Action:  Comments:    Objective:  INTERVENTIONS  Game Ready   - Temp: 34 degrees   - Location: R shoulder  - Position: Seated  - Time: 10 minutes   - Pressure level: Low   INTERVENTIONS  Reps/ Time Weight/ Level Completed  Today Comments          MODALITIES                      MANUAL                             EXERCISES        Mat Level:       AAROM Cane R Shoulder Flexion / ABD 10 x 3-5\"  x    Rhythmic Stabilization R Shoulder 3 x 30\"      Supine R Shoulder Flexion 10 x 2 1# x Weight during second set   Scapular Punches 10 x 2 1# x    ABC 1 x 1# x    Side lying ABD 10 x  x    Side

## 2025-07-07 ENCOUNTER — HOSPITAL ENCOUNTER (OUTPATIENT)
Dept: PHYSICAL THERAPY | Age: 60
Setting detail: THERAPIES SERIES
Discharge: HOME | End: 2025-07-07
Payer: COMMERCIAL

## 2025-07-07 NOTE — FLOWSHEET NOTE
[facility]   Outpatient Rehabilitation & Therapy  [address] [facility] [address]  P: [phone]   F: [phone]     Physical Therapy Cancel/No Show note    Date: 2025  Patient: Petra Starr  : 1965  MRN: [id number]    Visit Count: 3/12  Cancels/No Shows to date: 3/0    For today's appointment patient:    [x]  Cancelled    [] Rescheduled appointment    [] No-show     Reason given by patient:    []  Patient ill    []  Conflicting appointment    [] No transportation      [] Conflict with work    [x] No reason given    [] Weather related    [] COVID-19    [] Other:      Comments:        [x] Next appointment was confirmed --this 2025    Electronically signed by: Thalia Marroquin, PT

## 2025-07-08 NOTE — CARE COORDINATION
[x] [facility]   Outpatient Rehabilitation & Therapy  [address]  P: [phone]   F: [phone]     Physical Therapy Insurance Communication Note    Date: 2025  Patient: Petra Starr  : 1965  MRN: [id number]    Insurance: [facility] Approved w/ disclaimer - 3 x week for 4 weeks. 12 visits, valid 6/10 -    Claim # [id number]    Visit Count: 3/12  Cancels/No Shows to date: 3/0      Writer called and left voicemail at 8:32 am at this date for  Domi to request for date extension to 2025 to allow patient more time to complete approved visits. Left fax number and call back number.           Electronically signed by: Thalia Marroquin PT

## 2025-07-09 ENCOUNTER — HOSPITAL ENCOUNTER (OUTPATIENT)
Dept: PHYSICAL THERAPY | Age: 60
Setting detail: THERAPIES SERIES
Discharge: HOME | End: 2025-07-09
Payer: COMMERCIAL

## 2025-07-09 NOTE — FLOWSHEET NOTE
[facility]   Outpatient Rehabilitation & Therapy  [address]  P: [phone]   F: [phone]     Physical Therapy Cancel/No Show note    Date: 2025  Patient: Petra Thrasher  : 1965  MRN: [id number]    Visit Count: 3/12  Cancels/No Shows to date:     For today's appointment patient:    [x]  Cancelled    [] Rescheduled appointment    [] No-show     Reason given by patient:    []  Patient ill    []  Conflicting appointment    [] No transportation      [] Conflict with work    [] No reason given    [] Weather related    [] COVID-19    [x] Other:      Comments: Per : Pt is feeling better and she will contact [facility] to let them know she is done with therapy. PT aware.      [] Next appointment was confirmed    Electronically signed by: TIEN CHUA PTA

## 2025-07-11 ENCOUNTER — APPOINTMENT (OUTPATIENT)
Dept: PHYSICAL THERAPY | Age: 60
End: 2025-07-11
Payer: COMMERCIAL

## 2025-07-18 ENCOUNTER — E-VISIT (OUTPATIENT)
Dept: FAMILY MEDICINE CLINIC | Age: 60
End: 2025-07-18

## 2025-07-18 DIAGNOSIS — S80.869A INSECT BITE OF LOWER LEG, UNSPECIFIED LATERALITY, INITIAL ENCOUNTER: Primary | ICD-10-CM
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
## 2025-07-18 DIAGNOSIS — Z12.31 ENCOUNTER FOR SCREENING MAMMOGRAM FOR BREAST CANCER: ICD-10-CM

## 2025-07-18 RX ORDER — DOXYCYCLINE HYCLATE 100 MG
100 TABLET ORAL 2 TIMES DAILY
Qty: 14 TABLET | Refills: 0 | Status: SHIPPED | OUTPATIENT
Start: 2025-07-18 | End: 2025-07-25

## 2025-07-18 RX ORDER — MUPIROCIN 2 %
OINTMENT (GRAM) TOPICAL
Qty: 30 G | Refills: 3 | Status: SHIPPED | OUTPATIENT
Start: 2025-07-18

## 2025-07-18 RX ORDER — METHYLPREDNISOLONE 4 MG/1
TABLET ORAL
Qty: 1 KIT | Refills: 0 | Status: SHIPPED | OUTPATIENT
Start: 2025-07-18

## 2025-07-18 NOTE — PROGRESS NOTES
Petra Thrasher (1965) initiated an asynchronous digital communication through [employer].  Date of service: 7/18/2025     HPI: per patient's questionnaire    EXAM: not applicable    Diagnoses and all orders for this visit:  Assessment & Plan  Insect bite of lower leg, unspecified laterality, initial encounter   New, acute, pictures noted with multiple erythematous patches, with insect marks in the middle, significant surrounding erythema around the insect bites located on the legs,  Due to itching for which she has been using Zyrtec and hydrocortisone will give Medrol pack  Based on the pictures, she has likely early cellulitis as well, due to significant surrounding erythema and skin breakdown during multiple insect bites  Orders:    doxycycline hyclate (VIBRA-TABS) 100 MG tablet; Take 1 tablet by mouth 2 times daily for 7 days    mupirocin (BACTROBAN) 2 % ointment; Apply topically 3 times daily x 10 days.    methylPREDNISolone (MEDROL, LEONIDAS,) 4 MG tablet; Take by mouth, with food. Keep low carb, low salt diet while taking it    Encounter for screening mammogram for breast cancer       Orders:    [facility] REBECCA DIGITAL SCREEN BILATERAL; Future        Patient was advised to contact PCP if symptoms worsen or failing to change as expected      Time: EV3 - 21 or more minutes were spent on the digital evaluation and management of this patient.    Electronically signed by Leslie Herron MD on 7/18/25 at 11:27 AM.

## 2025-07-28 ENCOUNTER — HOSPITAL ENCOUNTER (OUTPATIENT)
Dept: PAIN MANAGEMENT | Age: 60
Discharge: HOME | End: 2025-07-28

## 2025-07-28 VITALS — HEIGHT: 60 IN | WEIGHT: 212 LBS | BODY MASS INDEX: 41.62 KG/M2

## 2025-07-28 DIAGNOSIS — M47.817 LUMBOSACRAL SPONDYLOSIS WITHOUT MYELOPATHY: ICD-10-CM

## 2025-07-28 DIAGNOSIS — M51.369 DEGENERATION OF INTERVERTEBRAL DISC OF LUMBAR REGION, UNSPECIFIED WHETHER PAIN PRESENT: ICD-10-CM

## 2025-07-28 DIAGNOSIS — M54.16 LUMBAR RADICULOPATHY: Primary | ICD-10-CM

## 2025-07-28 DIAGNOSIS — E66.812 CLASS 2 OBESITY WITH BODY MASS INDEX (BMI) OF 38.0 TO 38.9 IN ADULT, UNSPECIFIED OBESITY TYPE, UNSPECIFIED WHETHER SERIOUS COMORBIDITY PRESENT: ICD-10-CM

## 2025-07-28 PROCEDURE — 99213 OFFICE O/P EST LOW 20 MIN: CPT

## 2025-07-28 PROCEDURE — 99214 OFFICE O/P EST MOD 30 MIN: CPT | Performed by: STUDENT IN AN ORGANIZED HEALTH CARE EDUCATION/TRAINING PROGRAM

## 2025-07-28 RX ORDER — ETODOLAC 400 MG/1
400 TABLET, FILM COATED ORAL 2 TIMES DAILY PRN
Qty: 60 TABLET | Refills: 0 | Status: SHIPPED | OUTPATIENT
Start: 2025-07-28 | End: 2025-08-27

## 2025-07-28 NOTE — PROGRESS NOTES
disorder 07/19/2020    Opioid dependence (HCC) 10/16/2014    PONV (postoperative nausea and vomiting)     \"THEY PUT SOMETHING BEHIND MY EAR FOR THE N&V\"    Presence of left artificial knee joint 09/29/2021    Primary osteoarthritis of both knees 04/25/2021    Formatting of this note might be different from the original.  Added automatically from request for surgery [id number]      Severe obesity (BMI 35.0-39.9) with comorbidity (HCC) 07/19/2020    Spinal stenosis of lumbar region without neurogenic claudication 06/15/2024    Status post right knee replacement 11/23/2024    Urge incontinence 03/09/2020    Wears glasses        Past Surgical History:   Procedure Laterality Date    BLADDER SUSPENSION      CARDIAC CATHETERIZATION  08/13/2021    LAD: Diffuse irregularities 30-40%., EF 60%    COLONOSCOPY N/A 09/18/2023    COLONOSCOPY DIAGNOSTIC performed by Vamshi Maldonado MD at [facility] ENDO    FRACTURE SURGERY  02/07/2013    right distial radius orif    HYSTERECTOMY (CERVIX STATUS UNKNOWN)      has ovaries    JOINT REPLACEMENT Right 09/05/2024    KNEE ARTHROSCOPY      right    OR COLONOSCOPY W/BIOPSY SINGLE/MULTIPLE N/A 11/16/2017    COLONOSCOPY WITH BIOPSY OF POLYP performed by Vamshi Maldonado MD at [facility] OR    OR EGD TRANSORAL BIOPSY SINGLE/MULTIPLE N/A 11/16/2017    EGD BIOPSY performed by Vamshi Maldonado MD at [facility] OR    TONSILLECTOMY      TOTAL KNEE ARTHROPLASTY Left 09/2021    Dr. Grant    UPPER GASTROINTESTINAL ENDOSCOPY N/A 03/07/2024    ESOPHAGOGASTRODUODENOSCOPY BIOPSY performed by Vamshi Maldonado MD at [facility] ENDO    WRIST SURGERY Right     PINS, PLATE & SCREWS INTACT.       Family History   Problem Relation Age of Onset    High Blood Pressure Mother     Depression Mother     Heart Surgery Father     Other Brother         scoliosis    Colon Cancer Maternal Grandfather     COPD Maternal Grandfather     Lung Cancer Maternal Grandfather         lung    Breast Cancer Paternal Aunt 50    Cancer Maternal Cousin         brain

## 2025-08-11 ENCOUNTER — PATIENT MESSAGE (OUTPATIENT)
Dept: FAMILY MEDICINE CLINIC | Age: 60
End: 2025-08-11

## 2025-08-11 DIAGNOSIS — R35.1 NOCTURIA: ICD-10-CM

## 2025-08-11 DIAGNOSIS — R73.03 PREDIABETES: Primary | ICD-10-CM

## 2025-08-11 DIAGNOSIS — N32.81 OAB (OVERACTIVE BLADDER): ICD-10-CM

## 2025-08-12 RX ORDER — MIRABEGRON 50 MG/1
50 TABLET, FILM COATED, EXTENDED RELEASE ORAL DAILY
Qty: 30 TABLET | Refills: 0 | Status: SHIPPED | OUTPATIENT
Start: 2025-08-12

## 2025-08-15 ENCOUNTER — PATIENT MESSAGE (OUTPATIENT)
Dept: FAMILY MEDICINE CLINIC | Age: 60
End: 2025-08-15

## 2025-08-15 DIAGNOSIS — Z96.653 STATUS POST BILATERAL KNEE REPLACEMENTS: ICD-10-CM

## 2025-08-15 DIAGNOSIS — M48.061 SPINAL STENOSIS OF LUMBAR REGION WITHOUT NEUROGENIC CLAUDICATION: Primary | ICD-10-CM

## 2025-08-15 RX ORDER — LIDOCAINE 50 MG/G
1 PATCH TOPICAL DAILY
Qty: 90 PATCH | Refills: 3 | Status: SHIPPED | OUTPATIENT
Start: 2025-08-15

## 2025-08-16 DIAGNOSIS — M25.561 CHRONIC PAIN OF RIGHT KNEE: ICD-10-CM

## 2025-08-16 DIAGNOSIS — G89.29 CHRONIC PAIN OF RIGHT KNEE: ICD-10-CM

## 2025-08-16 DIAGNOSIS — Z96.651 STATUS POST RIGHT KNEE REPLACEMENT: ICD-10-CM

## 2025-08-16 DIAGNOSIS — G89.29 CHRONIC MIDLINE LOW BACK PAIN WITH RIGHT-SIDED SCIATICA: ICD-10-CM

## 2025-08-16 DIAGNOSIS — M54.41 CHRONIC MIDLINE LOW BACK PAIN WITH RIGHT-SIDED SCIATICA: ICD-10-CM

## 2025-08-16 DIAGNOSIS — M79.7 FIBROMYALGIA: ICD-10-CM

## 2025-08-17 RX ORDER — GABAPENTIN 100 MG/1
100 CAPSULE ORAL 3 TIMES DAILY
Qty: 90 CAPSULE | Refills: 0 | Status: SHIPPED | OUTPATIENT
Start: 2025-08-17 | End: 2025-09-16

## 2025-08-26 RX ORDER — ETODOLAC 400 MG/1
TABLET, FILM COATED ORAL
Qty: 60 TABLET | Refills: 0 | Status: SHIPPED | OUTPATIENT
Start: 2025-08-26

## 2025-08-28 ENCOUNTER — TELEPHONE (OUTPATIENT)
Dept: PAIN MANAGEMENT | Age: 60
End: 2025-08-28

## 2025-08-28 ENCOUNTER — OFFICE VISIT (OUTPATIENT)
Dept: NEUROSURGERY | Age: 60
End: 2025-08-28
Payer: COMMERCIAL

## 2025-08-28 VITALS
OXYGEN SATURATION: 97 % | BODY MASS INDEX: 40.25 KG/M2 | HEART RATE: 64 BPM | HEIGHT: 60 IN | DIASTOLIC BLOOD PRESSURE: 84 MMHG | SYSTOLIC BLOOD PRESSURE: 144 MMHG | WEIGHT: 205 LBS

## 2025-08-28 DIAGNOSIS — M47.26 OTHER SPONDYLOSIS WITH RADICULOPATHY, LUMBAR REGION: Primary | ICD-10-CM

## 2025-08-28 DIAGNOSIS — M53.3 SACROILIAC PAIN: ICD-10-CM

## 2025-08-28 PROCEDURE — 3079F DIAST BP 80-89 MM HG: CPT | Performed by: NURSE PRACTITIONER

## 2025-08-28 PROCEDURE — 3017F COLORECTAL CA SCREEN DOC REV: CPT | Performed by: NURSE PRACTITIONER

## 2025-08-28 PROCEDURE — G8417 CALC BMI ABV UP PARAM F/U: HCPCS | Performed by: NURSE PRACTITIONER

## 2025-08-28 PROCEDURE — 1036F TOBACCO NON-USER: CPT | Performed by: NURSE PRACTITIONER

## 2025-08-28 PROCEDURE — 3077F SYST BP >= 140 MM HG: CPT | Performed by: NURSE PRACTITIONER

## 2025-08-28 PROCEDURE — G8427 DOCREV CUR MEDS BY ELIG CLIN: HCPCS | Performed by: NURSE PRACTITIONER

## 2025-08-28 PROCEDURE — 99204 OFFICE O/P NEW MOD 45 MIN: CPT | Performed by: NURSE PRACTITIONER

## 2025-08-30 DIAGNOSIS — R73.03 PREDIABETES: ICD-10-CM

## 2025-09-02 RX ORDER — DULAGLUTIDE 0.75 MG/.5ML
0.75 INJECTION, SOLUTION SUBCUTANEOUS WEEKLY
Qty: 2 ML | Refills: 0 | Status: SHIPPED | OUTPATIENT
Start: 2025-09-02

## 2025-09-03 ENCOUNTER — HOSPITAL ENCOUNTER (OUTPATIENT)
Dept: PAIN MANAGEMENT | Age: 60
Discharge: HOME | End: 2025-09-03
Payer: MEDICAID

## 2025-09-03 VITALS — HEIGHT: 60 IN | WEIGHT: 200 LBS | BODY MASS INDEX: 39.27 KG/M2

## 2025-09-03 DIAGNOSIS — M53.3 SACROILIAC JOINT PAIN: Primary | ICD-10-CM

## 2025-09-03 DIAGNOSIS — M47.817 LUMBOSACRAL SPONDYLOSIS WITHOUT MYELOPATHY: ICD-10-CM

## 2025-09-03 DIAGNOSIS — E66.812 CLASS 2 OBESITY WITH BODY MASS INDEX (BMI) OF 38.0 TO 38.9 IN ADULT, UNSPECIFIED OBESITY TYPE, UNSPECIFIED WHETHER SERIOUS COMORBIDITY PRESENT: ICD-10-CM

## 2025-09-03 DIAGNOSIS — M51.369 DEGENERATION OF INTERVERTEBRAL DISC OF LUMBAR REGION, UNSPECIFIED WHETHER PAIN PRESENT: ICD-10-CM

## 2025-09-03 PROCEDURE — 99214 OFFICE O/P EST MOD 30 MIN: CPT | Performed by: STUDENT IN AN ORGANIZED HEALTH CARE EDUCATION/TRAINING PROGRAM

## 2025-09-03 PROCEDURE — 99213 OFFICE O/P EST LOW 20 MIN: CPT

## 2025-09-03 ASSESSMENT — PAIN SCALES - GENERAL: PAINLEVEL_OUTOF10: 5

## (undated) DEVICE — FORCEPS BX L240CM JAW DIA2.2MM RAD JAW 4 HOT DISP

## (undated) DEVICE — DEFENDO AIR WATER SUCTION AND BIOPSY VALVE KIT FOR  OLYMPUS: Brand: DEFENDO AIR/WATER/SUCTION AND BIOPSY VALVE

## (undated) DEVICE — GLOVE SURG SZ 75 L12IN FNGR THK79MIL GRN LTX FREE

## (undated) DEVICE — ENDO KIT W/SYRINGE: Brand: MEDLINE INDUSTRIES, INC.

## (undated) DEVICE — FORCEPS BX L240CM WRK CHN 2.8MM STD CAP W/ NDL MIC MESH

## (undated) DEVICE — SYRINGE MED 50ML LUERLOCK TIP

## (undated) DEVICE — BITEBLOCK 54FR W/ DENT RIM BLOX

## (undated) DEVICE — JELLY,LUBE,STERILE,FLIP TOP,TUBE,2-OZ: Brand: MEDLINE

## (undated) DEVICE — GLOVE ORANGE PI 7 1/2   MSG9075

## (undated) DEVICE — AIRLIFE™ NASAL OXYGEN CANNULA CURVED, FLARED TIP, WITH 7 FEET (2.1 M) CRUSH RESISTANT TUBING, OVER-THE-EAR STYLE: Brand: AIRLIFE™